# Patient Record
Sex: MALE | Race: WHITE | NOT HISPANIC OR LATINO | URBAN - METROPOLITAN AREA
[De-identification: names, ages, dates, MRNs, and addresses within clinical notes are randomized per-mention and may not be internally consistent; named-entity substitution may affect disease eponyms.]

---

## 2018-10-31 ENCOUNTER — OUTPATIENT (OUTPATIENT)
Dept: OUTPATIENT SERVICES | Facility: HOSPITAL | Age: 63
LOS: 1 days | Discharge: HOME | End: 2018-10-31

## 2018-10-31 DIAGNOSIS — I10 ESSENTIAL (PRIMARY) HYPERTENSION: ICD-10-CM

## 2019-05-02 ENCOUNTER — APPOINTMENT (OUTPATIENT)
Dept: CARDIOLOGY | Facility: CLINIC | Age: 64
End: 2019-05-02
Payer: MEDICARE

## 2019-05-02 PROCEDURE — 99214 OFFICE O/P EST MOD 30 MIN: CPT

## 2019-05-02 PROCEDURE — 93000 ELECTROCARDIOGRAM COMPLETE: CPT

## 2019-10-16 ENCOUNTER — APPOINTMENT (OUTPATIENT)
Dept: CARDIOLOGY | Facility: CLINIC | Age: 64
End: 2019-10-16
Payer: MEDICARE

## 2019-10-16 PROCEDURE — 76377 3D RENDER W/INTRP POSTPROCES: CPT

## 2019-10-16 PROCEDURE — 93306 TTE W/DOPPLER COMPLETE: CPT

## 2019-10-24 ENCOUNTER — APPOINTMENT (OUTPATIENT)
Dept: CARDIOLOGY | Facility: CLINIC | Age: 64
End: 2019-10-24
Payer: MEDICARE

## 2019-10-24 PROCEDURE — 93880 EXTRACRANIAL BILAT STUDY: CPT

## 2019-11-20 ENCOUNTER — APPOINTMENT (OUTPATIENT)
Dept: CARDIOLOGY | Facility: CLINIC | Age: 64
End: 2019-11-20
Payer: MEDICARE

## 2019-11-20 PROCEDURE — 93000 ELECTROCARDIOGRAM COMPLETE: CPT

## 2019-11-20 PROCEDURE — 99214 OFFICE O/P EST MOD 30 MIN: CPT

## 2020-05-19 ENCOUNTER — APPOINTMENT (OUTPATIENT)
Dept: CARDIOLOGY | Facility: CLINIC | Age: 65
End: 2020-05-19
Payer: MEDICARE

## 2020-05-19 PROCEDURE — 99214 OFFICE O/P EST MOD 30 MIN: CPT

## 2020-05-19 PROCEDURE — 93000 ELECTROCARDIOGRAM COMPLETE: CPT

## 2020-09-04 ENCOUNTER — RX RENEWAL (OUTPATIENT)
Age: 65
End: 2020-09-04

## 2020-10-05 ENCOUNTER — RECORD ABSTRACTING (OUTPATIENT)
Age: 65
End: 2020-10-05

## 2020-10-05 DIAGNOSIS — Z87.448 PERSONAL HISTORY OF OTHER DISEASES OF URINARY SYSTEM: ICD-10-CM

## 2020-10-05 DIAGNOSIS — I65.23 OCCLUSION AND STENOSIS OF BILATERAL CAROTID ARTERIES: ICD-10-CM

## 2020-10-05 DIAGNOSIS — Z87.898 PERSONAL HISTORY OF OTHER SPECIFIED CONDITIONS: ICD-10-CM

## 2020-10-05 DIAGNOSIS — Z87.891 PERSONAL HISTORY OF NICOTINE DEPENDENCE: ICD-10-CM

## 2020-10-05 DIAGNOSIS — Z86.79 PERSONAL HISTORY OF OTHER DISEASES OF THE CIRCULATORY SYSTEM: ICD-10-CM

## 2020-11-12 ENCOUNTER — APPOINTMENT (OUTPATIENT)
Dept: CARDIOLOGY | Facility: CLINIC | Age: 65
End: 2020-11-12
Payer: MEDICARE

## 2020-11-12 VITALS
HEART RATE: 50 BPM | TEMPERATURE: 98.5 F | SYSTOLIC BLOOD PRESSURE: 120 MMHG | WEIGHT: 238 LBS | DIASTOLIC BLOOD PRESSURE: 70 MMHG | BODY MASS INDEX: 33.32 KG/M2 | RESPIRATION RATE: 16 BRPM | OXYGEN SATURATION: 98 % | HEIGHT: 71 IN

## 2020-11-12 PROCEDURE — 99214 OFFICE O/P EST MOD 30 MIN: CPT

## 2020-11-12 PROCEDURE — 93000 ELECTROCARDIOGRAM COMPLETE: CPT

## 2020-11-12 RX ORDER — MULTIVIT-MIN/FOLIC/VIT K/LYCOP 400-300MCG
50 MCG TABLET ORAL
Refills: 0 | Status: ACTIVE | COMMUNITY

## 2020-11-12 RX ORDER — HYDROXYCHLOROQUINE SULFATE 200 MG/1
200 TABLET ORAL
Refills: 0 | Status: ACTIVE | COMMUNITY

## 2020-11-12 NOTE — HISTORY OF PRESENT ILLNESS
[FreeTextEntry1] : 65 year old male who came in for on going evaluation for HTN, MVP, hyperlipidemia and palpitations. Patient denies chest pains, cough, fever or dyspnea. The patient is currently being treated for Lupus.\par \par \par PMH is significant for:\par \par HTN - controlled\par Palpitations - stable\par MVP - stable\par Hyperlipidemia - controlled\par Lupus - stable\par \par Medications reviewed and reconciled with patient. Patient is compliant with taking appropriate medications at appropriate doses at appropriate intervals without missing doses.

## 2020-11-12 NOTE — ASSESSMENT
[FreeTextEntry1] : HTN - controlled\par Palpitations - stable\par MVP - stable\par Hyperlipidemia - controlled\par Lupus - stable

## 2020-11-12 NOTE — PHYSICAL EXAM
[General Appearance - Well Developed] : well developed [Normal Appearance] : normal appearance [Well Groomed] : well groomed [General Appearance - Well Nourished] : well nourished [No Deformities] : no deformities [General Appearance - In No Acute Distress] : no acute distress [Normal Conjunctiva] : the conjunctiva exhibited no abnormalities [Eyelids - No Xanthelasma] : the eyelids demonstrated no xanthelasmas [Normal Oral Mucosa] : normal oral mucosa [No Oral Pallor] : no oral pallor [No Oral Cyanosis] : no oral cyanosis [Normal Jugular Venous A Waves Present] : normal jugular venous A waves present [Normal Jugular Venous V Waves Present] : normal jugular venous V waves present [No Jugular Venous Sanchez A Waves] : no jugular venous sanchez A waves [Respiration, Rhythm And Depth] : normal respiratory rhythm and effort [Exaggerated Use Of Accessory Muscles For Inspiration] : no accessory muscle use [Auscultation Breath Sounds / Voice Sounds] : lungs were clear to auscultation bilaterally [Heart Rate And Rhythm] : heart rate and rhythm were normal [Heart Sounds] : normal S1 and S2 [Murmurs] : no murmurs present [Arterial Pulses Normal] : the arterial pulses were normal [Edema] : no peripheral edema present [Abdomen Soft] : soft [Abdomen Tenderness] : non-tender [Abdomen Mass (___ Cm)] : no abdominal mass palpated [Abnormal Walk] : normal gait [Gait - Sufficient For Exercise Testing] : the gait was sufficient for exercise testing [Nail Clubbing] : no clubbing of the fingernails [Cyanosis, Localized] : no localized cyanosis [Petechial Hemorrhages (___cm)] : no petechial hemorrhages [Skin Color & Pigmentation] : normal skin color and pigmentation [] : no rash [No Venous Stasis] : no venous stasis [Skin Lesions] : no skin lesions [No Skin Ulcers] : no skin ulcer [No Xanthoma] : no  xanthoma was observed [Oriented To Time, Place, And Person] : oriented to person, place, and time [Affect] : the affect was normal [Mood] : the mood was normal [No Anxiety] : not feeling anxious

## 2021-03-04 ENCOUNTER — RX RENEWAL (OUTPATIENT)
Age: 66
End: 2021-03-04

## 2021-05-12 ENCOUNTER — APPOINTMENT (OUTPATIENT)
Dept: CARDIOLOGY | Facility: CLINIC | Age: 66
End: 2021-05-12
Payer: MEDICARE

## 2021-05-12 VITALS
WEIGHT: 246 LBS | HEIGHT: 71 IN | DIASTOLIC BLOOD PRESSURE: 80 MMHG | RESPIRATION RATE: 16 BRPM | BODY MASS INDEX: 34.44 KG/M2 | HEART RATE: 59 BPM | OXYGEN SATURATION: 95 % | SYSTOLIC BLOOD PRESSURE: 126 MMHG | TEMPERATURE: 97.9 F

## 2021-05-12 PROCEDURE — 99213 OFFICE O/P EST LOW 20 MIN: CPT

## 2021-05-12 PROCEDURE — 93000 ELECTROCARDIOGRAM COMPLETE: CPT

## 2021-05-12 NOTE — DISCUSSION/SUMMARY
[With Me] : with me [___ Month(s)] : in [unfilled] month(s) [FreeTextEntry1] : Ekg results were reviewed.\par Recent lab results were reviewed.\par \par Fasting CBC, BMP, LFTs, Lipid Profile, HgbA1c, CRP, UA, Urine Microalbumin, Mg, Ca, TSH, T3, T4 prior to next visit\par f/u 6 months\par \par Number/complexity of problems -  Mod - 2 or more stable chronic illnesses\par Amount/complexity of data -history, exam, reviewed old note, labs reviewed, ekg reviewed\par Risk of complications - Low\par

## 2021-08-28 ENCOUNTER — RX RENEWAL (OUTPATIENT)
Age: 66
End: 2021-08-28

## 2021-09-18 ENCOUNTER — RX RENEWAL (OUTPATIENT)
Age: 66
End: 2021-09-18

## 2021-11-15 ENCOUNTER — APPOINTMENT (OUTPATIENT)
Dept: CARDIOLOGY | Facility: CLINIC | Age: 66
End: 2021-11-15
Payer: MEDICARE

## 2021-11-15 VITALS
WEIGHT: 227 LBS | SYSTOLIC BLOOD PRESSURE: 122 MMHG | OXYGEN SATURATION: 98 % | DIASTOLIC BLOOD PRESSURE: 70 MMHG | HEART RATE: 53 BPM | TEMPERATURE: 97.8 F | BODY MASS INDEX: 31.78 KG/M2 | HEIGHT: 71 IN

## 2021-11-15 DIAGNOSIS — Z00.00 ENCOUNTER FOR GENERAL ADULT MEDICAL EXAMINATION W/OUT ABNORMAL FINDINGS: ICD-10-CM

## 2021-11-15 PROCEDURE — 99213 OFFICE O/P EST LOW 20 MIN: CPT

## 2021-11-15 PROCEDURE — 93000 ELECTROCARDIOGRAM COMPLETE: CPT

## 2021-11-15 RX ORDER — MULTIVITAMIN
TABLET,CHEWABLE ORAL
Qty: 1 | Refills: 0 | Status: ACTIVE | COMMUNITY

## 2021-11-15 NOTE — HISTORY OF PRESENT ILLNESS
[FreeTextEntry1] : 66 year old male who came in for on going evaluation for HTN, MVP, hyperlipidemia and palpitations. Patient denies chest pains, cough, fever or dyspnea. The patient is currently being treated for Lupus.\par \par \par PMH is significant for:\par \par HTN - controlled\par Palpitations - stable\par MVP - stable\par Hyperlipidemia - controlled\par Lupus - stable\par \par Medications reviewed and reconciled with patient. Patient is compliant with taking appropriate medications at appropriate doses at appropriate intervals without missing doses.

## 2021-11-15 NOTE — DISCUSSION/SUMMARY
[With Me] : with me [___ Month(s)] : in [unfilled] month(s) [FreeTextEntry1] : Last visit note reviewed.\par Ekg results were reviewed.\par Recent lab results were reviewed.\par \par Fasting CBC, BMP, LFTs, Lipid Profile, HgbA1c, CRP, UA, Urine Microalbumin, Mg, Ca, TSH, T3, T4 prior to next visit\par f/u 6 months\par \par Number/complexity of problems -  Mod - 2 or more stable chronic illnesses\par Amount/complexity of data -history, exam, reviewed old note, labs reviewed, ekg reviewed\par Risk of complications - Low\par

## 2022-02-27 ENCOUNTER — RX RENEWAL (OUTPATIENT)
Age: 67
End: 2022-02-27

## 2022-03-24 NOTE — DISCUSSION/SUMMARY
Dear Estefania Barrios,    We are sorry you are not feeling well. Based on the responses you provided, it is recommended that you be seen in-person in urgent care so we can better evaluate your symptoms. Please click here to find the nearest urgent care location to you.   You will not be charged for this Visit. Thank you for trusting us with your care.    Niecy Price, CNP     [___ Month(s)] : [unfilled] month(s) [With Me] : with me [FreeTextEntry1] : Ekg results were reviewed.\par Recent lab results were reviewed.\par \par Fasting CBC, BMP, LFTs, Lipid Profile, HgbA1c, CRP, UA, Urine Microalbumin, Mg, Ca, TSH, T3, T4 prior to next visit\par f/u 6 months

## 2022-05-12 ENCOUNTER — APPOINTMENT (OUTPATIENT)
Dept: CARDIOLOGY | Facility: CLINIC | Age: 67
End: 2022-05-12
Payer: MEDICARE

## 2022-05-12 VITALS — SYSTOLIC BLOOD PRESSURE: 154 MMHG | HEART RATE: 80 BPM | DIASTOLIC BLOOD PRESSURE: 79 MMHG

## 2022-05-12 VITALS
DIASTOLIC BLOOD PRESSURE: 72 MMHG | TEMPERATURE: 96.8 F | BODY MASS INDEX: 32.34 KG/M2 | SYSTOLIC BLOOD PRESSURE: 122 MMHG | OXYGEN SATURATION: 95 % | HEIGHT: 71 IN | HEART RATE: 58 BPM | WEIGHT: 231 LBS

## 2022-05-12 VITALS — DIASTOLIC BLOOD PRESSURE: 71 MMHG | HEART RATE: 60 BPM | SYSTOLIC BLOOD PRESSURE: 146 MMHG

## 2022-05-12 VITALS — DIASTOLIC BLOOD PRESSURE: 80 MMHG | SYSTOLIC BLOOD PRESSURE: 146 MMHG

## 2022-05-12 DIAGNOSIS — E78.5 HYPERLIPIDEMIA, UNSPECIFIED: ICD-10-CM

## 2022-05-12 PROCEDURE — 99214 OFFICE O/P EST MOD 30 MIN: CPT

## 2022-05-12 PROCEDURE — 93000 ELECTROCARDIOGRAM COMPLETE: CPT

## 2022-05-12 NOTE — ASSESSMENT
[FreeTextEntry1] : HTN - uncontrolled\par Palpitations - stable\par MVP - stable\par Hyperlipidemia - controlled\par Lupus - stable\par Dizziness - new

## 2022-05-12 NOTE — HISTORY OF PRESENT ILLNESS
[FreeTextEntry1] : 67 year old male who came in for on going evaluation for HTN, MVP, hyperlipidemia and palpitations. Patient denies chest pains, cough, fever or dyspnea. The patient is currently being treated for Lupus.  The patient fell about 8 weeks ago and feels woozy when he stands up quickly.  He also gets diizzy when he looks up and to the right.  The patient did not tke his BP meds today and was due for them about 30 min. ago.\par \par \par PMH is significant for:\par \par HTN - uncontrolled\par Palpitations - stable\par MVP - stable\par Hyperlipidemia - controlled\par Lupus - stable\par Dizziness - new\par \par Medications reviewed and reconciled with patient. Patient is compliant with taking appropriate medications at appropriate doses at appropriate intervals without missing doses.

## 2022-06-14 ENCOUNTER — APPOINTMENT (OUTPATIENT)
Dept: CARDIOLOGY | Facility: CLINIC | Age: 67
End: 2022-06-14
Payer: MEDICARE

## 2022-06-14 PROCEDURE — 93880 EXTRACRANIAL BILAT STUDY: CPT

## 2022-06-14 PROCEDURE — 93306 TTE W/DOPPLER COMPLETE: CPT

## 2022-06-22 ENCOUNTER — APPOINTMENT (OUTPATIENT)
Dept: CARDIOLOGY | Facility: CLINIC | Age: 67
End: 2022-06-22
Payer: MEDICARE

## 2022-06-22 VITALS
SYSTOLIC BLOOD PRESSURE: 124 MMHG | OXYGEN SATURATION: 97 % | BODY MASS INDEX: 31.5 KG/M2 | TEMPERATURE: 98.7 F | HEIGHT: 71 IN | DIASTOLIC BLOOD PRESSURE: 68 MMHG | HEART RATE: 82 BPM | WEIGHT: 225 LBS | RESPIRATION RATE: 16 BRPM

## 2022-06-22 VITALS — DIASTOLIC BLOOD PRESSURE: 78 MMHG | HEART RATE: 72 BPM | SYSTOLIC BLOOD PRESSURE: 132 MMHG

## 2022-06-22 VITALS — DIASTOLIC BLOOD PRESSURE: 67 MMHG | SYSTOLIC BLOOD PRESSURE: 118 MMHG | HEART RATE: 62 BPM

## 2022-06-22 VITALS — DIASTOLIC BLOOD PRESSURE: 64 MMHG | SYSTOLIC BLOOD PRESSURE: 123 MMHG | HEART RATE: 56 BPM

## 2022-06-22 PROCEDURE — 93000 ELECTROCARDIOGRAM COMPLETE: CPT

## 2022-06-22 PROCEDURE — 99214 OFFICE O/P EST MOD 30 MIN: CPT

## 2022-06-22 RX ORDER — MECLIZINE HYDROCHLORIDE 25 MG/1
25 TABLET ORAL 3 TIMES DAILY
Qty: 30 | Refills: 1 | Status: ACTIVE | COMMUNITY
Start: 2022-06-22 | End: 1900-01-01

## 2022-06-22 NOTE — ASSESSMENT
[FreeTextEntry1] : HTN - controlled\par Palpitations - stable\par MVP - stable\par Hyperlipidemia - controlled\par Lupus - stable\par Dizziness - continuing

## 2022-06-22 NOTE — DISCUSSION/SUMMARY
[With Me] : with me [___ Month(s)] : in [unfilled] month(s) [FreeTextEntry1] : Last visit note reviewed.\par Echocardiogram results were reviewed.\par Carotid artery duplex results were reviewed.\par Ekg was performed and interpreted. (sinus bradycardia, Non-specific T abnormality)\par \par Refer to ENT and neurology\par Meclizine 25 mg po tid\par Fasting CBC, BMP, LFTs, Lipid Profile, HgbA1c, CRP, UA, Urine Microalbumin, Mg, Ca, TSH, T3, T4 prior to next visit\par f/u 6 months\par \par Number/complexity of problems - Mod - 2 or more chronic stable illnesses\par Amount/complexity of data -history, exam, reviewed old note, ekg reviewed, echo reviewed, carotid reviewed\par Medication modification performed\par Risk of complications - Mod\par \par

## 2022-06-22 NOTE — HISTORY OF PRESENT ILLNESS
[FreeTextEntry1] : 67 year old male who came in for on going evaluation for HTN, MVP, hyperlipidemia and palpitations. Patient denies chest pains, cough, fever or dyspnea. The patient is currently being treated for Lupus.  The patient fell about 8 weeks ago and feels woozy when he stands up quickly.  He also gets diizzy when he looks up and to the right.  The patient did not tke his BP meds today and was due for them about 30 min. ago.\par \par \par PMH is significant for:\par \par HTN - controlled\par Palpitations - stable\par MVP - stable\par Hyperlipidemia - controlled\par Lupus - stable\par Dizziness - continuing\par \par Medications reviewed and reconciled with patient. Patient is compliant with taking appropriate medications at appropriate doses at appropriate intervals without missing doses.

## 2022-08-03 ENCOUNTER — APPOINTMENT (OUTPATIENT)
Dept: OTOLARYNGOLOGY | Facility: CLINIC | Age: 67
End: 2022-08-03

## 2022-08-03 VITALS — BODY MASS INDEX: 31.36 KG/M2 | HEIGHT: 71 IN | WEIGHT: 224 LBS

## 2022-08-03 PROCEDURE — 99203 OFFICE O/P NEW LOW 30 MIN: CPT

## 2022-08-03 NOTE — HISTORY OF PRESENT ILLNESS
[de-identified] : Patient presents today c/o dizziness.  He has been experiencing  dizziness when  standing  up . Dizziness when looking up .  Has been going on for  a while . Denies room  spinning , he gets  off balance .   Started  after Covid booster shots most recent 5/16/22 ,  10/20/21. Was advise not to get get  recent covid booster   Denies any nausea . Hard time taking  showers in the morning , gets  very  dizzy .  Dr Gisselle Sims  noticed that when he  stands up  blood  pressure drops . He  sees Dr Gisselle Sanchez for lupus taking hydrochloroquine . Feels better when drinking beer ,  has been drinking beer daily at night. Drinking water  during day.

## 2022-08-03 NOTE — PHYSICAL EXAM
[Normal] : mucosa is normal [Midline] : trachea located in midline position [] : [place] test is negative

## 2022-09-06 ENCOUNTER — RX RENEWAL (OUTPATIENT)
Age: 67
End: 2022-09-06

## 2022-09-12 ENCOUNTER — APPOINTMENT (OUTPATIENT)
Dept: SPEECH THERAPY | Facility: CLINIC | Age: 67
End: 2022-09-12

## 2022-09-12 ENCOUNTER — OUTPATIENT (OUTPATIENT)
Dept: OUTPATIENT SERVICES | Facility: HOSPITAL | Age: 67
LOS: 1 days | Discharge: HOME | End: 2022-09-12

## 2022-09-13 DIAGNOSIS — H91.23 SUDDEN IDIOPATHIC HEARING LOSS, BILATERAL: ICD-10-CM

## 2022-09-13 DIAGNOSIS — R42 DIZZINESS AND GIDDINESS: ICD-10-CM

## 2022-09-21 ENCOUNTER — APPOINTMENT (OUTPATIENT)
Dept: OTOLARYNGOLOGY | Facility: CLINIC | Age: 67
End: 2022-09-21

## 2022-09-21 VITALS — BODY MASS INDEX: 31.36 KG/M2 | HEIGHT: 71 IN | WEIGHT: 224 LBS

## 2022-09-21 DIAGNOSIS — R42 DIZZINESS AND GIDDINESS: ICD-10-CM

## 2022-09-21 DIAGNOSIS — H90.5 UNSPECIFIED SENSORINEURAL HEARING LOSS: ICD-10-CM

## 2022-09-21 PROCEDURE — 99215 OFFICE O/P EST HI 40 MIN: CPT

## 2022-09-21 NOTE — REASON FOR VISIT
12 hour chart check   [Subsequent Evaluation] : a subsequent evaluation for [FreeTextEntry2] : dizziness

## 2022-09-21 NOTE — HISTORY OF PRESENT ILLNESS
[FreeTextEntry1] : Patient returns today following up on dizziness. Patient is here to discuss results of hearing and VNG testing.

## 2022-09-21 NOTE — ASSESSMENT
[FreeTextEntry1] : Patient still dizzy. \par \par I reviewed, interpreted, and discussed the Audiogram done on sep 12. Bilateral SNHL.\par \par discussed hearing aids. Patient does not want them now.\par \par I personally reviewed, interpreted and discussed patient's VNG. In favor of BPPV, however, central etiology could not be ruled out.\par \par Patient to go to neurology.\par \par

## 2022-10-19 ENCOUNTER — APPOINTMENT (OUTPATIENT)
Dept: OTOLARYNGOLOGY | Facility: CLINIC | Age: 67
End: 2022-10-19

## 2022-12-21 ENCOUNTER — APPOINTMENT (OUTPATIENT)
Dept: CARDIOLOGY | Facility: CLINIC | Age: 67
End: 2022-12-21

## 2022-12-21 VITALS
HEART RATE: 52 BPM | DIASTOLIC BLOOD PRESSURE: 70 MMHG | SYSTOLIC BLOOD PRESSURE: 130 MMHG | WEIGHT: 220 LBS | BODY MASS INDEX: 30.8 KG/M2 | OXYGEN SATURATION: 97 % | HEIGHT: 71 IN

## 2022-12-21 DIAGNOSIS — I10 ESSENTIAL (PRIMARY) HYPERTENSION: ICD-10-CM

## 2022-12-21 DIAGNOSIS — M32.9 SYSTEMIC LUPUS ERYTHEMATOSUS, UNSPECIFIED: ICD-10-CM

## 2022-12-21 DIAGNOSIS — I65.29 OCCLUSION AND STENOSIS OF UNSPECIFIED CAROTID ARTERY: ICD-10-CM

## 2022-12-21 DIAGNOSIS — Z78.9 OTHER SPECIFIED HEALTH STATUS: ICD-10-CM

## 2022-12-21 DIAGNOSIS — E78.5 HYPERLIPIDEMIA, UNSPECIFIED: ICD-10-CM

## 2022-12-21 PROCEDURE — 99214 OFFICE O/P EST MOD 30 MIN: CPT

## 2022-12-21 PROCEDURE — 93000 ELECTROCARDIOGRAM COMPLETE: CPT

## 2022-12-21 NOTE — HISTORY OF PRESENT ILLNESS
[FreeTextEntry1] : Mr. Sanchez is a 67-year-old man with history of MVP, HTN, HLD, and SLE presenting for follow up.\par \par Patient previously followed with Dr. Sims and was last seen in office 6/2022.\par Today, feels overall well other than chronic knee pain/arthritis.\par \par RA Duplex 2019\par - No ROLAN, normal sized kidneys\par \par TTE 6/2022\par - Normal biventricular function, mild LA dilatation\par \par Carotid Doppler 6/2022\par - Bilateral ICA stenosis <50%\par \par Labs:\par - , , , HDL 71\par - Cr 0.9, K 4.0\par - TSH 1.54, CRP 2.5 (<8)\par \par Notable Meds:\par - Valsartan 320mg\par - Chlorthalidone 12.5mg daily\par - Metoprolol XL 100mg daily\par - Plaquenil 400mg daily\par  GOAL: Patient will demonstrate a 1/3 increase in strength where deficient within 4 weeks to assist with performing functional mobility and ADLs. surgery

## 2022-12-21 NOTE — ASSESSMENT
[FreeTextEntry1] : Mr. Sanchez is a 67-year-old man with history of MVP, HTN, HLD, and SLE presenting for follow up.\par \par Impression:\par (1) Systolic murmur, aortic sclerosis vs stenosis\par (2) MVP based on prior TTE, no reported regurgitation\par (3) HTN, well controlled\par (4) HLD, well controlled, goal LDL <100\par (5) SLE, on Plaquenil\par (6) Carotid artery disease\par \par Plan:\par - TTE\par - Carotid artery duplex\par - Continue chlorthalidone, valsartan, metoprolol\par - ENT follow up for vertigo\par - Cardiometabolic labs\par \par RTC 6-12 months

## 2022-12-21 NOTE — PHYSICAL EXAM
[Well Developed] : well developed [Well Nourished] : well nourished [No Acute Distress] : no acute distress [Normal Conjunctiva] : normal conjunctiva [Normal Venous Pressure] : normal venous pressure [No Carotid Bruit] : no carotid bruit [Normal S1, S2] : normal S1, S2 [No Rub] : no rub [No Gallop] : no gallop [Clear Lung Fields] : clear lung fields [Good Air Entry] : good air entry [No Respiratory Distress] : no respiratory distress  [Soft] : abdomen soft [Non Tender] : non-tender [No Masses/organomegaly] : no masses/organomegaly [Normal Bowel Sounds] : normal bowel sounds [Normal Gait] : normal gait [No Edema] : no edema [No Cyanosis] : no cyanosis [No Clubbing] : no clubbing [No Varicosities] : no varicosities [No Rash] : no rash [No Skin Lesions] : no skin lesions [Moves all extremities] : moves all extremities [No Focal Deficits] : no focal deficits [Normal Speech] : normal speech [Alert and Oriented] : alert and oriented [Normal memory] : normal memory [de-identified] : 2/6 systolic murmur USBs

## 2023-01-13 ENCOUNTER — APPOINTMENT (OUTPATIENT)
Dept: NEUROLOGY | Facility: CLINIC | Age: 68
End: 2023-01-13

## 2023-05-15 RX ORDER — METOPROLOL SUCCINATE 100 MG/1
100 TABLET, EXTENDED RELEASE ORAL
Qty: 90 | Refills: 3 | Status: ACTIVE | COMMUNITY
Start: 2020-09-04 | End: 1900-01-01

## 2023-08-30 RX ORDER — VALSARTAN 320 MG/1
320 TABLET, COATED ORAL
Qty: 90 | Refills: 3 | Status: ACTIVE | COMMUNITY
Start: 2021-03-04 | End: 1900-01-01

## 2023-08-30 RX ORDER — CHLORTHALIDONE 25 MG/1
25 TABLET ORAL
Qty: 45 | Refills: 3 | Status: ACTIVE | COMMUNITY
Start: 2021-09-18 | End: 1900-01-01

## 2023-12-06 ENCOUNTER — APPOINTMENT (OUTPATIENT)
Dept: CARDIOLOGY | Facility: CLINIC | Age: 68
End: 2023-12-06

## 2023-12-06 ENCOUNTER — EMERGENCY (EMERGENCY)
Facility: HOSPITAL | Age: 68
LOS: 0 days | Discharge: LEFT BEFORE DISCHARGE | End: 2023-12-07
Attending: EMERGENCY MEDICINE
Payer: MEDICARE

## 2023-12-06 VITALS
TEMPERATURE: 98 F | OXYGEN SATURATION: 97 % | HEART RATE: 70 BPM | RESPIRATION RATE: 18 BRPM | WEIGHT: 222.01 LBS | DIASTOLIC BLOOD PRESSURE: 64 MMHG | SYSTOLIC BLOOD PRESSURE: 142 MMHG

## 2023-12-06 DIAGNOSIS — S22.42XA MULTIPLE FRACTURES OF RIBS, LEFT SIDE, INITIAL ENCOUNTER FOR CLOSED FRACTURE: ICD-10-CM
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
## 2023-12-06 DIAGNOSIS — Z53.29 PROCEDURE AND TREATMENT NOT CARRIED OUT BECAUSE OF PATIENT'S DECISION FOR OTHER REASONS: ICD-10-CM

## 2023-12-06 DIAGNOSIS — S09.90XA UNSPECIFIED INJURY OF HEAD, INITIAL ENCOUNTER: ICD-10-CM

## 2023-12-06 DIAGNOSIS — S00.01XA ABRASION OF SCALP, INITIAL ENCOUNTER: ICD-10-CM

## 2023-12-06 DIAGNOSIS — M32.9 SYSTEMIC LUPUS ERYTHEMATOSUS, UNSPECIFIED: ICD-10-CM
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
## 2023-12-06 DIAGNOSIS — R42 DIZZINESS AND GIDDINESS: ICD-10-CM

## 2023-12-06 DIAGNOSIS — I10 ESSENTIAL (PRIMARY) HYPERTENSION: ICD-10-CM

## 2023-12-06 DIAGNOSIS — Z87.891 PERSONAL HISTORY OF NICOTINE DEPENDENCE: ICD-10-CM

## 2023-12-06 DIAGNOSIS — Z23 ENCOUNTER FOR IMMUNIZATION: ICD-10-CM

## 2023-12-06 DIAGNOSIS — Z91.81 HISTORY OF FALLING: ICD-10-CM

## 2023-12-06 DIAGNOSIS — E78.5 HYPERLIPIDEMIA, UNSPECIFIED: ICD-10-CM

## 2023-12-06 DIAGNOSIS — R07.81 PLEURODYNIA: ICD-10-CM

## 2023-12-06 LAB
ALBUMIN SERPL ELPH-MCNC: 4.4 G/DL — SIGNIFICANT CHANGE UP (ref 3.5–5.2)
ALBUMIN SERPL ELPH-MCNC: 4.4 G/DL — SIGNIFICANT CHANGE UP (ref 3.5–5.2)
ALP SERPL-CCNC: 133 U/L — HIGH (ref 30–115)
ALP SERPL-CCNC: 133 U/L — HIGH (ref 30–115)
ALT FLD-CCNC: 11 U/L — SIGNIFICANT CHANGE UP (ref 0–41)
ALT FLD-CCNC: 11 U/L — SIGNIFICANT CHANGE UP (ref 0–41)
ANION GAP SERPL CALC-SCNC: 10 MMOL/L — SIGNIFICANT CHANGE UP (ref 7–14)
ANION GAP SERPL CALC-SCNC: 10 MMOL/L — SIGNIFICANT CHANGE UP (ref 7–14)
APTT BLD: 30.3 SEC — SIGNIFICANT CHANGE UP (ref 27–39.2)
APTT BLD: 30.3 SEC — SIGNIFICANT CHANGE UP (ref 27–39.2)
AST SERPL-CCNC: 28 U/L — SIGNIFICANT CHANGE UP (ref 0–41)
AST SERPL-CCNC: 28 U/L — SIGNIFICANT CHANGE UP (ref 0–41)
BASOPHILS # BLD AUTO: 0.05 K/UL — SIGNIFICANT CHANGE UP (ref 0–0.2)
BASOPHILS # BLD AUTO: 0.05 K/UL — SIGNIFICANT CHANGE UP (ref 0–0.2)
BASOPHILS NFR BLD AUTO: 0.4 % — SIGNIFICANT CHANGE UP (ref 0–1)
BASOPHILS NFR BLD AUTO: 0.4 % — SIGNIFICANT CHANGE UP (ref 0–1)
BILIRUB SERPL-MCNC: 0.5 MG/DL — SIGNIFICANT CHANGE UP (ref 0.2–1.2)
BILIRUB SERPL-MCNC: 0.5 MG/DL — SIGNIFICANT CHANGE UP (ref 0.2–1.2)
BUN SERPL-MCNC: 19 MG/DL — SIGNIFICANT CHANGE UP (ref 10–20)
BUN SERPL-MCNC: 19 MG/DL — SIGNIFICANT CHANGE UP (ref 10–20)
CALCIUM SERPL-MCNC: 9.4 MG/DL — SIGNIFICANT CHANGE UP (ref 8.4–10.4)
CALCIUM SERPL-MCNC: 9.4 MG/DL — SIGNIFICANT CHANGE UP (ref 8.4–10.4)
CHLORIDE SERPL-SCNC: 103 MMOL/L — SIGNIFICANT CHANGE UP (ref 98–110)
CHLORIDE SERPL-SCNC: 103 MMOL/L — SIGNIFICANT CHANGE UP (ref 98–110)
CO2 SERPL-SCNC: 28 MMOL/L — SIGNIFICANT CHANGE UP (ref 17–32)
CO2 SERPL-SCNC: 28 MMOL/L — SIGNIFICANT CHANGE UP (ref 17–32)
CREAT SERPL-MCNC: 0.8 MG/DL — SIGNIFICANT CHANGE UP (ref 0.7–1.5)
CREAT SERPL-MCNC: 0.8 MG/DL — SIGNIFICANT CHANGE UP (ref 0.7–1.5)
EGFR: 95 ML/MIN/1.73M2 — SIGNIFICANT CHANGE UP
EGFR: 95 ML/MIN/1.73M2 — SIGNIFICANT CHANGE UP
EOSINOPHIL # BLD AUTO: 0.09 K/UL — SIGNIFICANT CHANGE UP (ref 0–0.7)
EOSINOPHIL # BLD AUTO: 0.09 K/UL — SIGNIFICANT CHANGE UP (ref 0–0.7)
EOSINOPHIL NFR BLD AUTO: 0.8 % — SIGNIFICANT CHANGE UP (ref 0–8)
EOSINOPHIL NFR BLD AUTO: 0.8 % — SIGNIFICANT CHANGE UP (ref 0–8)
GLUCOSE SERPL-MCNC: 100 MG/DL — HIGH (ref 70–99)
GLUCOSE SERPL-MCNC: 100 MG/DL — HIGH (ref 70–99)
HCT VFR BLD CALC: 39.1 % — LOW (ref 42–52)
HCT VFR BLD CALC: 39.1 % — LOW (ref 42–52)
HGB BLD-MCNC: 13.1 G/DL — LOW (ref 14–18)
HGB BLD-MCNC: 13.1 G/DL — LOW (ref 14–18)
IMM GRANULOCYTES NFR BLD AUTO: 0.3 % — SIGNIFICANT CHANGE UP (ref 0.1–0.3)
IMM GRANULOCYTES NFR BLD AUTO: 0.3 % — SIGNIFICANT CHANGE UP (ref 0.1–0.3)
INR BLD: 1.05 RATIO — SIGNIFICANT CHANGE UP (ref 0.65–1.3)
INR BLD: 1.05 RATIO — SIGNIFICANT CHANGE UP (ref 0.65–1.3)
LYMPHOCYTES # BLD AUTO: 2.58 K/UL — SIGNIFICANT CHANGE UP (ref 1.2–3.4)
LYMPHOCYTES # BLD AUTO: 2.58 K/UL — SIGNIFICANT CHANGE UP (ref 1.2–3.4)
LYMPHOCYTES # BLD AUTO: 22.3 % — SIGNIFICANT CHANGE UP (ref 20.5–51.1)
LYMPHOCYTES # BLD AUTO: 22.3 % — SIGNIFICANT CHANGE UP (ref 20.5–51.1)
MCHC RBC-ENTMCNC: 31.1 PG — HIGH (ref 27–31)
MCHC RBC-ENTMCNC: 31.1 PG — HIGH (ref 27–31)
MCHC RBC-ENTMCNC: 33.5 G/DL — SIGNIFICANT CHANGE UP (ref 32–37)
MCHC RBC-ENTMCNC: 33.5 G/DL — SIGNIFICANT CHANGE UP (ref 32–37)
MCV RBC AUTO: 92.9 FL — SIGNIFICANT CHANGE UP (ref 80–94)
MCV RBC AUTO: 92.9 FL — SIGNIFICANT CHANGE UP (ref 80–94)
MONOCYTES # BLD AUTO: 0.75 K/UL — HIGH (ref 0.1–0.6)
MONOCYTES # BLD AUTO: 0.75 K/UL — HIGH (ref 0.1–0.6)
MONOCYTES NFR BLD AUTO: 6.5 % — SIGNIFICANT CHANGE UP (ref 1.7–9.3)
MONOCYTES NFR BLD AUTO: 6.5 % — SIGNIFICANT CHANGE UP (ref 1.7–9.3)
NEUTROPHILS # BLD AUTO: 8.05 K/UL — HIGH (ref 1.4–6.5)
NEUTROPHILS # BLD AUTO: 8.05 K/UL — HIGH (ref 1.4–6.5)
NEUTROPHILS NFR BLD AUTO: 69.7 % — SIGNIFICANT CHANGE UP (ref 42.2–75.2)
NEUTROPHILS NFR BLD AUTO: 69.7 % — SIGNIFICANT CHANGE UP (ref 42.2–75.2)
NRBC # BLD: 0 /100 WBCS — SIGNIFICANT CHANGE UP (ref 0–0)
NRBC # BLD: 0 /100 WBCS — SIGNIFICANT CHANGE UP (ref 0–0)
PLATELET # BLD AUTO: 287 K/UL — SIGNIFICANT CHANGE UP (ref 130–400)
PLATELET # BLD AUTO: 287 K/UL — SIGNIFICANT CHANGE UP (ref 130–400)
PMV BLD: 9.9 FL — SIGNIFICANT CHANGE UP (ref 7.4–10.4)
PMV BLD: 9.9 FL — SIGNIFICANT CHANGE UP (ref 7.4–10.4)
POTASSIUM SERPL-MCNC: 3.6 MMOL/L — SIGNIFICANT CHANGE UP (ref 3.5–5)
POTASSIUM SERPL-MCNC: 3.6 MMOL/L — SIGNIFICANT CHANGE UP (ref 3.5–5)
POTASSIUM SERPL-SCNC: 3.6 MMOL/L — SIGNIFICANT CHANGE UP (ref 3.5–5)
POTASSIUM SERPL-SCNC: 3.6 MMOL/L — SIGNIFICANT CHANGE UP (ref 3.5–5)
PROT SERPL-MCNC: 7 G/DL — SIGNIFICANT CHANGE UP (ref 6–8)
PROT SERPL-MCNC: 7 G/DL — SIGNIFICANT CHANGE UP (ref 6–8)
PROTHROM AB SERPL-ACNC: 12 SEC — SIGNIFICANT CHANGE UP (ref 9.95–12.87)
PROTHROM AB SERPL-ACNC: 12 SEC — SIGNIFICANT CHANGE UP (ref 9.95–12.87)
RBC # BLD: 4.21 M/UL — LOW (ref 4.7–6.1)
RBC # BLD: 4.21 M/UL — LOW (ref 4.7–6.1)
RBC # FLD: 13.2 % — SIGNIFICANT CHANGE UP (ref 11.5–14.5)
RBC # FLD: 13.2 % — SIGNIFICANT CHANGE UP (ref 11.5–14.5)
SODIUM SERPL-SCNC: 141 MMOL/L — SIGNIFICANT CHANGE UP (ref 135–146)
SODIUM SERPL-SCNC: 141 MMOL/L — SIGNIFICANT CHANGE UP (ref 135–146)
WBC # BLD: 11.55 K/UL — HIGH (ref 4.8–10.8)
WBC # BLD: 11.55 K/UL — HIGH (ref 4.8–10.8)
WBC # FLD AUTO: 11.55 K/UL — HIGH (ref 4.8–10.8)
WBC # FLD AUTO: 11.55 K/UL — HIGH (ref 4.8–10.8)

## 2023-12-06 PROCEDURE — 90715 TDAP VACCINE 7 YRS/> IM: CPT

## 2023-12-06 PROCEDURE — 70450 CT HEAD/BRAIN W/O DYE: CPT | Mod: MA

## 2023-12-06 PROCEDURE — 71260 CT THORAX DX C+: CPT | Mod: MA

## 2023-12-06 PROCEDURE — 71101 X-RAY EXAM UNILAT RIBS/CHEST: CPT | Mod: LT

## 2023-12-06 PROCEDURE — 36415 COLL VENOUS BLD VENIPUNCTURE: CPT

## 2023-12-06 PROCEDURE — 74177 CT ABD & PELVIS W/CONTRAST: CPT | Mod: MA

## 2023-12-06 PROCEDURE — 72125 CT NECK SPINE W/O DYE: CPT | Mod: MA

## 2023-12-06 PROCEDURE — 80053 COMPREHEN METABOLIC PANEL: CPT

## 2023-12-06 PROCEDURE — 85025 COMPLETE CBC W/AUTO DIFF WBC: CPT

## 2023-12-06 PROCEDURE — 99284 EMERGENCY DEPT VISIT MOD MDM: CPT

## 2023-12-06 PROCEDURE — 90471 IMMUNIZATION ADMIN: CPT

## 2023-12-06 PROCEDURE — 99285 EMERGENCY DEPT VISIT HI MDM: CPT | Mod: FS

## 2023-12-06 PROCEDURE — 74177 CT ABD & PELVIS W/CONTRAST: CPT | Mod: 26,MA

## 2023-12-06 PROCEDURE — 99284 EMERGENCY DEPT VISIT MOD MDM: CPT | Mod: 25

## 2023-12-06 PROCEDURE — 85610 PROTHROMBIN TIME: CPT

## 2023-12-06 PROCEDURE — 71101 X-RAY EXAM UNILAT RIBS/CHEST: CPT | Mod: 26,LT

## 2023-12-06 PROCEDURE — 85730 THROMBOPLASTIN TIME PARTIAL: CPT

## 2023-12-06 PROCEDURE — 72125 CT NECK SPINE W/O DYE: CPT | Mod: 26,MA

## 2023-12-06 PROCEDURE — 70450 CT HEAD/BRAIN W/O DYE: CPT | Mod: 26,MA

## 2023-12-06 PROCEDURE — 71260 CT THORAX DX C+: CPT | Mod: 26,MA

## 2023-12-06 RX ORDER — TETANUS TOXOID, REDUCED DIPHTHERIA TOXOID AND ACELLULAR PERTUSSIS VACCINE, ADSORBED 5; 2.5; 8; 8; 2.5 [IU]/.5ML; [IU]/.5ML; UG/.5ML; UG/.5ML; UG/.5ML
0.5 SUSPENSION INTRAMUSCULAR ONCE
Refills: 0 | Status: COMPLETED | OUTPATIENT
Start: 2023-12-06 | End: 2023-12-06

## 2023-12-06 RX ADMIN — TETANUS TOXOID, REDUCED DIPHTHERIA TOXOID AND ACELLULAR PERTUSSIS VACCINE, ADSORBED 0.5 MILLILITER(S): 5; 2.5; 8; 8; 2.5 SUSPENSION INTRAMUSCULAR at 17:06

## 2023-12-06 NOTE — ED PROVIDER NOTE - OBJECTIVE STATEMENT
67 yo male with a pmh of htn and hld presents after a mechanical fall. pt states to have hit his head with no LOC. pt mentions to have had a mechanical fall several days prior as well and notes R rib pain from that fall. pt denies any other symptoms including fevers, chill, headache, recent illness/travel, cough, abdominal pain, chest pain, or SOB. 69 yo male with a pmh of htn and hld presents after a mechanical fall. pt states to have hit his head with no LOC. pt mentions to have had a mechanical fall several days prior as well and notes R rib pain from that fall. pt denies any other symptoms including fevers, chill, headache, recent illness/travel, cough, abdominal pain, chest pain, or SOB.

## 2023-12-06 NOTE — ED ADULT TRIAGE NOTE - CHIEF COMPLAINT QUOTE
Patient walk into ED steady gait a+ox3 in NAD sp trip and fall outside with head injury- denies AC use/LOC

## 2023-12-06 NOTE — ED PROVIDER NOTE - PROGRESS NOTE DETAILS
Pt s/o to Dr. Haddad to f/u trauma surg eval. Signed out to me by Dr. Arce to follow-up trauma, reassess and dispo. discussed incidental c2 lucency with pt, pt will follow up outpatient.   I had extensive discussion of risks and benefits of pursuing further medical evaluation and/or care with patient and any available family/friends; patient still electing to leave against medical advice. Patient is awake, alert, oriented and demonstrates full capacity and insight into illness. Patient aware and encouraged to return immediately to ED or nearest ED if patient decides to change mind regarding care or if patient experiences any new, worsening, or concerning symptoms.

## 2023-12-06 NOTE — CONSULT NOTE ADULT - ATTENDING COMMENTS
Trauma/ACS Attending Note Attestation    Patient is examined and evaluated at the bedside at 11:15 PM. Treatment plan discussed with the team, nurses, and consulting physicians and consulting teams. Medications, radiological studies and all other relevant studies reviewed.     68y Male with PMH HTN, HLD, ?Lupus who presents s/p fall. Unclear if syncopal or mechanical. States he was getting out of chair when he fell and hit the backside of his head. Has been having frequent dizziness spells he believes is due to his blood pressure medications, but he could not say if he was dizzy during this fall. He saw his cardiologist who recommended he go to the ED for evaluation for his fall. Patient reports he also fell a week prior to presentation on his left side. No chest pain or SOB at present. Ambulated after fall without issue. No numbness, paresthesias.    I examined the patient independently and agree with the physical exam findings above. He pulled over 3L on IS during my examination.                          13.1   11.55 )-----------( 287      ( 06 Dec 2023 19:47 )             39.1     12-06    141  |  103  |  19  ----------------------------<  100<H>  3.6   |  28  |  0.8    Ca    9.4      06 Dec 2023 19:47    TPro  7.0  /  Alb  4.4  /  TBili  0.5  /  DBili  x   /  AST  28  /  ALT  11  /  AlkPhos  133<H>  12-06      CXR reviewed and interpreted by me - no hemothorax/pneumothorax  CTH/Csp reviewed and interpreted by me - no acute traumatic injuries  CT C/A/P reviewed and interpreted by me - scattered old rib fractures, acute to subacute L lateral fifth rib fracture    Assessment/Plan:  68y Male PMH HTN, HLD, ?Lupus s/p fall. Unclear if mechanical or syncopal. Found to have scalp abrasion and acute to subacute L lateral 5th rib fracture. Pulling over 3L on IS. No chest pain or tenderness. No indication for admission to trauma service. Recommend medical evaluation for syncope and blood pressure medication adjustment if needed.    Jesse Wise MD  Trauma/ACS/Surgical Critical Care Attending

## 2023-12-06 NOTE — ED PROVIDER NOTE - CONSIDERATION OF ADMISSION OBSERVATION
recommended admission for further evaluation and possible rehab vs home care services but pt refused, leaving AMA Consideration of Admission/Observation

## 2023-12-06 NOTE — ED PROVIDER NOTE - PHYSICAL EXAMINATION
Gen: NAD, AOx3  Head: NCAT  HEENT: PERRL, oral mucosa moist, normal conjunctiva, oropharynx clear without exudate or erythema  Lung: CTAB, no respiratory distress, no wheezing, rales, rhonchi  CV: normal s1/s2, rrr, Normal perfusion, pulses 2+ throughout  Abd: soft, NTND, no CVA tenderness  Genitourinary: no pelvic tenderness  MSK: No edema, no visible deformities, full range of motion in all 4 extremities, no spinal tenderness, TTP R lower ribs   Neuro: CN II-XII grossly intact, No focal neurologic deficits, sensation intact, strength 5/5 BUE/BLE  Skin: No rash   Psych: normal affect

## 2023-12-06 NOTE — ED PROVIDER NOTE - NS ED ATTENDING STATEMENT MOD
This was a shared visit with the JERE. I reviewed and verified the documentation and independently performed the documented:

## 2023-12-06 NOTE — ED PROVIDER NOTE - NSFOLLOWUPCLINICS_GEN_ALL_ED_FT
[facility]  Trauma Surgery  [address] [place] [address]  Phone: [phone]  Fax:   Follow Up Time: 1-3 Days     [facility]  Trauma Surgery  [address] [place] [address]  Phone: [phone]  Fax:   Follow Up Time: 1-3 Days

## 2023-12-06 NOTE — ED PROVIDER NOTE - CLINICAL SUMMARY MEDICAL DECISION MAKING FREE TEXT BOX
68-year-old male presented for evaluation status post fall, imaging without acute pathology noted, patient with subacute rib fracture, evaluated by trauma, cleared.  Recommended admission to medical service for further evaluation and possible home services given falls.  Patient adamantly refusing, discussed at length risks of discharge however patient ANO x 3, states he does not want to be admitted or receive any home care.  Advised close follow-up for reevaluation with PMD and patient agreed. The patient wished to leave against medical advice.  I have discussed the risks, benefits and alternatives (including the possibility of worsening of disease, pain, permanent disability, and/or death) with the patient.  The patient voiced understanding of these risks, benefits, and alternatives and still wishes to sign out against medical advice.  The patient was awake, alert, oriented  x 3 and has demonstrated capacity to refuse/direct care.  I advised the patient that they can and should return immediately should they develop any worse/different/additional symptoms, or if they change their mind and want to continue their care. Strict return precautions advised and pt verbalized understanding. 68-year-old male presented for evaluation status post fall, imaging without acute pathology noted, patient with acute and subacute rib fractures, evaluated by trauma, cleared.  Recommended admission to medical service for further evaluation and possible home services given falls.  Patient adamantly refusing, discussed at length risks of discharge however patient ANO x 3, states he does not want to be admitted or receive any home care.  Advised close follow-up for reevaluation with PMD Dr Johnson and patient agreed. The patient wished to leave against medical advice.  I have discussed the risks, benefits and alternatives (including the possibility of worsening of disease, pain, permanent disability, and/or death) with the patient.  The patient voiced understanding of these risks, benefits, and alternatives and still wishes to sign out against medical advice.  The patient was awake, alert, oriented  x 3 and has demonstrated capacity to refuse/direct care.  I advised the patient that they can and should return immediately should they develop any worse/different/additional symptoms, or if they change their mind and want to continue their care. Strict return precautions advised and pt verbalized understanding. Incentive spirometer and education provided.

## 2023-12-06 NOTE — ED PROVIDER NOTE - CARE PROVIDERS DIRECT ADDRESSES
Pharmacy faxed request for medication clarification.  Preferred pharmacy set up and verified.    Med Name: olanzapine    Message on fax:  90 day supply request       ,DirectAddress_Unknown,[email or web address] ,DirectAddress_Unknown,[email or web address]

## 2023-12-06 NOTE — ED PROVIDER NOTE - PROVIDER TOKENS
PROVIDER:[TOKEN:[[id number]],FOLLOWUP:[1-3 Days]],PROVIDER:[TOKEN:[[id number]],FOLLOWUP:[1-3 Days]] PROVIDER:[TOKEN:[[id number]],FOLLOWUP:[1-3 Days]],PROVIDER:[TOKEN:[[id number]],FOLLOWUP:[1-3 Days]]

## 2023-12-06 NOTE — ED PROVIDER NOTE - ATTENDING APP SHARED VISIT CONTRIBUTION OF CARE
68-year-old male with h/o HTN, HLD, and ER evaluation status post trip and fall earlier today.  Patient states he lost his balance getting out of car, fell backwards and hit the back of his head.  No LOC.  + Scalp abrasion.  Denies any neck pain.  On any AC meds.  No visual changes.  No N/V.  Patient also complaining of left lower rib pain which started after a fall 2 weeks ago -states his legs gave out and he fell onto his left ribs.  Did not seek medical evaluation at that time.  Patient states he uses a cane to ambulate, has history of legs giving out. denies any cp/sob.  no abd pain. extremity pain/injury.    PE - nad, nc, + posterior scalp abrasion, eomi, perrl, op- clear, mmm, no c-spine tenderness, + normal WOB, cta b/l, no w/r/r, rrr, + mild L lateral rib tenderness,  abd - soft, nt/nd, nabs, from x 4, no extremity tenderness/swelling, A&O x 3, cn 2-12 intact, motor 5/5  b/l UE and LE, no  sensory deficits.  -ct head/c-spine, L rib x-ray No

## 2023-12-06 NOTE — CONSULT NOTE ADULT - SUBJECTIVE AND OBJECTIVE BOX
TRAUMA ACTIVATION LEVEL:  CONSULT  ACTIVATED BY: ED  INTUBATED:  NO    MECHANISM OF INJURY:   [] Blunt     [] MVC	  [x] Fall	  [] Pedestrian Struck	  [] Motorcycle     [] Assault     [] Bicycle collision    [] Sports injury    [] Penetrating    [] Gun Shot Wound      [] Stab Wound    GCS: 15    E: 4	V: 5	M: 6    HPI:     68yM w/ PMHx of HTN, HLD, Lupus, former smoker seen as Trauma Consult s/p fall.  Trauma assessment in ED: ABCs intact , GCS 15 , AAOx3.    Patient states he was getting out of his car today when he had a mechanical fall backwards, hitting the back of his head. He denies any loss of consciousness or anticoagulation medications. He was able to ambulate immediately after the fall. His only complaint at this time is some intermittent left chest wall pain which he describes as aching. Patient also had a fall about a week ago on his left side. He states he has episodes of dizziness after taking his blood pressure medications, and has had more falls recently. He was on his way to see his cardiologist Dr. Rosenberg today when he experienced the fall. He denies any shortness of breath, cough, nausea, vomiting, changes in vision, weight loss, fevers, chills at this time.       5-item FRAIL scale (Fatigue, Resistance, Ambulation, Illnesses, & Loss of Weight)    - Fatigue: How much time during the previous 4 weeks did you feel tired?   All or most of the time [   ] Yes (1pt)    [ x ] No  (0pts)  - Resistance: Do you have any difficulty walking up 10 steps alone without resting and without aids? [  x ] Yes (1pt)    [  ] No  (0pts)  - Ambulation: Do you have any difficulty walking several hundred yards alone without aids? [ x  ] Yes (1pt)    [  ] No  (0pts)  - Illness: Do you have 5+ medical problems? [   ] 5 or more (1pt) [ x ] < 5 (0pts)  (The total illnesses (0–11) are recoded as 0–4 = 0 and 5–11 = 1. The illnesses include hypertension, diabetes, cancer (other than a minor skin cancer), chronic lung disease, heart attack, congestive heart failure, angina, asthma, arthritis, stroke, and kidney disease.)    - Loss of weight: Have you had weight loss of 5% or more? [   ] Yes (1pt)    [ x ] No  (0pts)    Total Score:     Score 1-2: Consult medical comangement  Score 3-4: Consult Geriatric service   Score 5: Consult Palliative service [id number]    Jackie Garcia G, Jabari MEYER, Jojo DESOUZA, Maynor MESSINA. Frality: toward a clinical definition. J [employer] Med Dir Assoc. 2008; 9 (2): 71-72  Lauro JE, Sree TK, Gutierrez DK. A simple frailty questionnaire (FRAIL) predicts outcomes in middle aged  Americans. J Nutr Health Aging. 2012; 16 (7): 601-608      PAST MEDICAL & SURGICAL HISTORY:  HTN  HLD  Former Smoker  ?Lupus    Allergies    No Known Allergies    Intolerances        Home Medications:  Valsartan  Metoprolol  Chlorthalidone    ROS: 10-system review is otherwise negative except HPI above.      Primary Survey:    A - airway intact  B - bilateral breath sounds and good chest rise  C - palpable pulses in all extremities  D - GCS 15 on arrival, GARCIA  Exposure obtained    Vital Signs Last 24 Hrs  T(C): 36.7 (06 Dec 2023 13:50), Max: 36.7 (06 Dec 2023 13:50)  T(F): 98 (06 Dec 2023 13:50), Max: 98 (06 Dec 2023 13:50)  HR: 70 (06 Dec 2023 13:50) (70 - 70)  BP: 142/64 (06 Dec 2023 13:50) (142/64 - 142/64)  BP(mean): --  RR: 18 (06 Dec 2023 13:50) (18 - 18)  SpO2: 97% (06 Dec 2023 13:50) (97% - 97%)    Parameters below as of 06 Dec 2023 13:50  Patient On (Oxygen Delivery Method): room air        Secondary Survey:   General: NAD  HEENT: EOMI, PEERLA. Abrasion to crown of skull approx 3x3cm, no active bleeding.   Neck: Soft, midline trachea. no c-spine tenderness  Chest: Minimal L chest wall tenderness to palpation, no subcutaneous emphysema   Cardiac: S1, S2, RRR  Respiratory: Bilateral breath sounds, clear and equal bilaterally  Abdomen: Soft, non-distended, non-tender, no rebound, no guarding.  Groin: Normal appearing, pelvis stable   Ext:  Moving b/l upper and lower extremities. Palpable Radial b/l UE, b/l DP palpable in LE.   Back: No T/L/S spine tenderness, No palpable runoff/stepoff/deformity    ACCESS / DEVICES:  [ x] Peripheral IV  [ ] Central Venous Line	[ ] R	[ ] L	[ ] IJ	[ ] Fem	[ ] SC	Placed:   [ ] Arterial Line		[ ] R	[ ] L	[ ] Fem	[ ] Rad	[ ] Ax	Placed:   [ ] PICC:					[ ] Mediport  [ ] Urinary Catheter,  Date Placed:   [ ] Chest tube: [ ] Right, [ ] Left  [ ] MIRTHA/Ed Drains    Labs:  CAPILLARY BLOOD GLUCOSE                              13.1   11.55 )-----------( 287      ( 06 Dec 2023 19:47 )             39.1       Auto Neutrophil %: 69.7 % (12-06-23 @ 19:47)  Auto Immature Granulocyte %: 0.3 % (12-06-23 @ 19:47)    12-06    141  |  103  |  19  ----------------------------<  100<H>  3.6   |  28  |  0.8      Calcium: 9.4 mg/dL (12-06-23 @ 19:47)      LFTs:             7.0  | 0.5  | 28       ------------------[133     ( 06 Dec 2023 19:47 )  4.4  | x    | 11          Lipase:x      Amylase:x             Coags:     12.00  ----< 1.05    ( 06 Dec 2023 19:47 )     30.3                Urinalysis Basic - ( 06 Dec 2023 19:47 )    Color: x / Appearance: x / SG: x / pH: x  Gluc: 100 mg/dL / Ketone: x  / Bili: x / Urobili: x   Blood: x / Protein: x / Nitrite: x   Leuk Esterase: x / RBC: x / WBC x   Sq Epi: x / Non Sq Epi: x / Bacteria: x                RADIOLOGY & ADDITIONAL STUDIES:  ---------------------------------------------------------------------------------------    < from: CT Head No Cont (12.06.23 @ 15:47) >  CT HEAD:  No acute intracranial findings.    Mild chronic microvascular ischemic changes.    CT CERVICAL SPINE:  No acute cervical fracture or facet subluxation.    Nonspecific 1.2 cm lucent osseous lesion in the C3 vertebral body.   Further characterization with nonemergent contrast-enhanced cervical MRI   can be considered.    < end of copied text >  < from: CT Chest w/ IV Cont (12.06.23 @ 21:57) >  Acute, minimally displaced fracture of the left lateral fifth rib. No   pneumothorax.    Subacute to chronic appearing fractures of the left fifth through eighth   ribs.    < end of copied text >   TRAUMA ACTIVATION LEVEL:  CONSULT  ACTIVATED BY: ED  INTUBATED:  NO    MECHANISM OF INJURY:   [] Blunt     [] MVC	  [x] Fall	    GCS: 15    E: 4	V: 5	M: 6    HPI:   68yM w/ PMHx of HTN, HLD, Lupus, former smoker seen as Trauma Consult s/p fall.  Trauma assessment in ED: ABCs intact , GCS 15 , AAOx3.    Patient states he was getting out of his car today when he had a mechanical fall backwards, hitting the back of his head. He denies any loss of consciousness or anticoagulation medications. He was able to ambulate immediately after the fall. His only complaint at this time is some intermittent left chest wall pain which he describes as aching. Patient also had a fall about a week ago on his left side. He states he has episodes of dizziness after taking his blood pressure medications, and has had more falls recently. He was on his way to see his cardiologist Dr. Rosenberg today when he experienced the fall. He denies any shortness of breath, cough, nausea, vomiting, changes in vision, weight loss, fevers, chills at this time.       5-item FRAIL scale (Fatigue, Resistance, Ambulation, Illnesses, & Loss of Weight)  - Fatigue: How much time during the previous 4 weeks did you feel tired?   All or most of the time [   ] Yes (1pt)    [ x ] No  (0pts)  - Resistance: Do you have any difficulty walking up 10 steps alone without resting and without aids? [  x ] Yes (1pt)    [  ] No  (0pts)  - Ambulation: Do you have any difficulty walking several hundred yards alone without aids? [ x  ] Yes (1pt)    [  ] No  (0pts)  - Illness: Do you have 5+ medical problems? [   ] 5 or more (1pt) [ x ] < 5 (0pts)  (The total illnesses (0–11) are recoded as 0–4 = 0 and 5–11 = 1. The illnesses include hypertension, diabetes, cancer (other than a minor skin cancer), chronic lung disease, heart attack, congestive heart failure, angina, asthma, arthritis, stroke, and kidney disease.)    - Loss of weight: Have you had weight loss of 5% or more? [   ] Yes (1pt)    [ x ] No  (0pts)    Total Score: 2    Score 1-2: Consult medical comangement  Score 3-4: Consult Geriatric service   Score 5: Consult Palliative service [id number]    Jackie Garcia G, Jabari YM, Jojo DESOUZA, Maynor B. Frality: toward a clinical definition. J AM Med Dir Assoc. 2008; 9 (2): 71-72  Lauro JE, Sree TK, Gutierrez DK. A simple frailty questionnaire (FRAIL) predicts outcomes in middle aged  Americans. J Nutr Health Aging. 2012; 16 (7): 601-608      PAST MEDICAL & SURGICAL HISTORY:  HTN  HLD  Former Smoker  ?Lupus    Allergies  No Known Allergies    Home Medications:  Valsartan  Metoprolol  Chlorthalidone    ROS: 10-system review is otherwise negative except HPI above.      Primary Survey:    A - airway intact  B - bilateral breath sounds and good chest rise  C - palpable pulses in all extremities  D - GCS 15 on arrival, GARCIA  Exposure obtained    Vital Signs Last 24 Hrs  T(C): 36.7 (06 Dec 2023 13:50), Max: 36.7 (06 Dec 2023 13:50)  T(F): 98 (06 Dec 2023 13:50), Max: 98 (06 Dec 2023 13:50)  HR: 70 (06 Dec 2023 13:50) (70 - 70)  BP: 142/64 (06 Dec 2023 13:50) (142/64 - 142/64)  BP(mean): --  RR: 18 (06 Dec 2023 13:50) (18 - 18)  SpO2: 97% (06 Dec 2023 13:50) (97% - 97%)    Parameters below as of 06 Dec 2023 13:50  Patient On (Oxygen Delivery Method): room air      Secondary Survey:   General: NAD  HEENT: EOMI, PEERLA. Abrasion to crown of skull approx 3x3cm, no active bleeding.   Neck: Soft, midline trachea. no c-spine tenderness  Chest: Minimal L chest wall tenderness to palpation, no subcutaneous emphysema   Cardiac: S1, S2, RRR  Respiratory: Bilateral breath sounds, clear and equal bilaterally  Abdomen: Soft, non-distended, non-tender, no rebound, no guarding.  Groin: Normal appearing, pelvis stable   Ext:  Moving b/l upper and lower extremities. Palpable Radial b/l UE, b/l DP palpable in LE.   Back: No T/L/S spine tenderness, No palpable runoff/stepoff/deformity    ACCESS / DEVICES:  [ x] Peripheral IV      Labs:                     13.1   11.55 )-----------( 287      ( 06 Dec 2023 19:47 )             39.1       Auto Neutrophil %: 69.7 % (12-06-23 @ 19:47)  Auto Immature Granulocyte %: 0.3 % (12-06-23 @ 19:47)    12-06  141  |  103  |  19  ----------------------------<  100<H>  3.6   |  28  |  0.8    Calcium: 9.4 mg/dL (12-06-23 @ 19:47)    LFTs:         7.0  | 0.5  | 28       ------------------[133     ( 06 Dec 2023 19:47 )  4.4  | x    | 11             Coags:  12.00  ----< 1.05    ( 06 Dec 2023 19:47 )     30.3        Urinalysis Basic - ( 06 Dec 2023 19:47 )    Color: x / Appearance: x / SG: x / pH: x  Gluc: 100 mg/dL / Ketone: x  / Bili: x / Urobili: x   Blood: x / Protein: x / Nitrite: x   Leuk Esterase: x / RBC: x / WBC x   Sq Epi: x / Non Sq Epi: x / Bacteria: x          RADIOLOGY & ADDITIONAL STUDIES:  ---------------------------------------------------------------------------------------  < from: CT Head No Cont (12.06.23 @ 15:47) >  CT HEAD:  No acute intracranial findings.  Mild chronic microvascular ischemic changes.    CT CERVICAL SPINE:  No acute cervical fracture or facet subluxation.    Nonspecific 1.2 cm lucent osseous lesion in the C3 vertebral body.   Further characterization with nonemergent contrast-enhanced cervical MRI   can be considered.  < end of copied text >    < from: CT Chest w/ IV Cont (12.06.23 @ 21:57) >  Acute, minimally displaced fracture of the left lateral fifth rib. No   pneumothorax.    Subacute to chronic appearing fractures of the left fifth through eighth   ribs.  < end of copied text >

## 2023-12-06 NOTE — ED PROVIDER NOTE - CARE PROVIDER_API CALL
Bridger Rosenberg  Cardiovascular Disease  [address] [place] [address], [address]  [place] [address]  Phone: [phone]  Fax: [phone]  Follow Up Time: 1-3 Days    Philipp Johnson  [address] [address]  Phone: [phone]  Fax: [phone]  Follow Up Time: 1-3 Days   Bridger Rosenberg  Cardiovascular Disease  [address] [place] [address], [address]  [place] [address]  Phone: [phone]  Fax: [phone]  Follow Up Time: 1-3 Days    Philipp Johnson  [address] [address]  Phone: [phone]  Fax: [phone]  Follow Up Time: 1-3 Days

## 2023-12-06 NOTE — ED PROVIDER NOTE - NSFOLLOWUPINSTRUCTIONS_ED_ALL_ED_FT
Rib Fracture    WHAT YOU NEED TO KNOW:    A rib fracture is a crack or break in a rib bone. Rib fractures usually heal within 6 weeks. You should be able to return to normal activities before that time. Do not wrap anything around your body to try to splint your ribs. This can prevent you from taking deep breaths and increases your risk for pneumonia.Rib Cage         DISCHARGE INSTRUCTIONS:    Call 911 for any of the following:     You have trouble breathing.      You have new or increased pain.    Return to the emergency department if:     Your pain does not get better, even after treatment.      You have a fever or a cough.     Contact your healthcare provider if:     You have questions or concerns about your condition or care.        Medicines:     NSAIDs help decrease swelling and pain. NSAIDs are available without a doctor's order. Ask your healthcare provider which medicine is right for you. Ask how much to take and when to take it. Take as directed. NSAIDs can cause stomach bleeding and kidney problems if not taken correctly.      Prescription pain medicine may be given. Ask your healthcare provider how to take this medicine safely. Some prescription pain medicines contain acetaminophen. Do not take other medicines that contain acetaminophen without talking to your healthcare provider. Too much acetaminophen may cause liver damage. Prescription pain medicine may cause constipation. Ask your healthcare provider how to prevent or treat constipation.       Take your medicine as directed. Contact your healthcare provider if you think your medicine is not helping or if you have side effects. Tell him or her if you are allergic to any medicine. Keep a list of the medicines, vitamins, and herbs you take. Include the amounts, and when and why you take them. Bring the list or the pill bottles to follow-up visits. Carry your medicine list with you in case of an emergency.    Follow up with your healthcare provider as directed: Write down your questions so you remember to ask them during your visits.    Deep breathing: Deep breathing will decrease your risk for pneumonia. Hug a pillow on the injured side while doing this exercise, to decrease pain. Take a deep breath and hold it for as long as possible. You should let the air out and then cough strongly. Deep breaths help open your airway. You may be given an incentive spirometer to help take deep breaths. Put the plastic piece in your mouth. Take a slow, deep breath. You should then let the air out and cough. Repeat these steps 10 times every hour.    Rest: Rest and limit activity to decrease swelling and pain, and allow your injury to heal. Avoid activities that may cause more pain or damage to your ribs such as, pulling, pushing, and lifting. As your pain decreases, begin movements slowly. Take short walks between rest periods.    Ice: Apply ice on the fractured area for 15 to 20 minutes every hour or as directed. Use an ice pack or put crushed ice in a plastic bag. Cover it with a towel. Ice helps prevent tissue damage and decreases swelling and pain.       © Copyright [employer] 2019 All illustrations and images included in CareNotes are the copyrighted property of [employer]. or [employer]       Fall Prevention    WHAT YOU NEED TO KNOW:    Fall prevention includes ways to make your home and other areas safer. It also includes ways you can move more carefully to prevent a fall. Health conditions that cause changes in your blood pressure, vision, or muscle strength and coordination may increase your risk for falls. Medicines may also increase your risk for falls if they make you dizzy, weak, or sleepy.     DISCHARGE INSTRUCTIONS:    Call 911 or have someone else call if:     You have fallen and are unconscious.      You have fallen and cannot move part of your body.    Contact your healthcare provider if:     You have fallen and have pain or a headache.      You have questions or concerns about your condition or care.    Fall prevention tips:     Stand or sit up slowly. This may help you keep your balance and prevent falls.      Use assistive devices as directed. Your healthcare provider may suggest that you use a cane or walker to help you keep your balance. You may need to have grab bars put in your bathroom near the toilet or in the shower.      Wear shoes that fit well and have soles that . Wear shoes both inside and outside. Use slippers with good . Do not wear shoes with high heels.      Wear a personal alarm. This is a device that allows you to call 911 if you fall and need help. Ask your healthcare provider for more information.      Stay active. Exercise can help strengthen your muscles and improve your balance. Your healthcare provider may recommend water aerobics or walking. He or she may also recommend physical therapy to improve your coordination. Never start an exercise program without talking to your healthcare provider first.       Manage your medical conditions. Keep all appointments with your healthcare providers. Visit your eye doctor as directed.    Home safety tips:     Add items to prevent falls in the bathroom. Put nonslip strips on your bath or shower floor to prevent you from slipping. Use a bath mat if you do not have carpet in the bathroom. This will prevent you from falling when you step out of the bath or shower. Use a shower seat so you do not need to stand while you shower. Sit on the toilet or a chair in your bathroom to dry yourself and put on clothing. This will prevent you from losing your balance from drying or dressing yourself while you are standing.       Keep paths clear. Remove books, shoes, and other objects from walkways and stairs. Place cords for telephones and lamps out of the way so that you do not need to walk over them. Tape them down if you cannot move them. Remove small rugs. If you cannot remove a rug, secure it with double-sided tape. This will prevent you from tripping.       Install bright lights in your home. Use night lights to help light paths to the bathroom or kitchen. Always turn on the light before you start walking.      Keep items you use often on shelves within reach. Do not use a step stool to help you reach an item.      Paint or place reflective tape on the edges of your stairs. This will help you see the stairs better.    Follow up with your healthcare provider as directed: Write down your questions so you remember to ask them during your visits.        © Copyright [employer] 2020 Rib Fracture    WHAT YOU NEED TO KNOW:    A rib fracture is a crack or break in a rib bone. Rib fractures usually heal within 6 weeks. You should be able to return to normal activities before that time. Do not wrap anything around your body to try to splint your ribs. This can prevent you from taking deep breaths and increases your risk for pneumonia.Rib Cage         DISCHARGE INSTRUCTIONS:    Call 911 for any of the following:     You have trouble breathing.      You have new or increased pain.    Return to the emergency department if:     Your pain does not get better, even after treatment.      You have a fever or a cough.     Contact your healthcare provider if:     You have questions or concerns about your condition or care.        Medicines:     NSAIDs help decrease swelling and pain. NSAIDs are available without a doctor's order. Ask your healthcare provider which medicine is right for you. Ask how much to take and when to take it. Take as directed. NSAIDs can cause stomach bleeding and kidney problems if not taken correctly.      Prescription pain medicine may be given. Ask your healthcare provider how to take this medicine safely. Some prescription pain medicines contain acetaminophen. Do not take other medicines that contain acetaminophen without talking to your healthcare provider. Too much acetaminophen may cause liver damage. Prescription pain medicine may cause constipation. Ask your healthcare provider how to prevent or treat constipation.       Take your medicine as directed. Contact your healthcare provider if you think your medicine is not helping or if you have side effects. Tell him or her if you are allergic to any medicine. Keep a list of the medicines, vitamins, and herbs you take. Include the amounts, and when and why you take them. Bring the list or the pill bottles to follow-up visits. Carry your medicine list with you in case of an emergency.    Follow up with your healthcare provider as directed: Write down your questions so you remember to ask them during your visits.    Deep breathing: Deep breathing will decrease your risk for pneumonia. Hug a pillow on the injured side while doing this exercise, to decrease pain. Take a deep breath and hold it for as long as possible. You should let the air out and then cough strongly. Deep breaths help open your airway. You may be given an incentive spirometer to help take deep breaths. Put the plastic piece in your mouth. Take a slow, deep breath. You should then let the air out and cough. Repeat these steps 10 times every hour.    Rest: Rest and limit activity to decrease swelling and pain, and allow your injury to heal. Avoid activities that may cause more pain or damage to your ribs such as, pulling, pushing, and lifting. As your pain decreases, begin movements slowly. Take short walks between rest periods.    Ice: Apply ice on the fractured area for 15 to 20 minutes every hour or as directed. Use an ice pack or put crushed ice in a plastic bag. Cover it with a towel. Ice helps prevent tissue damage and decreases swelling and pain.       © Copyright [employer] 2019 All illustrations and images included in CareNotes are the copyrighted property of [employer]. or [employer]       Fall Prevention    WHAT YOU NEED TO KNOW:    Fall prevention includes ways to make your home and other areas safer. It also includes ways you can move more carefully to prevent a fall. Health conditions that cause changes in your blood pressure, vision, or muscle strength and coordination may increase your risk for falls. Medicines may also increase your risk for falls if they make you dizzy, weak, or sleepy.     DISCHARGE INSTRUCTIONS:    Call 911 or have someone else call if:     You have fallen and are unconscious.      You have fallen and cannot move part of your body.    Contact your healthcare provider if:     You have fallen and have pain or a headache.      You have questions or concerns about your condition or care.    Fall prevention tips:     Stand or sit up slowly. This may help you keep your balance and prevent falls.      Use assistive devices as directed. Your healthcare provider may suggest that you use a cane or walker to help you keep your balance. You may need to have grab bars put in your bathroom near the toilet or in the shower.      Wear shoes that fit well and have soles that . Wear shoes both inside and outside. Use slippers with good . Do not wear shoes with high heels.      Wear a personal alarm. This is a device that allows you to call 911 if you fall and need help. Ask your healthcare provider for more information.      Stay active. Exercise can help strengthen your muscles and improve your balance. Your healthcare provider may recommend water aerobics or walking. He or she may also recommend physical therapy to improve your coordination. Never start an exercise program without talking to your healthcare provider first.       Manage your medical conditions. Keep all appointments with your healthcare providers. Visit your eye doctor as directed.    Home safety tips:     Add items to prevent falls in the bathroom. Put nonslip strips on your bath or shower floor to prevent you from slipping. Use a bath mat if you do not have carpet in the bathroom. This will prevent you from falling when you step out of the bath or shower. Use a shower seat so you do not need to stand while you shower. Sit on the toilet or a chair in your bathroom to dry yourself and put on clothing. This will prevent you from losing your balance from drying or dressing yourself while you are standing.       Keep paths clear. Remove books, shoes, and other objects from walkways and stairs. Place cords for telephones and lamps out of the way so that you do not need to walk over them. Tape them down if you cannot move them. Remove small rugs. If you cannot remove a rug, secure it with double-sided tape. This will prevent you from tripping.       Install bright lights in your home. Use night lights to help light paths to the bathroom or kitchen. Always turn on the light before you start walking.      Keep items you use often on shelves within reach. Do not use a step stool to help you reach an item.      Paint or place reflective tape on the edges of your stairs. This will help you see the stairs better.    Follow up with your healthcare provider as directed: Write down your questions so you remember to ask them during your visits.        © Copyright [employer] 2020

## 2023-12-06 NOTE — ED ADULT NURSE NOTE - OBJECTIVE STATEMENT
Pt. presents to the ED s/p fall. Pt. stated he tripped and fell outside going into his doctor's office. Pt. reports he hit his head. Pt. denies LOC. Pt. denies AC use. Pt. denies N/V/D. Pt. denies chest pain/SOB. Pt. ambulates with cane at baseline; as per triage pt. walked with steady gait.

## 2023-12-06 NOTE — ED PROVIDER NOTE - DATE/TIME 2
07-Dec-2023 00:04 Azithromycin Pregnancy And Lactation Text: This medication is considered safe during pregnancy and is also secreted in breast milk.

## 2023-12-06 NOTE — ED ADULT NURSE NOTE - NSFALLRISKINTERV_ED_ALL_ED
Communicate fall risk and risk factors to all staff, patient, and family/Provide visual cue: yellow wristband, yellow gown, etc/Reinforce activity limits and safety measures with patient and family/Call bell, personal items and telephone in reach/Instruct patient to call for assistance before getting out of bed/chair/stretcher/Non-slip footwear applied when patient is off stretcher/[place] to call system/Physically safe environment - no spills, clutter or unnecessary equipment/Purposeful Proactive Rounding/Room/bathroom lighting operational, light cord in reach Communicate fall risk and risk factors to all staff, patient, and family/Provide visual cue: yellow wristband, yellow gown, etc/Reinforce activity limits and safety measures with patient and family/Call bell, personal items and telephone in reach/Instruct patient to call for assistance before getting out of bed/chair/stretcher/Non-slip footwear applied when patient is off stretcher/[place] to call system/Physically safe environment - no spills, clutter or unnecessary equipment/Purposeful Proactive Rounding/Room/bathroom lighting operational, light cord in reach

## 2023-12-06 NOTE — CONSULT NOTE ADULT - ASSESSMENT
ASSESSMENT:  68y Male  w/ PMHx of HTN, HLD, former smoker seen as Trauma Consult s/p fall, external signs of trauma include abrasion to scalp approx 3x3cm with no active bleeding. Trauma assessment in ED: ABCs intact , GCS 15 , AAOx3,  GARCIA.     Injuries identified:   - Scall abrasion  - Acute, minimally displaced fracture of the left lateral fifth rib.   - Subacute to chronic appearing fractures of the left fifth through eighth ribs.      PLAN:   - Rib fractures likes from previous fall given minimal chest wall tenderness, ability to pull >3000 on incentive spirometry  - No acute trauma surgery intervention  - Recommend medical syncope workup for dizziness, more frequent falls  - Dispo per ED    Disposition pending results of above labs and imaging  Above plan discussed with Trauma attending, Dr. Wise, patient, patient family, and ED team  --------------------------------------------------------------------------------------  12-06-23 @ 23:30 ASSESSMENT:  68y Male  w/ PMHx of HTN, HLD, former smoker seen as Trauma Consult s/p fall, external signs of trauma include abrasion to scalp approx 3x3cm with no active bleeding. Trauma assessment in ED: ABCs intact , GCS 15 , AAOx3,  GARCIA.     Injuries identified:   - Scalp abrasion repaired by ED  - Acute, minimally displaced fracture of the left lateral fifth rib.   - Subacute to chronic appearing fractures of the left fifth through eighth ribs.      PLAN:   - Rib fractures likely from previous fall given minimal chest wall tenderness, and ability to pull >3000 on incentive spirometry  - No acute trauma surgery intervention  - Recommend medical syncope workup for dizziness, and more frequent falls  - Dispo per ED  - Will follow for Tertiary Trauma Survey in 24H      Above plan discussed with Trauma attending, Dr. Wise, patient, and ED team  --------------------------------------------------------------------------------------  12-06-23 @ 23:30

## 2023-12-06 NOTE — ED PROVIDER NOTE - PATIENT PORTAL LINK FT
You can access the FollowMyHealth Patient Portal offered by [facility] by registering at the following website: [email or web address]. By joining [facility]’s FollowMyHealth portal, you will also be able to view your health information using other applications (apps) compatible with our system. You can access the FollowMyHealth Patient Portal offered by [facility] by registering at the following website: [email or web address]. By joining [email or web address]’s FollowMyHealth portal, you will also be able to view your health information using other applications (apps) compatible with our system.

## 2023-12-07 VITALS
SYSTOLIC BLOOD PRESSURE: 144 MMHG | DIASTOLIC BLOOD PRESSURE: 62 MMHG | TEMPERATURE: 98 F | OXYGEN SATURATION: 98 % | RESPIRATION RATE: 18 BRPM | HEART RATE: 72 BPM

## 2023-12-07 NOTE — ED ADULT NURSE REASSESSMENT NOTE - NS ED NURSE REASSESS COMMENT FT1
patient requested to leave AMA. MD aware and at the bedside. Patient A&Ox4, ambulates with a steady gait with cane. Patient educated on the risk and dangers of leaving AMA including possible death. Patient verbalized understanding and said "I want to leave."  Patient IV access removed, no bleeding noted.  Patient signed all AMA papers.

## 2023-12-20 ENCOUNTER — APPOINTMENT (OUTPATIENT)
Dept: CARDIOLOGY | Facility: CLINIC | Age: 68
End: 2023-12-20

## 2024-01-01 ENCOUNTER — RESULT REVIEW (OUTPATIENT)
Age: 69
End: 2024-01-01

## 2024-01-01 ENCOUNTER — INPATIENT (INPATIENT)
Facility: HOSPITAL | Age: 69
LOS: 9 days | DRG: 179 | End: 2024-11-27
Attending: INTERNAL MEDICINE | Admitting: STUDENT IN AN ORGANIZED HEALTH CARE EDUCATION/TRAINING PROGRAM
Payer: MEDICARE

## 2024-01-01 VITALS
DIASTOLIC BLOOD PRESSURE: 74 MMHG | SYSTOLIC BLOOD PRESSURE: 171 MMHG | TEMPERATURE: 98 F | HEART RATE: 64 BPM | OXYGEN SATURATION: 95 % | RESPIRATION RATE: 20 BRPM | WEIGHT: 190.04 LBS

## 2024-01-01 DIAGNOSIS — J69.0 PNEUMONITIS DUE TO INHALATION OF FOOD AND VOMIT: ICD-10-CM

## 2024-01-01 DIAGNOSIS — Z51.5 ENCOUNTER FOR PALLIATIVE CARE: ICD-10-CM

## 2024-01-01 DIAGNOSIS — Z11.52 ENCOUNTER FOR SCREENING FOR COVID-19: ICD-10-CM

## 2024-01-01 DIAGNOSIS — M32.9 SYSTEMIC LUPUS ERYTHEMATOSUS, UNSPECIFIED: ICD-10-CM

## 2024-01-01 DIAGNOSIS — R40.2A NONTRAUMATIC COMA DUE TO UNDERLYING CONDITION: ICD-10-CM

## 2024-01-01 DIAGNOSIS — Z87.11 PERSONAL HISTORY OF PEPTIC ULCER DISEASE: ICD-10-CM

## 2024-01-01 DIAGNOSIS — R00.1 BRADYCARDIA, UNSPECIFIED: ICD-10-CM

## 2024-01-01 DIAGNOSIS — Z66 DO NOT RESUSCITATE: ICD-10-CM

## 2024-01-01 DIAGNOSIS — J96.01 ACUTE RESPIRATORY FAILURE WITH HYPOXIA: ICD-10-CM

## 2024-01-01 DIAGNOSIS — G93.1 ANOXIC BRAIN DAMAGE, NOT ELSEWHERE CLASSIFIED: ICD-10-CM

## 2024-01-01 DIAGNOSIS — R27.0 ATAXIA, UNSPECIFIED: ICD-10-CM

## 2024-01-01 DIAGNOSIS — E66.9 OBESITY, UNSPECIFIED: ICD-10-CM

## 2024-01-01 DIAGNOSIS — I46.8 CARDIAC ARREST DUE TO OTHER UNDERLYING CONDITION: ICD-10-CM

## 2024-01-01 DIAGNOSIS — I46.9 CARDIAC ARREST, CAUSE UNSPECIFIED: ICD-10-CM

## 2024-01-01 DIAGNOSIS — E78.5 HYPERLIPIDEMIA, UNSPECIFIED: ICD-10-CM

## 2024-01-01 DIAGNOSIS — I21.A1 MYOCARDIAL INFARCTION TYPE 2: ICD-10-CM

## 2024-01-01 DIAGNOSIS — Z79.69 LONG TERM (CURRENT) USE OF OTHER IMMUNOMODULATORS AND IMMUNOSUPPRESSANTS: ICD-10-CM

## 2024-01-01 DIAGNOSIS — D84.81 IMMUNODEFICIENCY DUE TO CONDITIONS CLASSIFIED ELSEWHERE: ICD-10-CM

## 2024-01-01 DIAGNOSIS — I49.01 VENTRICULAR FIBRILLATION: ICD-10-CM

## 2024-01-01 DIAGNOSIS — R57.9 SHOCK, UNSPECIFIED: ICD-10-CM

## 2024-01-01 DIAGNOSIS — Z91.81 HISTORY OF FALLING: ICD-10-CM

## 2024-01-01 DIAGNOSIS — J18.9 PNEUMONIA, UNSPECIFIED ORGANISM: ICD-10-CM

## 2024-01-01 DIAGNOSIS — G40.401 OTHER GENERALIZED EPILEPSY AND EPILEPTIC SYNDROMES, NOT INTRACTABLE, WITH STATUS EPILEPTICUS: ICD-10-CM

## 2024-01-01 DIAGNOSIS — Z71.89 OTHER SPECIFIED COUNSELING: ICD-10-CM

## 2024-01-01 DIAGNOSIS — I47.20 VENTRICULAR TACHYCARDIA, UNSPECIFIED: ICD-10-CM

## 2024-01-01 LAB
ALBUMIN SERPL ELPH-MCNC: 3 G/DL — LOW (ref 3.5–5.2)
ALBUMIN SERPL ELPH-MCNC: 3 G/DL — LOW (ref 3.5–5.2)
ALBUMIN SERPL ELPH-MCNC: 3.1 G/DL — LOW (ref 3.5–5.2)
ALBUMIN SERPL ELPH-MCNC: 3.3 G/DL — LOW (ref 3.5–5.2)
ALBUMIN SERPL ELPH-MCNC: 3.4 G/DL — LOW (ref 3.5–5.2)
ALBUMIN SERPL ELPH-MCNC: 3.6 G/DL — SIGNIFICANT CHANGE UP (ref 3.5–5.2)
ALBUMIN SERPL ELPH-MCNC: 3.8 G/DL — SIGNIFICANT CHANGE UP (ref 3.5–5.2)
ALP SERPL-CCNC: 50 U/L — SIGNIFICANT CHANGE UP (ref 30–115)
ALP SERPL-CCNC: 52 U/L — SIGNIFICANT CHANGE UP (ref 30–115)
ALP SERPL-CCNC: 53 U/L — SIGNIFICANT CHANGE UP (ref 30–115)
ALP SERPL-CCNC: 55 U/L — SIGNIFICANT CHANGE UP (ref 30–115)
ALP SERPL-CCNC: 59 U/L — SIGNIFICANT CHANGE UP (ref 30–115)
ALP SERPL-CCNC: 61 U/L — SIGNIFICANT CHANGE UP (ref 30–115)
ALP SERPL-CCNC: 62 U/L — SIGNIFICANT CHANGE UP (ref 30–115)
ALP SERPL-CCNC: 63 U/L — SIGNIFICANT CHANGE UP (ref 30–115)
ALP SERPL-CCNC: 65 U/L — SIGNIFICANT CHANGE UP (ref 30–115)
ALP SERPL-CCNC: 66 U/L — SIGNIFICANT CHANGE UP (ref 30–115)
ALT FLD-CCNC: 10 U/L — SIGNIFICANT CHANGE UP (ref 0–41)
ALT FLD-CCNC: 11 U/L — SIGNIFICANT CHANGE UP (ref 0–41)
ALT FLD-CCNC: 12 U/L — SIGNIFICANT CHANGE UP (ref 0–41)
ALT FLD-CCNC: 17 U/L — SIGNIFICANT CHANGE UP (ref 0–41)
ALT FLD-CCNC: 23 U/L — SIGNIFICANT CHANGE UP (ref 0–41)
ALT FLD-CCNC: 27 U/L — SIGNIFICANT CHANGE UP (ref 0–41)
ALT FLD-CCNC: 6 U/L — SIGNIFICANT CHANGE UP (ref 0–41)
ALT FLD-CCNC: 7 U/L — SIGNIFICANT CHANGE UP (ref 0–41)
ALT FLD-CCNC: 7 U/L — SIGNIFICANT CHANGE UP (ref 0–41)
ALT FLD-CCNC: 8 U/L — SIGNIFICANT CHANGE UP (ref 0–41)
ALT FLD-CCNC: 8 U/L — SIGNIFICANT CHANGE UP (ref 0–41)
ALT FLD-CCNC: 9 U/L — SIGNIFICANT CHANGE UP (ref 0–41)
ANION GAP SERPL CALC-SCNC: 11 MMOL/L — SIGNIFICANT CHANGE UP (ref 7–14)
ANION GAP SERPL CALC-SCNC: 12 MMOL/L — SIGNIFICANT CHANGE UP (ref 7–14)
ANION GAP SERPL CALC-SCNC: 12 MMOL/L — SIGNIFICANT CHANGE UP (ref 7–14)
ANION GAP SERPL CALC-SCNC: 13 MMOL/L — SIGNIFICANT CHANGE UP (ref 7–14)
ANION GAP SERPL CALC-SCNC: 16 MMOL/L — HIGH (ref 7–14)
ANION GAP SERPL CALC-SCNC: 8 MMOL/L — SIGNIFICANT CHANGE UP (ref 7–14)
ANION GAP SERPL CALC-SCNC: 9 MMOL/L — SIGNIFICANT CHANGE UP (ref 7–14)
APPEARANCE UR: ABNORMAL
APTT BLD: 23.1 SEC — CRITICAL LOW (ref 27–39.2)
APTT BLD: 28.4 SEC — SIGNIFICANT CHANGE UP (ref 27–39.2)
APTT BLD: 29.3 SEC — SIGNIFICANT CHANGE UP (ref 27–39.2)
APTT BLD: 29.9 SEC — SIGNIFICANT CHANGE UP (ref 27–39.2)
AST SERPL-CCNC: 22 U/L — SIGNIFICANT CHANGE UP (ref 0–41)
AST SERPL-CCNC: 22 U/L — SIGNIFICANT CHANGE UP (ref 0–41)
AST SERPL-CCNC: 24 U/L — SIGNIFICANT CHANGE UP (ref 0–41)
AST SERPL-CCNC: 25 U/L — SIGNIFICANT CHANGE UP (ref 0–41)
AST SERPL-CCNC: 26 U/L — SIGNIFICANT CHANGE UP (ref 0–41)
AST SERPL-CCNC: 26 U/L — SIGNIFICANT CHANGE UP (ref 0–41)
AST SERPL-CCNC: 28 U/L — SIGNIFICANT CHANGE UP (ref 0–41)
AST SERPL-CCNC: 30 U/L — SIGNIFICANT CHANGE UP (ref 0–41)
AST SERPL-CCNC: 35 U/L — SIGNIFICANT CHANGE UP (ref 0–41)
AST SERPL-CCNC: 37 U/L — SIGNIFICANT CHANGE UP (ref 0–41)
AST SERPL-CCNC: 48 U/L — HIGH (ref 0–41)
AST SERPL-CCNC: 57 U/L — HIGH (ref 0–41)
B BURGDOR DNA SPEC QL NAA+PROBE: NEGATIVE — SIGNIFICANT CHANGE UP
BASE EXCESS BLDA CALC-SCNC: 1.3 MMOL/L — SIGNIFICANT CHANGE UP (ref -2–3)
BASE EXCESS BLDA CALC-SCNC: 2.5 MMOL/L — SIGNIFICANT CHANGE UP (ref -2–3)
BASE EXCESS BLDA CALC-SCNC: 3.9 MMOL/L — HIGH (ref -2–3)
BASE EXCESS BLDA CALC-SCNC: 6.1 MMOL/L — HIGH (ref -2–3)
BASE EXCESS BLDA CALC-SCNC: 6.2 MMOL/L — HIGH (ref -2–3)
BASE EXCESS BLDV CALC-SCNC: 1.7 MMOL/L — SIGNIFICANT CHANGE UP (ref -2–3)
BASOPHILS # BLD AUTO: 0.02 K/UL — SIGNIFICANT CHANGE UP (ref 0–0.2)
BASOPHILS # BLD AUTO: 0.04 K/UL — SIGNIFICANT CHANGE UP (ref 0–0.2)
BASOPHILS # BLD AUTO: 0.04 K/UL — SIGNIFICANT CHANGE UP (ref 0–0.2)
BASOPHILS # BLD AUTO: 0.05 K/UL — SIGNIFICANT CHANGE UP (ref 0–0.2)
BASOPHILS # BLD AUTO: 0.05 K/UL — SIGNIFICANT CHANGE UP (ref 0–0.2)
BASOPHILS # BLD AUTO: 0.07 K/UL — SIGNIFICANT CHANGE UP (ref 0–0.2)
BASOPHILS # BLD AUTO: 0.08 K/UL — SIGNIFICANT CHANGE UP (ref 0–0.2)
BASOPHILS # BLD AUTO: 0.08 K/UL — SIGNIFICANT CHANGE UP (ref 0–0.2)
BASOPHILS # BLD AUTO: 0.09 K/UL — SIGNIFICANT CHANGE UP (ref 0–0.2)
BASOPHILS # BLD AUTO: 0.1 K/UL — SIGNIFICANT CHANGE UP (ref 0–0.2)
BASOPHILS # BLD AUTO: 0.12 K/UL — SIGNIFICANT CHANGE UP (ref 0–0.2)
BASOPHILS NFR BLD AUTO: 0.2 % — SIGNIFICANT CHANGE UP (ref 0–1)
BASOPHILS NFR BLD AUTO: 0.2 % — SIGNIFICANT CHANGE UP (ref 0–1)
BASOPHILS NFR BLD AUTO: 0.4 % — SIGNIFICANT CHANGE UP (ref 0–1)
BASOPHILS NFR BLD AUTO: 0.4 % — SIGNIFICANT CHANGE UP (ref 0–1)
BASOPHILS NFR BLD AUTO: 0.5 % — SIGNIFICANT CHANGE UP (ref 0–1)
BASOPHILS NFR BLD AUTO: 0.5 % — SIGNIFICANT CHANGE UP (ref 0–1)
BASOPHILS NFR BLD AUTO: 0.6 % — SIGNIFICANT CHANGE UP (ref 0–1)
BASOPHILS NFR BLD AUTO: 0.7 % — SIGNIFICANT CHANGE UP (ref 0–1)
BILIRUB SERPL-MCNC: 0.2 MG/DL — SIGNIFICANT CHANGE UP (ref 0.2–1.2)
BILIRUB SERPL-MCNC: 0.3 MG/DL — SIGNIFICANT CHANGE UP (ref 0.2–1.2)
BILIRUB SERPL-MCNC: 0.4 MG/DL — SIGNIFICANT CHANGE UP (ref 0.2–1.2)
BILIRUB SERPL-MCNC: 0.5 MG/DL — SIGNIFICANT CHANGE UP (ref 0.2–1.2)
BILIRUB UR-MCNC: NEGATIVE — SIGNIFICANT CHANGE UP
BUN SERPL-MCNC: 15 MG/DL — SIGNIFICANT CHANGE UP (ref 10–20)
BUN SERPL-MCNC: 17 MG/DL — SIGNIFICANT CHANGE UP (ref 10–20)
BUN SERPL-MCNC: 18 MG/DL — SIGNIFICANT CHANGE UP (ref 10–20)
BUN SERPL-MCNC: 18 MG/DL — SIGNIFICANT CHANGE UP (ref 10–20)
BUN SERPL-MCNC: 21 MG/DL — HIGH (ref 10–20)
BUN SERPL-MCNC: 22 MG/DL — HIGH (ref 10–20)
BUN SERPL-MCNC: 23 MG/DL — HIGH (ref 10–20)
BUN SERPL-MCNC: 24 MG/DL — HIGH (ref 10–20)
BUN SERPL-MCNC: 26 MG/DL — HIGH (ref 10–20)
BUN SERPL-MCNC: 28 MG/DL — HIGH (ref 10–20)
BUN SERPL-MCNC: 30 MG/DL — HIGH (ref 10–20)
CA-I SERPL-SCNC: 1.19 MMOL/L — SIGNIFICANT CHANGE UP (ref 1.15–1.33)
CALCIUM SERPL-MCNC: 7.8 MG/DL — LOW (ref 8.4–10.4)
CALCIUM SERPL-MCNC: 7.9 MG/DL — LOW (ref 8.4–10.5)
CALCIUM SERPL-MCNC: 8 MG/DL — LOW (ref 8.4–10.5)
CALCIUM SERPL-MCNC: 8 MG/DL — LOW (ref 8.4–10.5)
CALCIUM SERPL-MCNC: 8.2 MG/DL — LOW (ref 8.4–10.4)
CALCIUM SERPL-MCNC: 8.2 MG/DL — LOW (ref 8.4–10.4)
CALCIUM SERPL-MCNC: 8.2 MG/DL — LOW (ref 8.4–10.5)
CALCIUM SERPL-MCNC: 8.3 MG/DL — LOW (ref 8.4–10.5)
CALCIUM SERPL-MCNC: 8.3 MG/DL — LOW (ref 8.4–10.5)
CALCIUM SERPL-MCNC: 8.4 MG/DL — SIGNIFICANT CHANGE UP (ref 8.4–10.5)
CALCIUM SERPL-MCNC: 8.4 MG/DL — SIGNIFICANT CHANGE UP (ref 8.4–10.5)
CALCIUM SERPL-MCNC: 8.6 MG/DL — SIGNIFICANT CHANGE UP (ref 8.4–10.5)
CALCIUM SERPL-MCNC: 8.9 MG/DL — SIGNIFICANT CHANGE UP (ref 8.4–10.5)
CHLORIDE SERPL-SCNC: 100 MMOL/L — SIGNIFICANT CHANGE UP (ref 98–110)
CHLORIDE SERPL-SCNC: 101 MMOL/L — SIGNIFICANT CHANGE UP (ref 98–110)
CHLORIDE SERPL-SCNC: 102 MMOL/L — SIGNIFICANT CHANGE UP (ref 98–110)
CHLORIDE SERPL-SCNC: 103 MMOL/L — SIGNIFICANT CHANGE UP (ref 98–110)
CHLORIDE SERPL-SCNC: 104 MMOL/L — SIGNIFICANT CHANGE UP (ref 98–110)
CHLORIDE SERPL-SCNC: 105 MMOL/L — SIGNIFICANT CHANGE UP (ref 98–110)
CHLORIDE SERPL-SCNC: 106 MMOL/L — SIGNIFICANT CHANGE UP (ref 98–110)
CHLORIDE SERPL-SCNC: 107 MMOL/L — SIGNIFICANT CHANGE UP (ref 98–110)
CHLORIDE SERPL-SCNC: 108 MMOL/L — SIGNIFICANT CHANGE UP (ref 98–110)
CHOLEST SERPL-MCNC: 152 MG/DL — SIGNIFICANT CHANGE UP
CHOLEST SERPL-MCNC: 153 MG/DL — SIGNIFICANT CHANGE UP
CK SERPL-CCNC: 184 U/L — SIGNIFICANT CHANGE UP (ref 0–225)
CK SERPL-CCNC: 290 U/L — HIGH (ref 0–225)
CK SERPL-CCNC: 301 U/L — HIGH (ref 0–225)
CK SERPL-CCNC: 337 U/L — HIGH (ref 0–225)
CK SERPL-CCNC: 385 U/L — HIGH (ref 0–225)
CK SERPL-CCNC: 427 U/L — HIGH (ref 0–225)
CK SERPL-CCNC: 99 U/L — SIGNIFICANT CHANGE UP (ref 0–225)
CO2 SERPL-SCNC: 20 MMOL/L — SIGNIFICANT CHANGE UP (ref 17–32)
CO2 SERPL-SCNC: 23 MMOL/L — SIGNIFICANT CHANGE UP (ref 17–32)
CO2 SERPL-SCNC: 24 MMOL/L — SIGNIFICANT CHANGE UP (ref 17–32)
CO2 SERPL-SCNC: 25 MMOL/L — SIGNIFICANT CHANGE UP (ref 17–32)
CO2 SERPL-SCNC: 25 MMOL/L — SIGNIFICANT CHANGE UP (ref 17–32)
CO2 SERPL-SCNC: 26 MMOL/L — SIGNIFICANT CHANGE UP (ref 17–32)
CO2 SERPL-SCNC: 26 MMOL/L — SIGNIFICANT CHANGE UP (ref 17–32)
CO2 SERPL-SCNC: 27 MMOL/L — SIGNIFICANT CHANGE UP (ref 17–32)
CO2 SERPL-SCNC: 28 MMOL/L — SIGNIFICANT CHANGE UP (ref 17–32)
CO2 SERPL-SCNC: 29 MMOL/L — SIGNIFICANT CHANGE UP (ref 17–32)
CO2 SERPL-SCNC: 30 MMOL/L — SIGNIFICANT CHANGE UP (ref 17–32)
COLOR SPEC: SIGNIFICANT CHANGE UP
CORTIS AM PEAK SERPL-MCNC: 12.7 UG/DL — SIGNIFICANT CHANGE UP (ref 6–18.4)
CREAT SERPL-MCNC: 0.6 MG/DL — LOW (ref 0.7–1.5)
CREAT SERPL-MCNC: 0.7 MG/DL — SIGNIFICANT CHANGE UP (ref 0.7–1.5)
CREAT SERPL-MCNC: 0.8 MG/DL — SIGNIFICANT CHANGE UP (ref 0.7–1.5)
CREAT SERPL-MCNC: 0.9 MG/DL — SIGNIFICANT CHANGE UP (ref 0.7–1.5)
CREAT SERPL-MCNC: 1 MG/DL — SIGNIFICANT CHANGE UP (ref 0.7–1.5)
CREAT SERPL-MCNC: 1 MG/DL — SIGNIFICANT CHANGE UP (ref 0.7–1.5)
CREAT SERPL-MCNC: 1.1 MG/DL — SIGNIFICANT CHANGE UP (ref 0.7–1.5)
CULTURE RESULTS: ABNORMAL
CULTURE RESULTS: NO GROWTH — SIGNIFICANT CHANGE UP
CULTURE RESULTS: SIGNIFICANT CHANGE UP
CULTURE RESULTS: SIGNIFICANT CHANGE UP
DIFF PNL FLD: ABNORMAL
EGFR: 103 ML/MIN/1.73M2 — SIGNIFICANT CHANGE UP
EGFR: 72 ML/MIN/1.73M2 — SIGNIFICANT CHANGE UP
EGFR: 80 ML/MIN/1.73M2 — SIGNIFICANT CHANGE UP
EGFR: 80 ML/MIN/1.73M2 — SIGNIFICANT CHANGE UP
EGFR: 91 ML/MIN/1.73M2 — SIGNIFICANT CHANGE UP
EGFR: 95 ML/MIN/1.73M2 — SIGNIFICANT CHANGE UP
EGFR: 99 ML/MIN/1.73M2 — SIGNIFICANT CHANGE UP
EOSINOPHIL # BLD AUTO: 0.01 K/UL — SIGNIFICANT CHANGE UP (ref 0–0.7)
EOSINOPHIL # BLD AUTO: 0.03 K/UL — SIGNIFICANT CHANGE UP (ref 0–0.7)
EOSINOPHIL # BLD AUTO: 0.12 K/UL — SIGNIFICANT CHANGE UP (ref 0–0.7)
EOSINOPHIL # BLD AUTO: 0.13 K/UL — SIGNIFICANT CHANGE UP (ref 0–0.7)
EOSINOPHIL # BLD AUTO: 0.16 K/UL — SIGNIFICANT CHANGE UP (ref 0–0.7)
EOSINOPHIL # BLD AUTO: 0.19 K/UL — SIGNIFICANT CHANGE UP (ref 0–0.7)
EOSINOPHIL # BLD AUTO: 0.28 K/UL — SIGNIFICANT CHANGE UP (ref 0–0.7)
EOSINOPHIL # BLD AUTO: 0.38 K/UL — SIGNIFICANT CHANGE UP (ref 0–0.7)
EOSINOPHIL # BLD AUTO: 0.41 K/UL — SIGNIFICANT CHANGE UP (ref 0–0.7)
EOSINOPHIL # BLD AUTO: 0.41 K/UL — SIGNIFICANT CHANGE UP (ref 0–0.7)
EOSINOPHIL # BLD AUTO: 0.52 K/UL — SIGNIFICANT CHANGE UP (ref 0–0.7)
EOSINOPHIL NFR BLD AUTO: 0 % — SIGNIFICANT CHANGE UP (ref 0–8)
EOSINOPHIL NFR BLD AUTO: 0.3 % — SIGNIFICANT CHANGE UP (ref 0–8)
EOSINOPHIL NFR BLD AUTO: 0.9 % — SIGNIFICANT CHANGE UP (ref 0–8)
EOSINOPHIL NFR BLD AUTO: 1.1 % — SIGNIFICANT CHANGE UP (ref 0–8)
EOSINOPHIL NFR BLD AUTO: 1.2 % — SIGNIFICANT CHANGE UP (ref 0–8)
EOSINOPHIL NFR BLD AUTO: 1.7 % — SIGNIFICANT CHANGE UP (ref 0–8)
EOSINOPHIL NFR BLD AUTO: 1.9 % — SIGNIFICANT CHANGE UP (ref 0–8)
EOSINOPHIL NFR BLD AUTO: 2.2 % — SIGNIFICANT CHANGE UP (ref 0–8)
EOSINOPHIL NFR BLD AUTO: 2.6 % — SIGNIFICANT CHANGE UP (ref 0–8)
EOSINOPHIL NFR BLD AUTO: 2.6 % — SIGNIFICANT CHANGE UP (ref 0–8)
EOSINOPHIL NFR BLD AUTO: 3.7 % — SIGNIFICANT CHANGE UP (ref 0–8)
FLUAV AG NPH QL: SIGNIFICANT CHANGE UP
FLUAV AG NPH QL: SIGNIFICANT CHANGE UP
FLUBV AG NPH QL: SIGNIFICANT CHANGE UP
FLUBV AG NPH QL: SIGNIFICANT CHANGE UP
GAS PNL BLDA: SIGNIFICANT CHANGE UP
GAS PNL BLDV: 137 MMOL/L — SIGNIFICANT CHANGE UP (ref 136–145)
GAS PNL BLDV: SIGNIFICANT CHANGE UP
GLUCOSE BLDC GLUCOMTR-MCNC: 115 MG/DL — HIGH (ref 70–99)
GLUCOSE BLDC GLUCOMTR-MCNC: 165 MG/DL — HIGH (ref 70–99)
GLUCOSE BLDC GLUCOMTR-MCNC: 98 MG/DL — SIGNIFICANT CHANGE UP (ref 70–99)
GLUCOSE SERPL-MCNC: 104 MG/DL — HIGH (ref 70–99)
GLUCOSE SERPL-MCNC: 107 MG/DL — HIGH (ref 70–99)
GLUCOSE SERPL-MCNC: 110 MG/DL — HIGH (ref 70–99)
GLUCOSE SERPL-MCNC: 119 MG/DL — HIGH (ref 70–99)
GLUCOSE SERPL-MCNC: 122 MG/DL — HIGH (ref 70–99)
GLUCOSE SERPL-MCNC: 123 MG/DL — HIGH (ref 70–99)
GLUCOSE SERPL-MCNC: 211 MG/DL — HIGH (ref 70–99)
GLUCOSE SERPL-MCNC: 88 MG/DL — SIGNIFICANT CHANGE UP (ref 70–99)
GLUCOSE SERPL-MCNC: 92 MG/DL — SIGNIFICANT CHANGE UP (ref 70–99)
GLUCOSE SERPL-MCNC: 93 MG/DL — SIGNIFICANT CHANGE UP (ref 70–99)
GLUCOSE SERPL-MCNC: 95 MG/DL — SIGNIFICANT CHANGE UP (ref 70–99)
GLUCOSE SERPL-MCNC: 95 MG/DL — SIGNIFICANT CHANGE UP (ref 70–99)
GLUCOSE SERPL-MCNC: 97 MG/DL — SIGNIFICANT CHANGE UP (ref 70–99)
GLUCOSE UR QL: NEGATIVE MG/DL — SIGNIFICANT CHANGE UP
GRAM STN FLD: ABNORMAL
HCO3 BLDA-SCNC: 26 MMOL/L — SIGNIFICANT CHANGE UP (ref 21–28)
HCO3 BLDA-SCNC: 26 MMOL/L — SIGNIFICANT CHANGE UP (ref 21–28)
HCO3 BLDA-SCNC: 28 MMOL/L — SIGNIFICANT CHANGE UP (ref 21–28)
HCO3 BLDA-SCNC: 30 MMOL/L — HIGH (ref 21–28)
HCO3 BLDA-SCNC: 30 MMOL/L — HIGH (ref 21–28)
HCO3 BLDV-SCNC: 27 MMOL/L — SIGNIFICANT CHANGE UP (ref 22–29)
HCT VFR BLD CALC: 26.5 % — LOW (ref 42–52)
HCT VFR BLD CALC: 26.5 % — LOW (ref 42–52)
HCT VFR BLD CALC: 27.5 % — LOW (ref 42–52)
HCT VFR BLD CALC: 29.5 % — LOW (ref 42–52)
HCT VFR BLD CALC: 29.9 % — LOW (ref 42–52)
HCT VFR BLD CALC: 30.3 % — LOW (ref 42–52)
HCT VFR BLD CALC: 30.9 % — LOW (ref 42–52)
HCT VFR BLD CALC: 31.5 % — LOW (ref 42–52)
HCT VFR BLD CALC: 33 % — LOW (ref 42–52)
HCT VFR BLD CALC: 33.2 % — LOW (ref 42–52)
HCT VFR BLD CALC: 33.4 % — LOW (ref 42–52)
HCT VFR BLDA CALC: 30 % — LOW (ref 39–51)
HDLC SERPL-MCNC: 29 MG/DL — LOW
HDLC SERPL-MCNC: 47 MG/DL — SIGNIFICANT CHANGE UP
HGB BLD CALC-MCNC: 10 G/DL — LOW (ref 12.6–17.4)
HGB BLD-MCNC: 10.2 G/DL — LOW (ref 14–18)
HGB BLD-MCNC: 10.5 G/DL — LOW (ref 14–18)
HGB BLD-MCNC: 10.5 G/DL — LOW (ref 14–18)
HGB BLD-MCNC: 10.9 G/DL — LOW (ref 14–18)
HGB BLD-MCNC: 8.5 G/DL — LOW (ref 14–18)
HGB BLD-MCNC: 8.5 G/DL — LOW (ref 14–18)
HGB BLD-MCNC: 8.8 G/DL — LOW (ref 14–18)
HGB BLD-MCNC: 9.3 G/DL — LOW (ref 14–18)
HGB BLD-MCNC: 9.5 G/DL — LOW (ref 14–18)
HGB BLD-MCNC: 9.8 G/DL — LOW (ref 14–18)
HGB BLD-MCNC: 9.9 G/DL — LOW (ref 14–18)
HOROWITZ INDEX BLDA+IHG-RTO: 40 — SIGNIFICANT CHANGE UP
HOROWITZ INDEX BLDA+IHG-RTO: 50 — SIGNIFICANT CHANGE UP
IMM GRANULOCYTES NFR BLD AUTO: 0.3 % — SIGNIFICANT CHANGE UP (ref 0.1–0.3)
IMM GRANULOCYTES NFR BLD AUTO: 0.4 % — HIGH (ref 0.1–0.3)
IMM GRANULOCYTES NFR BLD AUTO: 0.4 % — HIGH (ref 0.1–0.3)
IMM GRANULOCYTES NFR BLD AUTO: 0.5 % — HIGH (ref 0.1–0.3)
IMM GRANULOCYTES NFR BLD AUTO: 0.6 % — HIGH (ref 0.1–0.3)
IMM GRANULOCYTES NFR BLD AUTO: 0.6 % — HIGH (ref 0.1–0.3)
IMM GRANULOCYTES NFR BLD AUTO: 1.1 % — HIGH (ref 0.1–0.3)
IMM GRANULOCYTES NFR BLD AUTO: 2 % — HIGH (ref 0.1–0.3)
IMM GRANULOCYTES NFR BLD AUTO: 2.2 % — HIGH (ref 0.1–0.3)
IMM GRANULOCYTES NFR BLD AUTO: 2.3 % — HIGH (ref 0.1–0.3)
IMM GRANULOCYTES NFR BLD AUTO: 3 % — HIGH (ref 0.1–0.3)
INR BLD: 0.98 RATIO — SIGNIFICANT CHANGE UP (ref 0.65–1.3)
INR BLD: 0.99 RATIO — SIGNIFICANT CHANGE UP (ref 0.65–1.3)
INR BLD: 1.12 RATIO — SIGNIFICANT CHANGE UP (ref 0.65–1.3)
KETONES UR-MCNC: ABNORMAL MG/DL
LACTATE BLDV-MCNC: 1.2 MMOL/L — SIGNIFICANT CHANGE UP (ref 0.5–2)
LACTATE SERPL-SCNC: 0.9 MMOL/L — SIGNIFICANT CHANGE UP (ref 0.7–2)
LACTATE SERPL-SCNC: 1.9 MMOL/L — SIGNIFICANT CHANGE UP (ref 0.7–2)
LEUKOCYTE ESTERASE UR-ACNC: ABNORMAL
LIPID PNL WITH DIRECT LDL SERPL: 88 MG/DL — SIGNIFICANT CHANGE UP
LIPID PNL WITH DIRECT LDL SERPL: 91 MG/DL — SIGNIFICANT CHANGE UP
LYMPHOCYTES # BLD AUTO: 1.81 K/UL — SIGNIFICANT CHANGE UP (ref 1.2–3.4)
LYMPHOCYTES # BLD AUTO: 1.82 K/UL — SIGNIFICANT CHANGE UP (ref 1.2–3.4)
LYMPHOCYTES # BLD AUTO: 1.88 K/UL — SIGNIFICANT CHANGE UP (ref 1.2–3.4)
LYMPHOCYTES # BLD AUTO: 11.1 % — LOW (ref 20.5–51.1)
LYMPHOCYTES # BLD AUTO: 11.4 % — LOW (ref 20.5–51.1)
LYMPHOCYTES # BLD AUTO: 13.3 % — LOW (ref 20.5–51.1)
LYMPHOCYTES # BLD AUTO: 15.1 % — LOW (ref 20.5–51.1)
LYMPHOCYTES # BLD AUTO: 16 % — LOW (ref 20.5–51.1)
LYMPHOCYTES # BLD AUTO: 16.2 % — LOW (ref 20.5–51.1)
LYMPHOCYTES # BLD AUTO: 16.9 % — LOW (ref 20.5–51.1)
LYMPHOCYTES # BLD AUTO: 18.8 % — LOW (ref 20.5–51.1)
LYMPHOCYTES # BLD AUTO: 2.1 K/UL — SIGNIFICANT CHANGE UP (ref 1.2–3.4)
LYMPHOCYTES # BLD AUTO: 2.12 K/UL — SIGNIFICANT CHANGE UP (ref 1.2–3.4)
LYMPHOCYTES # BLD AUTO: 2.34 K/UL — SIGNIFICANT CHANGE UP (ref 1.2–3.4)
LYMPHOCYTES # BLD AUTO: 2.47 K/UL — SIGNIFICANT CHANGE UP (ref 1.2–3.4)
LYMPHOCYTES # BLD AUTO: 2.6 K/UL — SIGNIFICANT CHANGE UP (ref 1.2–3.4)
LYMPHOCYTES # BLD AUTO: 2.77 K/UL — SIGNIFICANT CHANGE UP (ref 1.2–3.4)
LYMPHOCYTES # BLD AUTO: 2.77 K/UL — SIGNIFICANT CHANGE UP (ref 1.2–3.4)
LYMPHOCYTES # BLD AUTO: 2.81 K/UL — SIGNIFICANT CHANGE UP (ref 1.2–3.4)
LYMPHOCYTES # BLD AUTO: 20.2 % — LOW (ref 20.5–51.1)
LYMPHOCYTES # BLD AUTO: 21.9 % — SIGNIFICANT CHANGE UP (ref 20.5–51.1)
LYMPHOCYTES # BLD AUTO: 22.7 % — SIGNIFICANT CHANGE UP (ref 20.5–51.1)
MAGNESIUM SERPL-MCNC: 2.1 MG/DL — SIGNIFICANT CHANGE UP (ref 1.8–2.4)
MAGNESIUM SERPL-MCNC: 2.1 MG/DL — SIGNIFICANT CHANGE UP (ref 1.8–2.4)
MAGNESIUM SERPL-MCNC: 2.2 MG/DL — SIGNIFICANT CHANGE UP (ref 1.8–2.4)
MAGNESIUM SERPL-MCNC: 2.3 MG/DL — SIGNIFICANT CHANGE UP (ref 1.8–2.4)
MAGNESIUM SERPL-MCNC: 2.4 MG/DL — SIGNIFICANT CHANGE UP (ref 1.8–2.4)
MCHC RBC-ENTMCNC: 28.9 PG — SIGNIFICANT CHANGE UP (ref 27–31)
MCHC RBC-ENTMCNC: 29.2 PG — SIGNIFICANT CHANGE UP (ref 27–31)
MCHC RBC-ENTMCNC: 29.2 PG — SIGNIFICANT CHANGE UP (ref 27–31)
MCHC RBC-ENTMCNC: 29.3 PG — SIGNIFICANT CHANGE UP (ref 27–31)
MCHC RBC-ENTMCNC: 29.3 PG — SIGNIFICANT CHANGE UP (ref 27–31)
MCHC RBC-ENTMCNC: 29.4 PG — SIGNIFICANT CHANGE UP (ref 27–31)
MCHC RBC-ENTMCNC: 29.4 PG — SIGNIFICANT CHANGE UP (ref 27–31)
MCHC RBC-ENTMCNC: 29.5 PG — SIGNIFICANT CHANGE UP (ref 27–31)
MCHC RBC-ENTMCNC: 29.6 PG — SIGNIFICANT CHANGE UP (ref 27–31)
MCHC RBC-ENTMCNC: 29.7 PG — SIGNIFICANT CHANGE UP (ref 27–31)
MCHC RBC-ENTMCNC: 29.8 PG — SIGNIFICANT CHANGE UP (ref 27–31)
MCHC RBC-ENTMCNC: 31.4 G/DL — LOW (ref 32–37)
MCHC RBC-ENTMCNC: 31.4 G/DL — LOW (ref 32–37)
MCHC RBC-ENTMCNC: 31.5 G/DL — LOW (ref 32–37)
MCHC RBC-ENTMCNC: 31.6 G/DL — LOW (ref 32–37)
MCHC RBC-ENTMCNC: 31.8 G/DL — LOW (ref 32–37)
MCHC RBC-ENTMCNC: 32 G/DL — SIGNIFICANT CHANGE UP (ref 32–37)
MCHC RBC-ENTMCNC: 32.1 G/DL — SIGNIFICANT CHANGE UP (ref 32–37)
MCHC RBC-ENTMCNC: 32.1 G/DL — SIGNIFICANT CHANGE UP (ref 32–37)
MCHC RBC-ENTMCNC: 32.3 G/DL — SIGNIFICANT CHANGE UP (ref 32–37)
MCHC RBC-ENTMCNC: 33 G/DL — SIGNIFICANT CHANGE UP (ref 32–37)
MCHC RBC-ENTMCNC: 33 G/DL — SIGNIFICANT CHANGE UP (ref 32–37)
MCV RBC AUTO: 89.4 FL — SIGNIFICANT CHANGE UP (ref 80–94)
MCV RBC AUTO: 89.8 FL — SIGNIFICANT CHANGE UP (ref 80–94)
MCV RBC AUTO: 91.1 FL — SIGNIFICANT CHANGE UP (ref 80–94)
MCV RBC AUTO: 92 FL — SIGNIFICANT CHANGE UP (ref 80–94)
MCV RBC AUTO: 92 FL — SIGNIFICANT CHANGE UP (ref 80–94)
MCV RBC AUTO: 92.1 FL — SIGNIFICANT CHANGE UP (ref 80–94)
MCV RBC AUTO: 92.3 FL — SIGNIFICANT CHANGE UP (ref 80–94)
MCV RBC AUTO: 92.3 FL — SIGNIFICANT CHANGE UP (ref 80–94)
MCV RBC AUTO: 92.5 FL — SIGNIFICANT CHANGE UP (ref 80–94)
MCV RBC AUTO: 93.1 FL — SIGNIFICANT CHANGE UP (ref 80–94)
MCV RBC AUTO: 93.5 FL — SIGNIFICANT CHANGE UP (ref 80–94)
MONOCYTES # BLD AUTO: 0.7 K/UL — HIGH (ref 0.1–0.6)
MONOCYTES # BLD AUTO: 0.73 K/UL — HIGH (ref 0.1–0.6)
MONOCYTES # BLD AUTO: 0.76 K/UL — HIGH (ref 0.1–0.6)
MONOCYTES # BLD AUTO: 0.82 K/UL — HIGH (ref 0.1–0.6)
MONOCYTES # BLD AUTO: 0.86 K/UL — HIGH (ref 0.1–0.6)
MONOCYTES # BLD AUTO: 1.02 K/UL — HIGH (ref 0.1–0.6)
MONOCYTES # BLD AUTO: 1.03 K/UL — HIGH (ref 0.1–0.6)
MONOCYTES # BLD AUTO: 1.14 K/UL — HIGH (ref 0.1–0.6)
MONOCYTES # BLD AUTO: 1.23 K/UL — HIGH (ref 0.1–0.6)
MONOCYTES # BLD AUTO: 1.58 K/UL — HIGH (ref 0.1–0.6)
MONOCYTES # BLD AUTO: 1.66 K/UL — HIGH (ref 0.1–0.6)
MONOCYTES NFR BLD AUTO: 5.8 % — SIGNIFICANT CHANGE UP (ref 1.7–9.3)
MONOCYTES NFR BLD AUTO: 6.2 % — SIGNIFICANT CHANGE UP (ref 1.7–9.3)
MONOCYTES NFR BLD AUTO: 6.3 % — SIGNIFICANT CHANGE UP (ref 1.7–9.3)
MONOCYTES NFR BLD AUTO: 6.4 % — SIGNIFICANT CHANGE UP (ref 1.7–9.3)
MONOCYTES NFR BLD AUTO: 6.5 % — SIGNIFICANT CHANGE UP (ref 1.7–9.3)
MONOCYTES NFR BLD AUTO: 6.8 % — SIGNIFICANT CHANGE UP (ref 1.7–9.3)
MONOCYTES NFR BLD AUTO: 7.9 % — SIGNIFICANT CHANGE UP (ref 1.7–9.3)
MONOCYTES NFR BLD AUTO: 8.1 % — SIGNIFICANT CHANGE UP (ref 1.7–9.3)
MONOCYTES NFR BLD AUTO: 8.2 % — SIGNIFICANT CHANGE UP (ref 1.7–9.3)
MONOCYTES NFR BLD AUTO: 8.5 % — SIGNIFICANT CHANGE UP (ref 1.7–9.3)
MONOCYTES NFR BLD AUTO: 8.6 % — SIGNIFICANT CHANGE UP (ref 1.7–9.3)
MRSA PCR RESULT.: NEGATIVE — SIGNIFICANT CHANGE UP
MRSA PCR RESULT.: NEGATIVE — SIGNIFICANT CHANGE UP
NEUTROPHILS # BLD AUTO: 12.26 K/UL — HIGH (ref 1.4–6.5)
NEUTROPHILS # BLD AUTO: 12.93 K/UL — HIGH (ref 1.4–6.5)
NEUTROPHILS # BLD AUTO: 14.31 K/UL — HIGH (ref 1.4–6.5)
NEUTROPHILS # BLD AUTO: 17.34 K/UL — HIGH (ref 1.4–6.5)
NEUTROPHILS # BLD AUTO: 7.1 K/UL — HIGH (ref 1.4–6.5)
NEUTROPHILS # BLD AUTO: 8.18 K/UL — HIGH (ref 1.4–6.5)
NEUTROPHILS # BLD AUTO: 8.39 K/UL — HIGH (ref 1.4–6.5)
NEUTROPHILS # BLD AUTO: 8.59 K/UL — HIGH (ref 1.4–6.5)
NEUTROPHILS # BLD AUTO: 8.63 K/UL — HIGH (ref 1.4–6.5)
NEUTROPHILS # BLD AUTO: 9.03 K/UL — HIGH (ref 1.4–6.5)
NEUTROPHILS # BLD AUTO: 9.75 K/UL — HIGH (ref 1.4–6.5)
NEUTROPHILS NFR BLD AUTO: 65.1 % — SIGNIFICANT CHANGE UP (ref 42.2–75.2)
NEUTROPHILS NFR BLD AUTO: 65.2 % — SIGNIFICANT CHANGE UP (ref 42.2–75.2)
NEUTROPHILS NFR BLD AUTO: 68.1 % — SIGNIFICANT CHANGE UP (ref 42.2–75.2)
NEUTROPHILS NFR BLD AUTO: 70.4 % — SIGNIFICANT CHANGE UP (ref 42.2–75.2)
NEUTROPHILS NFR BLD AUTO: 72.3 % — SIGNIFICANT CHANGE UP (ref 42.2–75.2)
NEUTROPHILS NFR BLD AUTO: 73.5 % — SIGNIFICANT CHANGE UP (ref 42.2–75.2)
NEUTROPHILS NFR BLD AUTO: 75.2 % — SIGNIFICANT CHANGE UP (ref 42.2–75.2)
NEUTROPHILS NFR BLD AUTO: 75.3 % — HIGH (ref 42.2–75.2)
NEUTROPHILS NFR BLD AUTO: 75.8 % — HIGH (ref 42.2–75.2)
NEUTROPHILS NFR BLD AUTO: 77 % — HIGH (ref 42.2–75.2)
NEUTROPHILS NFR BLD AUTO: 82.3 % — HIGH (ref 42.2–75.2)
NITRITE UR-MCNC: NEGATIVE — SIGNIFICANT CHANGE UP
NON HDL CHOLESTEROL: 105 MG/DL — SIGNIFICANT CHANGE UP
NON HDL CHOLESTEROL: 124 MG/DL — SIGNIFICANT CHANGE UP
NRBC # BLD: 0 /100 WBCS — SIGNIFICANT CHANGE UP (ref 0–0)
NT-PROBNP SERPL-SCNC: 569 PG/ML — HIGH (ref 0–300)
PCO2 BLDA: 37 MMHG — SIGNIFICANT CHANGE UP (ref 35–48)
PCO2 BLDA: 37 MMHG — SIGNIFICANT CHANGE UP (ref 35–48)
PCO2 BLDA: 38 MMHG — SIGNIFICANT CHANGE UP (ref 35–48)
PCO2 BLDA: 38 MMHG — SIGNIFICANT CHANGE UP (ref 35–48)
PCO2 BLDA: 40 MMHG — SIGNIFICANT CHANGE UP (ref 35–48)
PCO2 BLDV: 42 MMHG — SIGNIFICANT CHANGE UP (ref 42–55)
PH BLDA: 7.42 — SIGNIFICANT CHANGE UP (ref 7.35–7.45)
PH BLDA: 7.46 — HIGH (ref 7.35–7.45)
PH BLDA: 7.47 — HIGH (ref 7.35–7.45)
PH BLDA: 7.5 — HIGH (ref 7.35–7.45)
PH BLDA: 7.51 — HIGH (ref 7.35–7.45)
PH BLDV: 7.41 — SIGNIFICANT CHANGE UP (ref 7.32–7.43)
PH UR: 5.5 — SIGNIFICANT CHANGE UP (ref 5–8)
PHOSPHATE SERPL-MCNC: 2.9 MG/DL — SIGNIFICANT CHANGE UP (ref 2.1–4.9)
PHOSPHATE SERPL-MCNC: 2.9 MG/DL — SIGNIFICANT CHANGE UP (ref 2.1–4.9)
PHOSPHATE SERPL-MCNC: 3.1 MG/DL — SIGNIFICANT CHANGE UP (ref 2.1–4.9)
PHOSPHATE SERPL-MCNC: 3.2 MG/DL — SIGNIFICANT CHANGE UP (ref 2.1–4.9)
PHOSPHATE SERPL-MCNC: 4.1 MG/DL — SIGNIFICANT CHANGE UP (ref 2.1–4.9)
PLATELET # BLD AUTO: 185 K/UL — SIGNIFICANT CHANGE UP (ref 130–400)
PLATELET # BLD AUTO: 209 K/UL — SIGNIFICANT CHANGE UP (ref 130–400)
PLATELET # BLD AUTO: 210 K/UL — SIGNIFICANT CHANGE UP (ref 130–400)
PLATELET # BLD AUTO: 215 K/UL — SIGNIFICANT CHANGE UP (ref 130–400)
PLATELET # BLD AUTO: 216 K/UL — SIGNIFICANT CHANGE UP (ref 130–400)
PLATELET # BLD AUTO: 254 K/UL — SIGNIFICANT CHANGE UP (ref 130–400)
PLATELET # BLD AUTO: 259 K/UL — SIGNIFICANT CHANGE UP (ref 130–400)
PLATELET # BLD AUTO: 263 K/UL — SIGNIFICANT CHANGE UP (ref 130–400)
PLATELET # BLD AUTO: 287 K/UL — SIGNIFICANT CHANGE UP (ref 130–400)
PMV BLD: 10.2 FL — SIGNIFICANT CHANGE UP (ref 7.4–10.4)
PMV BLD: 10.2 FL — SIGNIFICANT CHANGE UP (ref 7.4–10.4)
PMV BLD: 10.3 FL — SIGNIFICANT CHANGE UP (ref 7.4–10.4)
PMV BLD: 10.3 FL — SIGNIFICANT CHANGE UP (ref 7.4–10.4)
PMV BLD: 10.4 FL — SIGNIFICANT CHANGE UP (ref 7.4–10.4)
PMV BLD: 10.5 FL — HIGH (ref 7.4–10.4)
PMV BLD: 10.6 FL — HIGH (ref 7.4–10.4)
PMV BLD: 10.6 FL — HIGH (ref 7.4–10.4)
PMV BLD: 10.8 FL — HIGH (ref 7.4–10.4)
PO2 BLDA: 107 MMHG — SIGNIFICANT CHANGE UP (ref 83–108)
PO2 BLDA: 140 MMHG — HIGH (ref 83–108)
PO2 BLDA: 148 MMHG — HIGH (ref 83–108)
PO2 BLDA: 63 MMHG — LOW (ref 83–108)
PO2 BLDA: 77 MMHG — LOW (ref 83–108)
PO2 BLDV: 62 MMHG — HIGH (ref 25–45)
POTASSIUM BLDV-SCNC: 3.1 MMOL/L — LOW (ref 3.5–5.1)
POTASSIUM SERPL-MCNC: 3.1 MMOL/L — LOW (ref 3.5–5)
POTASSIUM SERPL-MCNC: 3.3 MMOL/L — LOW (ref 3.5–5)
POTASSIUM SERPL-MCNC: 3.3 MMOL/L — LOW (ref 3.5–5)
POTASSIUM SERPL-MCNC: 3.4 MMOL/L — LOW (ref 3.5–5)
POTASSIUM SERPL-MCNC: 3.5 MMOL/L — SIGNIFICANT CHANGE UP (ref 3.5–5)
POTASSIUM SERPL-MCNC: 3.5 MMOL/L — SIGNIFICANT CHANGE UP (ref 3.5–5)
POTASSIUM SERPL-MCNC: 3.6 MMOL/L — SIGNIFICANT CHANGE UP (ref 3.5–5)
POTASSIUM SERPL-MCNC: 3.6 MMOL/L — SIGNIFICANT CHANGE UP (ref 3.5–5)
POTASSIUM SERPL-MCNC: 3.7 MMOL/L — SIGNIFICANT CHANGE UP (ref 3.5–5)
POTASSIUM SERPL-MCNC: 3.8 MMOL/L — SIGNIFICANT CHANGE UP (ref 3.5–5)
POTASSIUM SERPL-MCNC: 3.8 MMOL/L — SIGNIFICANT CHANGE UP (ref 3.5–5)
POTASSIUM SERPL-MCNC: 4.1 MMOL/L — SIGNIFICANT CHANGE UP (ref 3.5–5)
POTASSIUM SERPL-MCNC: 4.1 MMOL/L — SIGNIFICANT CHANGE UP (ref 3.5–5)
POTASSIUM SERPL-SCNC: 3.1 MMOL/L — LOW (ref 3.5–5)
POTASSIUM SERPL-SCNC: 3.3 MMOL/L — LOW (ref 3.5–5)
POTASSIUM SERPL-SCNC: 3.3 MMOL/L — LOW (ref 3.5–5)
POTASSIUM SERPL-SCNC: 3.4 MMOL/L — LOW (ref 3.5–5)
POTASSIUM SERPL-SCNC: 3.5 MMOL/L — SIGNIFICANT CHANGE UP (ref 3.5–5)
POTASSIUM SERPL-SCNC: 3.5 MMOL/L — SIGNIFICANT CHANGE UP (ref 3.5–5)
POTASSIUM SERPL-SCNC: 3.6 MMOL/L — SIGNIFICANT CHANGE UP (ref 3.5–5)
POTASSIUM SERPL-SCNC: 3.6 MMOL/L — SIGNIFICANT CHANGE UP (ref 3.5–5)
POTASSIUM SERPL-SCNC: 3.7 MMOL/L — SIGNIFICANT CHANGE UP (ref 3.5–5)
POTASSIUM SERPL-SCNC: 3.8 MMOL/L — SIGNIFICANT CHANGE UP (ref 3.5–5)
POTASSIUM SERPL-SCNC: 3.8 MMOL/L — SIGNIFICANT CHANGE UP (ref 3.5–5)
POTASSIUM SERPL-SCNC: 4.1 MMOL/L — SIGNIFICANT CHANGE UP (ref 3.5–5)
POTASSIUM SERPL-SCNC: 4.1 MMOL/L — SIGNIFICANT CHANGE UP (ref 3.5–5)
PROCALCITONIN SERPL-MCNC: 0.1 NG/ML — SIGNIFICANT CHANGE UP (ref 0.02–0.1)
PROCALCITONIN SERPL-MCNC: 0.44 NG/ML — HIGH (ref 0.02–0.1)
PROCALCITONIN SERPL-MCNC: 0.75 NG/ML — HIGH (ref 0.02–0.1)
PROT SERPL-MCNC: 5.1 G/DL — LOW (ref 6–8)
PROT SERPL-MCNC: 5.3 G/DL — LOW (ref 6–8)
PROT SERPL-MCNC: 5.3 G/DL — LOW (ref 6–8)
PROT SERPL-MCNC: 5.4 G/DL — LOW (ref 6–8)
PROT SERPL-MCNC: 5.7 G/DL — LOW (ref 6–8)
PROT SERPL-MCNC: 5.8 G/DL — LOW (ref 6–8)
PROT SERPL-MCNC: 5.9 G/DL — LOW (ref 6–8)
PROT SERPL-MCNC: 5.9 G/DL — LOW (ref 6–8)
PROT SERPL-MCNC: 6 G/DL — SIGNIFICANT CHANGE UP (ref 6–8)
PROT SERPL-MCNC: 6.4 G/DL — SIGNIFICANT CHANGE UP (ref 6–8)
PROT UR-MCNC: 100 MG/DL
PROTHROM AB SERPL-ACNC: 11.6 SEC — SIGNIFICANT CHANGE UP (ref 9.95–12.87)
PROTHROM AB SERPL-ACNC: 11.7 SEC — SIGNIFICANT CHANGE UP (ref 9.95–12.87)
PROTHROM AB SERPL-ACNC: 13.2 SEC — HIGH (ref 9.95–12.87)
RAPID RVP RESULT: SIGNIFICANT CHANGE UP
RBC # BLD: 2.87 M/UL — LOW (ref 4.7–6.1)
RBC # BLD: 2.91 M/UL — LOW (ref 4.7–6.1)
RBC # BLD: 2.99 M/UL — LOW (ref 4.7–6.1)
RBC # BLD: 3.17 M/UL — LOW (ref 4.7–6.1)
RBC # BLD: 3.24 M/UL — LOW (ref 4.7–6.1)
RBC # BLD: 3.29 M/UL — LOW (ref 4.7–6.1)
RBC # BLD: 3.37 M/UL — LOW (ref 4.7–6.1)
RBC # BLD: 3.44 M/UL — LOW (ref 4.7–6.1)
RBC # BLD: 3.59 M/UL — LOW (ref 4.7–6.1)
RBC # BLD: 3.63 M/UL — LOW (ref 4.7–6.1)
RBC # BLD: 3.69 M/UL — LOW (ref 4.7–6.1)
RBC # FLD: 13.9 % — SIGNIFICANT CHANGE UP (ref 11.5–14.5)
RBC # FLD: 14.1 % — SIGNIFICANT CHANGE UP (ref 11.5–14.5)
RBC # FLD: 14.5 % — SIGNIFICANT CHANGE UP (ref 11.5–14.5)
RBC # FLD: 14.6 % — HIGH (ref 11.5–14.5)
RBC # FLD: 14.7 % — HIGH (ref 11.5–14.5)
RSV RNA NPH QL NAA+NON-PROBE: SIGNIFICANT CHANGE UP
RSV RNA NPH QL NAA+NON-PROBE: SIGNIFICANT CHANGE UP
SAO2 % BLDA: 93 % — LOW (ref 94–98)
SAO2 % BLDA: 96.6 % — SIGNIFICANT CHANGE UP (ref 94–98)
SAO2 % BLDA: 98.2 % — HIGH (ref 94–98)
SAO2 % BLDA: 98.3 % — HIGH (ref 94–98)
SAO2 % BLDA: 98.5 % — HIGH (ref 94–98)
SAO2 % BLDV: 92 % — HIGH (ref 67–88)
SARS-COV-2 RNA SPEC QL NAA+PROBE: SIGNIFICANT CHANGE UP
SODIUM SERPL-SCNC: 137 MMOL/L — SIGNIFICANT CHANGE UP (ref 135–146)
SODIUM SERPL-SCNC: 138 MMOL/L — SIGNIFICANT CHANGE UP (ref 135–146)
SODIUM SERPL-SCNC: 139 MMOL/L — SIGNIFICANT CHANGE UP (ref 135–146)
SODIUM SERPL-SCNC: 140 MMOL/L — SIGNIFICANT CHANGE UP (ref 135–146)
SODIUM SERPL-SCNC: 141 MMOL/L — SIGNIFICANT CHANGE UP (ref 135–146)
SODIUM SERPL-SCNC: 143 MMOL/L — SIGNIFICANT CHANGE UP (ref 135–146)
SODIUM SERPL-SCNC: 144 MMOL/L — SIGNIFICANT CHANGE UP (ref 135–146)
SP GR SPEC: >1.03 — HIGH (ref 1–1.03)
SPECIMEN SOURCE: SIGNIFICANT CHANGE UP
TRIGL SERPL-MCNC: 166 MG/DL — HIGH
TRIGL SERPL-MCNC: 87 MG/DL — SIGNIFICANT CHANGE UP
TROPONIN T, HIGH SENSITIVITY RESULT: 100 NG/L — CRITICAL HIGH (ref 6–21)
TROPONIN T, HIGH SENSITIVITY RESULT: 109 NG/L — CRITICAL HIGH (ref 6–21)
TROPONIN T, HIGH SENSITIVITY RESULT: 29 NG/L — HIGH (ref 6–21)
TROPONIN T, HIGH SENSITIVITY RESULT: 32 NG/L — HIGH (ref 6–21)
TSH SERPL-MCNC: 1.24 UIU/ML — SIGNIFICANT CHANGE UP (ref 0.27–4.2)
UROBILINOGEN FLD QL: 1 MG/DL — SIGNIFICANT CHANGE UP (ref 0.2–1)
VALPROATE SERPL-MCNC: 16 UG/ML — LOW (ref 50–100)
VALPROATE SERPL-MCNC: 17 UG/ML — LOW (ref 50–100)
VALPROATE SERPL-MCNC: 22 UG/ML — LOW (ref 50–100)
VALPROATE SERPL-MCNC: 24 UG/ML — LOW (ref 50–100)
VALPROATE SERPL-MCNC: 27 UG/ML — LOW (ref 50–100)
VALPROATE SERPL-MCNC: 29 UG/ML — LOW (ref 50–100)
VALPROATE SERPL-MCNC: 31 UG/ML — LOW (ref 50–100)
WBC # BLD: 10.88 K/UL — HIGH (ref 4.8–10.8)
WBC # BLD: 11.15 K/UL — HIGH (ref 4.8–10.8)
WBC # BLD: 11.29 K/UL — HIGH (ref 4.8–10.8)
WBC # BLD: 11.34 K/UL — HIGH (ref 4.8–10.8)
WBC # BLD: 12.67 K/UL — HIGH (ref 4.8–10.8)
WBC # BLD: 12.96 K/UL — HIGH (ref 4.8–10.8)
WBC # BLD: 13.89 K/UL — HIGH (ref 4.8–10.8)
WBC # BLD: 15.92 K/UL — HIGH (ref 4.8–10.8)
WBC # BLD: 18.36 K/UL — HIGH (ref 4.8–10.8)
WBC # BLD: 19.5 K/UL — HIGH (ref 4.8–10.8)
WBC # BLD: 21.08 K/UL — HIGH (ref 4.8–10.8)
WBC # FLD AUTO: 10.88 K/UL — HIGH (ref 4.8–10.8)
WBC # FLD AUTO: 11.15 K/UL — HIGH (ref 4.8–10.8)
WBC # FLD AUTO: 11.29 K/UL — HIGH (ref 4.8–10.8)
WBC # FLD AUTO: 11.34 K/UL — HIGH (ref 4.8–10.8)
WBC # FLD AUTO: 12.67 K/UL — HIGH (ref 4.8–10.8)
WBC # FLD AUTO: 12.96 K/UL — HIGH (ref 4.8–10.8)
WBC # FLD AUTO: 13.89 K/UL — HIGH (ref 4.8–10.8)
WBC # FLD AUTO: 15.92 K/UL — HIGH (ref 4.8–10.8)
WBC # FLD AUTO: 18.36 K/UL — HIGH (ref 4.8–10.8)
WBC # FLD AUTO: 19.5 K/UL — HIGH (ref 4.8–10.8)
WBC # FLD AUTO: 21.08 K/UL — HIGH (ref 4.8–10.8)

## 2024-01-01 PROCEDURE — 95720 EEG PHY/QHP EA INCR W/VEEG: CPT

## 2024-01-01 PROCEDURE — 93010 ELECTROCARDIOGRAM REPORT: CPT | Mod: 76,77

## 2024-01-01 PROCEDURE — 82803 BLOOD GASES ANY COMBINATION: CPT

## 2024-01-01 PROCEDURE — 85025 COMPLETE CBC W/AUTO DIFF WBC: CPT

## 2024-01-01 PROCEDURE — 99233 SBSQ HOSP IP/OBS HIGH 50: CPT

## 2024-01-01 PROCEDURE — 83605 ASSAY OF LACTIC ACID: CPT

## 2024-01-01 PROCEDURE — 93010 ELECTROCARDIOGRAM REPORT: CPT

## 2024-01-01 PROCEDURE — 86901 BLOOD TYPING SEROLOGIC RH(D): CPT

## 2024-01-01 PROCEDURE — 71045 X-RAY EXAM CHEST 1 VIEW: CPT | Mod: 26,XU

## 2024-01-01 PROCEDURE — 82962 GLUCOSE BLOOD TEST: CPT

## 2024-01-01 PROCEDURE — 93970 EXTREMITY STUDY: CPT

## 2024-01-01 PROCEDURE — 71045 X-RAY EXAM CHEST 1 VIEW: CPT | Mod: 26

## 2024-01-01 PROCEDURE — 82533 TOTAL CORTISOL: CPT

## 2024-01-01 PROCEDURE — 87040 BLOOD CULTURE FOR BACTERIA: CPT

## 2024-01-01 PROCEDURE — 83735 ASSAY OF MAGNESIUM: CPT

## 2024-01-01 PROCEDURE — 93306 TTE W/DOPPLER COMPLETE: CPT | Mod: 26

## 2024-01-01 PROCEDURE — 87640 STAPH A DNA AMP PROBE: CPT

## 2024-01-01 PROCEDURE — 71250 CT THORAX DX C-: CPT | Mod: 26

## 2024-01-01 PROCEDURE — 93970 EXTREMITY STUDY: CPT | Mod: 26

## 2024-01-01 PROCEDURE — 74018 RADEX ABDOMEN 1 VIEW: CPT | Mod: 26

## 2024-01-01 PROCEDURE — 71045 X-RAY EXAM CHEST 1 VIEW: CPT

## 2024-01-01 PROCEDURE — 93307 TTE W/O DOPPLER COMPLETE: CPT

## 2024-01-01 PROCEDURE — 70551 MRI BRAIN STEM W/O DYE: CPT | Mod: MC

## 2024-01-01 PROCEDURE — 84295 ASSAY OF SERUM SODIUM: CPT

## 2024-01-01 PROCEDURE — 99291 CRITICAL CARE FIRST HOUR: CPT

## 2024-01-01 PROCEDURE — 86900 BLOOD TYPING SEROLOGIC ABO: CPT

## 2024-01-01 PROCEDURE — 82550 ASSAY OF CK (CPK): CPT

## 2024-01-01 PROCEDURE — 95714 VEEG EA 12-26 HR UNMNTR: CPT

## 2024-01-01 PROCEDURE — 93971 EXTREMITY STUDY: CPT | Mod: 26,RT

## 2024-01-01 PROCEDURE — 87641 MR-STAPH DNA AMP PROBE: CPT

## 2024-01-01 PROCEDURE — 84100 ASSAY OF PHOSPHORUS: CPT

## 2024-01-01 PROCEDURE — 80061 LIPID PANEL: CPT

## 2024-01-01 PROCEDURE — 80053 COMPREHEN METABOLIC PANEL: CPT

## 2024-01-01 PROCEDURE — 87476 LYME DIS DNA AMP PROBE: CPT

## 2024-01-01 PROCEDURE — 70450 CT HEAD/BRAIN W/O DYE: CPT | Mod: 26

## 2024-01-01 PROCEDURE — 99233 SBSQ HOSP IP/OBS HIGH 50: CPT | Mod: FS

## 2024-01-01 PROCEDURE — 93005 ELECTROCARDIOGRAM TRACING: CPT

## 2024-01-01 PROCEDURE — 85014 HEMATOCRIT: CPT

## 2024-01-01 PROCEDURE — 84132 ASSAY OF SERUM POTASSIUM: CPT

## 2024-01-01 PROCEDURE — 81001 URINALYSIS AUTO W/SCOPE: CPT

## 2024-01-01 PROCEDURE — 80048 BASIC METABOLIC PNL TOTAL CA: CPT

## 2024-01-01 PROCEDURE — 83880 ASSAY OF NATRIURETIC PEPTIDE: CPT

## 2024-01-01 PROCEDURE — 87449 NOS EACH ORGANISM AG IA: CPT

## 2024-01-01 PROCEDURE — 99223 1ST HOSP IP/OBS HIGH 75: CPT

## 2024-01-01 PROCEDURE — 36415 COLL VENOUS BLD VENIPUNCTURE: CPT

## 2024-01-01 PROCEDURE — 71250 CT THORAX DX C-: CPT | Mod: MC

## 2024-01-01 PROCEDURE — 85610 PROTHROMBIN TIME: CPT

## 2024-01-01 PROCEDURE — 70551 MRI BRAIN STEM W/O DYE: CPT | Mod: 26

## 2024-01-01 PROCEDURE — 94003 VENT MGMT INPAT SUBQ DAY: CPT

## 2024-01-01 PROCEDURE — 93971 EXTREMITY STUDY: CPT | Mod: RT

## 2024-01-01 PROCEDURE — 87086 URINE CULTURE/COLONY COUNT: CPT

## 2024-01-01 PROCEDURE — 99222 1ST HOSP IP/OBS MODERATE 55: CPT

## 2024-01-01 PROCEDURE — 71046 X-RAY EXAM CHEST 2 VIEWS: CPT

## 2024-01-01 PROCEDURE — 0241U: CPT

## 2024-01-01 PROCEDURE — 85018 HEMOGLOBIN: CPT

## 2024-01-01 PROCEDURE — 85730 THROMBOPLASTIN TIME PARTIAL: CPT

## 2024-01-01 PROCEDURE — 87070 CULTURE OTHR SPECIMN AEROBIC: CPT

## 2024-01-01 PROCEDURE — 82330 ASSAY OF CALCIUM: CPT

## 2024-01-01 PROCEDURE — 74018 RADEX ABDOMEN 1 VIEW: CPT

## 2024-01-01 PROCEDURE — 71046 X-RAY EXAM CHEST 2 VIEWS: CPT | Mod: 26

## 2024-01-01 PROCEDURE — 70450 CT HEAD/BRAIN W/O DYE: CPT | Mod: MC

## 2024-01-01 PROCEDURE — 71045 X-RAY EXAM CHEST 1 VIEW: CPT | Mod: 26,77

## 2024-01-01 PROCEDURE — 84145 PROCALCITONIN (PCT): CPT

## 2024-01-01 PROCEDURE — 83520 IMMUNOASSAY QUANT NOS NONAB: CPT

## 2024-01-01 PROCEDURE — 94760 N-INVAS EAR/PLS OXIMETRY 1: CPT

## 2024-01-01 PROCEDURE — 86850 RBC ANTIBODY SCREEN: CPT

## 2024-01-01 PROCEDURE — 99233 SBSQ HOSP IP/OBS HIGH 50: CPT | Mod: 25

## 2024-01-01 PROCEDURE — 87205 SMEAR GRAM STAIN: CPT

## 2024-01-01 PROCEDURE — 99497 ADVNCD CARE PLAN 30 MIN: CPT

## 2024-01-01 PROCEDURE — 99497 ADVNCD CARE PLAN 30 MIN: CPT | Mod: 25

## 2024-01-01 PROCEDURE — 84443 ASSAY THYROID STIM HORMONE: CPT

## 2024-01-01 PROCEDURE — 80164 ASSAY DIPROPYLACETIC ACD TOT: CPT

## 2024-01-01 PROCEDURE — 84484 ASSAY OF TROPONIN QUANT: CPT

## 2024-01-01 PROCEDURE — 95700 EEG CONT REC W/VID EEG TECH: CPT

## 2024-01-01 RX ORDER — METOPROLOL TARTRATE 100 MG/1
5 TABLET, FILM COATED ORAL ONCE
Refills: 0 | Status: COMPLETED | OUTPATIENT
Start: 2024-01-01 | End: 2024-01-01

## 2024-01-01 RX ORDER — FENTANYL 12 UG/H
0.5 PATCH, EXTENDED RELEASE TRANSDERMAL
Qty: 2500 | Refills: 0 | Status: DISCONTINUED | OUTPATIENT
Start: 2024-01-01 | End: 2024-01-01

## 2024-01-01 RX ORDER — GLUCOSAMINE SULFATE DIPOT CHLR 500 MG
1 CAPSULE ORAL DAILY
Refills: 0 | Status: DISCONTINUED | OUTPATIENT
Start: 2024-01-01 | End: 2024-01-01

## 2024-01-01 RX ORDER — ACETAMINOPHEN 500MG 500 MG/1
650 TABLET, COATED ORAL EVERY 6 HOURS
Refills: 0 | Status: DISCONTINUED | OUTPATIENT
Start: 2024-01-01 | End: 2024-01-01

## 2024-01-01 RX ORDER — NOREPINEPHRINE BITARTRATE 1 MG/ML
0.05 INJECTION, SOLUTION, CONCENTRATE INTRAVENOUS
Qty: 8 | Refills: 0 | Status: DISCONTINUED | OUTPATIENT
Start: 2024-01-01 | End: 2024-01-01

## 2024-01-01 RX ORDER — LEVETIRACETAM 1000 MG/1
1000 TABLET ORAL ONCE
Refills: 0 | Status: DISCONTINUED | OUTPATIENT
Start: 2024-01-01 | End: 2024-01-01

## 2024-01-01 RX ORDER — DOXYCYCLINE HYCLATE 150 MG/1
100 TABLET, COATED ORAL EVERY 12 HOURS
Refills: 0 | Status: DISCONTINUED | OUTPATIENT
Start: 2024-01-01 | End: 2024-01-01

## 2024-01-01 RX ORDER — ACETAMINOPHEN 500MG 500 MG/1
1000 TABLET, COATED ORAL ONCE
Refills: 0 | Status: COMPLETED | OUTPATIENT
Start: 2024-01-01 | End: 2024-01-01

## 2024-01-01 RX ORDER — LEVETIRACETAM 1000 MG/1
2000 TABLET ORAL ONCE
Refills: 0 | Status: DISCONTINUED | OUTPATIENT
Start: 2024-01-01 | End: 2024-01-01

## 2024-01-01 RX ORDER — LEVETIRACETAM 1000 MG/1
1500 TABLET ORAL
Refills: 0 | Status: DISCONTINUED | OUTPATIENT
Start: 2024-01-01 | End: 2024-01-01

## 2024-01-01 RX ORDER — PROPOFOL 10 MG/ML
10 INJECTION, EMULSION INTRAVENOUS
Qty: 500 | Refills: 0 | Status: DISCONTINUED | OUTPATIENT
Start: 2024-01-01 | End: 2024-01-01

## 2024-01-01 RX ORDER — CEFEPIME 2 G/1
1000 INJECTION, POWDER, FOR SOLUTION INTRAVENOUS ONCE
Refills: 0 | Status: COMPLETED | OUTPATIENT
Start: 2024-01-01 | End: 2024-01-01

## 2024-01-01 RX ORDER — PROPOFOL 10 MG/ML
10 INJECTION, EMULSION INTRAVENOUS
Qty: 1000 | Refills: 0 | Status: DISCONTINUED | OUTPATIENT
Start: 2024-01-01 | End: 2024-01-01

## 2024-01-01 RX ORDER — HYDROMORPHONE HYDROCHLORIDE 2 MG/1
1 TABLET ORAL
Refills: 0 | Status: DISCONTINUED | OUTPATIENT
Start: 2024-01-01 | End: 2024-01-01

## 2024-01-01 RX ORDER — CEFEPIME 2 G/1
2000 INJECTION, POWDER, FOR SOLUTION INTRAVENOUS ONCE
Refills: 0 | Status: COMPLETED | OUTPATIENT
Start: 2024-01-01 | End: 2024-01-01

## 2024-01-01 RX ORDER — PIPERACILLIN SODIUM AND TAZOBACTAM SODIUM 4; .5 G/20ML; G/20ML
3.38 INJECTION, POWDER, LYOPHILIZED, FOR SOLUTION INTRAVENOUS EVERY 8 HOURS
Refills: 0 | Status: DISCONTINUED | OUTPATIENT
Start: 2024-01-01 | End: 2024-01-01

## 2024-01-01 RX ORDER — PROPOFOL 10 MG/ML
50 INJECTION, EMULSION INTRAVENOUS ONCE
Refills: 0 | Status: DISCONTINUED | OUTPATIENT
Start: 2024-01-01 | End: 2024-01-01

## 2024-01-01 RX ORDER — DOXYCYCLINE HYCLATE 150 MG/1
100 TABLET, COATED ORAL EVERY 12 HOURS
Refills: 0 | Status: COMPLETED | OUTPATIENT
Start: 2024-01-01 | End: 2024-01-01

## 2024-01-01 RX ORDER — CEFEPIME 2 G/1
2000 INJECTION, POWDER, FOR SOLUTION INTRAVENOUS EVERY 8 HOURS
Refills: 0 | Status: DISCONTINUED | OUTPATIENT
Start: 2024-01-01 | End: 2024-01-01

## 2024-01-01 RX ORDER — 0.9 % SODIUM CHLORIDE 0.9 %
1000 INTRAVENOUS SOLUTION INTRAVENOUS
Refills: 0 | Status: DISCONTINUED | OUTPATIENT
Start: 2024-01-01 | End: 2024-01-01

## 2024-01-01 RX ORDER — EPINEPHRINE 1 MG/ML(1)
2 AMPUL (ML) INJECTION
Qty: 4 | Refills: 0 | Status: DISCONTINUED | OUTPATIENT
Start: 2024-01-01 | End: 2024-01-01

## 2024-01-01 RX ORDER — LORAZEPAM 2 MG/1
2 TABLET ORAL ONCE
Refills: 0 | Status: DISCONTINUED | OUTPATIENT
Start: 2024-01-01 | End: 2024-01-01

## 2024-01-01 RX ORDER — 0.9 % SODIUM CHLORIDE 0.9 %
1000 INTRAVENOUS SOLUTION INTRAVENOUS ONCE
Refills: 0 | Status: COMPLETED | OUTPATIENT
Start: 2024-01-01 | End: 2024-01-01

## 2024-01-01 RX ORDER — LEVETIRACETAM 1000 MG/1
500 TABLET ORAL
Refills: 0 | Status: DISCONTINUED | OUTPATIENT
Start: 2024-01-01 | End: 2024-01-01

## 2024-01-01 RX ORDER — PIPERACILLIN SODIUM AND TAZOBACTAM SODIUM 4; .5 G/20ML; G/20ML
3.38 INJECTION, POWDER, LYOPHILIZED, FOR SOLUTION INTRAVENOUS ONCE
Refills: 0 | Status: COMPLETED | OUTPATIENT
Start: 2024-01-01 | End: 2024-01-01

## 2024-01-01 RX ORDER — PROPOFOL 10 MG/ML
40 INJECTION, EMULSION INTRAVENOUS
Qty: 1000 | Refills: 0 | Status: DISCONTINUED | OUTPATIENT
Start: 2024-01-01 | End: 2024-01-01

## 2024-01-01 RX ORDER — CHLORHEXIDINE GLUCONATE 1.2 MG/ML
15 RINSE ORAL EVERY 12 HOURS
Refills: 0 | Status: DISCONTINUED | OUTPATIENT
Start: 2024-01-01 | End: 2024-01-01

## 2024-01-01 RX ORDER — METOPROLOL TARTRATE 100 MG/1
25 TABLET, FILM COATED ORAL
Refills: 0 | Status: DISCONTINUED | OUTPATIENT
Start: 2024-01-01 | End: 2024-01-01

## 2024-01-01 RX ORDER — PIPERACILLIN SODIUM AND TAZOBACTAM SODIUM 4; .5 G/20ML; G/20ML
3.38 INJECTION, POWDER, LYOPHILIZED, FOR SOLUTION INTRAVENOUS ONCE
Refills: 0 | Status: DISCONTINUED | OUTPATIENT
Start: 2024-01-01 | End: 2024-01-01

## 2024-01-01 RX ORDER — FLUTICASONE PROPIONATE 50 MCG
1 SPRAY, SUSPENSION NASAL
Refills: 0 | DISCHARGE

## 2024-01-01 RX ORDER — HYDROXYCHLOROQUINE SULFATE 200 MG/1
400 TABLET, FILM COATED ORAL DAILY
Refills: 0 | Status: DISCONTINUED | OUTPATIENT
Start: 2024-01-01 | End: 2024-01-01

## 2024-01-01 RX ORDER — METHYLPREDNISOLONE SOD SUCC 125 MG
40 VIAL (EA) INJECTION EVERY 12 HOURS
Refills: 0 | Status: DISCONTINUED | OUTPATIENT
Start: 2024-01-01 | End: 2024-01-01

## 2024-01-01 RX ORDER — POTASSIUM CHLORIDE 600 MG/1
40 TABLET, EXTENDED RELEASE ORAL ONCE
Refills: 0 | Status: COMPLETED | OUTPATIENT
Start: 2024-01-01 | End: 2024-01-01

## 2024-01-01 RX ORDER — PROPOFOL 10 MG/ML
20 INJECTION, EMULSION INTRAVENOUS
Qty: 1000 | Refills: 0 | Status: DISCONTINUED | OUTPATIENT
Start: 2024-01-01 | End: 2024-01-01

## 2024-01-01 RX ORDER — IPRATROPIUM BROMIDE AND ALBUTEROL SULFATE 2.5; .5 MG/3ML; MG/3ML
3 SOLUTION RESPIRATORY (INHALATION) ONCE
Refills: 0 | Status: COMPLETED | OUTPATIENT
Start: 2024-01-01 | End: 2024-01-01

## 2024-01-01 RX ORDER — VALPROIC ACID 250 MG/5ML
2000 SOLUTION ORAL ONCE
Refills: 0 | Status: COMPLETED | OUTPATIENT
Start: 2024-01-01 | End: 2024-01-01

## 2024-01-01 RX ORDER — FUROSEMIDE 40 MG/1
40 TABLET ORAL DAILY
Refills: 0 | Status: DISCONTINUED | OUTPATIENT
Start: 2024-01-01 | End: 2024-01-01

## 2024-01-01 RX ORDER — LORAZEPAM 2 MG/1
2 TABLET ORAL EVERY 4 HOURS
Refills: 0 | Status: DISCONTINUED | OUTPATIENT
Start: 2024-01-01 | End: 2024-01-01

## 2024-01-01 RX ORDER — HYDROMORPHONE HYDROCHLORIDE 2 MG/1
1 TABLET ORAL ONCE
Refills: 0 | Status: DISCONTINUED | OUTPATIENT
Start: 2024-01-01 | End: 2024-01-01

## 2024-01-01 RX ORDER — VANCOMYCIN HCL 900 MCG/MG
1750 POWDER (GRAM) MISCELLANEOUS EVERY 12 HOURS
Refills: 0 | Status: DISCONTINUED | OUTPATIENT
Start: 2024-01-01 | End: 2024-01-01

## 2024-01-01 RX ORDER — POTASSIUM CHLORIDE 600 MG/1
40 TABLET, EXTENDED RELEASE ORAL ONCE
Refills: 0 | Status: DISCONTINUED | OUTPATIENT
Start: 2024-01-01 | End: 2024-01-01

## 2024-01-01 RX ORDER — FENTANYL 12 UG/H
25 PATCH, EXTENDED RELEASE TRANSDERMAL EVERY 4 HOURS
Refills: 0 | Status: DISCONTINUED | OUTPATIENT
Start: 2024-01-01 | End: 2024-01-01

## 2024-01-01 RX ORDER — ENOXAPARIN SODIUM 30 MG/.3ML
40 INJECTION SUBCUTANEOUS EVERY 24 HOURS
Refills: 0 | Status: DISCONTINUED | OUTPATIENT
Start: 2024-01-01 | End: 2024-01-01

## 2024-01-01 RX ORDER — PANTOPRAZOLE SODIUM 40 MG/1
40 TABLET, DELAYED RELEASE ORAL EVERY 12 HOURS
Refills: 0 | Status: DISCONTINUED | OUTPATIENT
Start: 2024-01-01 | End: 2024-01-01

## 2024-01-01 RX ORDER — LORAZEPAM 2 MG/1
4 TABLET ORAL ONCE
Refills: 0 | Status: DISCONTINUED | OUTPATIENT
Start: 2024-01-01 | End: 2024-01-01

## 2024-01-01 RX ORDER — DOXYCYCLINE HYCLATE 150 MG/1
TABLET, COATED ORAL
Refills: 0 | Status: DISCONTINUED | OUTPATIENT
Start: 2024-01-01 | End: 2024-01-01

## 2024-01-01 RX ORDER — LEVETIRACETAM 1000 MG/1
2000 TABLET ORAL ONCE
Refills: 0 | Status: COMPLETED | OUTPATIENT
Start: 2024-01-01 | End: 2024-01-01

## 2024-01-01 RX ORDER — METHYLPREDNISOLONE SOD SUCC 125 MG
40 VIAL (EA) INJECTION
Refills: 0 | Status: DISCONTINUED | OUTPATIENT
Start: 2024-01-01 | End: 2024-01-01

## 2024-01-01 RX ORDER — POLYETHYLENE GLYCOL 3350 17 G/17G
17 POWDER, FOR SOLUTION ORAL
Refills: 0 | Status: DISCONTINUED | OUTPATIENT
Start: 2024-01-01 | End: 2024-01-01

## 2024-01-01 RX ORDER — SENNOSIDES 8.6 MG
2 TABLET ORAL AT BEDTIME
Refills: 0 | Status: DISCONTINUED | OUTPATIENT
Start: 2024-01-01 | End: 2024-01-01

## 2024-01-01 RX ORDER — LORAZEPAM 2 MG/1
2 TABLET ORAL
Refills: 0 | Status: DISCONTINUED | OUTPATIENT
Start: 2024-01-01 | End: 2024-01-01

## 2024-01-01 RX ORDER — VALPROIC ACID 250 MG/5ML
1000 SOLUTION ORAL EVERY 8 HOURS
Refills: 0 | Status: DISCONTINUED | OUTPATIENT
Start: 2024-01-01 | End: 2024-01-01

## 2024-01-01 RX ORDER — AMIODARONE HYDROCHLORIDE 200 MG/1
1 TABLET ORAL
Qty: 450 | Refills: 0 | Status: DISCONTINUED | OUTPATIENT
Start: 2024-01-01 | End: 2024-01-01

## 2024-01-01 RX ORDER — AMIODARONE HYDROCHLORIDE 200 MG/1
300 TABLET ORAL ONCE
Refills: 0 | Status: DISCONTINUED | OUTPATIENT
Start: 2024-01-01 | End: 2024-01-01

## 2024-01-01 RX ORDER — GABAPENTIN 300 MG/1
100 CAPSULE ORAL THREE TIMES A DAY
Refills: 0 | Status: DISCONTINUED | OUTPATIENT
Start: 2024-01-01 | End: 2024-01-01

## 2024-01-01 RX ORDER — DOXYCYCLINE HYCLATE 150 MG/1
100 TABLET, COATED ORAL ONCE
Refills: 0 | Status: COMPLETED | OUTPATIENT
Start: 2024-01-01 | End: 2024-01-01

## 2024-01-01 RX ORDER — LOSARTAN POTASSIUM 100 MG/1
1 TABLET, FILM COATED ORAL
Refills: 0 | DISCHARGE

## 2024-01-01 RX ORDER — POTASSIUM CHLORIDE 600 MG/1
20 TABLET, EXTENDED RELEASE ORAL ONCE
Refills: 0 | Status: COMPLETED | OUTPATIENT
Start: 2024-01-01 | End: 2024-01-01

## 2024-01-01 RX ORDER — 0.9 % SODIUM CHLORIDE 0.9 %
1200 INTRAVENOUS SOLUTION INTRAVENOUS ONCE
Refills: 0 | Status: COMPLETED | OUTPATIENT
Start: 2024-01-01 | End: 2024-01-01

## 2024-01-01 RX ORDER — PIPERACILLIN SODIUM AND TAZOBACTAM SODIUM 4; .5 G/20ML; G/20ML
3.38 INJECTION, POWDER, LYOPHILIZED, FOR SOLUTION INTRAVENOUS EVERY 8 HOURS
Refills: 0 | Status: COMPLETED | OUTPATIENT
Start: 2024-01-01 | End: 2024-01-01

## 2024-01-01 RX ORDER — NOREPINEPHRINE BITARTRATE 1 MG/ML
0.05 INJECTION, SOLUTION, CONCENTRATE INTRAVENOUS
Qty: 16 | Refills: 0 | Status: DISCONTINUED | OUTPATIENT
Start: 2024-01-01 | End: 2024-01-01

## 2024-01-01 RX ORDER — LORAZEPAM 2 MG/1
2 TABLET ORAL DAILY
Refills: 0 | Status: DISCONTINUED | OUTPATIENT
Start: 2024-01-01 | End: 2024-01-01

## 2024-01-01 RX ORDER — CHLORHEXIDINE GLUCONATE 1.2 MG/ML
1 RINSE ORAL
Refills: 0 | Status: DISCONTINUED | OUTPATIENT
Start: 2024-01-01 | End: 2024-01-01

## 2024-01-01 RX ORDER — VALPROIC ACID 250 MG/5ML
750 SOLUTION ORAL EVERY 8 HOURS
Refills: 0 | Status: DISCONTINUED | OUTPATIENT
Start: 2024-01-01 | End: 2024-01-01

## 2024-01-01 RX ORDER — CYANOCOBALAMIN/FOLIC AC/VIT B6 1-2.2-25MG
1 TABLET ORAL DAILY
Refills: 0 | Status: DISCONTINUED | OUTPATIENT
Start: 2024-01-01 | End: 2024-01-01

## 2024-01-01 RX ORDER — CEFEPIME 2 G/1
INJECTION, POWDER, FOR SOLUTION INTRAVENOUS
Refills: 0 | Status: DISCONTINUED | OUTPATIENT
Start: 2024-01-01 | End: 2024-01-01

## 2024-01-01 RX ORDER — METHYLPREDNISOLONE SOD SUCC 125 MG
40 VIAL (EA) INJECTION DAILY
Refills: 0 | Status: DISCONTINUED | OUTPATIENT
Start: 2024-01-01 | End: 2024-01-01

## 2024-01-01 RX ORDER — PROPOFOL 10 MG/ML
75 INJECTION, EMULSION INTRAVENOUS
Qty: 1000 | Refills: 0 | Status: DISCONTINUED | OUTPATIENT
Start: 2024-01-01 | End: 2024-01-01

## 2024-01-01 RX ORDER — DOPAMINE HYDROCHLORIDE 0.8 MG/ML
2 INJECTION, SOLUTION INTRAVENOUS
Qty: 400 | Refills: 0 | Status: DISCONTINUED | OUTPATIENT
Start: 2024-01-01 | End: 2024-01-01

## 2024-01-01 RX ORDER — POTASSIUM CHLORIDE 600 MG/1
20 TABLET, EXTENDED RELEASE ORAL
Refills: 0 | Status: COMPLETED | OUTPATIENT
Start: 2024-01-01 | End: 2024-01-01

## 2024-01-01 RX ORDER — GLYCOPYRROLATE 1 MG/1
0.2 TABLET ORAL
Refills: 0 | Status: DISCONTINUED | OUTPATIENT
Start: 2024-01-01 | End: 2024-01-01

## 2024-01-01 RX ORDER — METOPROLOL TARTRATE 100 MG/1
5 TABLET, FILM COATED ORAL ONCE
Refills: 0 | Status: DISCONTINUED | OUTPATIENT
Start: 2024-01-01 | End: 2024-01-01

## 2024-01-01 RX ORDER — EPINEPHRINE 1 MG/ML(1)
4 AMPUL (ML) INJECTION
Qty: 4 | Refills: 0 | Status: DISCONTINUED | OUTPATIENT
Start: 2024-01-01 | End: 2024-01-01

## 2024-01-01 RX ORDER — LOSARTAN POTASSIUM 100 MG/1
50 TABLET, FILM COATED ORAL DAILY
Refills: 0 | Status: DISCONTINUED | OUTPATIENT
Start: 2024-01-01 | End: 2024-01-01

## 2024-01-01 RX ORDER — PANTOPRAZOLE SODIUM 40 MG/1
40 TABLET, DELAYED RELEASE ORAL
Refills: 0 | Status: DISCONTINUED | OUTPATIENT
Start: 2024-01-01 | End: 2024-01-01

## 2024-01-01 RX ORDER — VALPROIC ACID 250 MG/5ML
750 SOLUTION ORAL EVERY 12 HOURS
Refills: 0 | Status: DISCONTINUED | OUTPATIENT
Start: 2024-01-01 | End: 2024-01-01

## 2024-01-01 RX ORDER — FENTANYL 12 UG/H
50 PATCH, EXTENDED RELEASE TRANSDERMAL EVERY 4 HOURS
Refills: 0 | Status: DISCONTINUED | OUTPATIENT
Start: 2024-01-01 | End: 2024-01-01

## 2024-01-01 RX ORDER — LANOLIN ALCOHOL/MO/W.PET/CERES
100 CREAM (GRAM) TOPICAL DAILY
Refills: 0 | Status: DISCONTINUED | OUTPATIENT
Start: 2024-01-01 | End: 2024-01-01

## 2024-01-01 RX ORDER — ATROPINE 2 MG/.7ML
1 INJECTION INTRAMUSCULAR ONCE
Refills: 0 | Status: DISCONTINUED | OUTPATIENT
Start: 2024-01-01 | End: 2024-01-01

## 2024-01-01 RX ORDER — FENTANYL 12 UG/H
50 PATCH, EXTENDED RELEASE TRANSDERMAL
Refills: 0 | Status: DISCONTINUED | OUTPATIENT
Start: 2024-01-01 | End: 2024-01-01

## 2024-01-01 RX ADMIN — Medication 40 MILLIGRAM(S): at 17:02

## 2024-01-01 RX ADMIN — DOPAMINE HYDROCHLORIDE 8.18 MICROGRAM(S)/KG/MIN: 0.8 INJECTION, SOLUTION INTRAVENOUS at 17:24

## 2024-01-01 RX ADMIN — VALPROIC ACID 50 MILLIGRAM(S): 250 SOLUTION ORAL at 13:07

## 2024-01-01 RX ADMIN — Medication 1 TABLET(S): at 11:38

## 2024-01-01 RX ADMIN — PANTOPRAZOLE SODIUM 40 MILLIGRAM(S): 40 TABLET, DELAYED RELEASE ORAL at 05:15

## 2024-01-01 RX ADMIN — FUROSEMIDE 40 MILLIGRAM(S): 40 TABLET ORAL at 05:28

## 2024-01-01 RX ADMIN — CEFEPIME 100 MILLIGRAM(S): 2 INJECTION, POWDER, FOR SOLUTION INTRAVENOUS at 05:43

## 2024-01-01 RX ADMIN — GABAPENTIN 100 MILLIGRAM(S): 300 CAPSULE ORAL at 05:29

## 2024-01-01 RX ADMIN — POLYETHYLENE GLYCOL 3350 17 GRAM(S): 17 POWDER, FOR SOLUTION ORAL at 18:04

## 2024-01-01 RX ADMIN — POLYETHYLENE GLYCOL 3350 17 GRAM(S): 17 POWDER, FOR SOLUTION ORAL at 19:02

## 2024-01-01 RX ADMIN — ACETAMINOPHEN 500MG 650 MILLIGRAM(S): 500 TABLET, COATED ORAL at 19:09

## 2024-01-01 RX ADMIN — Medication 1 TABLET(S): at 11:09

## 2024-01-01 RX ADMIN — PROPOFOL 20.7 MICROGRAM(S)/KG/MIN: 10 INJECTION, EMULSION INTRAVENOUS at 03:42

## 2024-01-01 RX ADMIN — Medication 2 TABLET(S): at 21:09

## 2024-01-01 RX ADMIN — Medication 100 MILLIGRAM(S): at 11:38

## 2024-01-01 RX ADMIN — GABAPENTIN 100 MILLIGRAM(S): 300 CAPSULE ORAL at 05:45

## 2024-01-01 RX ADMIN — POLYETHYLENE GLYCOL 3350 17 GRAM(S): 17 POWDER, FOR SOLUTION ORAL at 05:19

## 2024-01-01 RX ADMIN — PIPERACILLIN SODIUM AND TAZOBACTAM SODIUM 25 GRAM(S): 4; .5 INJECTION, POWDER, LYOPHILIZED, FOR SOLUTION INTRAVENOUS at 21:58

## 2024-01-01 RX ADMIN — Medication 5.46 MG/KG/HR: at 09:25

## 2024-01-01 RX ADMIN — LORAZEPAM 2 MILLIGRAM(S): 2 TABLET ORAL at 18:00

## 2024-01-01 RX ADMIN — VALPROIC ACID 50 MILLIGRAM(S): 250 SOLUTION ORAL at 21:23

## 2024-01-01 RX ADMIN — GABAPENTIN 100 MILLIGRAM(S): 300 CAPSULE ORAL at 21:09

## 2024-01-01 RX ADMIN — LEVETIRACETAM 1500 MILLIGRAM(S): 1000 TABLET ORAL at 17:31

## 2024-01-01 RX ADMIN — POTASSIUM CHLORIDE 50 MILLIEQUIVALENT(S): 600 TABLET, EXTENDED RELEASE ORAL at 11:39

## 2024-01-01 RX ADMIN — HYDROMORPHONE HYDROCHLORIDE 1 MILLIGRAM(S): 2 TABLET ORAL at 13:09

## 2024-01-01 RX ADMIN — LOSARTAN POTASSIUM 50 MILLIGRAM(S): 100 TABLET, FILM COATED ORAL at 05:18

## 2024-01-01 RX ADMIN — Medication 1 APPLICATION(S): at 05:20

## 2024-01-01 RX ADMIN — PANTOPRAZOLE SODIUM 40 MILLIGRAM(S): 40 TABLET, DELAYED RELEASE ORAL at 05:17

## 2024-01-01 RX ADMIN — Medication 1 MILLIGRAM(S): at 12:14

## 2024-01-01 RX ADMIN — GABAPENTIN 100 MILLIGRAM(S): 300 CAPSULE ORAL at 13:46

## 2024-01-01 RX ADMIN — PANTOPRAZOLE SODIUM 40 MILLIGRAM(S): 40 TABLET, DELAYED RELEASE ORAL at 18:53

## 2024-01-01 RX ADMIN — FUROSEMIDE 40 MILLIGRAM(S): 40 TABLET ORAL at 05:44

## 2024-01-01 RX ADMIN — HYDROXYCHLOROQUINE SULFATE 400 MILLIGRAM(S): 200 TABLET, FILM COATED ORAL at 11:19

## 2024-01-01 RX ADMIN — LORAZEPAM 2 MILLIGRAM(S): 2 TABLET ORAL at 21:07

## 2024-01-01 RX ADMIN — POLYETHYLENE GLYCOL 3350 17 GRAM(S): 17 POWDER, FOR SOLUTION ORAL at 05:17

## 2024-01-01 RX ADMIN — POTASSIUM CHLORIDE 50 MILLIEQUIVALENT(S): 600 TABLET, EXTENDED RELEASE ORAL at 02:24

## 2024-01-01 RX ADMIN — LORAZEPAM 2 MILLIGRAM(S): 2 TABLET ORAL at 12:59

## 2024-01-01 RX ADMIN — GABAPENTIN 100 MILLIGRAM(S): 300 CAPSULE ORAL at 13:07

## 2024-01-01 RX ADMIN — Medication 100 MILLIGRAM(S): at 11:55

## 2024-01-01 RX ADMIN — FUROSEMIDE 40 MILLIGRAM(S): 40 TABLET ORAL at 10:11

## 2024-01-01 RX ADMIN — Medication 1 MILLIGRAM(S): at 11:26

## 2024-01-01 RX ADMIN — PANTOPRAZOLE SODIUM 40 MILLIGRAM(S): 40 TABLET, DELAYED RELEASE ORAL at 05:34

## 2024-01-01 RX ADMIN — LEVETIRACETAM 500 MILLIGRAM(S): 1000 TABLET ORAL at 19:36

## 2024-01-01 RX ADMIN — LEVETIRACETAM 1000 MILLIGRAM(S): 1000 TABLET ORAL at 10:04

## 2024-01-01 RX ADMIN — Medication 100 MILLIGRAM(S): at 11:18

## 2024-01-01 RX ADMIN — PROPOFOL 26.2 MICROGRAM(S)/KG/MIN: 10 INJECTION, EMULSION INTRAVENOUS at 09:24

## 2024-01-01 RX ADMIN — POLYETHYLENE GLYCOL 3350 17 GRAM(S): 17 POWDER, FOR SOLUTION ORAL at 17:20

## 2024-01-01 RX ADMIN — METOPROLOL TARTRATE 5 MILLIGRAM(S): 100 TABLET, FILM COATED ORAL at 21:01

## 2024-01-01 RX ADMIN — CHLORHEXIDINE GLUCONATE 1 APPLICATION(S): 1.2 RINSE ORAL at 05:19

## 2024-01-01 RX ADMIN — Medication 1 TABLET(S): at 12:48

## 2024-01-01 RX ADMIN — DOXYCYCLINE HYCLATE 100 MILLIGRAM(S): 150 TABLET, COATED ORAL at 02:01

## 2024-01-01 RX ADMIN — PIPERACILLIN SODIUM AND TAZOBACTAM SODIUM 200 GRAM(S): 4; .5 INJECTION, POWDER, LYOPHILIZED, FOR SOLUTION INTRAVENOUS at 10:13

## 2024-01-01 RX ADMIN — Medication 100 MILLIGRAM(S): at 13:03

## 2024-01-01 RX ADMIN — ENOXAPARIN SODIUM 40 MILLIGRAM(S): 30 INJECTION SUBCUTANEOUS at 07:01

## 2024-01-01 RX ADMIN — CHLORHEXIDINE GLUCONATE 15 MILLILITER(S): 1.2 RINSE ORAL at 05:17

## 2024-01-01 RX ADMIN — VALPROIC ACID 50 MILLIGRAM(S): 250 SOLUTION ORAL at 05:30

## 2024-01-01 RX ADMIN — Medication 5.46 MG/KG/HR: at 23:18

## 2024-01-01 RX ADMIN — METOPROLOL TARTRATE 5 MILLIGRAM(S): 100 TABLET, FILM COATED ORAL at 05:43

## 2024-01-01 RX ADMIN — ACETAMINOPHEN 500MG 650 MILLIGRAM(S): 500 TABLET, COATED ORAL at 12:20

## 2024-01-01 RX ADMIN — LORAZEPAM 2 MILLIGRAM(S): 2 TABLET ORAL at 08:09

## 2024-01-01 RX ADMIN — POLYETHYLENE GLYCOL 3350 17 GRAM(S): 17 POWDER, FOR SOLUTION ORAL at 05:14

## 2024-01-01 RX ADMIN — PROPOFOL 26.2 MICROGRAM(S)/KG/MIN: 10 INJECTION, EMULSION INTRAVENOUS at 05:17

## 2024-01-01 RX ADMIN — POLYETHYLENE GLYCOL 3350 17 GRAM(S): 17 POWDER, FOR SOLUTION ORAL at 05:26

## 2024-01-01 RX ADMIN — POLYETHYLENE GLYCOL 3350 17 GRAM(S): 17 POWDER, FOR SOLUTION ORAL at 17:07

## 2024-01-01 RX ADMIN — Medication 2 MILLIGRAM(S): at 04:02

## 2024-01-01 RX ADMIN — LEVETIRACETAM 1500 MILLIGRAM(S): 1000 TABLET ORAL at 17:02

## 2024-01-01 RX ADMIN — LORAZEPAM 2 MILLIGRAM(S): 2 TABLET ORAL at 22:58

## 2024-01-01 RX ADMIN — ACETAMINOPHEN 500MG 650 MILLIGRAM(S): 500 TABLET, COATED ORAL at 05:45

## 2024-01-01 RX ADMIN — PIPERACILLIN SODIUM AND TAZOBACTAM SODIUM 25 GRAM(S): 4; .5 INJECTION, POWDER, LYOPHILIZED, FOR SOLUTION INTRAVENOUS at 05:27

## 2024-01-01 RX ADMIN — HYDROXYCHLOROQUINE SULFATE 400 MILLIGRAM(S): 200 TABLET, FILM COATED ORAL at 12:04

## 2024-01-01 RX ADMIN — HYDROXYCHLOROQUINE SULFATE 400 MILLIGRAM(S): 200 TABLET, FILM COATED ORAL at 12:14

## 2024-01-01 RX ADMIN — GABAPENTIN 100 MILLIGRAM(S): 300 CAPSULE ORAL at 11:16

## 2024-01-01 RX ADMIN — PANTOPRAZOLE SODIUM 40 MILLIGRAM(S): 40 TABLET, DELAYED RELEASE ORAL at 17:02

## 2024-01-01 RX ADMIN — CHLORHEXIDINE GLUCONATE 15 MILLILITER(S): 1.2 RINSE ORAL at 18:53

## 2024-01-01 RX ADMIN — Medication 1 TABLET(S): at 11:55

## 2024-01-01 RX ADMIN — GABAPENTIN 100 MILLIGRAM(S): 300 CAPSULE ORAL at 05:16

## 2024-01-01 RX ADMIN — PANTOPRAZOLE SODIUM 40 MILLIGRAM(S): 40 TABLET, DELAYED RELEASE ORAL at 17:20

## 2024-01-01 RX ADMIN — VALPROIC ACID 50 MILLIGRAM(S): 250 SOLUTION ORAL at 17:19

## 2024-01-01 RX ADMIN — Medication 1 MILLIGRAM(S): at 12:48

## 2024-01-01 RX ADMIN — LORAZEPAM 2 MILLIGRAM(S): 2 TABLET ORAL at 14:40

## 2024-01-01 RX ADMIN — POLYETHYLENE GLYCOL 3350 17 GRAM(S): 17 POWDER, FOR SOLUTION ORAL at 05:35

## 2024-01-01 RX ADMIN — GABAPENTIN 100 MILLIGRAM(S): 300 CAPSULE ORAL at 21:23

## 2024-01-01 RX ADMIN — Medication 1 APPLICATION(S): at 21:34

## 2024-01-01 RX ADMIN — VALPROIC ACID 50 MILLIGRAM(S): 250 SOLUTION ORAL at 21:04

## 2024-01-01 RX ADMIN — PIPERACILLIN SODIUM AND TAZOBACTAM SODIUM 25 GRAM(S): 4; .5 INJECTION, POWDER, LYOPHILIZED, FOR SOLUTION INTRAVENOUS at 05:14

## 2024-01-01 RX ADMIN — VALPROIC ACID 50 MILLIGRAM(S): 250 SOLUTION ORAL at 21:16

## 2024-01-01 RX ADMIN — VALPROIC ACID 50 MILLIGRAM(S): 250 SOLUTION ORAL at 12:48

## 2024-01-01 RX ADMIN — PIPERACILLIN SODIUM AND TAZOBACTAM SODIUM 25 GRAM(S): 4; .5 INJECTION, POWDER, LYOPHILIZED, FOR SOLUTION INTRAVENOUS at 14:38

## 2024-01-01 RX ADMIN — PROPOFOL 6.55 MICROGRAM(S)/KG/MIN: 10 INJECTION, EMULSION INTRAVENOUS at 06:57

## 2024-01-01 RX ADMIN — DOXYCYCLINE HYCLATE 100 MILLIGRAM(S): 150 TABLET, COATED ORAL at 06:08

## 2024-01-01 RX ADMIN — DOXYCYCLINE HYCLATE 100 MILLIGRAM(S): 150 TABLET, COATED ORAL at 17:31

## 2024-01-01 RX ADMIN — Medication 2 TABLET(S): at 22:36

## 2024-01-01 RX ADMIN — PROPOFOL 26.2 MICROGRAM(S)/KG/MIN: 10 INJECTION, EMULSION INTRAVENOUS at 21:20

## 2024-01-01 RX ADMIN — PANTOPRAZOLE SODIUM 40 MILLIGRAM(S): 40 TABLET, DELAYED RELEASE ORAL at 17:31

## 2024-01-01 RX ADMIN — LEVETIRACETAM 800 MILLIGRAM(S): 1000 TABLET ORAL at 08:53

## 2024-01-01 RX ADMIN — LORAZEPAM 2 MILLIGRAM(S): 2 TABLET ORAL at 03:17

## 2024-01-01 RX ADMIN — PIPERACILLIN SODIUM AND TAZOBACTAM SODIUM 25 GRAM(S): 4; .5 INJECTION, POWDER, LYOPHILIZED, FOR SOLUTION INTRAVENOUS at 21:06

## 2024-01-01 RX ADMIN — ACETAMINOPHEN 500MG 650 MILLIGRAM(S): 500 TABLET, COATED ORAL at 11:56

## 2024-01-01 RX ADMIN — CHLORHEXIDINE GLUCONATE 15 MILLILITER(S): 1.2 RINSE ORAL at 18:04

## 2024-01-01 RX ADMIN — ACETAMINOPHEN 500MG 1000 MILLIGRAM(S): 500 TABLET, COATED ORAL at 17:45

## 2024-01-01 RX ADMIN — Medication 1 TABLET(S): at 11:26

## 2024-01-01 RX ADMIN — Medication 2 MILLIGRAM(S): at 04:17

## 2024-01-01 RX ADMIN — PIPERACILLIN SODIUM AND TAZOBACTAM SODIUM 25 GRAM(S): 4; .5 INJECTION, POWDER, LYOPHILIZED, FOR SOLUTION INTRAVENOUS at 05:17

## 2024-01-01 RX ADMIN — GABAPENTIN 100 MILLIGRAM(S): 300 CAPSULE ORAL at 13:43

## 2024-01-01 RX ADMIN — LEVETIRACETAM 1500 MILLIGRAM(S): 1000 TABLET ORAL at 05:44

## 2024-01-01 RX ADMIN — PROPOFOL 26.2 MICROGRAM(S)/KG/MIN: 10 INJECTION, EMULSION INTRAVENOUS at 19:34

## 2024-01-01 RX ADMIN — PIPERACILLIN SODIUM AND TAZOBACTAM SODIUM 25 GRAM(S): 4; .5 INJECTION, POWDER, LYOPHILIZED, FOR SOLUTION INTRAVENOUS at 21:21

## 2024-01-01 RX ADMIN — Medication 1 APPLICATION(S): at 18:06

## 2024-01-01 RX ADMIN — PROPOFOL 26.2 MICROGRAM(S)/KG/MIN: 10 INJECTION, EMULSION INTRAVENOUS at 23:17

## 2024-01-01 RX ADMIN — LORAZEPAM 2 MILLIGRAM(S): 2 TABLET ORAL at 13:00

## 2024-01-01 RX ADMIN — VALPROIC ACID 50 MILLIGRAM(S): 250 SOLUTION ORAL at 14:39

## 2024-01-01 RX ADMIN — HYDROXYCHLOROQUINE SULFATE 400 MILLIGRAM(S): 200 TABLET, FILM COATED ORAL at 13:02

## 2024-01-01 RX ADMIN — CEFEPIME 1000 MILLIGRAM(S): 2 INJECTION, POWDER, FOR SOLUTION INTRAVENOUS at 01:03

## 2024-01-01 RX ADMIN — GABAPENTIN 100 MILLIGRAM(S): 300 CAPSULE ORAL at 05:42

## 2024-01-01 RX ADMIN — FENTANYL 50 MICROGRAM(S): 12 PATCH, EXTENDED RELEASE TRANSDERMAL at 10:13

## 2024-01-01 RX ADMIN — GABAPENTIN 100 MILLIGRAM(S): 300 CAPSULE ORAL at 05:17

## 2024-01-01 RX ADMIN — CEFEPIME 100 MILLIGRAM(S): 2 INJECTION, POWDER, FOR SOLUTION INTRAVENOUS at 21:24

## 2024-01-01 RX ADMIN — Medication 100 MILLIGRAM(S): at 11:20

## 2024-01-01 RX ADMIN — PIPERACILLIN SODIUM AND TAZOBACTAM SODIUM 25 GRAM(S): 4; .5 INJECTION, POWDER, LYOPHILIZED, FOR SOLUTION INTRAVENOUS at 07:26

## 2024-01-01 RX ADMIN — Medication 250 MILLIGRAM(S): at 19:01

## 2024-01-01 RX ADMIN — ENOXAPARIN SODIUM 40 MILLIGRAM(S): 30 INJECTION SUBCUTANEOUS at 08:37

## 2024-01-01 RX ADMIN — Medication 2 TABLET(S): at 21:20

## 2024-01-01 RX ADMIN — PIPERACILLIN SODIUM AND TAZOBACTAM SODIUM 25 GRAM(S): 4; .5 INJECTION, POWDER, LYOPHILIZED, FOR SOLUTION INTRAVENOUS at 21:09

## 2024-01-01 RX ADMIN — PANTOPRAZOLE SODIUM 40 MILLIGRAM(S): 40 TABLET, DELAYED RELEASE ORAL at 17:07

## 2024-01-01 RX ADMIN — Medication 1 APPLICATION(S): at 05:27

## 2024-01-01 RX ADMIN — ACETAMINOPHEN 500MG 650 MILLIGRAM(S): 500 TABLET, COATED ORAL at 11:35

## 2024-01-01 RX ADMIN — POLYETHYLENE GLYCOL 3350 17 GRAM(S): 17 POWDER, FOR SOLUTION ORAL at 17:02

## 2024-01-01 RX ADMIN — Medication 1000 MILLILITER(S): at 02:30

## 2024-01-01 RX ADMIN — PIPERACILLIN SODIUM AND TAZOBACTAM SODIUM 25 GRAM(S): 4; .5 INJECTION, POWDER, LYOPHILIZED, FOR SOLUTION INTRAVENOUS at 13:45

## 2024-01-01 RX ADMIN — PROPOFOL 6.55 MICROGRAM(S)/KG/MIN: 10 INJECTION, EMULSION INTRAVENOUS at 19:01

## 2024-01-01 RX ADMIN — CHLORHEXIDINE GLUCONATE 15 MILLILITER(S): 1.2 RINSE ORAL at 19:02

## 2024-01-01 RX ADMIN — FUROSEMIDE 40 MILLIGRAM(S): 40 TABLET ORAL at 05:18

## 2024-01-01 RX ADMIN — PROPOFOL 13.1 MICROGRAM(S)/KG/MIN: 10 INJECTION, EMULSION INTRAVENOUS at 05:48

## 2024-01-01 RX ADMIN — LORAZEPAM 4 MILLIGRAM(S): 2 TABLET ORAL at 21:35

## 2024-01-01 RX ADMIN — PIPERACILLIN SODIUM AND TAZOBACTAM SODIUM 25 GRAM(S): 4; .5 INJECTION, POWDER, LYOPHILIZED, FOR SOLUTION INTRAVENOUS at 15:57

## 2024-01-01 RX ADMIN — PROPOFOL 26.2 MICROGRAM(S)/KG/MIN: 10 INJECTION, EMULSION INTRAVENOUS at 21:09

## 2024-01-01 RX ADMIN — ACETAMINOPHEN 500MG 650 MILLIGRAM(S): 500 TABLET, COATED ORAL at 03:42

## 2024-01-01 RX ADMIN — POTASSIUM CHLORIDE 50 MILLIEQUIVALENT(S): 600 TABLET, EXTENDED RELEASE ORAL at 08:19

## 2024-01-01 RX ADMIN — LEVETIRACETAM 1500 MILLIGRAM(S): 1000 TABLET ORAL at 05:43

## 2024-01-01 RX ADMIN — LEVETIRACETAM 1500 MILLIGRAM(S): 1000 TABLET ORAL at 05:15

## 2024-01-01 RX ADMIN — POTASSIUM CHLORIDE 50 MILLIEQUIVALENT(S): 600 TABLET, EXTENDED RELEASE ORAL at 10:35

## 2024-01-01 RX ADMIN — CHLORHEXIDINE GLUCONATE 15 MILLILITER(S): 1.2 RINSE ORAL at 05:19

## 2024-01-01 RX ADMIN — POLYETHYLENE GLYCOL 3350 17 GRAM(S): 17 POWDER, FOR SOLUTION ORAL at 17:21

## 2024-01-01 RX ADMIN — PANTOPRAZOLE SODIUM 40 MILLIGRAM(S): 40 TABLET, DELAYED RELEASE ORAL at 05:43

## 2024-01-01 RX ADMIN — ENOXAPARIN SODIUM 40 MILLIGRAM(S): 30 INJECTION SUBCUTANEOUS at 06:39

## 2024-01-01 RX ADMIN — Medication 1 MILLIGRAM(S): at 11:55

## 2024-01-01 RX ADMIN — DOXYCYCLINE HYCLATE 100 MILLIGRAM(S): 150 TABLET, COATED ORAL at 05:14

## 2024-01-01 RX ADMIN — CHLORHEXIDINE GLUCONATE 15 MILLILITER(S): 1.2 RINSE ORAL at 17:20

## 2024-01-01 RX ADMIN — LORAZEPAM 2 MILLIGRAM(S): 2 TABLET ORAL at 12:00

## 2024-01-01 RX ADMIN — Medication 1200 MILLILITER(S): at 04:21

## 2024-01-01 RX ADMIN — ACETAMINOPHEN 500MG 650 MILLIGRAM(S): 500 TABLET, COATED ORAL at 03:57

## 2024-01-01 RX ADMIN — Medication 50 MILLILITER(S): at 21:09

## 2024-01-01 RX ADMIN — ENOXAPARIN SODIUM 40 MILLIGRAM(S): 30 INJECTION SUBCUTANEOUS at 09:54

## 2024-01-01 RX ADMIN — LORAZEPAM 2 MILLIGRAM(S): 2 TABLET ORAL at 19:08

## 2024-01-01 RX ADMIN — Medication 1 APPLICATION(S): at 05:29

## 2024-01-01 RX ADMIN — CHLORHEXIDINE GLUCONATE 1 APPLICATION(S): 1.2 RINSE ORAL at 05:45

## 2024-01-01 RX ADMIN — ENOXAPARIN SODIUM 40 MILLIGRAM(S): 30 INJECTION SUBCUTANEOUS at 06:44

## 2024-01-01 RX ADMIN — POLYETHYLENE GLYCOL 3350 17 GRAM(S): 17 POWDER, FOR SOLUTION ORAL at 17:31

## 2024-01-01 RX ADMIN — DOPAMINE HYDROCHLORIDE 8.18 MICROGRAM(S)/KG/MIN: 0.8 INJECTION, SOLUTION INTRAVENOUS at 01:40

## 2024-01-01 RX ADMIN — GABAPENTIN 100 MILLIGRAM(S): 300 CAPSULE ORAL at 21:04

## 2024-01-01 RX ADMIN — LEVETIRACETAM 1500 MILLIGRAM(S): 1000 TABLET ORAL at 05:26

## 2024-01-01 RX ADMIN — HYDROXYCHLOROQUINE SULFATE 400 MILLIGRAM(S): 200 TABLET, FILM COATED ORAL at 11:26

## 2024-01-01 RX ADMIN — Medication 1 TABLET(S): at 11:29

## 2024-01-01 RX ADMIN — METOPROLOL TARTRATE 5 MILLIGRAM(S): 100 TABLET, FILM COATED ORAL at 10:13

## 2024-01-01 RX ADMIN — Medication 1 APPLICATION(S): at 05:18

## 2024-01-01 RX ADMIN — PIPERACILLIN SODIUM AND TAZOBACTAM SODIUM 25 GRAM(S): 4; .5 INJECTION, POWDER, LYOPHILIZED, FOR SOLUTION INTRAVENOUS at 05:34

## 2024-01-01 RX ADMIN — PANTOPRAZOLE SODIUM 40 MILLIGRAM(S): 40 TABLET, DELAYED RELEASE ORAL at 05:19

## 2024-01-01 RX ADMIN — CHLORHEXIDINE GLUCONATE 15 MILLILITER(S): 1.2 RINSE ORAL at 05:28

## 2024-01-01 RX ADMIN — Medication 100 MILLIGRAM(S): at 12:48

## 2024-01-01 RX ADMIN — Medication 2 TABLET(S): at 21:17

## 2024-01-01 RX ADMIN — HYDROMORPHONE HYDROCHLORIDE 1 MILLIGRAM(S): 2 TABLET ORAL at 12:59

## 2024-01-01 RX ADMIN — ACETAMINOPHEN 500MG 650 MILLIGRAM(S): 500 TABLET, COATED ORAL at 20:36

## 2024-01-01 RX ADMIN — CHLORHEXIDINE GLUCONATE 1 APPLICATION(S): 1.2 RINSE ORAL at 05:18

## 2024-01-01 RX ADMIN — PROPOFOL 18.1 MICROGRAM(S)/KG/MIN: 10 INJECTION, EMULSION INTRAVENOUS at 11:22

## 2024-01-01 RX ADMIN — PIPERACILLIN SODIUM AND TAZOBACTAM SODIUM 25 GRAM(S): 4; .5 INJECTION, POWDER, LYOPHILIZED, FOR SOLUTION INTRAVENOUS at 21:16

## 2024-01-01 RX ADMIN — GABAPENTIN 100 MILLIGRAM(S): 300 CAPSULE ORAL at 22:26

## 2024-01-01 RX ADMIN — VALPROIC ACID 50 MILLIGRAM(S): 250 SOLUTION ORAL at 13:16

## 2024-01-01 RX ADMIN — HYDROMORPHONE HYDROCHLORIDE 1 MILLIGRAM(S): 2 TABLET ORAL at 13:00

## 2024-01-01 RX ADMIN — GABAPENTIN 100 MILLIGRAM(S): 300 CAPSULE ORAL at 05:35

## 2024-01-01 RX ADMIN — LORAZEPAM 2 MILLIGRAM(S): 2 TABLET ORAL at 11:31

## 2024-01-01 RX ADMIN — Medication 2 TABLET(S): at 21:23

## 2024-01-01 RX ADMIN — Medication 250 MILLIGRAM(S): at 05:19

## 2024-01-01 RX ADMIN — Medication 1 TABLET(S): at 12:13

## 2024-01-01 RX ADMIN — PANTOPRAZOLE SODIUM 40 MILLIGRAM(S): 40 TABLET, DELAYED RELEASE ORAL at 18:04

## 2024-01-01 RX ADMIN — VALPROIC ACID 50 MILLIGRAM(S): 250 SOLUTION ORAL at 05:14

## 2024-01-01 RX ADMIN — Medication 250 MILLIGRAM(S): at 19:02

## 2024-01-01 RX ADMIN — HYDROXYCHLOROQUINE SULFATE 400 MILLIGRAM(S): 200 TABLET, FILM COATED ORAL at 11:30

## 2024-01-01 RX ADMIN — VALPROIC ACID 50 MILLIGRAM(S): 250 SOLUTION ORAL at 05:20

## 2024-01-01 RX ADMIN — ENOXAPARIN SODIUM 40 MILLIGRAM(S): 30 INJECTION SUBCUTANEOUS at 07:27

## 2024-01-01 RX ADMIN — FENTANYL 4.31 MICROGRAM(S)/KG/HR: 12 PATCH, EXTENDED RELEASE TRANSDERMAL at 04:21

## 2024-01-01 RX ADMIN — Medication 4.36 MG/KG/HR: at 17:25

## 2024-01-01 RX ADMIN — VALPROIC ACID 50 MILLIGRAM(S): 250 SOLUTION ORAL at 05:45

## 2024-01-01 RX ADMIN — Medication 1 MILLIGRAM(S): at 13:03

## 2024-01-01 RX ADMIN — GABAPENTIN 100 MILLIGRAM(S): 300 CAPSULE ORAL at 21:20

## 2024-01-01 RX ADMIN — CEFEPIME 100 MILLIGRAM(S): 2 INJECTION, POWDER, FOR SOLUTION INTRAVENOUS at 12:50

## 2024-01-01 RX ADMIN — CHLORHEXIDINE GLUCONATE 15 MILLILITER(S): 1.2 RINSE ORAL at 17:01

## 2024-01-01 RX ADMIN — HYDROXYCHLOROQUINE SULFATE 400 MILLIGRAM(S): 200 TABLET, FILM COATED ORAL at 11:16

## 2024-01-01 RX ADMIN — METOPROLOL TARTRATE 5 MILLIGRAM(S): 100 TABLET, FILM COATED ORAL at 03:17

## 2024-01-01 RX ADMIN — Medication 1 MILLIGRAM(S): at 11:30

## 2024-01-01 RX ADMIN — PROPOFOL 26.2 MICROGRAM(S)/KG/MIN: 10 INJECTION, EMULSION INTRAVENOUS at 17:14

## 2024-01-01 RX ADMIN — Medication 2 TABLET(S): at 21:06

## 2024-01-01 RX ADMIN — ACETAMINOPHEN 500MG 650 MILLIGRAM(S): 500 TABLET, COATED ORAL at 12:00

## 2024-01-01 RX ADMIN — HYDROXYCHLOROQUINE SULFATE 400 MILLIGRAM(S): 200 TABLET, FILM COATED ORAL at 11:37

## 2024-01-01 RX ADMIN — PIPERACILLIN SODIUM AND TAZOBACTAM SODIUM 25 GRAM(S): 4; .5 INJECTION, POWDER, LYOPHILIZED, FOR SOLUTION INTRAVENOUS at 22:25

## 2024-01-01 RX ADMIN — CHLORHEXIDINE GLUCONATE 15 MILLILITER(S): 1.2 RINSE ORAL at 17:07

## 2024-01-01 RX ADMIN — ACETAMINOPHEN 500MG 650 MILLIGRAM(S): 500 TABLET, COATED ORAL at 21:03

## 2024-01-01 RX ADMIN — LORAZEPAM 2 MILLIGRAM(S): 2 TABLET ORAL at 21:01

## 2024-01-01 RX ADMIN — Medication 1 MILLIGRAM(S): at 11:15

## 2024-01-01 RX ADMIN — PIPERACILLIN SODIUM AND TAZOBACTAM SODIUM 25 GRAM(S): 4; .5 INJECTION, POWDER, LYOPHILIZED, FOR SOLUTION INTRAVENOUS at 13:20

## 2024-01-01 RX ADMIN — Medication 1.72 MG/KG/HR: at 04:21

## 2024-01-01 RX ADMIN — DOXYCYCLINE HYCLATE 100 MILLIGRAM(S): 150 TABLET, COATED ORAL at 17:08

## 2024-01-01 RX ADMIN — CHLORHEXIDINE GLUCONATE 15 MILLILITER(S): 1.2 RINSE ORAL at 17:31

## 2024-01-01 RX ADMIN — ENOXAPARIN SODIUM 40 MILLIGRAM(S): 30 INJECTION SUBCUTANEOUS at 06:48

## 2024-01-01 RX ADMIN — PANTOPRAZOLE SODIUM 40 MILLIGRAM(S): 40 TABLET, DELAYED RELEASE ORAL at 17:22

## 2024-01-01 RX ADMIN — Medication 1 APPLICATION(S): at 05:15

## 2024-01-01 RX ADMIN — LORAZEPAM 2 MILLIGRAM(S): 2 TABLET ORAL at 10:46

## 2024-01-01 RX ADMIN — Medication 1 APPLICATION(S): at 17:21

## 2024-01-01 RX ADMIN — VALPROIC ACID 50 MILLIGRAM(S): 250 SOLUTION ORAL at 13:03

## 2024-01-01 RX ADMIN — PANTOPRAZOLE SODIUM 40 MILLIGRAM(S): 40 TABLET, DELAYED RELEASE ORAL at 05:31

## 2024-01-01 RX ADMIN — LEVETIRACETAM 1500 MILLIGRAM(S): 1000 TABLET ORAL at 05:17

## 2024-01-01 RX ADMIN — GABAPENTIN 100 MILLIGRAM(S): 300 CAPSULE ORAL at 05:18

## 2024-01-01 RX ADMIN — Medication 1 APPLICATION(S): at 17:32

## 2024-01-01 RX ADMIN — FENTANYL 50 MICROGRAM(S): 12 PATCH, EXTENDED RELEASE TRANSDERMAL at 10:28

## 2024-01-01 RX ADMIN — ACETAMINOPHEN 500MG 400 MILLIGRAM(S): 500 TABLET, COATED ORAL at 21:28

## 2024-01-01 RX ADMIN — Medication 1 APPLICATION(S): at 17:07

## 2024-01-01 RX ADMIN — PIPERACILLIN SODIUM AND TAZOBACTAM SODIUM 25 GRAM(S): 4; .5 INJECTION, POWDER, LYOPHILIZED, FOR SOLUTION INTRAVENOUS at 22:36

## 2024-01-01 RX ADMIN — CHLORHEXIDINE GLUCONATE 1 APPLICATION(S): 1.2 RINSE ORAL at 05:15

## 2024-01-01 RX ADMIN — Medication 5.46 MG/KG/HR: at 09:39

## 2024-01-01 RX ADMIN — Medication 250 MILLIGRAM(S): at 05:44

## 2024-01-01 RX ADMIN — VALPROIC ACID 50 MILLIGRAM(S): 250 SOLUTION ORAL at 17:01

## 2024-01-01 RX ADMIN — METOPROLOL TARTRATE 5 MILLIGRAM(S): 100 TABLET, FILM COATED ORAL at 22:30

## 2024-01-01 RX ADMIN — PANTOPRAZOLE SODIUM 40 MILLIGRAM(S): 40 TABLET, DELAYED RELEASE ORAL at 19:03

## 2024-01-01 RX ADMIN — ACETAMINOPHEN 500MG 650 MILLIGRAM(S): 500 TABLET, COATED ORAL at 21:06

## 2024-01-01 RX ADMIN — PANTOPRAZOLE SODIUM 40 MILLIGRAM(S): 40 TABLET, DELAYED RELEASE ORAL at 05:42

## 2024-01-01 RX ADMIN — CHLORHEXIDINE GLUCONATE 15 MILLILITER(S): 1.2 RINSE ORAL at 05:34

## 2024-01-01 RX ADMIN — GABAPENTIN 100 MILLIGRAM(S): 300 CAPSULE ORAL at 15:51

## 2024-01-01 RX ADMIN — VALPROIC ACID 50 MILLIGRAM(S): 250 SOLUTION ORAL at 13:45

## 2024-01-01 RX ADMIN — PIPERACILLIN SODIUM AND TAZOBACTAM SODIUM 25 GRAM(S): 4; .5 INJECTION, POWDER, LYOPHILIZED, FOR SOLUTION INTRAVENOUS at 06:19

## 2024-01-01 RX ADMIN — Medication 1 APPLICATION(S): at 18:53

## 2024-01-01 RX ADMIN — ACETAMINOPHEN 500MG 650 MILLIGRAM(S): 500 TABLET, COATED ORAL at 05:17

## 2024-01-01 RX ADMIN — CHLORHEXIDINE GLUCONATE 1 APPLICATION(S): 1.2 RINSE ORAL at 05:36

## 2024-01-01 RX ADMIN — LEVETIRACETAM 1500 MILLIGRAM(S): 1000 TABLET ORAL at 17:20

## 2024-01-01 RX ADMIN — GABAPENTIN 100 MILLIGRAM(S): 300 CAPSULE ORAL at 21:06

## 2024-01-01 RX ADMIN — ACETAMINOPHEN 500MG 650 MILLIGRAM(S): 500 TABLET, COATED ORAL at 05:53

## 2024-01-01 RX ADMIN — GABAPENTIN 100 MILLIGRAM(S): 300 CAPSULE ORAL at 05:26

## 2024-01-01 RX ADMIN — POLYETHYLENE GLYCOL 3350 17 GRAM(S): 17 POWDER, FOR SOLUTION ORAL at 19:36

## 2024-01-01 RX ADMIN — FUROSEMIDE 40 MILLIGRAM(S): 40 TABLET ORAL at 05:14

## 2024-01-01 RX ADMIN — Medication 100 MILLIGRAM(S): at 11:26

## 2024-01-01 RX ADMIN — HYDROXYCHLOROQUINE SULFATE 400 MILLIGRAM(S): 200 TABLET, FILM COATED ORAL at 12:48

## 2024-01-01 RX ADMIN — Medication 1 MILLIGRAM(S): at 11:38

## 2024-01-01 RX ADMIN — GABAPENTIN 100 MILLIGRAM(S): 300 CAPSULE ORAL at 13:03

## 2024-01-01 RX ADMIN — GLYCOPYRROLATE 0.2 MILLIGRAM(S): 1 TABLET ORAL at 12:59

## 2024-01-01 RX ADMIN — LEVETIRACETAM 1500 MILLIGRAM(S): 1000 TABLET ORAL at 17:07

## 2024-01-01 RX ADMIN — LEVETIRACETAM 1500 MILLIGRAM(S): 1000 TABLET ORAL at 18:53

## 2024-01-01 RX ADMIN — IPRATROPIUM BROMIDE AND ALBUTEROL SULFATE 3 MILLILITER(S): 2.5; .5 SOLUTION RESPIRATORY (INHALATION) at 00:45

## 2024-01-01 RX ADMIN — ACETAMINOPHEN 500MG 400 MILLIGRAM(S): 500 TABLET, COATED ORAL at 17:31

## 2024-01-01 RX ADMIN — Medication 1 APPLICATION(S): at 05:44

## 2024-01-01 RX ADMIN — DOXYCYCLINE HYCLATE 100 MILLIGRAM(S): 150 TABLET, COATED ORAL at 05:25

## 2024-01-01 RX ADMIN — Medication 1 APPLICATION(S): at 06:08

## 2024-01-01 RX ADMIN — Medication 2 TABLET(S): at 22:26

## 2024-01-01 RX ADMIN — Medication 5.46 MG/KG/HR: at 14:21

## 2024-01-01 RX ADMIN — Medication 1 TABLET(S): at 11:19

## 2024-01-01 RX ADMIN — Medication 2 TABLET(S): at 21:58

## 2024-01-01 RX ADMIN — GABAPENTIN 100 MILLIGRAM(S): 300 CAPSULE ORAL at 13:16

## 2024-01-01 RX ADMIN — DOXYCYCLINE HYCLATE 100 MILLIGRAM(S): 150 TABLET, COATED ORAL at 05:45

## 2024-01-01 RX ADMIN — Medication 1 MILLIGRAM(S): at 11:08

## 2024-01-01 RX ADMIN — VALPROIC ACID 50 MILLIGRAM(S): 250 SOLUTION ORAL at 22:26

## 2024-01-01 RX ADMIN — PROPOFOL 26.2 MICROGRAM(S)/KG/MIN: 10 INJECTION, EMULSION INTRAVENOUS at 14:59

## 2024-01-01 RX ADMIN — CEFEPIME 100 MILLIGRAM(S): 2 INJECTION, POWDER, FOR SOLUTION INTRAVENOUS at 21:04

## 2024-01-01 RX ADMIN — Medication 100 MILLIGRAM(S): at 11:09

## 2024-01-01 RX ADMIN — ENOXAPARIN SODIUM 40 MILLIGRAM(S): 30 INJECTION SUBCUTANEOUS at 06:33

## 2024-01-01 RX ADMIN — LORAZEPAM 2 MILLIGRAM(S): 2 TABLET ORAL at 13:17

## 2024-01-01 RX ADMIN — Medication 1 APPLICATION(S): at 19:37

## 2024-01-01 RX ADMIN — Medication 40 MILLIGRAM(S): at 05:34

## 2024-01-01 RX ADMIN — ENOXAPARIN SODIUM 40 MILLIGRAM(S): 30 INJECTION SUBCUTANEOUS at 08:41

## 2024-01-01 RX ADMIN — CHLORHEXIDINE GLUCONATE 1 APPLICATION(S): 1.2 RINSE ORAL at 05:27

## 2024-01-01 RX ADMIN — Medication 50 MILLILITER(S): at 09:38

## 2024-01-01 RX ADMIN — PIPERACILLIN SODIUM AND TAZOBACTAM SODIUM 200 GRAM(S): 4; .5 INJECTION, POWDER, LYOPHILIZED, FOR SOLUTION INTRAVENOUS at 04:04

## 2024-01-01 RX ADMIN — LEVETIRACETAM 1500 MILLIGRAM(S): 1000 TABLET ORAL at 05:18

## 2024-01-01 RX ADMIN — CHLORHEXIDINE GLUCONATE 15 MILLILITER(S): 1.2 RINSE ORAL at 05:42

## 2024-01-01 RX ADMIN — VALPROIC ACID 50 MILLIGRAM(S): 250 SOLUTION ORAL at 21:58

## 2024-01-01 RX ADMIN — CEFEPIME 100 MILLIGRAM(S): 2 INJECTION, POWDER, FOR SOLUTION INTRAVENOUS at 00:33

## 2024-01-01 RX ADMIN — ACETAMINOPHEN 500MG 650 MILLIGRAM(S): 500 TABLET, COATED ORAL at 22:06

## 2024-01-01 RX ADMIN — PANTOPRAZOLE SODIUM 40 MILLIGRAM(S): 40 TABLET, DELAYED RELEASE ORAL at 05:28

## 2024-01-01 RX ADMIN — CHLORHEXIDINE GLUCONATE 1 APPLICATION(S): 1.2 RINSE ORAL at 05:44

## 2024-01-01 RX ADMIN — PROPOFOL 6.55 MICROGRAM(S)/KG/MIN: 10 INJECTION, EMULSION INTRAVENOUS at 13:15

## 2024-01-01 RX ADMIN — LORAZEPAM 2 MILLIGRAM(S): 2 TABLET ORAL at 04:02

## 2024-01-01 RX ADMIN — VALPROIC ACID 50 MILLIGRAM(S): 250 SOLUTION ORAL at 05:17

## 2024-01-01 RX ADMIN — GABAPENTIN 100 MILLIGRAM(S): 300 CAPSULE ORAL at 21:58

## 2024-01-01 RX ADMIN — CHLORHEXIDINE GLUCONATE 15 MILLILITER(S): 1.2 RINSE ORAL at 19:36

## 2024-01-01 RX ADMIN — HYDROXYCHLOROQUINE SULFATE 400 MILLIGRAM(S): 200 TABLET, FILM COATED ORAL at 11:55

## 2024-01-01 RX ADMIN — Medication 40 MILLIGRAM(S): at 05:26

## 2024-01-01 RX ADMIN — LEVETIRACETAM 1500 MILLIGRAM(S): 1000 TABLET ORAL at 18:04

## 2024-01-01 RX ADMIN — DOXYCYCLINE HYCLATE 100 MILLIGRAM(S): 150 TABLET, COATED ORAL at 17:19

## 2024-01-01 RX ADMIN — LEVETIRACETAM 500 MILLIGRAM(S): 1000 TABLET ORAL at 05:34

## 2024-01-01 RX ADMIN — ACETAMINOPHEN 500MG 650 MILLIGRAM(S): 500 TABLET, COATED ORAL at 23:00

## 2024-01-01 RX ADMIN — Medication 1 MILLIGRAM(S): at 11:19

## 2024-01-01 RX ADMIN — Medication 1 TABLET(S): at 13:02

## 2024-01-01 RX ADMIN — FUROSEMIDE 40 MILLIGRAM(S): 40 TABLET ORAL at 05:43

## 2024-01-01 RX ADMIN — DOXYCYCLINE HYCLATE 100 MILLIGRAM(S): 150 TABLET, COATED ORAL at 05:27

## 2024-01-01 RX ADMIN — ACETAMINOPHEN 500MG 650 MILLIGRAM(S): 500 TABLET, COATED ORAL at 19:15

## 2024-01-01 RX ADMIN — Medication 100 MILLIGRAM(S): at 12:13

## 2024-01-01 RX ADMIN — CHLORHEXIDINE GLUCONATE 15 MILLILITER(S): 1.2 RINSE ORAL at 05:26

## 2024-01-01 RX ADMIN — DOPAMINE HYDROCHLORIDE 10.2 MICROGRAM(S)/KG/MIN: 0.8 INJECTION, SOLUTION INTRAVENOUS at 10:55

## 2024-01-01 RX ADMIN — LEVETIRACETAM 1500 MILLIGRAM(S): 1000 TABLET ORAL at 19:02

## 2024-01-01 RX ADMIN — Medication 1 APPLICATION(S): at 17:08

## 2024-01-01 RX ADMIN — NOREPINEPHRINE BITARTRATE 8.08 MICROGRAM(S)/KG/MIN: 1 INJECTION, SOLUTION, CONCENTRATE INTRAVENOUS at 08:16

## 2024-01-01 RX ADMIN — LOSARTAN POTASSIUM 50 MILLIGRAM(S): 100 TABLET, FILM COATED ORAL at 05:43

## 2024-01-01 RX ADMIN — PIPERACILLIN SODIUM AND TAZOBACTAM SODIUM 25 GRAM(S): 4; .5 INJECTION, POWDER, LYOPHILIZED, FOR SOLUTION INTRAVENOUS at 13:03

## 2024-01-01 RX ADMIN — ENOXAPARIN SODIUM 40 MILLIGRAM(S): 30 INJECTION SUBCUTANEOUS at 06:50

## 2024-01-01 RX ADMIN — LORAZEPAM 2 MILLIGRAM(S): 2 TABLET ORAL at 20:44

## 2024-01-01 RX ADMIN — GABAPENTIN 100 MILLIGRAM(S): 300 CAPSULE ORAL at 21:17

## 2024-01-01 RX ADMIN — DOXYCYCLINE HYCLATE 100 MILLIGRAM(S): 150 TABLET, COATED ORAL at 17:01

## 2024-01-01 RX ADMIN — Medication 100 MILLIGRAM(S): at 11:29

## 2024-01-01 RX ADMIN — CEFEPIME 100 MILLIGRAM(S): 2 INJECTION, POWDER, FOR SOLUTION INTRAVENOUS at 05:19

## 2024-01-01 RX ADMIN — Medication 1 TABLET(S): at 11:16

## 2024-01-01 RX ADMIN — POLYETHYLENE GLYCOL 3350 17 GRAM(S): 17 POWDER, FOR SOLUTION ORAL at 05:43

## 2024-01-01 RX ADMIN — LOSARTAN POTASSIUM 50 MILLIGRAM(S): 100 TABLET, FILM COATED ORAL at 11:29

## 2024-01-01 RX ADMIN — POTASSIUM CHLORIDE 40 MILLIEQUIVALENT(S): 600 TABLET, EXTENDED RELEASE ORAL at 11:29

## 2024-01-01 RX ADMIN — POTASSIUM CHLORIDE 40 MILLIEQUIVALENT(S): 600 TABLET, EXTENDED RELEASE ORAL at 06:28

## 2024-01-01 RX ADMIN — CHLORHEXIDINE GLUCONATE 1 APPLICATION(S): 1.2 RINSE ORAL at 05:29

## 2024-01-01 RX ADMIN — ACETAMINOPHEN 500MG 650 MILLIGRAM(S): 500 TABLET, COATED ORAL at 04:00

## 2024-01-01 RX ADMIN — PANTOPRAZOLE SODIUM 40 MILLIGRAM(S): 40 TABLET, DELAYED RELEASE ORAL at 05:26

## 2024-01-01 RX ADMIN — CHLORHEXIDINE GLUCONATE 15 MILLILITER(S): 1.2 RINSE ORAL at 05:15

## 2024-01-01 RX ADMIN — GABAPENTIN 100 MILLIGRAM(S): 300 CAPSULE ORAL at 22:36

## 2024-01-01 RX ADMIN — GABAPENTIN 100 MILLIGRAM(S): 300 CAPSULE ORAL at 12:51

## 2024-01-01 RX ADMIN — VALPROIC ACID 50 MILLIGRAM(S): 250 SOLUTION ORAL at 05:44

## 2024-01-01 RX ADMIN — DOXYCYCLINE HYCLATE 100 MILLIGRAM(S): 150 TABLET, COATED ORAL at 17:21

## 2024-01-01 RX ADMIN — DOXYCYCLINE HYCLATE 100 MILLIGRAM(S): 150 TABLET, COATED ORAL at 05:17

## 2024-01-01 RX ADMIN — PIPERACILLIN SODIUM AND TAZOBACTAM SODIUM 25 GRAM(S): 4; .5 INJECTION, POWDER, LYOPHILIZED, FOR SOLUTION INTRAVENOUS at 16:03

## 2024-01-01 RX ADMIN — ACETAMINOPHEN 500MG 650 MILLIGRAM(S): 500 TABLET, COATED ORAL at 02:44

## 2024-01-01 RX ADMIN — LORAZEPAM 2 MILLIGRAM(S): 2 TABLET ORAL at 21:04

## 2024-01-01 RX ADMIN — Medication 1 APPLICATION(S): at 17:20

## 2024-01-01 RX ADMIN — CHLORHEXIDINE GLUCONATE 1 APPLICATION(S): 1.2 RINSE ORAL at 10:05

## 2024-01-01 RX ADMIN — CEFEPIME 100 MILLIGRAM(S): 2 INJECTION, POWDER, FOR SOLUTION INTRAVENOUS at 11:26

## 2024-01-01 RX ADMIN — CHLORHEXIDINE GLUCONATE 15 MILLILITER(S): 1.2 RINSE ORAL at 05:43

## 2024-01-01 RX ADMIN — PIPERACILLIN SODIUM AND TAZOBACTAM SODIUM 25 GRAM(S): 4; .5 INJECTION, POWDER, LYOPHILIZED, FOR SOLUTION INTRAVENOUS at 13:43

## 2024-01-01 RX ADMIN — PIPERACILLIN SODIUM AND TAZOBACTAM SODIUM 25 GRAM(S): 4; .5 INJECTION, POWDER, LYOPHILIZED, FOR SOLUTION INTRAVENOUS at 05:44

## 2024-01-01 RX ADMIN — POLYETHYLENE GLYCOL 3350 17 GRAM(S): 17 POWDER, FOR SOLUTION ORAL at 05:28

## 2024-01-01 RX ADMIN — LEVETIRACETAM 1500 MILLIGRAM(S): 1000 TABLET ORAL at 05:28

## 2024-01-01 RX ADMIN — PROPOFOL 5.17 MICROGRAM(S)/KG/MIN: 10 INJECTION, EMULSION INTRAVENOUS at 08:17

## 2024-01-01 RX ADMIN — DOXYCYCLINE HYCLATE 100 MILLIGRAM(S): 150 TABLET, COATED ORAL at 18:06

## 2024-01-15 ENCOUNTER — INPATIENT (INPATIENT)
Facility: HOSPITAL | Age: 69
LOS: 2 days | Discharge: NURSING, REHAB OR LONG-TERM CARE | DRG: 183 | End: 2024-01-18
Attending: SURGERY | Admitting: SURGERY
Payer: MEDICARE

## 2024-01-15 VITALS
SYSTOLIC BLOOD PRESSURE: 141 MMHG | TEMPERATURE: 98 F | OXYGEN SATURATION: 100 % | DIASTOLIC BLOOD PRESSURE: 70 MMHG | RESPIRATION RATE: 18 BRPM | WEIGHT: 220.02 LBS | HEART RATE: 71 BPM

## 2024-01-15 DIAGNOSIS — N17.9 ACUTE KIDNEY FAILURE, UNSPECIFIED: ICD-10-CM

## 2024-01-15 LAB
ALBUMIN SERPL ELPH-MCNC: 3.4 G/DL — LOW (ref 3.5–5.2)
ALP SERPL-CCNC: 79 U/L — SIGNIFICANT CHANGE UP (ref 30–115)
ALT FLD-CCNC: 110 U/L — HIGH (ref 0–41)
ANION GAP SERPL CALC-SCNC: 27 MMOL/L — HIGH (ref 7–14)
APTT BLD: 29.4 SEC — SIGNIFICANT CHANGE UP (ref 27–39.2)
AST SERPL-CCNC: 309 U/L — HIGH (ref 0–41)
BASE EXCESS BLDV CALC-SCNC: -2.8 MMOL/L — LOW (ref -2–3)
BASOPHILS # BLD AUTO: 0.06 K/UL — SIGNIFICANT CHANGE UP (ref 0–0.2)
BASOPHILS NFR BLD AUTO: 0.4 % — SIGNIFICANT CHANGE UP (ref 0–1)
BILIRUB SERPL-MCNC: 0.7 MG/DL — SIGNIFICANT CHANGE UP (ref 0.2–1.2)
BUN SERPL-MCNC: 68 MG/DL — CRITICAL HIGH (ref 10–20)
CA-I SERPL-SCNC: 1.09 MMOL/L — LOW (ref 1.15–1.33)
CALCIUM SERPL-MCNC: 8.4 MG/DL — SIGNIFICANT CHANGE UP (ref 8.4–10.5)
CHLORIDE SERPL-SCNC: 93 MMOL/L — LOW (ref 98–110)
CK SERPL-CCNC: 5374 U/L — HIGH (ref 0–225)
CO2 SERPL-SCNC: 20 MMOL/L — SIGNIFICANT CHANGE UP (ref 17–32)
CREAT SERPL-MCNC: 1.6 MG/DL — HIGH (ref 0.7–1.5)
EGFR: 46 ML/MIN/1.73M2 — LOW
EOSINOPHIL # BLD AUTO: 0 K/UL — SIGNIFICANT CHANGE UP (ref 0–0.7)
EOSINOPHIL NFR BLD AUTO: 0 % — SIGNIFICANT CHANGE UP (ref 0–8)
GAS PNL BLDV: 133 MMOL/L — LOW (ref 136–145)
GAS PNL BLDV: SIGNIFICANT CHANGE UP
GLUCOSE SERPL-MCNC: 86 MG/DL — SIGNIFICANT CHANGE UP (ref 70–99)
HCO3 BLDV-SCNC: 21 MMOL/L — LOW (ref 22–29)
HCT VFR BLD CALC: 43 % — SIGNIFICANT CHANGE UP (ref 42–52)
HCT VFR BLDA CALC: 47 % — SIGNIFICANT CHANGE UP (ref 39–51)
HGB BLD CALC-MCNC: 15.6 G/DL — SIGNIFICANT CHANGE UP (ref 12.6–17.4)
HGB BLD-MCNC: 14.7 G/DL — SIGNIFICANT CHANGE UP (ref 14–18)
IMM GRANULOCYTES NFR BLD AUTO: 1.8 % — HIGH (ref 0.1–0.3)
INR BLD: 1.07 RATIO — SIGNIFICANT CHANGE UP (ref 0.65–1.3)
LACTATE BLDV-MCNC: 1.8 MMOL/L — SIGNIFICANT CHANGE UP (ref 0.5–2)
LACTATE SERPL-SCNC: 3.3 MMOL/L — HIGH (ref 0.7–2)
LIDOCAIN IGE QN: 56 U/L — SIGNIFICANT CHANGE UP (ref 7–60)
LYMPHOCYTES # BLD AUTO: 15.3 % — LOW (ref 20.5–51.1)
LYMPHOCYTES # BLD AUTO: 2.62 K/UL — SIGNIFICANT CHANGE UP (ref 1.2–3.4)
MCHC RBC-ENTMCNC: 31 PG — SIGNIFICANT CHANGE UP (ref 27–31)
MCHC RBC-ENTMCNC: 34.2 G/DL — SIGNIFICANT CHANGE UP (ref 32–37)
MCV RBC AUTO: 90.7 FL — SIGNIFICANT CHANGE UP (ref 80–94)
MONOCYTES # BLD AUTO: 0.89 K/UL — HIGH (ref 0.1–0.6)
MONOCYTES NFR BLD AUTO: 5.2 % — SIGNIFICANT CHANGE UP (ref 1.7–9.3)
NEUTROPHILS # BLD AUTO: 13.25 K/UL — HIGH (ref 1.4–6.5)
NEUTROPHILS NFR BLD AUTO: 77.3 % — HIGH (ref 42.2–75.2)
NRBC # BLD: 0 /100 WBCS — SIGNIFICANT CHANGE UP (ref 0–0)
PCO2 BLDV: 33 MMHG — LOW (ref 42–55)
PH BLDV: 7.41 — SIGNIFICANT CHANGE UP (ref 7.32–7.43)
PLATELET # BLD AUTO: 385 K/UL — SIGNIFICANT CHANGE UP (ref 130–400)
PMV BLD: 10.4 FL — SIGNIFICANT CHANGE UP (ref 7.4–10.4)
PO2 BLDV: 46 MMHG — HIGH (ref 25–45)
POTASSIUM BLDV-SCNC: 3.5 MMOL/L — SIGNIFICANT CHANGE UP (ref 3.5–5.1)
POTASSIUM SERPL-MCNC: 4 MMOL/L — SIGNIFICANT CHANGE UP (ref 3.5–5)
POTASSIUM SERPL-SCNC: 4 MMOL/L — SIGNIFICANT CHANGE UP (ref 3.5–5)
PROT SERPL-MCNC: 6.2 G/DL — SIGNIFICANT CHANGE UP (ref 6–8)
PROTHROM AB SERPL-ACNC: 12.2 SEC — SIGNIFICANT CHANGE UP (ref 9.95–12.87)
RBC # BLD: 4.74 M/UL — SIGNIFICANT CHANGE UP (ref 4.7–6.1)
RBC # FLD: 13.2 % — SIGNIFICANT CHANGE UP (ref 11.5–14.5)
SAO2 % BLDV: 74.4 % — SIGNIFICANT CHANGE UP (ref 67–88)
SODIUM SERPL-SCNC: 140 MMOL/L — SIGNIFICANT CHANGE UP (ref 135–146)
WBC # BLD: 17.12 K/UL — HIGH (ref 4.8–10.8)
WBC # FLD AUTO: 17.12 K/UL — HIGH (ref 4.8–10.8)

## 2024-01-15 PROCEDURE — 36415 COLL VENOUS BLD VENIPUNCTURE: CPT

## 2024-01-15 PROCEDURE — 86901 BLOOD TYPING SEROLOGIC RH(D): CPT

## 2024-01-15 PROCEDURE — 80048 BASIC METABOLIC PNL TOTAL CA: CPT

## 2024-01-15 PROCEDURE — 97166 OT EVAL MOD COMPLEX 45 MIN: CPT | Mod: GO

## 2024-01-15 PROCEDURE — 86803 HEPATITIS C AB TEST: CPT

## 2024-01-15 PROCEDURE — 83605 ASSAY OF LACTIC ACID: CPT

## 2024-01-15 PROCEDURE — 99285 EMERGENCY DEPT VISIT HI MDM: CPT | Mod: GC

## 2024-01-15 PROCEDURE — 93010 ELECTROCARDIOGRAM REPORT: CPT

## 2024-01-15 PROCEDURE — 97162 PT EVAL MOD COMPLEX 30 MIN: CPT | Mod: GP

## 2024-01-15 PROCEDURE — 97110 THERAPEUTIC EXERCISES: CPT | Mod: GP

## 2024-01-15 PROCEDURE — 73564 X-RAY EXAM KNEE 4 OR MORE: CPT | Mod: 26,RT

## 2024-01-15 PROCEDURE — 74177 CT ABD & PELVIS W/CONTRAST: CPT | Mod: 26,MA

## 2024-01-15 PROCEDURE — 82550 ASSAY OF CK (CPK): CPT

## 2024-01-15 PROCEDURE — 71260 CT THORAX DX C+: CPT | Mod: 26,MA

## 2024-01-15 PROCEDURE — 86900 BLOOD TYPING SEROLOGIC ABO: CPT

## 2024-01-15 PROCEDURE — 73070 X-RAY EXAM OF ELBOW: CPT | Mod: 26,50

## 2024-01-15 PROCEDURE — 85025 COMPLETE CBC W/AUTO DIFF WBC: CPT

## 2024-01-15 PROCEDURE — 80076 HEPATIC FUNCTION PANEL: CPT

## 2024-01-15 PROCEDURE — 99291 CRITICAL CARE FIRST HOUR: CPT

## 2024-01-15 PROCEDURE — 83735 ASSAY OF MAGNESIUM: CPT

## 2024-01-15 PROCEDURE — 70450 CT HEAD/BRAIN W/O DYE: CPT | Mod: 26,MA

## 2024-01-15 PROCEDURE — 82140 ASSAY OF AMMONIA: CPT

## 2024-01-15 PROCEDURE — 84100 ASSAY OF PHOSPHORUS: CPT

## 2024-01-15 PROCEDURE — 86850 RBC ANTIBODY SCREEN: CPT

## 2024-01-15 PROCEDURE — 72125 CT NECK SPINE W/O DYE: CPT | Mod: 26,MA

## 2024-01-15 PROCEDURE — C9113: CPT

## 2024-01-15 PROCEDURE — 71045 X-RAY EXAM CHEST 1 VIEW: CPT

## 2024-01-15 RX ORDER — SODIUM CHLORIDE 9 MG/ML
1000 INJECTION, SOLUTION INTRAVENOUS ONCE
Refills: 0 | Status: COMPLETED | OUTPATIENT
Start: 2024-01-15 | End: 2024-01-15

## 2024-01-15 RX ORDER — METHOCARBAMOL 500 MG/1
500 TABLET, FILM COATED ORAL THREE TIMES A DAY
Refills: 0 | Status: DISCONTINUED | OUTPATIENT
Start: 2024-01-15 | End: 2024-01-17

## 2024-01-15 RX ORDER — SODIUM CHLORIDE 9 MG/ML
1000 INJECTION, SOLUTION INTRAVENOUS
Refills: 0 | Status: DISCONTINUED | OUTPATIENT
Start: 2024-01-15 | End: 2024-01-18

## 2024-01-15 RX ORDER — HEPARIN SODIUM 5000 [USP'U]/ML
5000 INJECTION INTRAVENOUS; SUBCUTANEOUS EVERY 8 HOURS
Refills: 0 | Status: DISCONTINUED | OUTPATIENT
Start: 2024-01-15 | End: 2024-01-18

## 2024-01-15 RX ORDER — TAMSULOSIN HYDROCHLORIDE 0.4 MG/1
0.4 CAPSULE ORAL AT BEDTIME
Refills: 0 | Status: DISCONTINUED | OUTPATIENT
Start: 2024-01-15 | End: 2024-01-18

## 2024-01-15 RX ORDER — METOPROLOL TARTRATE 50 MG
50 TABLET ORAL DAILY
Refills: 0 | Status: DISCONTINUED | OUTPATIENT
Start: 2024-01-16 | End: 2024-01-16

## 2024-01-15 RX ORDER — METOPROLOL TARTRATE 50 MG
100 TABLET ORAL DAILY
Refills: 0 | Status: DISCONTINUED | OUTPATIENT
Start: 2024-01-15 | End: 2024-01-15

## 2024-01-15 RX ORDER — GABAPENTIN 400 MG/1
100 CAPSULE ORAL THREE TIMES A DAY
Refills: 0 | Status: DISCONTINUED | OUTPATIENT
Start: 2024-01-15 | End: 2024-01-18

## 2024-01-15 RX ORDER — HYDROXYCHLOROQUINE SULFATE 200 MG
0 TABLET ORAL
Qty: 0 | Refills: 0 | DISCHARGE

## 2024-01-15 RX ORDER — ACETAMINOPHEN 500 MG
650 TABLET ORAL EVERY 6 HOURS
Refills: 0 | Status: DISCONTINUED | OUTPATIENT
Start: 2024-01-15 | End: 2024-01-18

## 2024-01-15 RX ORDER — LIDOCAINE 4 G/100G
1 CREAM TOPICAL EVERY 24 HOURS
Refills: 0 | Status: DISCONTINUED | OUTPATIENT
Start: 2024-01-15 | End: 2024-01-18

## 2024-01-15 RX ADMIN — SODIUM CHLORIDE 1000 MILLILITER(S): 9 INJECTION, SOLUTION INTRAVENOUS at 15:35

## 2024-01-15 RX ADMIN — SODIUM CHLORIDE 1000 MILLILITER(S): 9 INJECTION, SOLUTION INTRAVENOUS at 17:30

## 2024-01-15 RX ADMIN — SODIUM CHLORIDE 1000 MILLILITER(S): 9 INJECTION, SOLUTION INTRAVENOUS at 17:00

## 2024-01-15 RX ADMIN — SODIUM CHLORIDE 1000 MILLILITER(S): 9 INJECTION, SOLUTION INTRAVENOUS at 19:00

## 2024-01-15 NOTE — H&P ADULT - ASSESSMENT
ASSESSMENT:  68M w/ PMH as above who presents s/p found down, unknown duration, underwent the above trauma workup and was found to have the following acute traumatic injuries:    Injuries identified:   - Acute displaced left anterior 6th, 7th ribs, lateral 4th rib  - Acute right anterior 5th and 6th ribs  - Nondisplaced right L1 transverse process fractures  - JHON    PLAN:   - Trauma admission, SICU level of care  - Pain control, encourage IS  - IVF, repeat CK  - Repeat AM CXR  - Resume home medications  - PT/Rehab/Dispo    Disposition pending results of above labs and imaging  Above plan discussed with Trauma attending, Dr. Wise  , patient, patient family, and ED team  --------------------------------------------------------------------------------------  01-15-24 @ 21:55    TRAUMA SENIOR SPECTRA: [id number]  TRAUMA TEAM SPECTRA: [id number]   ASSESSMENT:  68M w/ PMH as above who presents s/p found down, unknown duration, underwent the above trauma workup and was found to have the following acute traumatic injuries:    Injuries identified:   - Acute displaced left anterior 6th, 7th ribs, lateral 4th rib  - Acute right anterior 5th and 6th ribs  - Nondisplaced right L1 transverse process fractures  - JHON    PLAN:   - Trauma admission, SICU level of care  - Pain control, encourage IS  - IVF, repeat CK  - Repeat AM CXR  - Resume home medications  - PT/Rehab/Dispo    Disposition pending results of above labs and imaging  Above plan discussed with Trauma attending, Dr. Wise  , patient, patient family, and ED team  --------------------------------------------------------------------------------------  01-15-24 @ 21:55    TRAUMA SENIOR SPECTRA: [id number]  TRAUMA TEAM SPECTRA: [id number]

## 2024-01-15 NOTE — ED ADULT TRIAGE NOTE - CHIEF COMPLAINT QUOTE
Fall 3 days ago, was unable to get himself off the floor. Abrasions to front of body. No LOC. No blood thinner use.

## 2024-01-15 NOTE — ED PROVIDER NOTE - ATTENDING CONTRIBUTION TO CARE
68-year-old male presents to the ED ED status post fall.  Patient was found down at home.  Last known was 3 days ago.  Patient was unable to get himself off the floor.  Vitals were reviewed by me and noted to be within acceptable parameters.  Physical exam noted to have diffuse abrasions and skin burns from laying on the floor noted on the upper torso.  Additional abrasions noted on the right knee and bilateral elbows.  Patient is currently ANO x 3.  Reports he has been feeling dizzy and having recurrent falls.  We obtained labs along with CT imaging.  Case was signed out pending imaging and final disposition.
on unit

## 2024-01-15 NOTE — CONSULT NOTE ADULT - SUBJECTIVE AND OBJECTIVE BOX
SUSAN AWAD   [id number]   [birth year]  68yM    Admit Date: 01-15-24  Indication for SICU:        ============================  HPI    Patient is a 67 y/o male with PMH of HTN, HLD, vertigo, possible Lupus who presents to the ED s/p found down for 3 days. The patient reports that he was walking when the banister on his staircase broke and he fell down a flight of stairs on 1/13 and was unable to get up from the ground until a friend was able to find him three days after the fall. Patient is A&O x4, HD stable, ?HT, +LOC, -AC. The patient reports that he did lose consciousness at one point but was awake and unable to get himself up from the ground. The patient was reportedly found soiled in urine, feces, with water bottles scattered around him. The patient reports that he had tried to get himself up from the ground but was unable to and sustained several rashes 2/2 rug burn while on the ground on his arms and abdomen. The patient was recently seen by the Trauma Service in 12/2023 for a fall and was since diagnosed with Vertigo that the patient reports was prescribed a medication that he cannot recall but does not wish to begin taking it. The patient currently reports bilateral rib pain, however denies CP, SOB, nausea, vomiting, dizziness, fever or chills.     SICU Consult for monitoring in the setting of advanced age with multiple rib fractures. The patient is afebrile, HR 80, 159/60, satting 98 on RA. The patient can pull 2.5L on incentive spirometer.      24 Hour Events  -Admission under SICU service    [X] A ten-point review of systems was otherwise negative except as noted above.  [  ] Due to altered mental status/intubation, subjective information was not attained from the patient. History was obtained, to the extent possible, from review of the chart and collateral sources of information.    =========================================================================================================================================  =========================================================================================================================================    PMH  PAST MEDICAL & SURGICAL HISTORY:    Home Meds:  Home Medications: Valsartan, Metoprolol 50 QD     Allergies  Allergies    No Known Allergies       Current Medications:      Vital Sings, Intake/Output (Last 24Hours)  ICU Vital Signs Last 24 Hrs  T(C): 36.5 (15 Wenceslao 2024 19:00), Max: 36.7 (15 Wenceslao 2024 15:30)  T(F): 97.7 (15 Wenceslao 2024 19:00), Max: 98 (15 Wenceslao 2024 15:30)  HR: 80 (15 Wenceslao 2024 19:00) (71 - 97)  BP: 159/60 (15 Wenceslao 2024 19:00) (119/55 - 159/60)  BP(mean): --  ABP: --  ABP(mean): --  RR: 19 (15 Wenceslao 2024 19:00) (15 - 19)  SpO2: 98% (15 Wenceslao 2024 19:00) (97% - 100%)    O2 Parameters below as of 15 Wenceslao 2024 19:00  Patient On (Oxygen Delivery Method): room air      I&O's Summary    15 Wenceslao 2024 07:01  -  15 Wenceslao 2024 21:46  --------------------------------------------------------  IN: 2000 mL / OUT: 0 mL / NET: 2000 mL        Physical Exam:  ----------------------------------------------------------------------------------------------------------  GCS:      Exam: A&Ox4, no focal deficits    RESPIRATORY:  Normal expansion/effort, bilateral rib tenderness to palpation. IS 2.5L    CARDIOVASCULAR:   S1/S2.  RRR  No peripheral edema    GASTROINTESTINAL:  Abdomen soft, non-distended, extensive rash on the abdomen and arms, reportedly from rug burn, no active bleeding, no areas of purulence noted.     MUSCULOSKELETAL:  Extremities warm, pink, well-perfused. Moves all extremities spontaneously    DERM:  No skin breakdown. Extensive rash on abdomen, and old bruising on bilateral legs and old scab on knees.     ----------------------------------------------------------------------------------------------------------  [x] Peripheral IV  [ ] Urinary Catheter Andrea                                               [ ] Central Venous Line                   [ ] Arterial Line		          < from: CT Abdomen and Pelvis w/ IV Cont (01.15.24 @ 16:49) >  1.  Acute displaced left anterior 6th, 7th ribs, lateral 4th rib, right   anterior 5th and 6th ribs and nondisplaced right L1 transverse process   fractures. No pneumothorax.  2.  Patchy bilateral groundglass opacities, nonspecific and possibly   infectious/inflammatory in etiology.  3.  Few nonspecific subcentimeter periduodenal lymph nodes, possibly   reactive. Correlate for history of duodenitis.      < from: CT Cervical Spine No Cont (01.15.24 @ 16:35) >  CT HEAD:  No acute intracranial pathology.    Scattered paranasal sinus mucosal thickening within left maxillary sinus   air-fluid level, correlate for sinusitis.    CT CERVICAL SPINE:  No acute fracture or dislocation.    Similar nonspecific scattered lucent osseous lesions are noted.   Nonemergent outpatient contrast-enhanced cervical MRI may be considered   for further evaluation.      Fatigue: How much time during the previous 4 weeks did you feel tired?   All or most of the time [   ] Yes (1pt)    [  x] No  (0pts)  Resistance: Do you have any difficulty walking up 10 steps alone without resting and without aids? [   ] Yes (1pt)    [ x ] No  (0pts)  Ambulation: Do you have any difficulty walking several hundred yards alone without aids? [x   ] Yes (1pt)    [  ] No  (0pts)  Illness: how many illnesses do you have out of list of 11 total? [   ] 5 or more (1pt) [x  ] < 5 (0pts)  Loss of weight: Have you had weight loss of 5% or more? [   ] Yes (1pt)    [  x] No  (0pts)    Total Score: 1    Score 1-2: Consult medical comangement  Score 3-4: Consult Geriatric service   Score 5: Consult Palliative service [phone]    Jackie Garcia G, Jabari MEYER, Jojo DESOUZA, Maynor MESSINA. Frality: toward a clinical definition. J AM Med Dir Assoc. 2008; 9 (2): 71-72  Lauor JE, Sree TK, Gutierrez DK. A simple frailty questionnaire (FRAIL) predicts outcomes in middle aged  Americans. J Nutr Health Aging. 2012; 16 (7): 601-608 SUSAN AWAD   [id number]   [birth year]  68yM    Admit Date: 01-15-24  Indication for SICU:        ============================  HPI    Patient is a 67 y/o male with PMH of HTN, HLD, vertigo, possible Lupus who presents to the ED s/p found down for 3 days. The patient reports that he was walking when the banister on his staircase broke and he fell down a flight of stairs on 1/13 and was unable to get up from the ground until a friend was able to find him three days after the fall. Patient is A&O x4, HD stable, ?HT, +LOC, -AC. The patient reports that he did lose consciousness at one point but was awake and unable to get himself up from the ground. The patient was reportedly found soiled in urine, feces, with water bottles scattered around him. The patient reports that he had tried to get himself up from the ground but was unable to and sustained several rashes 2/2 rug burn while on the ground on his arms and abdomen. The patient was recently seen by the Trauma Service in 12/2023 for a fall and was since diagnosed with Vertigo that the patient reports was prescribed a medication that he cannot recall but does not wish to begin taking it. The patient currently reports bilateral rib pain, however denies CP, SOB, nausea, vomiting, dizziness, fever or chills.     SICU Consult for monitoring in the setting of advanced age with multiple rib fractures. The patient is afebrile, HR 80, 159/60, satting 98 on RA. The patient can pull 2.5L on incentive spirometer.      24 Hour Events  -Admission under SICU service    [X] A ten-point review of systems was otherwise negative except as noted above.  [  ] Due to altered mental status/intubation, subjective information was not attained from the patient. History was obtained, to the extent possible, from review of the chart and collateral sources of information.    =========================================================================================================================================  =========================================================================================================================================    PMH  PAST MEDICAL & SURGICAL HISTORY:    Home Meds:  Home Medications: Valsartan, Metoprolol 50 QD     Allergies  Allergies    No Known Allergies       Current Medications:      Vital Sings, Intake/Output (Last 24Hours)  ICU Vital Signs Last 24 Hrs  T(C): 36.5 (15 Wenceslao 2024 19:00), Max: 36.7 (15 Wenceslao 2024 15:30)  T(F): 97.7 (15 Wenceslao 2024 19:00), Max: 98 (15 Wenceslao 2024 15:30)  HR: 80 (15 Wenceslao 2024 19:00) (71 - 97)  BP: 159/60 (15 Wenceslao 2024 19:00) (119/55 - 159/60)  BP(mean): --  ABP: --  ABP(mean): --  RR: 19 (15 Wenceslao 2024 19:00) (15 - 19)  SpO2: 98% (15 Wenceslao 2024 19:00) (97% - 100%)    O2 Parameters below as of 15 Wenceslao 2024 19:00  Patient On (Oxygen Delivery Method): room air      I&O's Summary    15 Wenceslao 2024 07:01  -  15 Wenceslao 2024 21:46  --------------------------------------------------------  IN: 2000 mL / OUT: 0 mL / NET: 2000 mL        Physical Exam:  ----------------------------------------------------------------------------------------------------------  GCS:      Exam: A&Ox4, no focal deficits    RESPIRATORY:  Normal expansion/effort, bilateral rib tenderness to palpation. IS 2.5L    CARDIOVASCULAR:   S1/S2.  RRR  No peripheral edema    GASTROINTESTINAL:  Abdomen soft, non-distended, extensive rash on the abdomen and arms, reportedly from rug burn, no active bleeding, no areas of purulence noted.     MUSCULOSKELETAL:  Extremities warm, pink, well-perfused. Moves all extremities spontaneously    DERM:  No skin breakdown. Extensive rash on abdomen, and old bruising on bilateral legs and old scab on knees.     ----------------------------------------------------------------------------------------------------------  [x] Peripheral IV  [ ] Urinary Catheter Andrea                                               [ ] Central Venous Line                   [ ] Arterial Line		          < from: CT Abdomen and Pelvis w/ IV Cont (01.15.24 @ 16:49) >  1.  Acute displaced left anterior 6th, 7th ribs, lateral 4th rib, right   anterior 5th and 6th ribs and nondisplaced right L1 transverse process   fractures. No pneumothorax.  2.  Patchy bilateral groundglass opacities, nonspecific and possibly   infectious/inflammatory in etiology.  3.  Few nonspecific subcentimeter periduodenal lymph nodes, possibly   reactive. Correlate for history of duodenitis.      < from: CT Cervical Spine No Cont (01.15.24 @ 16:35) >  CT HEAD:  No acute intracranial pathology.    Scattered paranasal sinus mucosal thickening within left maxillary sinus   air-fluid level, correlate for sinusitis.    CT CERVICAL SPINE:  No acute fracture or dislocation.    Similar nonspecific scattered lucent osseous lesions are noted.   Nonemergent outpatient contrast-enhanced cervical MRI may be considered   for further evaluation.      Fatigue: How much time during the previous 4 weeks did you feel tired?   All or most of the time [   ] Yes (1pt)    [  x] No  (0pts)  Resistance: Do you have any difficulty walking up 10 steps alone without resting and without aids? [   ] Yes (1pt)    [ x ] No  (0pts)  Ambulation: Do you have any difficulty walking several hundred yards alone without aids? [x   ] Yes (1pt)    [  ] No  (0pts)  Illness: how many illnesses do you have out of list of 11 total? [   ] 5 or more (1pt) [x  ] < 5 (0pts)  Loss of weight: Have you had weight loss of 5% or more? [   ] Yes (1pt)    [  x] No  (0pts)    Total Score: 1    Score 1-2: Consult medical comangement  Score 3-4: Consult Geriatric service   Score 5: Consult Palliative service [phone]    Jackie Garcia G, Jabari MEYER, Jojo DESOUZA, Maynor MESSINA. Frality: toward a clinical definition. J AM Med Dir Assoc. 2008; 9 (2): 71-72  Lauro JE, Sree TK, Gutierrez DK. A simple frailty questionnaire (FRAIL) predicts outcomes in middle aged  Americans. J Nutr Health Aging. 2012; 16 (7): 601-608

## 2024-01-15 NOTE — ED PROVIDER NOTE - DIFFERENTIAL DIAGNOSIS
Differential Diagnosis Fractures, dislocation, subluxation, renal failure, rhabdomyolysis Fractures, dislocation, subluxation, renal failure, rhabdomyolysis, acidosis

## 2024-01-15 NOTE — CONSULT NOTE ADULT - ASSESSMENT
Assessment & Plan    68y Male  s/p fall down flight of stairs, found down three days later, Rib fractures R 5-6th L 4th, 6th, 7th rib fracture, and L1 TP fracture    NEURO:  #Acute pain    -APAP, Gabapentin 100mg q8h, Lidocaine patches  #recently diagnosed Vertigo  -Was prescribed Meclizine but has not taken      RESP:   #Oxygenation    -RA  #Activity    -increase as tolerated  #Acute displaced left anterior 6th, 7th ribs, lateral 4th rib, right anterior 5th and 6th ribs fractures.  -no evidence of PTX on CT  -pulling 2.5L on incentive spirometry  -F/U AM CXR 1/16  -continue IS, cough, deep breathing exercises    CARDS:   #HTN  -takes Valsartan and Metoprolol 50mgQD  Imaging: Echo 06/2022-Normal global left ventricular systolic function. Normal left ventricular internal cavity size.  The left ventricular ejection fraction is 57 %. Mildly dilated left atrium. Trace mitral valve regurgitation. Trace tricuspid regurgitation.Trace pulmonic valve regurgitation.  #HLD  -takes no medication    GI/NUTR:  #Diet  -CLD  #History of right inguinal hernia repair with mesh in 1980  #GI Prophylaxis    -not indicated  #Bowel regimen    -senna/miralax if indicated    -last bowel movement      /RENAL:   #JHON  -Cr 1.6 from 0.8 one month ago  -IVF resuscitation  #Elevated CK, Lactate  -elevated to 5374, continue to trend QD  -Lactate 3.3, continue to trend until clear  #urine output in critically ill  -Pending U/A and monitor for next void  #mild transaminitis  -continue to monitor ,   Labs:          BUN/Cr- 68/1.6  -->          Electrolytes-Na 140 // K 4.0 // Mg -- //  Phos -- (01-15 @ 16:30)          HEME/ONC:   DVT propylaxis-  Hb/Hct:  14.7/43.0  -->  Plts:  385  -->    PTT/INR: 29.4/1.07 (01-15)    T&S:  Blood Consent-obtain if acute anemia, q6 CBC    ID:  #leukocytosis   WBC- 17.12  --->>  Temp trend- 24hrs T(F): 97.7 (01-15 @ 19:00), Max: 98 (01-15 @ 15:30)  Antibiotics-none    ENDO:    -FSG q6 if NPO or Tube feeds    -Glucose goal 140-180. if above 180 start ISS    MSK:  #Acute displaced left anterior 6th, 7th ribs, lateral 4th rib, right anterior 5th and 6th ribs fractures.nondisplaced right L1 transverse process       LINES/DRAINS:  PIV    ADVANCED DIRECTIVES:  DNR/DNI    HCP/Emergency Contact-Cousin Jose L Sanchez-will be coming to hospital 1/16    INDICATION FOR SICU: Acute renal failure, multiple rib fractures with advanced age          DISPO:   Case discussed with attending Dr. Wise Assessment & Plan    68y Male  s/p fall down flight of stairs, found down three days later, Rib fractures R 5-6th L 4th, 6th, 7th rib fracture, and L1 TP fracture    NEURO:  #Acute pain    -APAP, Gabapentin 100mg q8h, Lidocaine patches  #recently diagnosed Vertigo  -Was prescribed Meclizine but has not taken      RESP:   #Oxygenation    -RA  #Activity    -increase as tolerated  #Acute displaced left anterior 6th, 7th ribs, lateral 4th rib, right anterior 5th and 6th ribs fractures.  -no evidence of PTX on CT  -pulling 2.5L on incentive spirometry  -F/U AM CXR 1/16  -continue IS, cough, deep breathing exercises    CARDS:   #HTN  -takes Valsartan and Metoprolol 50mgQD  Imaging: Echo 06/2022-Normal global left ventricular systolic function. Normal left ventricular internal cavity size.  The left ventricular ejection fraction is 57 %. Mildly dilated left atrium. Trace mitral valve regurgitation. Trace tricuspid regurgitation.Trace pulmonic valve regurgitation.  #HLD  -takes no medication    GI/NUTR:  #Diet  -Regular Diet  #History of right inguinal hernia repair with mesh in 1980  #GI Prophylaxis    -not indicated  #Bowel regimen    -senna/miralax if indicated    -last bowel movement      /RENAL:   #JHON  -Cr 1.6 from 0.8 one month ago  -IVF resuscitation  #Elevated CK, Lactate  -elevated to 5374, continue to trend QD  -Lactate 3.3, continue to trend until clear  #urine output in critically ill  -Pending U/A and monitor for next void  #mild transaminitis  -continue to monitor ,   Labs:          BUN/Cr- 68/1.6  -->          Electrolytes-Na 140 // K 4.0 // Mg -- //  Phos -- (01-15 @ 16:30)          HEME/ONC:   DVT propylaxis-  Hb/Hct:  14.7/43.0  -->  Plts:  385  -->    PTT/INR: 29.4/1.07 (01-15)    T&S:  Blood Consent-obtain if acute anemia, q6 CBC    ID:  #leukocytosis   WBC- 17.12  --->>  Temp trend- 24hrs T(F): 97.7 (01-15 @ 19:00), Max: 98 (01-15 @ 15:30)  Antibiotics-none    ENDO:    -FSG q6 if NPO or Tube feeds    -Glucose goal 140-180. if above 180 start ISS    MSK:  #Acute displaced left anterior 6th, 7th ribs, lateral 4th rib, right anterior 5th and 6th ribs fractures.nondisplaced right L1 transverse process       LINES/DRAINS:  PIV    ADVANCED DIRECTIVES:  DNR/DNI    HCP/Emergency Contact-Cousin Jose L Sanchez-will be coming to hospital 1/16    INDICATION FOR SICU: Acute renal failure, multiple rib fractures with advanced age          DISPO:   Case discussed with attending Dr. Wise

## 2024-01-15 NOTE — H&P ADULT - NSHPLABSRESULTS_GEN_ALL_CORE
Labs:  CAPILLARY BLOOD GLUCOSE      POCT Blood Glucose.: 98 mg/dL (15 Wenceslao 2024 15:29)                          14.7   17.12 )-----------( 385      ( 15 Wenceslao 2024 16:30 )             43.0       Auto Neutrophil %: 77.3 % (01-15-24 @ 16:30)  Auto Immature Granulocyte %: 1.8 % (01-15-24 @ 16:30)    01-15    140  |  93<L>  |  68<HH>  ----------------------------<  86  4.0   |  20  |  1.6<H>      Calcium: 8.4 mg/dL (01-15-24 @ 16:30)      LFTs:             6.2  | 0.7  | 309      ------------------[79      ( 15 Wenceslao 2024 16:30 )  3.4  | x    | 110         Lipase:56     Amylase:x         Blood Gas Venous - Lactate: 1.8 mmol/L (01-15-24 @ 19:45)  Lactate, Blood: 3.3 mmol/L (01-15-24 @ 16:30)      Coags:     12.20  ----< 1.07    ( 15 Wenceslao 2024 16:30 )     29.4        CARDIAC MARKERS ( 15 Wenceslao 2024 16:30 )  x     / x     / 5374 U/L / x     / x              Urinalysis Basic - ( 15 Wenceslao 2024 16:30 )    Color: x / Appearance: x / SG: x / pH: x  Gluc: 86 mg/dL / Ketone: x  / Bili: x / Urobili: x   Blood: x / Protein: x / Nitrite: x   Leuk Esterase: x / RBC: x / WBC x   Sq Epi: x / Non Sq Epi: x / Bacteria: x                RADIOLOGY & ADDITIONAL STUDIES:  ---------------------------------------------------------------------------------------  < from: CT Head No Cont (01.15.24 @ 16:33) >    IMPRESSION:    CT HEAD:  No acute intracranial pathology.    Scattered paranasal sinus mucosal thickening within left maxillary sinus   air-fluid level, correlate for sinusitis.    CT CERVICAL SPINE:  No acute fracture or dislocation.    Similar nonspecific scattered lucent osseous lesions are noted.   Nonemergent outpatient contrast-enhanced cervical MRI may be considered   for further evaluation.        IMPRESSION:  1.  Acute displaced left anterior 6th, 7th ribs, lateral 4th rib, right   anterior 5th and 6th ribs and nondisplaced right L1 transverse process   fractures. No pneumothorax.  2.  Patchy bilateral groundglass opacities, nonspecific and possibly   infectious/inflammatory in etiology.  3.  Few nonspecific subcentimeter periduodenal lymph nodes, possibly   reactive. Correlate for history of duodenitis.

## 2024-01-15 NOTE — ED ADULT NURSE NOTE - NSFALLRISKFACTORS_ED_ALL_ED
right rib pain, unsteady gait, weakness/Bone Condition: Including osteoporosis, prolonged steroid use or metastatic bone disease/cancer/Other

## 2024-01-15 NOTE — ED ADULT NURSE NOTE - NSFALLHARMRISKINTERV_ED_ALL_ED
Assistance OOB with selected safe patient handling equipment if applicable/Assistance with ambulation/Communicate risk of Fall with Harm to all staff, patient, and family/Encourage patient to sit up slowly, dangle for a short time, stand at bedside before walking/Monitor gait and stability/Monitor for mental status changes and reorient to person, place, and time, as needed/Move patient closer to nursing station/within visual sight of ED staff/Orthostatic vital signs/Provide patient with walking aids/Provide visual cue: red socks, yellow wristband, yellow gown, etc/Reinforce activity limits and safety measures with patient and family/Toileting schedule using arm’s reach rule for commode and bathroom/Use of alarms - bed, stretcher, chair and/or video monitoring/Bed in lowest position, wheels locked, appropriate side rails in place/Call bell, personal items and telephone in reach/Instruct patient to call for assistance before getting out of bed/chair/stretcher/Non-slip footwear applied when patient is off stretcher/[place] to call system/Physically safe environment - no spills, clutter or unnecessary equipment/Purposeful Proactive Rounding/Room/bathroom lighting operational, light cord in reach Assistance OOB with selected safe patient handling equipment if applicable/Assistance with ambulation/Communicate risk of Fall with Harm to all staff, patient, and family/Encourage patient to sit up slowly, dangle for a short time, stand at bedside before walking/Monitor gait and stability/Monitor for mental status changes and reorient to person, place, and time, as needed/Move patient closer to nursing station/within visual sight of ED staff/Orthostatic vital signs/Provide patient with walking aids/Provide visual cue: red socks, yellow wristband, yellow gown, etc/Reinforce activity limits and safety measures with patient and family/Toileting schedule using arm’s reach rule for commode and bathroom/Use of alarms - bed, stretcher, chair and/or video monitoring/Bed in lowest position, wheels locked, appropriate side rails in place/Call bell, personal items and telephone in reach/Instruct patient to call for assistance before getting out of bed/chair/stretcher/Non-slip footwear applied when patient is off stretcher/[place] to call system/Physically safe environment - no spills, clutter or unnecessary equipment/Purposeful Proactive Rounding/Room/bathroom lighting operational, light cord in reach

## 2024-01-15 NOTE — ED PROVIDER NOTE - CARE PLAN
1 Principal Discharge DX:	Unwitnessed fall  Secondary Diagnosis:	Multiple rib fractures  Secondary Diagnosis:	Rhabdomyolysis  Secondary Diagnosis:	JHON (acute kidney injury)   Principal Discharge DX:	Unwitnessed fall  Secondary Diagnosis:	Multiple rib fractures  Secondary Diagnosis:	Rhabdomyolysis  Secondary Diagnosis:	JHON (acute kidney injury)  Secondary Diagnosis:	Transaminitis

## 2024-01-15 NOTE — H&P ADULT - ATTENDING COMMENTS
Trauma/ACS Attending Note Attestation    Patient is examined and evaluated at the bedside with the residents/PAs on 1/15 at 7:00 PM. Treatment plan discussed with the team, nurses, and consulting physicians and consulting teams. Medications, radiological studies and all other relevant studies reviewed.     68y Male with PMH HTN, HLD, vertigo s/p unwitnessed fall. Was found down by friend. Patient believes he fell due to his vertigo. Complaining of bilateral chest wall discomfort. No nausea, vomiting, SOB, abdominal pain, back pain, numbness, paresthesias, headache    Vital Signs Last 24 Hrs  T(C): 36.5 (15 Wenceslao 2024 19:00), Max: 36.7 (15 Wenceslao 2024 15:30)  T(F): 97.7 (15 Wenceslao 2024 19:00), Max: 98 (15 Wenceslao 2024 15:30)  HR: 80 (15 Wenceslao 2024 19:00) (71 - 97)  BP: 159/60 (15 Wenceslao 2024 19:00) (119/55 - 159/60)  BP(mean): --  RR: 19 (15 Wenceslao 2024 19:00) (15 - 19)  SpO2: 98% (15 Wenceslao 2024 19:00) (97% - 100%)    Parameters below as of 15 Wenceslao 2024 19:00  Patient On (Oxygen Delivery Method): room air    I personally examined the patient independently. I have made revisions to the physical exam in the note above and agree with the exam as written.                          13.1   14.95 )-----------( 323      ( 16 Jan 2024 00:41 )             37.6     01-15    140  |  93<L>  |  68<HH>  ----------------------------<  86  4.0   |  20  |  1.6<H>    Ca    8.4      15 Wenceslao 2024 16:30    TPro  6.2  /  Alb  3.4<L>  /  TBili  0.7  /  DBili  x   /  AST  309<H>  /  ALT  110<H>  /  AlkPhos  79  01-15    CK 5374, Lactate 3.3    CXR reviewed and interpreted by me - no hemothorax/pneumothorax  CTH/Csp reviewed and interpreted by me - no acute traumatic injuries  CT C/A/P reviewed and interpreted by me - L 4th, 6th, 7th rib fx; R 5th and 6th rib fx, R L1 TP fx, patchy bilateral groundglass opacities    Echocardiogram from 06/22 - EF 57% with mildly dilated LA, trace MR, trace TR, trace pulmonic valve regurg    Assessment/Plan:  68y Male s/p fall with L 4th, 6th, 7th rib fx along with R 5th and 6th rib fx and R L1 TP fx. Labs notable for leukocytosis, lactic acidosis, acute kidney injury, and elevated creatine kinase likely secondary to dehydration and prolonged immobilization on ground  - Admit to surgical ICU  - Multimodal pain control for rib fractures and R L1 TP fx  - Pulling 1500 on IS, mobilize as able, OOB as able, deep breathing exercises  - Continue metoprolol and valsartan for hypertension  - Can take regular diet, start bowel regimen if no BM in next day  - IVF resuscitation for acute kidney injury, trend lactate until cleared, trend creatinine and BUN, replete electrolytes per protocol  - Monitor hemoglobin and trend platelets  - Trend leukocytosis, monitor for fevers  - Monitor blood sugar  - SQH for DVT PPX    Jesse Wise MD  Trauma/ACS/Surgical Critical Care Attending

## 2024-01-15 NOTE — ED PROVIDER NOTE - NS ED MD DISPO DIVISION
[id number] Detailed [facility]

## 2024-01-15 NOTE — ED PROVIDER NOTE - OBJECTIVE STATEMENT
68-year-old male history of hypertension hyperlipidemia presenting for evaluation after fall.  Per friend at bedside, he was unable to reach patient for the past 5 days, called 911 to a wellness check however got tired of waiting so went to the house.  Was unable to reach friend so broke in and found friend in the basement crawling.  Patient states he was on the ground for at least 3 days.  Per EMS, patient was found on the floor with 30+ bottles of water, covered in urine and feces.  Patient states he rolled out of bed because he could not get up.

## 2024-01-15 NOTE — ED ADULT NURSE NOTE - OBJECTIVE STATEMENT
Pt BIBA from home s/p fall 3 days ago. Pt has been on the floor x 3 days. Pt states he has been falling a lot at home due to generalized weakness & unsteady gait. Pt uses walker but states his mobility as declined. Pt c/o pain to the right ribs. (+) multiple ecchymosis to the arms, legs and trunk.

## 2024-01-15 NOTE — ED PROVIDER NOTE - PHYSICAL EXAMINATION
Vital Signs: I have reviewed the initial vital signs.  Constitutional: chronically ill appearing no acute distress.  HEENT: Airway patent, moist MM, EOMI,   CV: regular rate, regular rhythm, well-perfused extremities,   Lungs: Clear to ascultation bilaterally, no increased work of breathing.  ABD: Non-tender, Non-distended,   MSK: Neck supple, nontender,   INTEG: Skin warm, abrasion to entire front chest and abdomen, bruising to BL elbows  NEURO: A&Ox3, moving all extremities, normal speech  PSYCH: Calm, cooperative, normal affect and interaction.

## 2024-01-15 NOTE — H&P ADULT - HISTORY OF PRESENT ILLNESS
TRAUMA ACTIVATION LEVEL:  CODE / ALERT  / CONSULT  ACTIVATED BY: EMS**  /  ED**  INTUBATED: YES** / NO**    MECHANISM OF INJURY:   [] Blunt     [] MVC	  [x] Fall	  [] Pedestrian Struck	  [] Motorcycle     [] Assault     [] Bicycle collision    [] Sports injury    [] Penetrating    [] Gun Shot Wound      [] Stab Wound    GCS: 15 	E: 4	V: 5	M: 6    HPI:"68-year-old male history of hypertension hyperlipidemia presenting for evaluation after fall.  Per friend at bedside, he was unable to reach patient for the past 5 days, called 911 to a wellness check however got tired of waiting so went to the house.  Was unable to reach friend so broke in and found friend in the basement crawling.  Patient states he was on the ground for at least 3 days.  Per EMS, patient was found on the floor with 30+ bottles of water, covered in urine and feces.  Patient states he rolled out of bed because he could not get up"      Obvious external signs of injury: chest abrasions    PAST MEDICAL & SURGICAL HISTORY:    Allergies    No Known Allergies    Intolerances      Home Medications:      ROS: 10-system review is otherwise negative except HPI above.      Primary Survey:    A - airway intact  B - bilateral breath sounds and good chest rise  C - palpable pulses in all extremities  D - GCS 15 on arrival, GARCIA  Exposure obtained    Vital Signs Last 24 Hrs  T(C): 36.5 (15 Wenceslao 2024 19:00), Max: 36.7 (15 Wenceslao 2024 15:30)  T(F): 97.7 (15 Wenceslao 2024 19:00), Max: 98 (15 Wenceslao 2024 15:30)  HR: 80 (15 Wenceslao 2024 19:00) (71 - 97)  BP: 159/60 (15 Wenceslao 2024 19:00) (119/55 - 159/60)  BP(mean): --  RR: 19 (15 Wenceslao 2024 19:00) (15 - 19)  SpO2: 98% (15 Wenceslao 2024 19:00) (97% - 100%)    Parameters below as of 15 Wenceslao 2024 19:00  Patient On (Oxygen Delivery Method): room air        Secondary Survey:   General: NAD, appears chronically ill  HEENT: Normocephalic, atraumatic, EOMI, PEERLA. no scalp lacerations   Neck: Soft, midline trachea. no c-spine tenderness  Chest: No chest wall tenderness, no subcutaneous emphysema, +chest abrasion  Cardiac: RR  Respiratory: Bilateral breath sounds, clear and equal bilaterally  Abdomen: Soft, non-distended, non-tender, no rebound, no guarding.  Groin: Normal appearing, pelvis stable   Ext:  Moving b/l upper and lower extremities. Palpable Radial b/l UE, b/l DP palpable in LE.   Back: No T/L/S spine tenderness, No palpable runoff/stepoff/deformity      FAST:        9 TRAUMA ACTIVATION LEVEL:  CODE / ALERT  / CONSULT  ACTIVATED BY: EMS**  /  ED**  INTUBATED: YES** / NO**    MECHANISM OF INJURY:   [] Blunt     [] MVC	  [x] Fall	  [] Pedestrian Struck	  [] Motorcycle     [] Assault     [] Bicycle collision    [] Sports injury    [] Penetrating    [] Gun Shot Wound      [] Stab Wound    GCS: 15 	E: 4	V: 5	M: 6    HPI:"68-year-old male history of hypertension hyperlipidemia presenting for evaluation after fall.  Per friend at bedside, he was unable to reach patient for the past 5 days, called 911 to a wellness check however got tired of waiting so went to the house.  Was unable to reach friend so broke in and found friend in the basement crawling.  Patient states he was on the ground for at least 3 days.  Per EMS, patient was found on the floor with 30+ bottles of water, covered in urine and feces.  Patient states he rolled out of bed because he could not get up"      Obvious external signs of injury: chest abrasions    PAST MEDICAL & SURGICAL HISTORY: HTN, HLD, vertigo, R inguinal hernia repair    Allergies    No Known Allergies    Intolerances      Home Medications: metoprolol, valsartan, flomax      ROS: 10-system review is otherwise negative except HPI above.      Primary Survey:    A - airway intact  B - bilateral breath sounds and good chest rise  C - palpable pulses in all extremities  D - GCS 15 on arrival, GARCIA  Exposure obtained    Vital Signs Last 24 Hrs  T(C): 36.5 (15 Wenceslao 2024 19:00), Max: 36.7 (15 Wenceslao 2024 15:30)  T(F): 97.7 (15 Wenceslao 2024 19:00), Max: 98 (15 Wenceslao 2024 15:30)  HR: 80 (15 Wenceslao 2024 19:00) (71 - 97)  BP: 159/60 (15 Wenceslao 2024 19:00) (119/55 - 159/60)  BP(mean): --  RR: 19 (15 Wenceslao 2024 19:00) (15 - 19)  SpO2: 98% (15 Wenceslao 2024 19:00) (97% - 100%)    Parameters below as of 15 Wenceslao 2024 19:00  Patient On (Oxygen Delivery Method): room air      Secondary Survey:   General: NAD, appears chronically ill  HEENT: Normocephalic, atraumatic, EOMI, PEERLA. no scalp lacerations   Neck: Soft, midline trachea. no c-spine tenderness  Chest: mild chest wall tenderness bilaterally, no subcutaneous emphysema, +chest abrasion  Cardiac: regular rate and rhythm  Respiratory: Bilateral breath sounds, clear and equal bilaterally  Abdomen: Soft, non-distended, non-tender, no rebound, no guarding.  Groin: Normal appearing, pelvis stable   Ext:  Moving b/l upper and lower extremities. Palpable Radial b/l UE, b/l DP palpable in LE.   Back: No T/L/S spine tenderness, No palpable runoff/stepoff/deformity

## 2024-01-15 NOTE — ED PROVIDER NOTE - CLINICAL SUMMARY MEDICAL DECISION MAKING FREE TEXT BOX
68yF HTN DLD frequent falls p/w fall and subsequent days on the floor crawling before friend found him and called 911.  Pt hemodynamically stable and w/o resp distress though labs w/ JHON, borderline acidosis, rhabdomyolysis and transaminitis likely from prolonged time on the floor.  Imaging w/ b/l rib fx, likely acute on chronic.  Trauma consulted and will adm for further care.

## 2024-01-16 ENCOUNTER — TRANSCRIPTION ENCOUNTER (OUTPATIENT)
Age: 69
End: 2024-01-16

## 2024-01-16 LAB
ALBUMIN SERPL ELPH-MCNC: 2.5 G/DL — LOW (ref 3.5–5.2)
ALP SERPL-CCNC: 54 U/L — SIGNIFICANT CHANGE UP (ref 30–115)
ALT FLD-CCNC: 69 U/L — HIGH (ref 0–41)
AMMONIA BLD-MCNC: 116 UMOL/L — HIGH (ref 11–55)
ANION GAP SERPL CALC-SCNC: 14 MMOL/L — SIGNIFICANT CHANGE UP (ref 7–14)
ANION GAP SERPL CALC-SCNC: 20 MMOL/L — HIGH (ref 7–14)
AST SERPL-CCNC: 193 U/L — HIGH (ref 0–41)
BASOPHILS # BLD AUTO: 0.02 K/UL — SIGNIFICANT CHANGE UP (ref 0–0.2)
BASOPHILS # BLD AUTO: 0.03 K/UL — SIGNIFICANT CHANGE UP (ref 0–0.2)
BASOPHILS # BLD AUTO: 0.04 K/UL — SIGNIFICANT CHANGE UP (ref 0–0.2)
BASOPHILS # BLD AUTO: 0.04 K/UL — SIGNIFICANT CHANGE UP (ref 0–0.2)
BASOPHILS NFR BLD AUTO: 0.2 % — SIGNIFICANT CHANGE UP (ref 0–1)
BASOPHILS NFR BLD AUTO: 0.2 % — SIGNIFICANT CHANGE UP (ref 0–1)
BASOPHILS NFR BLD AUTO: 0.3 % — SIGNIFICANT CHANGE UP (ref 0–1)
BASOPHILS NFR BLD AUTO: 0.3 % — SIGNIFICANT CHANGE UP (ref 0–1)
BILIRUB DIRECT SERPL-MCNC: 0.2 MG/DL — SIGNIFICANT CHANGE UP (ref 0–0.3)
BILIRUB INDIRECT FLD-MCNC: 0.3 MG/DL — SIGNIFICANT CHANGE UP (ref 0.2–1.2)
BILIRUB SERPL-MCNC: 0.5 MG/DL — SIGNIFICANT CHANGE UP (ref 0.2–1.2)
BUN SERPL-MCNC: 71 MG/DL — CRITICAL HIGH (ref 10–20)
BUN SERPL-MCNC: 95 MG/DL — CRITICAL HIGH (ref 10–20)
CALCIUM SERPL-MCNC: 7.5 MG/DL — LOW (ref 8.4–10.5)
CALCIUM SERPL-MCNC: 8.1 MG/DL — LOW (ref 8.4–10.5)
CHLORIDE SERPL-SCNC: 100 MMOL/L — SIGNIFICANT CHANGE UP (ref 98–110)
CHLORIDE SERPL-SCNC: 99 MMOL/L — SIGNIFICANT CHANGE UP (ref 98–110)
CK SERPL-CCNC: 3911 U/L — HIGH (ref 0–225)
CO2 SERPL-SCNC: 20 MMOL/L — SIGNIFICANT CHANGE UP (ref 17–32)
CO2 SERPL-SCNC: 22 MMOL/L — SIGNIFICANT CHANGE UP (ref 17–32)
CREAT SERPL-MCNC: 1.4 MG/DL — SIGNIFICANT CHANGE UP (ref 0.7–1.5)
CREAT SERPL-MCNC: 1.7 MG/DL — HIGH (ref 0.7–1.5)
EGFR: 43 ML/MIN/1.73M2 — LOW
EGFR: 54 ML/MIN/1.73M2 — LOW
EOSINOPHIL # BLD AUTO: 0 K/UL — SIGNIFICANT CHANGE UP (ref 0–0.7)
EOSINOPHIL # BLD AUTO: 0 K/UL — SIGNIFICANT CHANGE UP (ref 0–0.7)
EOSINOPHIL # BLD AUTO: 0.01 K/UL — SIGNIFICANT CHANGE UP (ref 0–0.7)
EOSINOPHIL # BLD AUTO: 0.03 K/UL — SIGNIFICANT CHANGE UP (ref 0–0.7)
EOSINOPHIL NFR BLD AUTO: 0 % — SIGNIFICANT CHANGE UP (ref 0–8)
EOSINOPHIL NFR BLD AUTO: 0 % — SIGNIFICANT CHANGE UP (ref 0–8)
EOSINOPHIL NFR BLD AUTO: 0.1 % — SIGNIFICANT CHANGE UP (ref 0–8)
EOSINOPHIL NFR BLD AUTO: 0.2 % — SIGNIFICANT CHANGE UP (ref 0–8)
GLUCOSE SERPL-MCNC: 130 MG/DL — HIGH (ref 70–99)
GLUCOSE SERPL-MCNC: 98 MG/DL — SIGNIFICANT CHANGE UP (ref 70–99)
HCT VFR BLD CALC: 27.4 % — LOW (ref 42–52)
HCT VFR BLD CALC: 27.6 % — LOW (ref 42–52)
HCT VFR BLD CALC: 29.9 % — LOW (ref 42–52)
HCT VFR BLD CALC: 37.6 % — LOW (ref 42–52)
HCV AB S/CO SERPL IA: 0.04 COI — SIGNIFICANT CHANGE UP
HCV AB SERPL-IMP: SIGNIFICANT CHANGE UP
HGB BLD-MCNC: 10.3 G/DL — LOW (ref 14–18)
HGB BLD-MCNC: 13.1 G/DL — LOW (ref 14–18)
HGB BLD-MCNC: 9.4 G/DL — LOW (ref 14–18)
HGB BLD-MCNC: 9.5 G/DL — LOW (ref 14–18)
IMM GRANULOCYTES NFR BLD AUTO: 1.3 % — HIGH (ref 0.1–0.3)
IMM GRANULOCYTES NFR BLD AUTO: 1.6 % — HIGH (ref 0.1–0.3)
IMM GRANULOCYTES NFR BLD AUTO: 1.7 % — HIGH (ref 0.1–0.3)
IMM GRANULOCYTES NFR BLD AUTO: 1.8 % — HIGH (ref 0.1–0.3)
LACTATE SERPL-SCNC: 1.2 MMOL/L — SIGNIFICANT CHANGE UP (ref 0.7–2)
LYMPHOCYTES # BLD AUTO: 1.57 K/UL — SIGNIFICANT CHANGE UP (ref 1.2–3.4)
LYMPHOCYTES # BLD AUTO: 11.9 % — LOW (ref 20.5–51.1)
LYMPHOCYTES # BLD AUTO: 18.6 % — LOW (ref 20.5–51.1)
LYMPHOCYTES # BLD AUTO: 19.2 % — LOW (ref 20.5–51.1)
LYMPHOCYTES # BLD AUTO: 2.78 K/UL — SIGNIFICANT CHANGE UP (ref 1.2–3.4)
LYMPHOCYTES # BLD AUTO: 2.82 K/UL — SIGNIFICANT CHANGE UP (ref 1.2–3.4)
LYMPHOCYTES # BLD AUTO: 2.88 K/UL — SIGNIFICANT CHANGE UP (ref 1.2–3.4)
LYMPHOCYTES # BLD AUTO: 20.9 % — SIGNIFICANT CHANGE UP (ref 20.5–51.1)
MAGNESIUM SERPL-MCNC: 2.7 MG/DL — HIGH (ref 1.8–2.4)
MAGNESIUM SERPL-MCNC: 2.8 MG/DL — HIGH (ref 1.8–2.4)
MCHC RBC-ENTMCNC: 31 PG — SIGNIFICANT CHANGE UP (ref 27–31)
MCHC RBC-ENTMCNC: 31.3 PG — HIGH (ref 27–31)
MCHC RBC-ENTMCNC: 31.5 PG — HIGH (ref 27–31)
MCHC RBC-ENTMCNC: 31.7 PG — HIGH (ref 27–31)
MCHC RBC-ENTMCNC: 34.1 G/DL — SIGNIFICANT CHANGE UP (ref 32–37)
MCHC RBC-ENTMCNC: 34.4 G/DL — SIGNIFICANT CHANGE UP (ref 32–37)
MCHC RBC-ENTMCNC: 34.7 G/DL — SIGNIFICANT CHANGE UP (ref 32–37)
MCHC RBC-ENTMCNC: 34.8 G/DL — SIGNIFICANT CHANGE UP (ref 32–37)
MCV RBC AUTO: 90.4 FL — SIGNIFICANT CHANGE UP (ref 80–94)
MCV RBC AUTO: 90.9 FL — SIGNIFICANT CHANGE UP (ref 80–94)
MCV RBC AUTO: 91.1 FL — SIGNIFICANT CHANGE UP (ref 80–94)
MCV RBC AUTO: 91.3 FL — SIGNIFICANT CHANGE UP (ref 80–94)
MONOCYTES # BLD AUTO: 0.35 K/UL — SIGNIFICANT CHANGE UP (ref 0.1–0.6)
MONOCYTES # BLD AUTO: 0.88 K/UL — HIGH (ref 0.1–0.6)
MONOCYTES # BLD AUTO: 1.01 K/UL — HIGH (ref 0.1–0.6)
MONOCYTES # BLD AUTO: 1.1 K/UL — HIGH (ref 0.1–0.6)
MONOCYTES NFR BLD AUTO: 2.7 % — SIGNIFICANT CHANGE UP (ref 1.7–9.3)
MONOCYTES NFR BLD AUTO: 5.9 % — SIGNIFICANT CHANGE UP (ref 1.7–9.3)
MONOCYTES NFR BLD AUTO: 6.9 % — SIGNIFICANT CHANGE UP (ref 1.7–9.3)
MONOCYTES NFR BLD AUTO: 8 % — SIGNIFICANT CHANGE UP (ref 1.7–9.3)
NEUTROPHILS # BLD AUTO: 10.59 K/UL — HIGH (ref 1.4–6.5)
NEUTROPHILS # BLD AUTO: 10.99 K/UL — HIGH (ref 1.4–6.5)
NEUTROPHILS # BLD AUTO: 11.06 K/UL — HIGH (ref 1.4–6.5)
NEUTROPHILS # BLD AUTO: 9.48 K/UL — HIGH (ref 1.4–6.5)
NEUTROPHILS NFR BLD AUTO: 68.9 % — SIGNIFICANT CHANGE UP (ref 42.2–75.2)
NEUTROPHILS NFR BLD AUTO: 71.9 % — SIGNIFICANT CHANGE UP (ref 42.2–75.2)
NEUTROPHILS NFR BLD AUTO: 73.9 % — SIGNIFICANT CHANGE UP (ref 42.2–75.2)
NEUTROPHILS NFR BLD AUTO: 83.5 % — HIGH (ref 42.2–75.2)
NRBC # BLD: 0 /100 WBCS — SIGNIFICANT CHANGE UP (ref 0–0)
PHOSPHATE SERPL-MCNC: 3.4 MG/DL — SIGNIFICANT CHANGE UP (ref 2.1–4.9)
PHOSPHATE SERPL-MCNC: 4 MG/DL — SIGNIFICANT CHANGE UP (ref 2.1–4.9)
PLATELET # BLD AUTO: 266 K/UL — SIGNIFICANT CHANGE UP (ref 130–400)
PLATELET # BLD AUTO: 268 K/UL — SIGNIFICANT CHANGE UP (ref 130–400)
PLATELET # BLD AUTO: 286 K/UL — SIGNIFICANT CHANGE UP (ref 130–400)
PLATELET # BLD AUTO: 323 K/UL — SIGNIFICANT CHANGE UP (ref 130–400)
PMV BLD: 10 FL — SIGNIFICANT CHANGE UP (ref 7.4–10.4)
PMV BLD: 10.2 FL — SIGNIFICANT CHANGE UP (ref 7.4–10.4)
PMV BLD: 10.4 FL — SIGNIFICANT CHANGE UP (ref 7.4–10.4)
PMV BLD: 10.6 FL — HIGH (ref 7.4–10.4)
POTASSIUM SERPL-MCNC: 3.8 MMOL/L — SIGNIFICANT CHANGE UP (ref 3.5–5)
POTASSIUM SERPL-MCNC: 3.8 MMOL/L — SIGNIFICANT CHANGE UP (ref 3.5–5)
POTASSIUM SERPL-SCNC: 3.8 MMOL/L — SIGNIFICANT CHANGE UP (ref 3.5–5)
POTASSIUM SERPL-SCNC: 3.8 MMOL/L — SIGNIFICANT CHANGE UP (ref 3.5–5)
PROT SERPL-MCNC: 4.5 G/DL — LOW (ref 6–8)
RBC # BLD: 3 M/UL — LOW (ref 4.7–6.1)
RBC # BLD: 3.03 M/UL — LOW (ref 4.7–6.1)
RBC # BLD: 3.29 M/UL — LOW (ref 4.7–6.1)
RBC # BLD: 4.16 M/UL — LOW (ref 4.7–6.1)
RBC # FLD: 13.2 % — SIGNIFICANT CHANGE UP (ref 11.5–14.5)
RBC # FLD: 13.3 % — SIGNIFICANT CHANGE UP (ref 11.5–14.5)
SODIUM SERPL-SCNC: 136 MMOL/L — SIGNIFICANT CHANGE UP (ref 135–146)
SODIUM SERPL-SCNC: 139 MMOL/L — SIGNIFICANT CHANGE UP (ref 135–146)
WBC # BLD: 13.16 K/UL — HIGH (ref 4.8–10.8)
WBC # BLD: 13.77 K/UL — HIGH (ref 4.8–10.8)
WBC # BLD: 14.71 K/UL — HIGH (ref 4.8–10.8)
WBC # BLD: 14.95 K/UL — HIGH (ref 4.8–10.8)
WBC # FLD AUTO: 13.16 K/UL — HIGH (ref 4.8–10.8)
WBC # FLD AUTO: 13.77 K/UL — HIGH (ref 4.8–10.8)
WBC # FLD AUTO: 14.71 K/UL — HIGH (ref 4.8–10.8)
WBC # FLD AUTO: 14.95 K/UL — HIGH (ref 4.8–10.8)

## 2024-01-16 PROCEDURE — 99233 SBSQ HOSP IP/OBS HIGH 50: CPT

## 2024-01-16 PROCEDURE — 71045 X-RAY EXAM CHEST 1 VIEW: CPT | Mod: 26

## 2024-01-16 PROCEDURE — 99291 CRITICAL CARE FIRST HOUR: CPT

## 2024-01-16 PROCEDURE — 71045 X-RAY EXAM CHEST 1 VIEW: CPT | Mod: 26,77

## 2024-01-16 PROCEDURE — 43255 EGD CONTROL BLEEDING ANY: CPT

## 2024-01-16 RX ORDER — SODIUM CHLORIDE 9 MG/ML
500 INJECTION, SOLUTION INTRAVENOUS ONCE
Refills: 0 | Status: COMPLETED | OUTPATIENT
Start: 2024-01-16 | End: 2024-01-16

## 2024-01-16 RX ORDER — VALSARTAN 80 MG/1
320 TABLET ORAL DAILY
Refills: 0 | Status: DISCONTINUED | OUTPATIENT
Start: 2024-01-15 | End: 2024-01-16

## 2024-01-16 RX ORDER — CHLORHEXIDINE GLUCONATE 213 G/1000ML
1 SOLUTION TOPICAL
Refills: 0 | Status: DISCONTINUED | OUTPATIENT
Start: 2024-01-16 | End: 2024-01-18

## 2024-01-16 RX ORDER — SODIUM CHLORIDE 9 MG/ML
250 INJECTION INTRAMUSCULAR; INTRAVENOUS; SUBCUTANEOUS ONCE
Refills: 0 | Status: COMPLETED | OUTPATIENT
Start: 2024-01-16 | End: 2024-01-16

## 2024-01-16 RX ORDER — VALSARTAN 80 MG/1
320 TABLET ORAL DAILY
Refills: 0 | Status: DISCONTINUED | OUTPATIENT
Start: 2024-01-16 | End: 2024-01-16

## 2024-01-16 RX ORDER — PANTOPRAZOLE SODIUM 20 MG/1
40 TABLET, DELAYED RELEASE ORAL EVERY 12 HOURS
Refills: 0 | Status: DISCONTINUED | OUTPATIENT
Start: 2024-01-16 | End: 2024-01-16

## 2024-01-16 RX ORDER — DIAZEPAM 5 MG
5 TABLET ORAL ONCE
Refills: 0 | Status: DISCONTINUED | OUTPATIENT
Start: 2024-01-16 | End: 2024-01-16

## 2024-01-16 RX ORDER — FOLIC ACID 0.8 MG
1 TABLET ORAL DAILY
Refills: 0 | Status: DISCONTINUED | OUTPATIENT
Start: 2024-01-16 | End: 2024-01-18

## 2024-01-16 RX ORDER — HYDROXYCHLOROQUINE SULFATE 200 MG
2 TABLET ORAL
Refills: 0 | DISCHARGE

## 2024-01-16 RX ORDER — HYDROXYCHLOROQUINE SULFATE 200 MG
400 TABLET ORAL DAILY
Refills: 0 | Status: DISCONTINUED | OUTPATIENT
Start: 2024-01-16 | End: 2024-01-18

## 2024-01-16 RX ORDER — PANTOPRAZOLE SODIUM 20 MG/1
8 TABLET, DELAYED RELEASE ORAL
Qty: 80 | Refills: 0 | Status: DISCONTINUED | OUTPATIENT
Start: 2024-01-16 | End: 2024-01-18

## 2024-01-16 RX ORDER — METOPROLOL TARTRATE 50 MG
25 TABLET ORAL
Refills: 0 | Status: DISCONTINUED | OUTPATIENT
Start: 2024-01-16 | End: 2024-01-18

## 2024-01-16 RX ORDER — SODIUM CHLORIDE 9 MG/ML
250 INJECTION, SOLUTION INTRAVENOUS ONCE
Refills: 0 | Status: DISCONTINUED | OUTPATIENT
Start: 2024-01-16 | End: 2024-01-16

## 2024-01-16 RX ORDER — THIAMINE MONONITRATE (VIT B1) 100 MG
100 TABLET ORAL DAILY
Refills: 0 | Status: COMPLETED | OUTPATIENT
Start: 2024-01-16 | End: 2024-01-18

## 2024-01-16 RX ORDER — METOPROLOL TARTRATE 50 MG
25 TABLET ORAL
Refills: 0 | Status: DISCONTINUED | OUTPATIENT
Start: 2024-01-16 | End: 2024-01-16

## 2024-01-16 RX ADMIN — SODIUM CHLORIDE 500 MILLILITER(S): 9 INJECTION, SOLUTION INTRAVENOUS at 08:10

## 2024-01-16 RX ADMIN — Medication 1 TABLET(S): at 11:44

## 2024-01-16 RX ADMIN — HEPARIN SODIUM 5000 UNIT(S): 5000 INJECTION INTRAVENOUS; SUBCUTANEOUS at 21:47

## 2024-01-16 RX ADMIN — SODIUM CHLORIDE 1000 MILLILITER(S): 9 INJECTION, SOLUTION INTRAVENOUS at 10:30

## 2024-01-16 RX ADMIN — SODIUM CHLORIDE 125 MILLILITER(S): 9 INJECTION, SOLUTION INTRAVENOUS at 05:05

## 2024-01-16 RX ADMIN — GABAPENTIN 100 MILLIGRAM(S): 400 CAPSULE ORAL at 13:49

## 2024-01-16 RX ADMIN — LIDOCAINE 1 PATCH: 4 CREAM TOPICAL at 21:09

## 2024-01-16 RX ADMIN — PANTOPRAZOLE SODIUM 40 MILLIGRAM(S): 20 TABLET, DELAYED RELEASE ORAL at 10:30

## 2024-01-16 RX ADMIN — SODIUM CHLORIDE 500 MILLILITER(S): 9 INJECTION INTRAMUSCULAR; INTRAVENOUS; SUBCUTANEOUS at 04:00

## 2024-01-16 RX ADMIN — Medication 650 MILLIGRAM(S): at 18:19

## 2024-01-16 RX ADMIN — LIDOCAINE 1 PATCH: 4 CREAM TOPICAL at 07:56

## 2024-01-16 RX ADMIN — GABAPENTIN 100 MILLIGRAM(S): 400 CAPSULE ORAL at 05:06

## 2024-01-16 RX ADMIN — METHOCARBAMOL 500 MILLIGRAM(S): 500 TABLET, FILM COATED ORAL at 05:06

## 2024-01-16 RX ADMIN — Medication 25 MILLIGRAM(S): at 18:19

## 2024-01-16 RX ADMIN — TAMSULOSIN HYDROCHLORIDE 0.4 MILLIGRAM(S): 0.4 CAPSULE ORAL at 21:47

## 2024-01-16 RX ADMIN — Medication 100 MILLIGRAM(S): at 11:44

## 2024-01-16 RX ADMIN — HEPARIN SODIUM 5000 UNIT(S): 5000 INJECTION INTRAVENOUS; SUBCUTANEOUS at 13:50

## 2024-01-16 RX ADMIN — CHLORHEXIDINE GLUCONATE 1 APPLICATION(S): 213 SOLUTION TOPICAL at 08:10

## 2024-01-16 RX ADMIN — HEPARIN SODIUM 5000 UNIT(S): 5000 INJECTION INTRAVENOUS; SUBCUTANEOUS at 05:05

## 2024-01-16 RX ADMIN — Medication 650 MILLIGRAM(S): at 23:49

## 2024-01-16 RX ADMIN — SODIUM CHLORIDE 1000 MILLILITER(S): 9 INJECTION, SOLUTION INTRAVENOUS at 13:30

## 2024-01-16 RX ADMIN — SODIUM CHLORIDE 500 MILLILITER(S): 9 INJECTION, SOLUTION INTRAVENOUS at 06:15

## 2024-01-16 RX ADMIN — METHOCARBAMOL 500 MILLIGRAM(S): 500 TABLET, FILM COATED ORAL at 13:49

## 2024-01-16 RX ADMIN — SODIUM CHLORIDE 125 MILLILITER(S): 9 INJECTION, SOLUTION INTRAVENOUS at 23:58

## 2024-01-16 RX ADMIN — SODIUM CHLORIDE 125 MILLILITER(S): 9 INJECTION, SOLUTION INTRAVENOUS at 08:10

## 2024-01-16 RX ADMIN — Medication 400 MILLIGRAM(S): at 11:45

## 2024-01-16 RX ADMIN — Medication 25 MILLIGRAM(S): at 05:06

## 2024-01-16 RX ADMIN — GABAPENTIN 100 MILLIGRAM(S): 400 CAPSULE ORAL at 21:47

## 2024-01-16 RX ADMIN — Medication 650 MILLIGRAM(S): at 05:06

## 2024-01-16 RX ADMIN — PANTOPRAZOLE SODIUM 10 MG/HR: 20 TABLET, DELAYED RELEASE ORAL at 18:46

## 2024-01-16 RX ADMIN — Medication 650 MILLIGRAM(S): at 12:12

## 2024-01-16 RX ADMIN — Medication 650 MILLIGRAM(S): at 11:42

## 2024-01-16 RX ADMIN — Medication 1 MILLIGRAM(S): at 11:43

## 2024-01-16 RX ADMIN — VALSARTAN 320 MILLIGRAM(S): 80 TABLET ORAL at 05:06

## 2024-01-16 NOTE — CHART NOTE - NSCHARTNOTEFT_GEN_A_CORE
PACU ANESTHESIA ADMISSION NOTE      Procedure:   Post op diagnosis:      ____  Intubated  TV:______       Rate: ______      FiO2: ______    __x__  Patent Airway    ____  Full return of protective reflexes    ____  Full recovery from anesthesia / back to baseline     Vitals:   T:     98       R:    12              BP:     140/63              Sat:  98%                  P:  75      Mental Status:  __x__ Awake   _____ Alert   _____ Drowsy   _____ Sedated    Nausea/Vomiting:  __x__ NO  ______Yes,   See Post - Op Orders          Pain Scale (0-10):  _____    Treatment: ____ None    ___x_ See Post - Op/PCA Orders    Post - Operative Fluids:   ____ Oral   ___x_ See Post - Op Orders    Plan: Discharge:   ____Home       ___x__Floor     _____Critical Care    _____  Other:_________________    Comments: Uneventful intraoperative course. No anesthesia issues or complications noted.  Patient stable upon arrival to PACU. Report given to RN. Discharge when criteria met.      c

## 2024-01-16 NOTE — PROGRESS NOTE ADULT - ASSESSMENT
68M w/ PMH of HTN, HLD, vertigo, and lupus presented to the ED s/p found down found to have acute left 4/6/7 and R 5/6 rib fractures as well as L transverse process fx.     PLAN:   -PAIN: APAP, gabapentin, robaxin, lido patch  -RESP: encourage IS, AM CXR  -CARD: resumed home valsartan and metoprolol  -GI: regular diet  -: ; JHON (baseline Cr 0.8) - 1.6 > 1.4  -HEME: HSQ  -ID: resumed home hydroxychloroquine    DISPO: PT, physiatry consults pending

## 2024-01-16 NOTE — CONSULT NOTE ADULT - SUBJECTIVE AND OBJECTIVE BOX
Gastroenterology Initial Consult Note      Location: [facility] 2B 002 A ([facility] 2B)  Patient Name: SUSAN AWAD  Age: 68y  Gender: Male      Chief Complaint  Patient is a 68y old Male who presents with a chief complaint of b/l rib fx (16 Jan 2024 00:06)  Primary diagnosis of Acute renal failure      Reason for Consult  Reported Melena  Intermittent Hematochezia      History of Present Illness  68 year old male patient known to have a history of HTN, DL, and BPH who was brought to the ED on 01/15 after being found on the floor unable to stand, found to be hemodynamically stable with evidence of acute rib fractures and pneumonia on imaging, admitted for further management. Stay was complicated on 01/16 by 2 episodes of melena per nurse. We are consulted for concern of melena.      Summary:  * Baseline hemoglobin (prior to admission): Hb 13.1 on 12/06/2023  * ED Vitals:  /70 mmHg, HR 71 bpm   * Hemoglobin on admission: Hb 14.7 -> 01/16 Hb 13.1   * Coags: INR 1.07 on 01/15    * Not on AP or AC  * SWEETIE dark brown  * CT Abdomen and Pelvis w/ IV Cont (01.15.24 @ 16:49) Acute displaced left anterior 6th, 7th ribs, lateral 4th rib, right anterior 5th and 6th ribs and nondisplaced right L1 transverse process fractures. No pneumothorax. Patchy bilateral groundglass opacities, nonspecific and possibly infectious/inflammatory in etiology.Few nonspecific subcentimeter periduodenal lymph nodes, possibly reactive. Correlate for history of duodenitis.  * No prior EGD  * Prior Colonoscopy around 3 years ago: at [facility]  * Family History: no GI malignancies  * Had hypotension overnight on 01/16; reported melena x2 by nurse; SWEETIE dark brown on 01/16 at 09:00 AM      Home Medications:  Home Medications:  CHLORTHALID 25MG TAB:  (15 Wenceslao 2024 22:12)  hydroxychloroquine 200 mg oral tablet: 2 tab(s) orally once a day (16 Jan 2024 02:53)  METOPROL SUC 100MG ER TAB:  (15 Wenceslao 2024 22:12)  TAMSULOSIN HCL 0.4 MG CAPSULE: TAKE 1 CAPSULE BY MOUTH EVERY DAY AT BEDTIME FOR 90 DAYS (15 Wenceslao 2024 22:12)  VALSARTAN 320 MG TABLET: TAKE 1 TABLET BY MOUTH EVERY DAY (15 Wenceslao 2024 22:12)      Social History:  Tobacco: denies  Alcohol: denies  Drugs: denies      Allergies:  No Known Allergies      Family History:  FAMILY HISTORY:  No GI malignancies      Vital Signs in the last 24 hours   Vitals Summary T(C): 37.3 (01-16-24 @ 08:00), Max: 37.3 (01-16-24 @ 03:00)  HR: 71 (01-16-24 @ 08:00) (71 - 109)  BP: 110/56 (01-16-24 @ 08:00) (79/58 - 159/60)  RR: 19 (01-16-24 @ 08:00) (15 - 20)  SpO2: 98% (01-16-24 @ 08:00) (95% - 100%)  Vent Data   Intake/ Output   01-15-24 @ 07:01  -  01-16-24 @ 07:00  --------------------------------------------------------  IN: 3475 mL / OUT: 0 mL / NET: 3475 mL    01-16-24 @ 07:01  -  01-16-24 @ 09:43  --------------------------------------------------------  IN: 625 mL / OUT: 0 mL / NET: 625 mL      Physical Exam  * General Appearance: Alert, oriented to time, place, and person  * Lungs: Good bilateral air entry, crackles on Nasal Canula  * Heart: Regular Rate and Rhythm, normal S1 and S2, no audible murmur, rub, or gallop  * Abdomen: Symmetric, non-distended, soft, non-tender, bowel sounds active all four quadrants, no masses, no organomegaly (no hepatosplenomegaly)  * Rectal: Dark Brown stool, Normal tone, no palpable masses or tenderness      Investigations   Laboratory Workup      - CBC:                        13.1   14.95 )-----------( 323      ( 16 Jan 2024 00:41 )             37.6       - Hgb Trend:  13.1  01-16-24 @ 00:41  14.7  01-15-24 @ 16:30          - Chemistry:  01-16    139  |  99  |  71<HH>  ----------------------------<  98  3.8   |  20  |  1.4    Ca    8.1<L>      16 Jan 2024 00:41  Phos  4.0     01-16  Mg     2.8     01-16    TPro  6.2  /  Alb  3.4<L>  /  TBili  0.7  /  DBili  x   /  AST  309<H>  /  ALT  110<H>  /  AlkPhos  79  01-15    Liver panel trend:  TBili 0.7   /      /      /   AlkP 79   /   Tptn 6.2   /   Alb 3.4    /   DBili --      01-15      - Coagulation Studies:  PT/INR - ( 15 Wenceslao 2024 16:30 )   PT: 12.20 sec;   INR: 1.07 ratio         PTT - ( 15 Wenceslao 2024 16:30 )  PTT:29.4 sec    - ABG:      - Cardiac Markers:  CARDIAC MARKERS ( 16 Jan 2024 00:41 )  x     / x     / 3911 U/L / x     / x      CARDIAC MARKERS ( 15 Wenceslao 2024 16:30 )  x     / x     / 5374 U/L / x     / x            Microbiological Workup  Urinalysis Basic - ( 16 Jan 2024 00:41 )    Color: x / Appearance: x / SG: x / pH: x  Gluc: 98 mg/dL / Ketone: x  / Bili: x / Urobili: x   Blood: x / Protein: x / Nitrite: x   Leuk Esterase: x / RBC: x / WBC x   Sq Epi: x / Non Sq Epi: x / Bacteria: x          Radiological Workup  CT Abdomen and Pelvis w/ IV Cont:   ACC: [id number] EXAM:  CT ABDOMEN AND PELVIS IC   ORDERED BY: ANTONY MOSES     ACC: [id number] EXAM:  CT CHEST IC   ORDERED BY: ANTONY MOSES     PROCEDURE DATE:  01/15/2024          INTERPRETATION:  CLINICAL STATEMENT: Fall    TECHNIQUE: Contiguous axial CT images were obtained from the thoracic   inlet to the pubic symphysis following administration of intravenous   contrast.  95 cc administered of Omnipaque 350 (accession [id number]), IV   contrast documented in unlinked concurrent exam (accession [id number]) (5   cc discarded).  Oral contrast was not administered.  Reformatted images   in the coronal and sagittal planes, as well as MIP reconstructed images,   were acquired.      COMPARISON CT: December 6, 2023    OTHER STUDIES USED FOR CORRELATION: None.      FINDINGS:    CHEST:    LUNGS/PLEURA/AIRWAYS: New patchy bilateral groundglass opacities, left   greater than right. No lobar consolidations, pleural effusions or   pneumothorax. Bibasilar subsegmental dependent atelectasis. Patent   central airways.    MEDIASTINUM/THORACIC NODES: No lymphadenopathy. Subcentimeter hypodense   right thyroid lobe nodule.    HEART/GREAT VESSELS: Normal heart size. Aortic and few coronary artery   calcifications. No pericardial effusion.      ABDOMEN/PELVIS:    HEPATOBILIARY: Hepatic dome and pericholecystic hepatic cysts.    SPLEEN: Unremarkable.    PANCREAS: Unremarkable.    ADRENAL GLANDS: Unremarkable.    KIDNEYS: Symmetric renal enhancement bilaterally. No hydronephrosis.   Bilateral renal cystsand additional subcentimeter hypodensities, too   small to characterize.    ABDOMINOPELVIC NODES: No lymphadenopathy. Few subcentimeter right upper   quadrant periduodenal lymph nodes, nonspecific.    PELVIC ORGANS: Prostatomegaly.    PERITONEUM/MESENTERY/BOWEL: No bowel obstruction, ascites or   pneumoperitoneum. Normal appendix.    BONES/SOFT TISSUES: Acute displaced left anterior 6th, 7th ribs and   lateral 4th  rib fractures. Acute right anterior 5th, 6th rib fractures   with minimal displacement. Acute nondisplaced right L1 transverse process   fracture. Nonspecific sclerotic appearance of the sternum, possibly   posttraumatic. Multilevel degenerative changes of the spine noted   Additional multiple healing and healed bilateral rib fractures. Bilateral   fat-containing inguinal hernias.    OTHER: Scattered atherosclerotic vascular calcifications.      IMPRESSION:  1.  Acute displaced left anterior 6th, 7th ribs, lateral 4th rib, right   anterior 5th and 6th ribs and nondisplaced right L1 transverse process   fractures. No pneumothorax.  2.  Patchy bilateral groundglass opacities, nonspecific and possibly   infectious/inflammatory in etiology.  3.  Few nonspecific subcentimeter periduodenal lymph nodes, possibly   reactive. Correlate for history of duodenitis.    --- End of Report ---        Current Medications  Standing Medications  acetaminophen     Tablet .. 650 milliGRAM(s) Oral every 6 hours  chlorhexidine 2% Cloths 1 Application(s) Topical <User Schedule>  gabapentin 100 milliGRAM(s) Oral three times a day  heparin   Injectable 5000 Unit(s) SubCutaneous every 8 hours  hydroxychloroquine 400 milliGRAM(s) Oral daily  lactated ringers. 1000 milliLiter(s) (125 mL/Hr) IV Continuous <Continuous>  lidocaine   4% Patch 1 Patch Transdermal every 24 hours  methocarbamol 500 milliGRAM(s) Oral three times a day  metoprolol tartrate 25 milliGRAM(s) Oral two times a day  pantoprazole  Injectable 40 milliGRAM(s) IV Push every 12 hours  tamsulosin 0.4 milliGRAM(s) Oral at bedtime    PRN Medications    Singles Doses Administered  lactated ringers Bolus 500 milliLiter(s) IV Bolus once  lactated ringers Bolus 1000 milliLiter(s) IV Bolus once  lactated ringers Bolus 1000 milliLiter(s) IV Bolus once  lactated ringers Bolus 500 milliLiter(s) IV Bolus once  sodium chloride 0.9% Bolus 250 milliLiter(s) IV Bolus once       Gastroenterology Initial Consult Note      Location: [facility] 2B 002 A ([facility] 2B)  Patient Name: SUSAN AWAD  Age: 68y  Gender: Male      Chief Complaint  Patient is a 68y old Male who presents with a chief complaint of b/l rib fx (16 Jan 2024 00:06)  Primary diagnosis of Acute renal failure      Reason for Consult  Reported Melena  Intermittent Hematochezia      History of Present Illness  68 year old male patient known to have a history of HTN, DL, and BPH who was brought to the ED on 01/15 after being found on the floor unable to stand, found to be hemodynamically stable with evidence of acute rib fractures and pneumonia on imaging, admitted for further management. Stay was complicated on 01/16 by 2 episodes of melena per nurse. We are consulted for concern of melena.      Summary:  * Baseline hemoglobin (prior to admission): Hb 13.1 on 12/06/2023  * ED Vitals:  /70 mmHg, HR 71 bpm   * Hemoglobin on admission: Hb 14.7 -> 01/16 Hb 13.1   * Coags: INR 1.07 on 01/15    * Not on AP or AC  * SWEETIE dark brown  * CT Abdomen and Pelvis w/ IV Cont (01.15.24 @ 16:49) Acute displaced left anterior 6th, 7th ribs, lateral 4th rib, right anterior 5th and 6th ribs and nondisplaced right L1 transverse process fractures. No pneumothorax. Patchy bilateral groundglass opacities, nonspecific and possibly infectious/inflammatory in etiology.Few nonspecific subcentimeter periduodenal lymph nodes, possibly reactive. Correlate for history of duodenitis.  * No prior EGD  * Prior Colonoscopy around 3 years ago: at [facility]  * Family History: no GI malignancies  * Had hypotension overnight on 01/16; reported melena x2 by nurse; SWEETIE dark brown on 01/16 at 09:00 AM      Home Medications:  Home Medications:  CHLORTHALID 25MG TAB:  (15 Wenceslao 2024 22:12)  hydroxychloroquine 200 mg oral tablet: 2 tab(s) orally once a day (16 Jan 2024 02:53)  METOPROL SUC 100MG ER TAB:  (15 Wenceslao 2024 22:12)  TAMSULOSIN HCL 0.4 MG CAPSULE: TAKE 1 CAPSULE BY MOUTH EVERY DAY AT BEDTIME FOR 90 DAYS (15 Wenceslao 2024 22:12)  VALSARTAN 320 MG TABLET: TAKE 1 TABLET BY MOUTH EVERY DAY (15 Wenceslao 2024 22:12)      Social History:  Tobacco: denies  Alcohol: denies  Drugs: denies      Allergies:  No Known Allergies      Family History:  FAMILY HISTORY:  No GI malignancies      Vital Signs in the last 24 hours   Vitals Summary T(C): 37.3 (01-16-24 @ 08:00), Max: 37.3 (01-16-24 @ 03:00)  HR: 71 (01-16-24 @ 08:00) (71 - 109)  BP: 110/56 (01-16-24 @ 08:00) (79/58 - 159/60)  RR: 19 (01-16-24 @ 08:00) (15 - 20)  SpO2: 98% (01-16-24 @ 08:00) (95% - 100%)  Vent Data   Intake/ Output   01-15-24 @ 07:01  -  01-16-24 @ 07:00  --------------------------------------------------------  IN: 3475 mL / OUT: 0 mL / NET: 3475 mL    01-16-24 @ 07:01  -  01-16-24 @ 09:43  --------------------------------------------------------  IN: 625 mL / OUT: 0 mL / NET: 625 mL      Physical Exam  * General Appearance: Alert, oriented to time, place, and person  * Lungs: Good bilateral air entry, crackles on Nasal Canula  * Heart: Regular Rate and Rhythm, normal S1 and S2, no audible murmur, rub, or gallop  * Abdomen: Symmetric, non-distended, soft, non-tender, bowel sounds active all four quadrants, no masses, no organomegaly (no hepatosplenomegaly)  * Rectal: Dark Brown stool, Normal tone, no palpable masses or tenderness      Investigations   Laboratory Workup      - CBC:                        13.1   14.95 )-----------( 323      ( 16 Jan 2024 00:41 )             37.6       - Hgb Trend:  13.1  01-16-24 @ 00:41  14.7  01-15-24 @ 16:30          - Chemistry:  01-16    139  |  99  |  71<HH>  ----------------------------<  98  3.8   |  20  |  1.4    Ca    8.1<L>      16 Jan 2024 00:41  Phos  4.0     01-16  Mg     2.8     01-16    TPro  6.2  /  Alb  3.4<L>  /  TBili  0.7  /  DBili  x   /  AST  309<H>  /  ALT  110<H>  /  AlkPhos  79  01-15    Liver panel trend:  TBili 0.7   /      /      /   AlkP 79   /   Tptn 6.2   /   Alb 3.4    /   DBili --      01-15      - Coagulation Studies:  PT/INR - ( 15 Wenceslao 2024 16:30 )   PT: 12.20 sec;   INR: 1.07 ratio         PTT - ( 15 Wenceslao 2024 16:30 )  PTT:29.4 sec    - ABG:      - Cardiac Markers:  CARDIAC MARKERS ( 16 Jan 2024 00:41 )  x     / x     / 3911 U/L / x     / x      CARDIAC MARKERS ( 15 Wenceslao 2024 16:30 )  x     / x     / 5374 U/L / x     / x            Microbiological Workup  Urinalysis Basic - ( 16 Jan 2024 00:41 )    Color: x / Appearance: x / SG: x / pH: x  Gluc: 98 mg/dL / Ketone: x  / Bili: x / Urobili: x   Blood: x / Protein: x / Nitrite: x   Leuk Esterase: x / RBC: x / WBC x   Sq Epi: x / Non Sq Epi: x / Bacteria: x          Radiological Workup  CT Abdomen and Pelvis w/ IV Cont:   ACC: [id number] EXAM:  CT ABDOMEN AND PELVIS IC   ORDERED BY: ANTONY MOSES     ACC: [id number] EXAM:  CT CHEST IC   ORDERED BY: ANTONY MOSES     PROCEDURE DATE:  01/15/2024          INTERPRETATION:  CLINICAL STATEMENT: Fall    TECHNIQUE: Contiguous axial CT images were obtained from the thoracic   inlet to the pubic symphysis following administration of intravenous   contrast.  95 cc administered of Omnipaque 350 (accession [id number]), IV   contrast documented in unlinked concurrent exam (accession [id number]) (5   cc discarded).  Oral contrast was not administered.  Reformatted images   in the coronal and sagittal planes, as well as MIP reconstructed images,   were acquired.      COMPARISON CT: December 6, 2023    OTHER STUDIES USED FOR CORRELATION: None.      FINDINGS:    CHEST:    LUNGS/PLEURA/AIRWAYS: New patchy bilateral groundglass opacities, left   greater than right. No lobar consolidations, pleural effusions or   pneumothorax. Bibasilar subsegmental dependent atelectasis. Patent   central airways.    MEDIASTINUM/THORACIC NODES: No lymphadenopathy. Subcentimeter hypodense   right thyroid lobe nodule.    HEART/GREAT VESSELS: Normal heart size. Aortic and few coronary artery   calcifications. No pericardial effusion.      ABDOMEN/PELVIS:    HEPATOBILIARY: Hepatic dome and pericholecystic hepatic cysts.    SPLEEN: Unremarkable.    PANCREAS: Unremarkable.    ADRENAL GLANDS: Unremarkable.    KIDNEYS: Symmetric renal enhancement bilaterally. No hydronephrosis.   Bilateral renal cystsand additional subcentimeter hypodensities, too   small to characterize.    ABDOMINOPELVIC NODES: No lymphadenopathy. Few subcentimeter right upper   quadrant periduodenal lymph nodes, nonspecific.    PELVIC ORGANS: Prostatomegaly.    PERITONEUM/MESENTERY/BOWEL: No bowel obstruction, ascites or   pneumoperitoneum. Normal appendix.    BONES/SOFT TISSUES: Acute displaced left anterior 6th, 7th ribs and   lateral 4th  rib fractures. Acute right anterior 5th, 6th rib fractures   with minimal displacement. Acute nondisplaced right L1 transverse process   fracture. Nonspecific sclerotic appearance of the sternum, possibly   posttraumatic. Multilevel degenerative changes of the spine noted   Additional multiple healing and healed bilateral rib fractures. Bilateral   fat-containing inguinal hernias.    OTHER: Scattered atherosclerotic vascular calcifications.      IMPRESSION:  1.  Acute displaced left anterior 6th, 7th ribs, lateral 4th rib, right   anterior 5th and 6th ribs and nondisplaced right L1 transverse process   fractures. No pneumothorax.  2.  Patchy bilateral groundglass opacities, nonspecific and possibly   infectious/inflammatory in etiology.  3.  Few nonspecific subcentimeter periduodenal lymph nodes, possibly   reactive. Correlate for history of duodenitis.    --- End of Report ---        Current Medications  Standing Medications  acetaminophen     Tablet .. 650 milliGRAM(s) Oral every 6 hours  chlorhexidine 2% Cloths 1 Application(s) Topical <User Schedule>  gabapentin 100 milliGRAM(s) Oral three times a day  heparin   Injectable 5000 Unit(s) SubCutaneous every 8 hours  hydroxychloroquine 400 milliGRAM(s) Oral daily  lactated ringers. 1000 milliLiter(s) (125 mL/Hr) IV Continuous <Continuous>  lidocaine   4% Patch 1 Patch Transdermal every 24 hours  methocarbamol 500 milliGRAM(s) Oral three times a day  metoprolol tartrate 25 milliGRAM(s) Oral two times a day  pantoprazole  Injectable 40 milliGRAM(s) IV Push every 12 hours  tamsulosin 0.4 milliGRAM(s) Oral at bedtime    PRN Medications    Singles Doses Administered  lactated ringers Bolus 500 milliLiter(s) IV Bolus once  lactated ringers Bolus 1000 milliLiter(s) IV Bolus once  lactated ringers Bolus 1000 milliLiter(s) IV Bolus once  lactated ringers Bolus 500 milliLiter(s) IV Bolus once  sodium chloride 0.9% Bolus 250 milliLiter(s) IV Bolus once       Gastroenterology Initial Consult Note      Location: [facility] 2B 002 A ([facility] 2B)  Patient Name: SUSAN AWAD  Age: 68y  Gender: Male      Chief Complaint  Patient is a 68y old Male who presents with a chief complaint of b/l rib fx (16 Jan 2024 00:06)  Primary diagnosis of Acute renal failure      Reason for Consult  Reported Melena  Intermittent Hematochezia      History of Present Illness  68 year old male patient known to have a history of HTN, DL, and BPH who was brought to the ED on 01/15 after being found on the floor unable to stand, found to be hemodynamically stable with evidence of acute rib fractures and pneumonia on imaging, admitted for further management. Stay was complicated on 01/16 by 2 episodes of melena per nurse. We are consulted for concern of melena.      Summary:  * Baseline hemoglobin (prior to admission): Hb 13.1 on 12/06/2023  * ED Vitals:  /70 mmHg, HR 71 bpm   * Hemoglobin on admission: Hb 14.7 -> 01/16 Hb 13.1   * Coags: INR 1.07 on 01/15    * Not on AP or AC  * SWEETIE dark brown  * CT Abdomen and Pelvis w/ IV Cont (01.15.24 @ 16:49) Acute displaced left anterior 6th, 7th ribs, lateral 4th rib, right anterior 5th and 6th ribs and nondisplaced right L1 transverse process fractures. No pneumothorax. Patchy bilateral groundglass opacities, nonspecific and possibly infectious/inflammatory in etiology.Few nonspecific subcentimeter periduodenal lymph nodes, possibly reactive. Correlate for history of duodenitis.  * No prior EGD  * Prior Colonoscopy around 3 years ago: at [facility]  * Family History: no GI malignancies  * Had hypotension overnight on 01/16; reported melena x2 by nurse in AM then again at 12 PM; SWEETIE dark brown with maroon tinge on 01/16 at 09:00 AM; Hb dropped from 13.1 to 10.3       Home Medications:  Home Medications:  CHLORTHALID 25MG TAB:  (15 Wenceslao 2024 22:12)  hydroxychloroquine 200 mg oral tablet: 2 tab(s) orally once a day (16 Jan 2024 02:53)  METOPROL SUC 100MG ER TAB:  (15 Wenceslao 2024 22:12)  TAMSULOSIN HCL 0.4 MG CAPSULE: TAKE 1 CAPSULE BY MOUTH EVERY DAY AT BEDTIME FOR 90 DAYS (15 Wenceslao 2024 22:12)  VALSARTAN 320 MG TABLET: TAKE 1 TABLET BY MOUTH EVERY DAY (15 Wenceslao 2024 22:12)      Social History:  Tobacco: denies  Alcohol: denies  Drugs: denies      Allergies:  No Known Allergies      Family History:  FAMILY HISTORY:  No GI malignancies      Vital Signs in the last 24 hours   Vitals Summary T(C): 37.3 (01-16-24 @ 08:00), Max: 37.3 (01-16-24 @ 03:00)  HR: 71 (01-16-24 @ 08:00) (71 - 109)  BP: 110/56 (01-16-24 @ 08:00) (79/58 - 159/60)  RR: 19 (01-16-24 @ 08:00) (15 - 20)  SpO2: 98% (01-16-24 @ 08:00) (95% - 100%)  Vent Data   Intake/ Output   01-15-24 @ 07:01  -  01-16-24 @ 07:00  --------------------------------------------------------  IN: 3475 mL / OUT: 0 mL / NET: 3475 mL    01-16-24 @ 07:01  -  01-16-24 @ 09:43  --------------------------------------------------------  IN: 625 mL / OUT: 0 mL / NET: 625 mL      Physical Exam  * General Appearance: Alert, oriented to time, place, and person  * Lungs: Good bilateral air entry, crackles on Nasal Canula  * Heart: Regular Rate and Rhythm, normal S1 and S2, no audible murmur, rub, or gallop  * Abdomen: Symmetric, non-distended, soft, non-tender, bowel sounds active all four quadrants, no masses, no organomegaly (no hepatosplenomegaly)  * Rectal: Dark Brown stool, Normal tone, no palpable masses or tenderness      Investigations   Laboratory Workup      - CBC:                        13.1   14.95 )-----------( 323      ( 16 Jan 2024 00:41 )             37.6       - Hgb Trend:  13.1  01-16-24 @ 00:41  14.7  01-15-24 @ 16:30          - Chemistry:  01-16    139  |  99  |  71<HH>  ----------------------------<  98  3.8   |  20  |  1.4    Ca    8.1<L>      16 Jan 2024 00:41  Phos  4.0     01-16  Mg     2.8     01-16    TPro  6.2  /  Alb  3.4<L>  /  TBili  0.7  /  DBili  x   /  AST  309<H>  /  ALT  110<H>  /  AlkPhos  79  01-15    Liver panel trend:  TBili 0.7   /      /      /   AlkP 79   /   Tptn 6.2   /   Alb 3.4    /   DBili --      01-15      - Coagulation Studies:  PT/INR - ( 15 Wenceslao 2024 16:30 )   PT: 12.20 sec;   INR: 1.07 ratio         PTT - ( 15 Wenceslao 2024 16:30 )  PTT:29.4 sec    - ABG:      - Cardiac Markers:  CARDIAC MARKERS ( 16 Jan 2024 00:41 )  x     / x     / 3911 U/L / x     / x      CARDIAC MARKERS ( 15 Wenceslao 2024 16:30 )  x     / x     / 5374 U/L / x     / x            Microbiological Workup  Urinalysis Basic - ( 16 Jan 2024 00:41 )    Color: x / Appearance: x / SG: x / pH: x  Gluc: 98 mg/dL / Ketone: x  / Bili: x / Urobili: x   Blood: x / Protein: x / Nitrite: x   Leuk Esterase: x / RBC: x / WBC x   Sq Epi: x / Non Sq Epi: x / Bacteria: x          Radiological Workup  CT Abdomen and Pelvis w/ IV Cont:   ACC: [id number] EXAM:  CT ABDOMEN AND PELVIS IC   ORDERED BY: ANTONY MOSES     ACC: [id number] EXAM:  CT CHEST IC   ORDERED BY: ANTONY MOSES     PROCEDURE DATE:  01/15/2024          INTERPRETATION:  CLINICAL STATEMENT: Fall    TECHNIQUE: Contiguous axial CT images were obtained from the thoracic   inlet to the pubic symphysis following administration of intravenous   contrast.  95 cc administered of Omnipaque 350 (accession [id number]), IV   contrast documented in unlinked concurrent exam (accession [id number]) (5   cc discarded).  Oral contrast was not administered.  Reformatted images   in the coronal and sagittal planes, as well as MIP reconstructed images,   were acquired.      COMPARISON CT: December 6, 2023    OTHER STUDIES USED FOR CORRELATION: None.      FINDINGS:    CHEST:    LUNGS/PLEURA/AIRWAYS: New patchy bilateral groundglass opacities, left   greater than right. No lobar consolidations, pleural effusions or   pneumothorax. Bibasilar subsegmental dependent atelectasis. Patent   central airways.    MEDIASTINUM/THORACIC NODES: No lymphadenopathy. Subcentimeter hypodense   right thyroid lobe nodule.    HEART/GREAT VESSELS: Normal heart size. Aortic and few coronary artery   calcifications. No pericardial effusion.      ABDOMEN/PELVIS:    HEPATOBILIARY: Hepatic dome and pericholecystic hepatic cysts.    SPLEEN: Unremarkable.    PANCREAS: Unremarkable.    ADRENAL GLANDS: Unremarkable.    KIDNEYS: Symmetric renal enhancement bilaterally. No hydronephrosis.   Bilateral renal cystsand additional subcentimeter hypodensities, too   small to characterize.    ABDOMINOPELVIC NODES: No lymphadenopathy. Few subcentimeter right upper   quadrant periduodenal lymph nodes, nonspecific.    PELVIC ORGANS: Prostatomegaly.    PERITONEUM/MESENTERY/BOWEL: No bowel obstruction, ascites or   pneumoperitoneum. Normal appendix.    BONES/SOFT TISSUES: Acute displaced left anterior 6th, 7th ribs and   lateral 4th  rib fractures. Acute right anterior 5th, 6th rib fractures   with minimal displacement. Acute nondisplaced right L1 transverse process   fracture. Nonspecific sclerotic appearance of the sternum, possibly   posttraumatic. Multilevel degenerative changes of the spine noted   Additional multiple healing and healed bilateral rib fractures. Bilateral   fat-containing inguinal hernias.    OTHER: Scattered atherosclerotic vascular calcifications.      IMPRESSION:  1.  Acute displaced left anterior 6th, 7th ribs, lateral 4th rib, right   anterior 5th and 6th ribs and nondisplaced right L1 transverse process   fractures. No pneumothorax.  2.  Patchy bilateral groundglass opacities, nonspecific and possibly   infectious/inflammatory in etiology.  3.  Few nonspecific subcentimeter periduodenal lymph nodes, possibly   reactive. Correlate for history of duodenitis.    --- End of Report ---        Current Medications  Standing Medications  acetaminophen     Tablet .. 650 milliGRAM(s) Oral every 6 hours  chlorhexidine 2% Cloths 1 Application(s) Topical <User Schedule>  gabapentin 100 milliGRAM(s) Oral three times a day  heparin   Injectable 5000 Unit(s) SubCutaneous every 8 hours  hydroxychloroquine 400 milliGRAM(s) Oral daily  lactated ringers. 1000 milliLiter(s) (125 mL/Hr) IV Continuous <Continuous>  lidocaine   4% Patch 1 Patch Transdermal every 24 hours  methocarbamol 500 milliGRAM(s) Oral three times a day  metoprolol tartrate 25 milliGRAM(s) Oral two times a day  pantoprazole  Injectable 40 milliGRAM(s) IV Push every 12 hours  tamsulosin 0.4 milliGRAM(s) Oral at bedtime    PRN Medications    Singles Doses Administered  lactated ringers Bolus 500 milliLiter(s) IV Bolus once  lactated ringers Bolus 1000 milliLiter(s) IV Bolus once  lactated ringers Bolus 1000 milliLiter(s) IV Bolus once  lactated ringers Bolus 500 milliLiter(s) IV Bolus once  sodium chloride 0.9% Bolus 250 milliLiter(s) IV Bolus once       Gastroenterology Initial Consult Note      Location: [facility] 2B 002 A ([facility] 2B)  Patient Name: SUSAN AWAD  Age: 68y  Gender: Male      Chief Complaint  Patient is a 68y old Male who presents with a chief complaint of b/l rib fx (16 Jan 2024 00:06)  Primary diagnosis of Acute renal failure      Reason for Consult  Reported Melena  Intermittent Hematochezia      History of Present Illness  68 year old male patient known to have a history of HTN, DL, and BPH who was brought to the ED on 01/15 after being found on the floor unable to stand, found to be hemodynamically stable with evidence of acute rib fractures and pneumonia on imaging, admitted for further management. Stay was complicated on 01/16 by 2 episodes of melena per nurse. We are consulted for concern of melena.      Summary:  * Baseline hemoglobin (prior to admission): Hb 13.1 on 12/06/2023  * ED Vitals:  /70 mmHg, HR 71 bpm   * Hemoglobin on admission: Hb 14.7 -> 01/16 Hb 13.1   * Coags: INR 1.07 on 01/15    * Not on AP or AC  * SWEETIE dark brown  * CT Abdomen and Pelvis w/ IV Cont (01.15.24 @ 16:49) Acute displaced left anterior 6th, 7th ribs, lateral 4th rib, right anterior 5th and 6th ribs and nondisplaced right L1 transverse process fractures. No pneumothorax. Patchy bilateral groundglass opacities, nonspecific and possibly infectious/inflammatory in etiology.Few nonspecific subcentimeter periduodenal lymph nodes, possibly reactive. Correlate for history of duodenitis.  * No prior EGD  * Prior Colonoscopy around 3 years ago: at [facility]  * Family History: no GI malignancies  * Had hypotension overnight on 01/16; reported melena x2 by nurse in AM then again at 12 PM; SWEETIE dark brown with maroon tinge on 01/16 at 09:00 AM; Hb dropped from 13.1 to 10.3       Home Medications:  Home Medications:  CHLORTHALID 25MG TAB:  (15 Wenceslao 2024 22:12)  hydroxychloroquine 200 mg oral tablet: 2 tab(s) orally once a day (16 Jan 2024 02:53)  METOPROL SUC 100MG ER TAB:  (15 Wenceslao 2024 22:12)  TAMSULOSIN HCL 0.4 MG CAPSULE: TAKE 1 CAPSULE BY MOUTH EVERY DAY AT BEDTIME FOR 90 DAYS (15 Wenceslao 2024 22:12)  VALSARTAN 320 MG TABLET: TAKE 1 TABLET BY MOUTH EVERY DAY (15 Wenceslao 2024 22:12)      Social History:  Tobacco: denies  Alcohol: denies  Drugs: denies      Allergies:  No Known Allergies      Family History:  FAMILY HISTORY:  No GI malignancies      Vital Signs in the last 24 hours   Vitals Summary T(C): 37.3 (01-16-24 @ 08:00), Max: 37.3 (01-16-24 @ 03:00)  HR: 71 (01-16-24 @ 08:00) (71 - 109)  BP: 110/56 (01-16-24 @ 08:00) (79/58 - 159/60)  RR: 19 (01-16-24 @ 08:00) (15 - 20)  SpO2: 98% (01-16-24 @ 08:00) (95% - 100%)  Vent Data   Intake/ Output   01-15-24 @ 07:01  -  01-16-24 @ 07:00  --------------------------------------------------------  IN: 3475 mL / OUT: 0 mL / NET: 3475 mL    01-16-24 @ 07:01  -  01-16-24 @ 09:43  --------------------------------------------------------  IN: 625 mL / OUT: 0 mL / NET: 625 mL      Physical Exam  * General Appearance: Alert, oriented to time, place, and person  * Lungs: Good bilateral air entry, crackles on Nasal Canula  * Heart: Regular Rate and Rhythm, normal S1 and S2, no audible murmur, rub, or gallop  * Abdomen: Symmetric, non-distended, soft, non-tender, bowel sounds active all four quadrants, no masses, no organomegaly (no hepatosplenomegaly)  * Rectal: Dark Brown stool, Normal tone, no palpable masses or tenderness      Investigations   Laboratory Workup      - CBC:                        13.1   14.95 )-----------( 323      ( 16 Jan 2024 00:41 )             37.6       - Hgb Trend:  13.1  01-16-24 @ 00:41  14.7  01-15-24 @ 16:30          - Chemistry:  01-16    139  |  99  |  71<HH>  ----------------------------<  98  3.8   |  20  |  1.4    Ca    8.1<L>      16 Jan 2024 00:41  Phos  4.0     01-16  Mg     2.8     01-16    TPro  6.2  /  Alb  3.4<L>  /  TBili  0.7  /  DBili  x   /  AST  309<H>  /  ALT  110<H>  /  AlkPhos  79  01-15    Liver panel trend:  TBili 0.7   /      /      /   AlkP 79   /   Tptn 6.2   /   Alb 3.4    /   DBili --      01-15      - Coagulation Studies:  PT/INR - ( 15 Wenceslao 2024 16:30 )   PT: 12.20 sec;   INR: 1.07 ratio         PTT - ( 15 Wenceslao 2024 16:30 )  PTT:29.4 sec    - ABG:      - Cardiac Markers:  CARDIAC MARKERS ( 16 Jan 2024 00:41 )  x     / x     / 3911 U/L / x     / x      CARDIAC MARKERS ( 15 Wenceslao 2024 16:30 )  x     / x     / 5374 U/L / x     / x            Microbiological Workup  Urinalysis Basic - ( 16 Jan 2024 00:41 )    Color: x / Appearance: x / SG: x / pH: x  Gluc: 98 mg/dL / Ketone: x  / Bili: x / Urobili: x   Blood: x / Protein: x / Nitrite: x   Leuk Esterase: x / RBC: x / WBC x   Sq Epi: x / Non Sq Epi: x / Bacteria: x          Radiological Workup  CT Abdomen and Pelvis w/ IV Cont:   ACC: [id number] EXAM:  CT ABDOMEN AND PELVIS IC   ORDERED BY: ANTONY MOSES     ACC: [id number] EXAM:  CT CHEST IC   ORDERED BY: ANTONY MOSES     PROCEDURE DATE:  01/15/2024          INTERPRETATION:  CLINICAL STATEMENT: Fall    TECHNIQUE: Contiguous axial CT images were obtained from the thoracic   inlet to the pubic symphysis following administration of intravenous   contrast.  95 cc administered of Omnipaque 350 (accession [id number]), IV   contrast documented in unlinked concurrent exam (accession [id number]) (5   cc discarded).  Oral contrast was not administered.  Reformatted images   in the coronal and sagittal planes, as well as MIP reconstructed images,   were acquired.      COMPARISON CT: December 6, 2023    OTHER STUDIES USED FOR CORRELATION: None.      FINDINGS:    CHEST:    LUNGS/PLEURA/AIRWAYS: New patchy bilateral groundglass opacities, left   greater than right. No lobar consolidations, pleural effusions or   pneumothorax. Bibasilar subsegmental dependent atelectasis. Patent   central airways.    MEDIASTINUM/THORACIC NODES: No lymphadenopathy. Subcentimeter hypodense   right thyroid lobe nodule.    HEART/GREAT VESSELS: Normal heart size. Aortic and few coronary artery   calcifications. No pericardial effusion.      ABDOMEN/PELVIS:    HEPATOBILIARY: Hepatic dome and pericholecystic hepatic cysts.    SPLEEN: Unremarkable.    PANCREAS: Unremarkable.    ADRENAL GLANDS: Unremarkable.    KIDNEYS: Symmetric renal enhancement bilaterally. No hydronephrosis.   Bilateral renal cystsand additional subcentimeter hypodensities, too   small to characterize.    ABDOMINOPELVIC NODES: No lymphadenopathy. Few subcentimeter right upper   quadrant periduodenal lymph nodes, nonspecific.    PELVIC ORGANS: Prostatomegaly.    PERITONEUM/MESENTERY/BOWEL: No bowel obstruction, ascites or   pneumoperitoneum. Normal appendix.    BONES/SOFT TISSUES: Acute displaced left anterior 6th, 7th ribs and   lateral 4th  rib fractures. Acute right anterior 5th, 6th rib fractures   with minimal displacement. Acute nondisplaced right L1 transverse process   fracture. Nonspecific sclerotic appearance of the sternum, possibly   posttraumatic. Multilevel degenerative changes of the spine noted   Additional multiple healing and healed bilateral rib fractures. Bilateral   fat-containing inguinal hernias.    OTHER: Scattered atherosclerotic vascular calcifications.      IMPRESSION:  1.  Acute displaced left anterior 6th, 7th ribs, lateral 4th rib, right   anterior 5th and 6th ribs and nondisplaced right L1 transverse process   fractures. No pneumothorax.  2.  Patchy bilateral groundglass opacities, nonspecific and possibly   infectious/inflammatory in etiology.  3.  Few nonspecific subcentimeter periduodenal lymph nodes, possibly   reactive. Correlate for history of duodenitis.    --- End of Report ---        Current Medications  Standing Medications  acetaminophen     Tablet .. 650 milliGRAM(s) Oral every 6 hours  chlorhexidine 2% Cloths 1 Application(s) Topical <User Schedule>  gabapentin 100 milliGRAM(s) Oral three times a day  heparin   Injectable 5000 Unit(s) SubCutaneous every 8 hours  hydroxychloroquine 400 milliGRAM(s) Oral daily  lactated ringers. 1000 milliLiter(s) (125 mL/Hr) IV Continuous <Continuous>  lidocaine   4% Patch 1 Patch Transdermal every 24 hours  methocarbamol 500 milliGRAM(s) Oral three times a day  metoprolol tartrate 25 milliGRAM(s) Oral two times a day  pantoprazole  Injectable 40 milliGRAM(s) IV Push every 12 hours  tamsulosin 0.4 milliGRAM(s) Oral at bedtime    PRN Medications    Singles Doses Administered  lactated ringers Bolus 500 milliLiter(s) IV Bolus once  lactated ringers Bolus 1000 milliLiter(s) IV Bolus once  lactated ringers Bolus 1000 milliLiter(s) IV Bolus once  lactated ringers Bolus 500 milliLiter(s) IV Bolus once  sodium chloride 0.9% Bolus 250 milliLiter(s) IV Bolus once       Gastroenterology Initial Consult Note      Location: [facility] 2B 002 A ([facility] 2B)  Patient Name: SUSAN AWAD  Age: 68y  Gender: Male      Chief Complaint  Patient is a 68y old Male who presents with a chief complaint of b/l rib fx (16 Jan 2024 00:06)  Primary diagnosis of Acute renal failure      Reason for Consult  Reported Melena  Intermittent Hematochezia      History of Present Illness  68 year old male patient known to have a history of HTN, DL, and BPH who was brought to the ED on 01/15 after being found on the floor unable to stand, found to be hemodynamically stable with evidence of acute rib fractures and pneumonia on imaging, admitted for further management. Stay was complicated on 01/16 by 2 episodes of melena per nurse. We are consulted for concern of melena.      Summary:  * Baseline hemoglobin (prior to admission): Hb 13.1 on 12/06/2023  * ED Vitals:  /70 mmHg, HR 71 bpm   * Hemoglobin on admission: Hb 14.7 -> 01/16 Hb 13.1   * Coags: INR 1.07 on 01/15    * Not on AP or AC  * SWEETIE dark brown  * CT Abdomen and Pelvis w/ IV Cont (01.15.24 @ 16:49) Acute displaced left anterior 6th, 7th ribs, lateral 4th rib, right anterior 5th and 6th ribs and nondisplaced right L1 transverse process fractures. No pneumothorax. Patchy bilateral groundglass opacities, nonspecific and possibly infectious/inflammatory in etiology.Few nonspecific subcentimeter periduodenal lymph nodes, possibly reactive. Correlate for history of duodenitis.  * No prior EGD  * Prior Colonoscopy around 3 years ago: [facility]  * Family History: colon ca in father   * Had hypotension overnight on 01/16; reported melena x2 by nurse in AM then again at 12 PM; SWEETIE dark brown with maroon tinge on 01/16 at 09:00 AM; Hb dropped from 13.1 to 10.3       Home Medications:  Home Medications:  CHLORTHALID 25MG TAB:  (15 Wenceslao 2024 22:12)  hydroxychloroquine 200 mg oral tablet: 2 tab(s) orally once a day (16 Jan 2024 02:53)  METOPROL SUC 100MG ER TAB:  (15 Wenceslao 2024 22:12)  TAMSULOSIN HCL 0.4 MG CAPSULE: TAKE 1 CAPSULE BY MOUTH EVERY DAY AT BEDTIME FOR 90 DAYS (15 Wenceslao 2024 22:12)  VALSARTAN 320 MG TABLET: TAKE 1 TABLET BY MOUTH EVERY DAY (15 Wenceslao 2024 22:12)      Social History:  Tobacco: denies  Alcohol: denies  Drugs: denies      Allergies:  No Known Allergies      Family History:  FAMILY HISTORY:  No GI malignancies      Vital Signs in the last 24 hours   Vitals Summary T(C): 37.3 (01-16-24 @ 08:00), Max: 37.3 (01-16-24 @ 03:00)  HR: 71 (01-16-24 @ 08:00) (71 - 109)  BP: 110/56 (01-16-24 @ 08:00) (79/58 - 159/60)  RR: 19 (01-16-24 @ 08:00) (15 - 20)  SpO2: 98% (01-16-24 @ 08:00) (95% - 100%)  Vent Data   Intake/ Output   01-15-24 @ 07:01  -  01-16-24 @ 07:00  --------------------------------------------------------  IN: 3475 mL / OUT: 0 mL / NET: 3475 mL    01-16-24 @ 07:01  -  01-16-24 @ 09:43  --------------------------------------------------------  IN: 625 mL / OUT: 0 mL / NET: 625 mL      Physical Exam  * General Appearance: Alert, oriented to time, place, and person  * Lungs: Good bilateral air entry, crackles on Nasal Canula  * Heart: Regular Rate and Rhythm, normal S1 and S2, no audible murmur, rub, or gallop  * Abdomen: Symmetric, non-distended, soft, non-tender, bowel sounds active all four quadrants, no masses, no organomegaly (no hepatosplenomegaly)  * Rectal: Dark Brown stool, Normal tone, no palpable masses or tenderness      Investigations   Laboratory Workup      - CBC:                        13.1   14.95 )-----------( 323      ( 16 Jan 2024 00:41 )             37.6       - Hgb Trend:  13.1  01-16-24 @ 00:41  14.7  01-15-24 @ 16:30          - Chemistry:  01-16    139  |  99  |  71<HH>  ----------------------------<  98  3.8   |  20  |  1.4    Ca    8.1<L>      16 Jan 2024 00:41  Phos  4.0     01-16  Mg     2.8     01-16    TPro  6.2  /  Alb  3.4<L>  /  TBili  0.7  /  DBili  x   /  AST  309<H>  /  ALT  110<H>  /  AlkPhos  79  01-15    Liver panel trend:  TBili 0.7   /      /      /   AlkP 79   /   Tptn 6.2   /   Alb 3.4    /   DBili --      01-15      - Coagulation Studies:  PT/INR - ( 15 Wenceslao 2024 16:30 )   PT: 12.20 sec;   INR: 1.07 ratio         PTT - ( 15 Wenceslao 2024 16:30 )  PTT:29.4 sec    - ABG:      - Cardiac Markers:  CARDIAC MARKERS ( 16 Jan 2024 00:41 )  x     / x     / 3911 U/L / x     / x      CARDIAC MARKERS ( 15 Wenceslao 2024 16:30 )  x     / x     / 5374 U/L / x     / x            Microbiological Workup  Urinalysis Basic - ( 16 Jan 2024 00:41 )    Color: x / Appearance: x / SG: x / pH: x  Gluc: 98 mg/dL / Ketone: x  / Bili: x / Urobili: x   Blood: x / Protein: x / Nitrite: x   Leuk Esterase: x / RBC: x / WBC x   Sq Epi: x / Non Sq Epi: x / Bacteria: x          Radiological Workup  CT Abdomen and Pelvis w/ IV Cont:   ACC: [id number] EXAM:  CT ABDOMEN AND PELVIS IC   ORDERED BY: ANTONY MOSES     ACC: [id number] EXAM:  CT CHEST IC   ORDERED BY: ANTONY MOSES     PROCEDURE DATE:  01/15/2024          INTERPRETATION:  CLINICAL STATEMENT: Fall    TECHNIQUE: Contiguous axial CT images were obtained from the thoracic   inlet to the pubic symphysis following administration of intravenous   contrast.  95 cc administered of Omnipaque 350 (accession [id number]), IV   contrast documented in unlinked concurrent exam (accession [id number]) (5   cc discarded).  Oral contrast was not administered.  Reformatted images   in the coronal and sagittal planes, as well as MIP reconstructed images,   were acquired.      COMPARISON CT: December 6, 2023    OTHER STUDIES USED FOR CORRELATION: None.      FINDINGS:    CHEST:    LUNGS/PLEURA/AIRWAYS: New patchy bilateral groundglass opacities, left   greater than right. No lobar consolidations, pleural effusions or   pneumothorax. Bibasilar subsegmental dependent atelectasis. Patent   central airways.    MEDIASTINUM/THORACIC NODES: No lymphadenopathy. Subcentimeter hypodense   right thyroid lobe nodule.    HEART/GREAT VESSELS: Normal heart size. Aortic and few coronary artery   calcifications. No pericardial effusion.      ABDOMEN/PELVIS:    HEPATOBILIARY: Hepatic dome and pericholecystic hepatic cysts.    SPLEEN: Unremarkable.    PANCREAS: Unremarkable.    ADRENAL GLANDS: Unremarkable.    KIDNEYS: Symmetric renal enhancement bilaterally. No hydronephrosis.   Bilateral renal cystsand additional subcentimeter hypodensities, too   small to characterize.    ABDOMINOPELVIC NODES: No lymphadenopathy. Few subcentimeter right upper   quadrant periduodenal lymph nodes, nonspecific.    PELVIC ORGANS: Prostatomegaly.    PERITONEUM/MESENTERY/BOWEL: No bowel obstruction, ascites or   pneumoperitoneum. Normal appendix.    BONES/SOFT TISSUES: Acute displaced left anterior 6th, 7th ribs and   lateral 4th  rib fractures. Acute right anterior 5th, 6th rib fractures   with minimal displacement. Acute nondisplaced right L1 transverse process   fracture. Nonspecific sclerotic appearance of the sternum, possibly   posttraumatic. Multilevel degenerative changes of the spine noted   Additional multiple healing and healed bilateral rib fractures. Bilateral   fat-containing inguinal hernias.    OTHER: Scattered atherosclerotic vascular calcifications.      IMPRESSION:  1.  Acute displaced left anterior 6th, 7th ribs, lateral 4th rib, right   anterior 5th and 6th ribs and nondisplaced right L1 transverse process   fractures. No pneumothorax.  2.  Patchy bilateral groundglass opacities, nonspecific and possibly   infectious/inflammatory in etiology.  3.  Few nonspecific subcentimeter periduodenal lymph nodes, possibly   reactive. Correlate for history of duodenitis.    --- End of Report ---        Current Medications  Standing Medications  acetaminophen     Tablet .. 650 milliGRAM(s) Oral every 6 hours  chlorhexidine 2% Cloths 1 Application(s) Topical <User Schedule>  gabapentin 100 milliGRAM(s) Oral three times a day  heparin   Injectable 5000 Unit(s) SubCutaneous every 8 hours  hydroxychloroquine 400 milliGRAM(s) Oral daily  lactated ringers. 1000 milliLiter(s) (125 mL/Hr) IV Continuous <Continuous>  lidocaine   4% Patch 1 Patch Transdermal every 24 hours  methocarbamol 500 milliGRAM(s) Oral three times a day  metoprolol tartrate 25 milliGRAM(s) Oral two times a day  pantoprazole  Injectable 40 milliGRAM(s) IV Push every 12 hours  tamsulosin 0.4 milliGRAM(s) Oral at bedtime    PRN Medications    Singles Doses Administered  lactated ringers Bolus 500 milliLiter(s) IV Bolus once  lactated ringers Bolus 1000 milliLiter(s) IV Bolus once  lactated ringers Bolus 1000 milliLiter(s) IV Bolus once  lactated ringers Bolus 500 milliLiter(s) IV Bolus once  sodium chloride 0.9% Bolus 250 milliLiter(s) IV Bolus once       Gastroenterology Initial Consult Note      Location: [facility] 2B 002 A ([facility] 2B)  Patient Name: SUSAN AWAD  Age: 68y  Gender: Male      Chief Complaint  Patient is a 68y old Male who presents with a chief complaint of b/l rib fx (16 Jan 2024 00:06)  Primary diagnosis of Acute renal failure      Reason for Consult  Reported Melena  Intermittent Hematochezia      History of Present Illness  68 year old male patient known to have a history of HTN, DL, and BPH who was brought to the ED on 01/15 after being found on the floor unable to stand, found to be hemodynamically stable with evidence of acute rib fractures and pneumonia on imaging, admitted for further management. Stay was complicated on 01/16 by 2 episodes of melena per nurse. We are consulted for concern of melena.      Summary:  * Baseline hemoglobin (prior to admission): Hb 13.1 on 12/06/2023  * ED Vitals:  /70 mmHg, HR 71 bpm   * Hemoglobin on admission: Hb 14.7 -> 01/16 Hb 13.1   * Coags: INR 1.07 on 01/15    * Not on AP or AC  * SWEETIE dark brown  * CT Abdomen and Pelvis w/ IV Cont (01.15.24 @ 16:49) Acute displaced left anterior 6th, 7th ribs, lateral 4th rib, right anterior 5th and 6th ribs and nondisplaced right L1 transverse process fractures. No pneumothorax. Patchy bilateral groundglass opacities, nonspecific and possibly infectious/inflammatory in etiology.Few nonspecific subcentimeter periduodenal lymph nodes, possibly reactive. Correlate for history of duodenitis.  * No prior EGD  * Prior Colonoscopy around 3 years ago: at [facility]  * Family History: colon ca in father   * Had hypotension overnight on 01/16; reported melena x2 by nurse in AM then again at 12 PM; SWEETIE dark brown with maroon tinge on 01/16 at 09:00 AM; Hb dropped from 13.1 to 10.3       Home Medications:  Home Medications:  CHLORTHALID 25MG TAB:  (15 Wenceslao 2024 22:12)  hydroxychloroquine 200 mg oral tablet: 2 tab(s) orally once a day (16 Jan 2024 02:53)  METOPROL SUC 100MG ER TAB:  (15 Wenceslao 2024 22:12)  TAMSULOSIN HCL 0.4 MG CAPSULE: TAKE 1 CAPSULE BY MOUTH EVERY DAY AT BEDTIME FOR 90 DAYS (15 Wenceslao 2024 22:12)  VALSARTAN 320 MG TABLET: TAKE 1 TABLET BY MOUTH EVERY DAY (15 Wenceslao 2024 22:12)      Social History:  Tobacco: denies  Alcohol: denies  Drugs: denies      Allergies:  No Known Allergies      Family History:  FAMILY HISTORY:  No GI malignancies      Vital Signs in the last 24 hours   Vitals Summary T(C): 37.3 (01-16-24 @ 08:00), Max: 37.3 (01-16-24 @ 03:00)  HR: 71 (01-16-24 @ 08:00) (71 - 109)  BP: 110/56 (01-16-24 @ 08:00) (79/58 - 159/60)  RR: 19 (01-16-24 @ 08:00) (15 - 20)  SpO2: 98% (01-16-24 @ 08:00) (95% - 100%)  Vent Data   Intake/ Output   01-15-24 @ 07:01  -  01-16-24 @ 07:00  --------------------------------------------------------  IN: 3475 mL / OUT: 0 mL / NET: 3475 mL    01-16-24 @ 07:01  -  01-16-24 @ 09:43  --------------------------------------------------------  IN: 625 mL / OUT: 0 mL / NET: 625 mL      Physical Exam  * General Appearance: Alert, oriented to time, place, and person  * Lungs: Good bilateral air entry, crackles on Nasal Canula  * Heart: Regular Rate and Rhythm, normal S1 and S2, no audible murmur, rub, or gallop  * Abdomen: Symmetric, non-distended, soft, non-tender, bowel sounds active all four quadrants, no masses, no organomegaly (no hepatosplenomegaly)  * Rectal: Dark Brown stool, Normal tone, no palpable masses or tenderness      Investigations   Laboratory Workup      - CBC:                        13.1   14.95 )-----------( 323      ( 16 Jan 2024 00:41 )             37.6       - Hgb Trend:  13.1  01-16-24 @ 00:41  14.7  01-15-24 @ 16:30          - Chemistry:  01-16    139  |  99  |  71<HH>  ----------------------------<  98  3.8   |  20  |  1.4    Ca    8.1<L>      16 Jan 2024 00:41  Phos  4.0     01-16  Mg     2.8     01-16    TPro  6.2  /  Alb  3.4<L>  /  TBili  0.7  /  DBili  x   /  AST  309<H>  /  ALT  110<H>  /  AlkPhos  79  01-15    Liver panel trend:  TBili 0.7   /      /      /   AlkP 79   /   Tptn 6.2   /   Alb 3.4    /   DBili --      01-15      - Coagulation Studies:  PT/INR - ( 15 Wenceslao 2024 16:30 )   PT: 12.20 sec;   INR: 1.07 ratio         PTT - ( 15 Wenceslao 2024 16:30 )  PTT:29.4 sec    - ABG:      - Cardiac Markers:  CARDIAC MARKERS ( 16 Jan 2024 00:41 )  x     / x     / 3911 U/L / x     / x      CARDIAC MARKERS ( 15 Wenceslao 2024 16:30 )  x     / x     / 5374 U/L / x     / x            Microbiological Workup  Urinalysis Basic - ( 16 Jan 2024 00:41 )    Color: x / Appearance: x / SG: x / pH: x  Gluc: 98 mg/dL / Ketone: x  / Bili: x / Urobili: x   Blood: x / Protein: x / Nitrite: x   Leuk Esterase: x / RBC: x / WBC x   Sq Epi: x / Non Sq Epi: x / Bacteria: x          Radiological Workup  CT Abdomen and Pelvis w/ IV Cont:   ACC: [id number] EXAM:  CT ABDOMEN AND PELVIS IC   ORDERED BY: ANTONY MOSES     ACC: [id number] EXAM:  CT CHEST IC   ORDERED BY: ANTONY MOSES     PROCEDURE DATE:  01/15/2024          INTERPRETATION:  CLINICAL STATEMENT: Fall    TECHNIQUE: Contiguous axial CT images were obtained from the thoracic   inlet to the pubic symphysis following administration of intravenous   contrast.  95 cc administered of Omnipaque 350 (accession [id number]), IV   contrast documented in unlinked concurrent exam (accession [id number]) (5   cc discarded).  Oral contrast was not administered.  Reformatted images   in the coronal and sagittal planes, as well as MIP reconstructed images,   were acquired.      COMPARISON CT: December 6, 2023    OTHER STUDIES USED FOR CORRELATION: None.      FINDINGS:    CHEST:    LUNGS/PLEURA/AIRWAYS: New patchy bilateral groundglass opacities, left   greater than right. No lobar consolidations, pleural effusions or   pneumothorax. Bibasilar subsegmental dependent atelectasis. Patent   central airways.    MEDIASTINUM/THORACIC NODES: No lymphadenopathy. Subcentimeter hypodense   right thyroid lobe nodule.    HEART/GREAT VESSELS: Normal heart size. Aortic and few coronary artery   calcifications. No pericardial effusion.      ABDOMEN/PELVIS:    HEPATOBILIARY: Hepatic dome and pericholecystic hepatic cysts.    SPLEEN: Unremarkable.    PANCREAS: Unremarkable.    ADRENAL GLANDS: Unremarkable.    KIDNEYS: Symmetric renal enhancement bilaterally. No hydronephrosis.   Bilateral renal cystsand additional subcentimeter hypodensities, too   small to characterize.    ABDOMINOPELVIC NODES: No lymphadenopathy. Few subcentimeter right upper   quadrant periduodenal lymph nodes, nonspecific.    PELVIC ORGANS: Prostatomegaly.    PERITONEUM/MESENTERY/BOWEL: No bowel obstruction, ascites or   pneumoperitoneum. Normal appendix.    BONES/SOFT TISSUES: Acute displaced left anterior 6th, 7th ribs and   lateral 4th  rib fractures. Acute right anterior 5th, 6th rib fractures   with minimal displacement. Acute nondisplaced right L1 transverse process   fracture. Nonspecific sclerotic appearance of the sternum, possibly   posttraumatic. Multilevel degenerative changes of the spine noted   Additional multiple healing and healed bilateral rib fractures. Bilateral   fat-containing inguinal hernias.    OTHER: Scattered atherosclerotic vascular calcifications.      IMPRESSION:  1.  Acute displaced left anterior 6th, 7th ribs, lateral 4th rib, right   anterior 5th and 6th ribs and nondisplaced right L1 transverse process   fractures. No pneumothorax.  2.  Patchy bilateral groundglass opacities, nonspecific and possibly   infectious/inflammatory in etiology.  3.  Few nonspecific subcentimeter periduodenal lymph nodes, possibly   reactive. Correlate for history of duodenitis.    --- End of Report ---        Current Medications  Standing Medications  acetaminophen     Tablet .. 650 milliGRAM(s) Oral every 6 hours  chlorhexidine 2% Cloths 1 Application(s) Topical <User Schedule>  gabapentin 100 milliGRAM(s) Oral three times a day  heparin   Injectable 5000 Unit(s) SubCutaneous every 8 hours  hydroxychloroquine 400 milliGRAM(s) Oral daily  lactated ringers. 1000 milliLiter(s) (125 mL/Hr) IV Continuous <Continuous>  lidocaine   4% Patch 1 Patch Transdermal every 24 hours  methocarbamol 500 milliGRAM(s) Oral three times a day  metoprolol tartrate 25 milliGRAM(s) Oral two times a day  pantoprazole  Injectable 40 milliGRAM(s) IV Push every 12 hours  tamsulosin 0.4 milliGRAM(s) Oral at bedtime    PRN Medications    Singles Doses Administered  lactated ringers Bolus 500 milliLiter(s) IV Bolus once  lactated ringers Bolus 1000 milliLiter(s) IV Bolus once  lactated ringers Bolus 1000 milliLiter(s) IV Bolus once  lactated ringers Bolus 500 milliLiter(s) IV Bolus once  sodium chloride 0.9% Bolus 250 milliLiter(s) IV Bolus once

## 2024-01-16 NOTE — CONSULT NOTE ADULT - ASSESSMENT
Patient is a 67 y/o male with PMH of HTN, HLD, vertigo, possible Lupus who presents to the ED s/p found down for 3 days. Patient reports falling out of bed, denies any LOC, but does have intermittent dizziness at baseline. He lives alone and was found to his friend 3 days later, admitted to trauma service due to rib fx and possible GIB. Hospitalist following for comanagement    Reported Melena  - patient denies any black/red stools although questionable historian  - CBC stable, elevated BUN  - GI on board, rec CLD and possible scope, PPI IV   - keep active T&S, transfuse if Hb <8    Unwitnessed fall resulting in rib fx  Rhabdomyolysis  - patient denies any preceding events - reports he rolled out of bed, but history seems inconsistent   - encourage incentive spirometry, PT, pain control and bowel regimen  - would be cautious with Robaxin in the elderly  - can add Oxycodone 5mg Q6H PRN for mod pain, add senna and Miralax  - unclear why on Ativan 2mg Q4H, recommend discontinuing - patient denies ETOH use. IF suspect ETOH Use, make ativan PRN and monitor CIWA   - taper down supplemental 02 as able, no evidence of active PNA  - c/w gentle hydration, repeat CK    - check orthostatics and echo   - PT following, possible 4A candidate     History OF MVP  HTN  CAD  - follows with Dr Rosenberg, please confirm home meds  - check echo    Transaminitis - could be due to transient hypotension  Hepatic Cysts   - prior records reviewed, LFTs previously normal  - c/w monitoring, avoid hepatotoxic meds  -   SLE - follows with Dr Sanchez, per OP records, patient on Hydroxychloroquine 400 Q12H    Vertigo - patient was prescribed meclizine but not currently taking     FOr dc planning, recommend social work eval as patient lives alone and has had multiple falls in the past   Discussed with patient, please call if any questions

## 2024-01-16 NOTE — CONSULT NOTE ADULT - SUBJECTIVE AND OBJECTIVE BOX
HPI: "68-year-old male history of hypertension hyperlipidemia presenting for evaluation after fall.  Per friend at bedside, he was unable to reach patient for the past 5 days, called 911 to a wellness check however got tired of waiting so went to the house.  Was unable to reach friend so broke in and found friend in the basement crawling.  Patient states he was on the ground for at least 3 days.  Per EMS, patient was found on the floor with 30+ bottles of water, covered in urine and feces.  Patient states he rolled out of bed because he could not get up"      Obvious external signs of injury: chest abrasions    PAST MEDICAL & SURGICAL HISTORY: HTN, HLD, vertigo, R inguinal hernia repair    Allergies    No Known Allergies    Intolerances      Home Medications: metoprolol, valsartan, flomax      ROS: 10-system review is otherwise negative except HPI above.      Primary Survey:    A - airway intact  B - bilateral breath sounds and good chest rise  C - palpable pulses in all extremities  D - GCS 15 on arrival, GARCIA  Exposure obtained    Vital Signs Last 24 Hrs  T(C): 36.5 (15 Wenceslao 2024 19:00), Max: 36.7 (15 Wenceslao 2024 15:30)  T(F): 97.7 (15 Wenceslao 2024 19:00), Max: 98 (15 Wenceslao 2024 15:30)  HR: 80 (15 Wenceslao 2024 19:00) (71 - 97)  BP: 159/60 (15 Wenceslao 2024 19:00) (119/55 - 159/60)  BP(mean): --  RR: 19 (15 Wenceslao 2024 19:00) (15 - 19)  SpO2: 98% (15 Wenceslao 2024 19:00) (97% - 100%)    Parameters below as of 15 Wenceslao 2024 19:00  Patient On (Oxygen Delivery Method): room air      Secondary Survey:   General: NAD, appears chronically ill  HEENT: Normocephalic, atraumatic, EOMI, PEERLA. no scalp lacerations   Neck: Soft, midline trachea. no c-spine tenderness  Chest: mild chest wall tenderness bilaterally, no subcutaneous emphysema, +chest abrasion  Cardiac: regular rate and rhythm  Respiratory: Bilateral breath sounds, clear and equal bilaterally  Abdomen: Soft, non-distended, non-tender, no rebound, no guarding.  Groin: Normal appearing, pelvis stable   Ext:  Moving b/l upper and lower extremities. Palpable Radial b/l UE, b/l DP palpable in LE.   Back: No T/L/S spine tenderness, No palpable runoff/stepoff/deformity           < from: CT Abdomen and Pelvis w/ IV Cont (01.15.24 @ 16:49) >  1.  Acute displaced left anterior 6th, 7th ribs, lateral 4th rib, right   anterior 5th and 6th ribs and nondisplaced right L1 transverse process   fractures. No pneumothorax.  2.  Patchy bilateral groundglass opacities, nonspecific and possibly   infectious/inflammatory in etiology.  3.  Few nonspecific subcentimeter periduodenal lymph nodes, possibly   reactive. Correlate for history of duodenitis.      < from: CT Cervical Spine No Cont (01.15.24 @ 16:35) >  CT HEAD:  No acute intracranial pathology.    Scattered paranasal sinus mucosal thickening within left maxillary sinus   air-fluid level, correlate for sinusitis.    CT CERVICAL SPINE:  No acute fracture or dislocation.      Injuries identified"  R 5-6th L 4th, 6th, 7th rib fracture, and L1 TP fracture    on 01/16 by 2 episodes of melena  - Would Ideally benefit of EGD and Colonoscopy for reported melena and hematochezia pending pulmonary optimization in setting of pneumonia and oxygen requirements (can start treatment for pneumonia). Patient would like to think about it first. GI on case       PAST MEDICAL & SURGICAL HISTORY:      Hospital Course:    TODAY'S SUBJECTIVE & REVIEW OF SYMPTOMS:     Constitutional WNL   Cardio WNL   Resp WNL   GI WNL  Heme WNL  Endo WNL  Skin WNL  MSK pain   Neuro WNL  Cognitive WNL  Psych WNL      MEDICATIONS  (STANDING):  acetaminophen     Tablet .. 650 milliGRAM(s) Oral every 6 hours  chlorhexidine 2% Cloths 1 Application(s) Topical <User Schedule>  diazepam  Injectable 5 milliGRAM(s) IV Push once  folic acid 1 milliGRAM(s) Oral daily  gabapentin 100 milliGRAM(s) Oral three times a day  heparin   Injectable 5000 Unit(s) SubCutaneous every 8 hours  hydroxychloroquine 400 milliGRAM(s) Oral daily  lactated ringers Bolus 500 milliLiter(s) IV Bolus once  lactated ringers. 1000 milliLiter(s) (125 mL/Hr) IV Continuous <Continuous>  lidocaine   4% Patch 1 Patch Transdermal every 24 hours  LORazepam     Tablet   Oral   LORazepam     Tablet 2 milliGRAM(s) Oral every 4 hours  methocarbamol 500 milliGRAM(s) Oral three times a day  metoprolol tartrate 25 milliGRAM(s) Oral two times a day  multivitamin 1 Tablet(s) Oral daily  pantoprazole  Injectable 40 milliGRAM(s) IV Push every 12 hours  tamsulosin 0.4 milliGRAM(s) Oral at bedtime  thiamine 100 milliGRAM(s) Oral daily    MEDICATIONS  (PRN):      FAMILY HISTORY:      Allergies    No Known Allergies    Intolerances        SOCIAL HISTORY:    [  ] Etoh  [  ] Smoking  [  ] Substance abuse     Home Environment:  [   ] Home Alone  [ x  ] Lives with Family  [   ] Home Health Aid    Dwelling:  [   ] Apartment  [ x  ] Private House  [   ] Adult Home  [   ] Skilled Nursing Facility      [   ] Short Term  [   ] Long Term  [x   ] Stairs       Elevator [   ]    FUNCTIONAL STATUS PTA: (Check all that apply)  Ambulation: [x    ]Independent    [   ] Dependent     [   ] Non-Ambulatory  Assistive Device: [   ] SA Cane  [   ]  Q Cane  [ x  ] Walker  [   ]  Wheelchair  ADL : [x ] Independent  [    ]  Dependent       Vital Signs Last 24 Hrs  T(C): 37.3 (16 Jan 2024 08:00), Max: 37.3 (16 Jan 2024 03:00)  T(F): 99.1 (16 Jan 2024 08:00), Max: 99.1 (16 Jan 2024 03:00)  HR: 71 (16 Jan 2024 08:00) (71 - 109)  BP: 110/56 (16 Jan 2024 08:00) (79/58 - 159/60)  BP(mean): 80 (16 Jan 2024 08:00) (63 - 87)  RR: 19 (16 Jan 2024 08:00) (15 - 20)  SpO2: 98% (16 Jan 2024 08:00) (95% - 100%)    Parameters below as of 16 Jan 2024 08:00  Patient On (Oxygen Delivery Method): nasal cannula  O2 Flow (L/min): 2        PHYSICAL EXAM: Awake & Alert  GENERAL: NAD  HEAD:  Normocephalic  CHEST/LUNG: Clear   HEART: S1S2+  ABDOMEN: Soft, Nontender  EXTREMITIES:  no calf tenderness    NERVOUS SYSTEM:  Cranial Nerves 2-12 intact [   ] Abnormal  [   ]  ROM: WFL all extremities [ x  ]  Abnormal [   ]  Motor Strength: WFL all extremities  [ x  ]  Abnormal [   ]  Sensation: intact to light touch [ x  ] Abnormal [   ]    FUNCTIONAL STATUS:  Bed Mobility: Independent [   ]  Supervision [   ]  Needs Assistance [ x  ]  N/A [   ]  Transfers: Independent [   ]  Supervision [   ]  Needs Assistance [   ]  N/A [   ]   Ambulation: Independent [   ]  Supervision [   ]  Needs Assistance [   ]  N/A [   ]  ADL: Independent [   ] Requires Assistance [   ] N/A [   ]      LABS:                        13.1   14.95 )-----------( 323      ( 16 Jan 2024 00:41 )             37.6     01-16    139  |  99  |  71<HH>  ----------------------------<  98  3.8   |  20  |  1.4    Ca    8.1<L>      16 Jan 2024 00:41  Phos  4.0     01-16  Mg     2.8     01-16    TPro  6.2  /  Alb  3.4<L>  /  TBili  0.7  /  DBili  x   /  AST  309<H>  /  ALT  110<H>  /  AlkPhos  79  01-15    PT/INR - ( 15 Wenceslao 2024 16:30 )   PT: 12.20 sec;   INR: 1.07 ratio         PTT - ( 15 Wenceslao 2024 16:30 )  PTT:29.4 sec  Urinalysis Basic - ( 16 Jan 2024 00:41 )    Color: x / Appearance: x / SG: x / pH: x  Gluc: 98 mg/dL / Ketone: x  / Bili: x / Urobili: x   Blood: x / Protein: x / Nitrite: x   Leuk Esterase: x / RBC: x / WBC x   Sq Epi: x / Non Sq Epi: x / Bacteria: x        RADIOLOGY & ADDITIONAL STUDIES:

## 2024-01-16 NOTE — PROGRESS NOTE ADULT - ATTENDING COMMENTS
Patient is awake, confused to place.  Has intermittently hallucinations this am.  On incentive spirometer able to do 1.5L  CXR with some low lung volumes bilateral.  Creat 1.4 this am. CK 3911, better.  Patient had an episode of hypotension last night, responded well to iv bolus.    ASSESSMENT:  69 y/o male, S/P Found down.  Closed Right 5,6th Rib Fractures.  Closed Left 4,6,7th Rib Fractures.  Acute pain due to trauma.  L1 Right Transverse Process Fracture.  Traumatic Rhabdomyolysis.  Dehydration.  JHON (Creat 1.6 from baseline 0.8).  Chronic Alcohol Use.  At risk for hemodynamic instability.    PLAN:  - pain control - avoid opioids  - put on CIWA protocol  - encourage incentive spirometer  - use O2 with NC as needed  - aggressive chest PT  - keep MAP>65; hypovolemic - continue ivf; use Nicom as needed  - regular diet  - follow serum electrolytes and UOP; follow CK   - ID: universal precautions  - DVT prophylaxis  Continue SICU care. Patient is awake, confused to place.  Has intermittently hallucinations this am.  On incentive spirometer able to do 1.5L  CXR with some low lung volumes bilateral.  Creat 1.4 this am. CK 3911, better.  Patient had an episode of hypotension last night, responded well to iv bolus.    ASSESSMENT:  67 y/o male, S/P Found down.  Closed Right 5,6th Rib Fractures.  Closed Left 4,6,7th Rib Fractures.  Acute pain due to trauma.  L1 Right Transverse Process Fracture.  Traumatic Rhabdomyolysis.  Dehydration.  JHON (Creat 1.6 from baseline 0.8).  Chronic Alcohol Use.  At risk for hemodynamic instability.    PLAN:  - pain control - avoid opioids  - put on CIWA protocol  - encourage incentive spirometer  - use O2 with NC as needed  - aggressive chest PT  - keep MAP>65; hypovolemic - continue ivf; use Nicom as needed  - regular diet  - follow serum electrolytes and UOP; follow CK   - ID: universal precautions  - DVT prophylaxis  Continue SICU care.

## 2024-01-16 NOTE — PROGRESS NOTE ADULT - ASSESSMENT
Assessment & Plan    68y Male  s/p fall down flight of stairs, found down three days later, Rib fractures R 5-6th L 4th, 6th, 7th rib fracture, and L1 TP fracture    NEURO:  #Acute pain    -APAP, Gabapentin 100mg q8h, Lidocaine patches  #recently diagnosed Vertigo  -Was prescribed Meclizine but has not taken      RESP:   #Oxygenation    -RA  #Activity    -increase as tolerated  #Acute displaced left anterior 6th, 7th ribs, lateral 4th rib, right anterior 5th and 6th ribs fractures.  -no evidence of PTX on CT  -pulling 2.5L on incentive spirometry  -F/U AM CXR 1/16  -continue IS, cough, deep breathing exercises    CARDS:   #HTN  -takes Valsartan 325mngQD and Metoprolol 50mgQD-->restarted  Imaging: Echo 06/2022-Normal global left ventricular systolic function. Normal left ventricular internal cavity size.  The left ventricular ejection fraction is 57 %. Mildly dilated left atrium. Trace mitral valve regurgitation. Trace tricuspid regurgitation.Trace pulmonic valve regurgitation.  #HLD  -takes no medication    GI/NUTR:  #Diet  -Regular Diet  #History of right inguinal hernia repair with mesh in 1980  #GI Prophylaxis    -not indicated  #Bowel regimen    -senna/miralax if indicated    -last bowel movement      /RENAL:   #JHON  -Cr 1.6 from 0.8 one month ago  -IVF resuscitation  #Elevated CK, Lactate  -elevated to 5374, continue to trend QD  -Lactate 3.3, continue to trend until clear  #urine output in critically ill  -Pending U/A and monitor for next void  #mild transaminitis  -continue to monitor ,   #urinary retention  -Tamsulodin 0.4mg QHS  Labs:          BUN/Cr- 68/1.6  -->          Electrolytes-Na 140 // K 4.0 // Mg -- //  Phos -- (01-15 @ 16:30)          HEME/ONC:   DVT propylaxis-  Hb/Hct:  14.7/43.0  -->  Plts:  385  -->    PTT/INR: 29.4/1.07 (01-15)    T&S:  Blood Consent-obtain if acute anemia, q6 CBC    ID:  #leukocytosis   WBC- 17.12  --->>  Temp trend- 24hrs T(F): 97.7 (01-15 @ 19:00), Max: 98 (01-15 @ 15:30)  Antibiotics-none    ENDO:    -FSG q6 if NPO or Tube feeds    -Glucose goal 140-180. if above 180 start ISS    MSK:  #Acute displaced left anterior 6th, 7th ribs, lateral 4th rib, right anterior 5th and 6th ribs fractures.nondisplaced right L1 transverse process       LINES/DRAINS:  PIV    ADVANCED DIRECTIVES:  DNR/DNI    HCP/Emergency Contact-Cousin Jose L Chungjuan-will be coming to hospital 1/16    INDICATION FOR SICU: Acute renal failure, multiple rib fractures with advanced age      DISPO:   Case discussed with attending Dr. Wise       Assessment & Plan    68y Male  s/p fall down flight of stairs, found down three days later, Rib fractures R 5-6th L 4th, 6th, 7th rib fracture, and L1 TP fracture    NEURO:  #Acute pain    -APAP, Gabapentin 100mg q8h, Lidocaine patches  #recently diagnosed Vertigo  -Was prescribed Meclizine but has not taken      RESP:   #Oxygenation    -RA  #Activity    -increase as tolerated  #Acute displaced left anterior 6th, 7th ribs, lateral 4th rib, right anterior 5th and 6th ribs fractures.  -no evidence of PTX on CT  -pulling 2.5L on incentive spirometry  -F/U AM CXR 1/16  -continue IS, cough, deep breathing exercises    CARDS:   #HTN  -takes Valsartan 325mngQD and Metoprolol 50mgQD-->restarted  Imaging: Echo 06/2022-Normal global left ventricular systolic function. Normal left ventricular internal cavity size.  The left ventricular ejection fraction is 57 %. Mildly dilated left atrium. Trace mitral valve regurgitation. Trace tricuspid regurgitation.Trace pulmonic valve regurgitation.  #HLD  -takes no medication    GI/NUTR:  #Diet  -Regular Diet  #History of right inguinal hernia repair with mesh in 1980  #GI Prophylaxis    -not indicated  #Bowel regimen    -senna/miralax if indicated    -last bowel movement      /RENAL:   #JHON  -Cr 1.6 from 0.8 one month ago  -IVF resuscitation  #Elevated CK, Lactate  -elevated to 5374, continue to trend QD  -Lactate 3.3, continue to trend until clear  #urine output in critically ill  -Pending U/A and monitor for next void  #mild transaminitis  -continue to monitor ,   #urinary retention  -Tamsulodin 0.4mg QHS  Labs:          BUN/Cr- 68/1.6  -->          Electrolytes-Na 140 // K 4.0 // Mg -- //  Phos -- (01-15 @ 16:30)          HEME/ONC:   DVT propylaxis-  Hb/Hct:  14.7/43.0  -->  Plts:  385  -->    PTT/INR: 29.4/1.07 (01-15)    T&S:  Blood Consent-obtain if acute anemia, q6 CBC    ID:  #leukocytosis   WBC- 17.12  --->>  Temp trend- 24hrs T(F): 97.7 (01-15 @ 19:00), Max: 98 (01-15 @ 15:30)  Antibiotics-none    ENDO:    -FSG q6 if NPO or Tube feeds    -Glucose goal 140-180. if above 180 start ISS    MSK:  #Acute displaced left anterior 6th, 7th ribs, lateral 4th rib, right anterior 5th and 6th ribs fractures.nondisplaced right L1 transverse process       LINES/DRAINS:  PIV    ADVANCED DIRECTIVES:  DNR/DNI    HCP/Emergency Contact-Cousin Jose L Thomaskala-will be coming to hospital 1/16    INDICATION FOR SICU: JHON, multiple rib fractures with advanced age      DISPO:   Case discussed with attending Dr. Wise

## 2024-01-16 NOTE — PRE-OP CHECKLIST - TEMPERATURE IN FAHRENHEIT (DEGREES F)
98

## 2024-01-16 NOTE — CONSULT NOTE ADULT - ASSESSMENT
IMPRESSION: Rehab of multitrauma, s/p fall with R 5-6 and L 4/6/7 rib fx and L1 transverse process fx / HTN, HLD, vertigo, and lupus     PRECAUTIONS: [   ] Cardiac  [   ] Respiratory  [   ] Seizures [   ] Contact Isolation  [   ] Droplet Isolation  [   ] Other    Weight Bearing Status:     RECOMMENDATION:    Out of Bed to Chair     DVT/Decubiti Prophylaxis    REHAB PLAN:     [ x   ] Bedside P/T 3-5 times a week   [  x  ]   Bedside O/T  2-3 times a week             [    ] Speech Therapy               [    ]  No Rehab Therapy Indicated   Conditioning/ROM                                    ADL  Bed Mobility                                               Conditioning/ROM  Transfers                                                     Bed Mobility  Sitting /Standing Balance                         Transfers                                        Gait Training                                               Sitting/Standing Balance  Stair Training [   ]Applicable                    Home equipment Eval                                                                        Splinting  [   ] Only      GOALS:   ADL   [ x   ]   Independent                    Transfers  [  x  ] Independent                          Ambulation  [  x  ] Independent     [  x   ] With device                            [    ]  CG                                                         [    ]  CG                                                                  [    ] CG                            [    ] Min A                                                   [    ] Min A                                                              [    ] Min  A          DISCHARGE PLAN:   [    ]  Good candidate for Intensive Rehabilitation/Hospital based                                             Will tolerate 3hrs Intensive Rehab Daily                                       [     ]  Short Term Rehab in Skilled Nursing Facility                                       [     ]  Home with Outpatient or VN services                                         [  x   ]  Possible Candidate for Intensive Hospital based Rehab

## 2024-01-16 NOTE — PROGRESS NOTE ADULT - SUBJECTIVE AND OBJECTIVE BOX
SUSAN AWAD   [id number]   [birth year]  68yM    Admit Date: 01-15-24  Indication for SICU:        ============================  HPI    Patient is a 67 y/o male with PMH of HTN, HLD, vertigo, possible Lupus who presents to the ED s/p found down for 3 days. The patient reports that he was walking when the banister on his staircase broke and he fell down a flight of stairs on 1/13 and was unable to get up from the ground until a friend was able to find him three days after the fall. Patient is A&O x4, HD stable, ?HT, +LOC, -AC. The patient reports that he did lose consciousness at one point but was awake and unable to get himself up from the ground. The patient was reportedly found soiled in urine, feces, with water bottles scattered around him. The patient reports that he had tried to get himself up from the ground but was unable to and sustained several rashes 2/2 rug burn while on the ground on his arms and abdomen. The patient was recently seen by the Trauma Service in 12/2023 for a fall and was since diagnosed with Vertigo that the patient reports was prescribed a medication that he cannot recall but does not wish to begin taking it. The patient currently reports bilateral rib pain, however denies CP, SOB, nausea, vomiting, dizziness, fever or chills.     SICU Consult for monitoring in the setting of advanced age with multiple rib fractures. The patient is afebrile, HR 80, 159/60, satting 98 on RA. The patient can pull 2.5L on incentive spirometer.      24 Hour Events:    1/15 NIGHT  -repeat 11:30pm labs including CK, lactate, BMP  -AM CXR f/u  -will need MOLST filled out      [X] A ten-point review of systems was otherwise negative except as noted above.  [  ] Due to altered mental status/intubation, subjective information was not attained from the patient. History was obtained, to the extent possible, from review of the chart and collateral sources of information.    =========================================================================================================================================  =========================================================================================================================================    PMH  PAST MEDICAL & SURGICAL HISTORY:    Home Meds:  Home Medications: Valsartan, Metoprolol 50 QD     Allergies  Allergies    No Known Allergies       Current Medications:    ICU Vital Signs Last 24 Hrs  T(C): 36.5 (15 Wenceslao 2024 19:00), Max: 36.7 (15 Wenceslao 2024 15:30)  T(F): 97.7 (15 Wenceslao 2024 19:00), Max: 98 (15 Wenceslao 2024 15:30)  HR: 80 (15 Wenceslao 2024 19:00) (71 - 97)  BP: 159/60 (15 Wenceslao 2024 19:00) (119/55 - 159/60)  BP(mean): --  ABP: --  ABP(mean): --  RR: 19 (15 Wenceslao 2024 19:00) (15 - 19)  SpO2: 98% (15 Wneceslao 2024 19:00) (97% - 100%)    O2 Parameters below as of 15 Wenceslao 2024 19:00  Patient On (Oxygen Delivery Method): room air    I&O's Summary    15 Wenceslao 2024 07:01  -  15 Wenceslao 2024 21:46  --------------------------------------------------------  IN: 2000 mL / OUT: 0 mL / NET: 2000 mL            < from: CT Abdomen and Pelvis w/ IV Cont (01.15.24 @ 16:49) >  1.  Acute displaced left anterior 6th, 7th ribs, lateral 4th rib, right   anterior 5th and 6th ribs and nondisplaced right L1 transverse process   fractures. No pneumothorax.  2.  Patchy bilateral groundglass opacities, nonspecific and possibly   infectious/inflammatory in etiology.  3.  Few nonspecific subcentimeter periduodenal lymph nodes, possibly   reactive. Correlate for history of duodenitis.      < from: CT Cervical Spine No Cont (01.15.24 @ 16:35) >  CT HEAD:  No acute intracranial pathology.    Scattered paranasal sinus mucosal thickening within left maxillary sinus   air-fluid level, correlate for sinusitis.    CT CERVICAL SPINE:  No acute fracture or dislocation.    Similar nonspecific scattered lucent osseous lesions are noted.   Nonemergent outpatient contrast-enhanced cervical MRI may be considered   for further evaluation.      Fatigue: How much time during the previous 4 weeks did you feel tired?   All or most of the time [   ] Yes (1pt)    [  x] No  (0pts)  Resistance: Do you have any difficulty walking up 10 steps alone without resting and without aids? [   ] Yes (1pt)    [ x ] No  (0pts)  Ambulation: Do you have any difficulty walking several hundred yards alone without aids? [x   ] Yes (1pt)    [  ] No  (0pts)  Illness: how many illnesses do you have out of list of 11 total? [   ] 5 or more (1pt) [x  ] < 5 (0pts)  Loss of weight: Have you had weight loss of 5% or more? [   ] Yes (1pt)    [  x] No  (0pts)    Total Score: 1    Score 1-2: Consult medical comangement  Score 3-4: Consult Geriatric service   Score 5: Consult Palliative service [phone]    Jackie Garcia G, Jabari MEYER, Jojo JE, Maynor B. Frality: toward a clinical definition. J AM Med Dir Assoc. 2008; 9 (2): 71-72  Lauro JE, Sree TK, Gutierrez DK. A simple frailty questionnaire (FRAIL) predicts outcomes in middle aged  Americans. J Nutr Health Aging. 2012; 16 (7): 601-608       SUSAN AWAD   [id number]   [birth year]  68yM    Admit Date: 01-15-24  Indication for SICU:        ============================  HPI    Patient is a 69 y/o male with PMH of HTN, HLD, vertigo, possible Lupus who presents to the ED s/p found down for 3 days. The patient reports that he was walking when the banister on his staircase broke and he fell down a flight of stairs on 1/13 and was unable to get up from the ground until a friend was able to find him three days after the fall. Patient is A&O x4, HD stable, ?HT, +LOC, -AC. The patient reports that he did lose consciousness at one point but was awake and unable to get himself up from the ground. The patient was reportedly found soiled in urine, feces, with water bottles scattered around him. The patient reports that he had tried to get himself up from the ground but was unable to and sustained several rashes 2/2 rug burn while on the ground on his arms and abdomen. The patient was recently seen by the Trauma Service in 12/2023 for a fall and was since diagnosed with Vertigo that the patient reports was prescribed a medication that he cannot recall but does not wish to begin taking it. The patient currently reports bilateral rib pain, however denies CP, SOB, nausea, vomiting, dizziness, fever or chills.     SICU Consult for monitoring in the setting of advanced age with multiple rib fractures. The patient is afebrile, HR 80, 159/60, satting 98 on RA. The patient can pull 2.5L on incentive spirometer.      24 Hour Events:    1/15 NIGHT  -repeat 11:30pm labs including CK, lactate, BMP  -AM CXR f/u  -will need MOLST filled out      [X] A ten-point review of systems was otherwise negative except as noted above.  [  ] Due to altered mental status/intubation, subjective information was not attained from the patient. History was obtained, to the extent possible, from review of the chart and collateral sources of information.    =========================================================================================================================================  =========================================================================================================================================    PMH  PAST MEDICAL & SURGICAL HISTORY:    Home Meds:  Home Medications: Valsartan, Metoprolol 50 QD     Allergies  Allergies    No Known Allergies       Current Medications:    ICU Vital Signs Last 24 Hrs  T(C): 36.5 (15 Wenceslao 2024 19:00), Max: 36.7 (15 Wenceslao 2024 15:30)  T(F): 97.7 (15 Wenceslao 2024 19:00), Max: 98 (15 Wenceslao 2024 15:30)  HR: 80 (15 Wenceslao 2024 19:00) (71 - 97)  BP: 159/60 (15 Wenceslao 2024 19:00) (119/55 - 159/60)  BP(mean): --  ABP: --  ABP(mean): --  RR: 19 (15 Wenceslao 2024 19:00) (15 - 19)  SpO2: 98% (15 Wenceslao 2024 19:00) (97% - 100%)    O2 Parameters below as of 15 Wenceslao 2024 19:00  Patient On (Oxygen Delivery Method): room air    I&O's Summary    15 Wenceslao 2024 07:01  -  15 Wenceslao 2024 21:46  --------------------------------------------------------  IN: 2000 mL / OUT: 0 mL / NET: 2000 mL            < from: CT Abdomen and Pelvis w/ IV Cont (01.15.24 @ 16:49) >  1.  Acute displaced left anterior 6th, 7th ribs, lateral 4th rib, right   anterior 5th and 6th ribs and nondisplaced right L1 transverse process   fractures. No pneumothorax.  2.  Patchy bilateral groundglass opacities, nonspecific and possibly   infectious/inflammatory in etiology.  3.  Few nonspecific subcentimeter periduodenal lymph nodes, possibly   reactive. Correlate for history of duodenitis.      < from: CT Cervical Spine No Cont (01.15.24 @ 16:35) >  CT HEAD:  No acute intracranial pathology.    Scattered paranasal sinus mucosal thickening within left maxillary sinus   air-fluid level, correlate for sinusitis.    CT CERVICAL SPINE:  No acute fracture or dislocation.    Similar nonspecific scattered lucent osseous lesions are noted.   Nonemergent outpatient contrast-enhanced cervical MRI may be considered   for further evaluation.      Fatigue: How much time during the previous 4 weeks did you feel tired?   All or most of the time [   ] Yes (1pt)    [  x] No  (0pts)  Resistance: Do you have any difficulty walking up 10 steps alone without resting and without aids? [   ] Yes (1pt)    [ x ] No  (0pts)  Ambulation: Do you have any difficulty walking several hundred yards alone without aids? [x   ] Yes (1pt)    [  ] No  (0pts)  Illness: how many illnesses do you have out of list of 11 total? [   ] 5 or more (1pt) [x  ] < 5 (0pts)  Loss of weight: Have you had weight loss of 5% or more? [   ] Yes (1pt)    [  x] No  (0pts)    Total Score: 1    Score 1-2: Consult medical comangement  Score 3-4: Consult Geriatric service   Score 5: Consult Palliative service [phone]    Jackie Garcia G, Jabari MEYER, Jojo JE, Maynor B. Frality: toward a clinical definition. J AM Med Dir Assoc. 2008; 9 (2): 71-72  Lauro JE, Sree TK, Gutierrez DK. A simple frailty questionnaire (FRAIL) predicts outcomes in middle aged  Americans. J Nutr Health Aging. 2012; 16 (7): 601-608

## 2024-01-16 NOTE — PHYSICAL THERAPY INITIAL EVALUATION ADULT - GENERAL OBSERVATIONS, REHAB EVAL
cognitive assessment completed,  and prior level of function obtained. pt falling asleep while speaking. Will hold PT and follow up as appropriate. cognitive assessment completed,  and prior level of function obtained. PT goals and procedures explained to pt. Pt falling asleep while speaking. Will hold PT and follow up as appropriate. [id number] cognitive assessment completed,  and prior level of function obtained. PT goals and procedures explained to pt. Pt falling asleep while speaking. Will hold PT and follow up as appropriate. [id number]

## 2024-01-16 NOTE — PROGRESS NOTE ADULT - SUBJECTIVE AND OBJECTIVE BOX
GENERAL SURGERY PROGRESS NOTE     DEANDRAPARVINSUSAN MULLEN  68y  Male  Hospital day :1d  POD:  Procedure:     OVERNIGHT EVENTS:  - admitted to SICU for multiple rib fx and age  - pain controlled  - IS [id number]  - saturating high 90s on 2L NC    T(F): 99.1 (01-16-24 @ 08:00), Max: 99.1 (01-16-24 @ 03:00)  HR: 71 (01-16-24 @ 08:00) (71 - 109)  BP: 110/56 (01-16-24 @ 08:00) (79/58 - 159/60)  RR: 19 (01-16-24 @ 08:00) (15 - 20)  SpO2: 98% (01-16-24 @ 08:00) (95% - 100%)    DIET/FLUIDS: folic acid 1 milliGRAM(s) Oral daily  lactated ringers. 1000 milliLiter(s) IV Continuous <Continuous>  multivitamin 1 Tablet(s) Oral daily  thiamine 100 milliGRAM(s) Oral daily    GI proph:  pantoprazole  Injectable 40 milliGRAM(s) IV Push every 12 hours    AC/ proph: heparin   Injectable 5000 Unit(s) SubCutaneous every 8 hours    ABx: hydroxychloroquine 400 milliGRAM(s) Oral daily      PHYSICAL EXAM:  GENERAL: NAD  CHEST/LUNG: normal respiratory effort, mild chest wall tenderness, NC in place  HEART: Regular rate and rhythm  ABDOMEN: Soft, Nontender, Nondistended;       LABS  POCT Blood Glucose.: 98 mg/dL (15 Wenceslao 2024 15:29)                        13.1   14.95 )-----------( 323      ( 16 Jan 2024 00:41 )             37.6       Auto Neutrophil %: 73.9 % (01-16-24 @ 00:41)  Auto Immature Granulocyte %: 1.3 % (01-16-24 @ 00:41)  Auto Neutrophil %: 77.3 % (01-15-24 @ 16:30)  Auto Immature Granulocyte %: 1.8 % (01-15-24 @ 16:30)    01-16    139  |  99  |  71<HH>  ----------------------------<  98  3.8   |  20  |  1.4    Calcium: 8.1 mg/dL (01-16-24 @ 00:41)    LFTs:             6.2  | 0.7  | 309      ------------------[79      ( 15 Wenceslao 2024 16:30 )  3.4  | x    | 110         Lipase:56       Lactate, Blood: 1.2 mmol/L (01-16-24 @ 00:41)  Blood Gas Venous - Lactate: 1.8 mmol/L (01-15-24 @ 19:45)  Lactate, Blood: 3.3 mmol/L (01-15-24 @ 16:30)    Coags:     12.20  ----< 1.07    ( 15 Wenceslao 2024 16:30 )     29.4      CARDIAC MARKERS ( 16 Jan 2024 00:41 )  x     / x     / 3911 U/L / x     / x      CARDIAC MARKERS ( 15 Wenceslao 2024 16:30 )  x     / x     / 5374 U/L / x     / x        Urinalysis Basic - ( 16 Jan 2024 00:41 )    Color: x / Appearance: x / SG: x / pH: x  Gluc: 98 mg/dL / Ketone: x  / Bili: x / Urobili: x   Blood: x / Protein: x / Nitrite: x   Leuk Esterase: x / RBC: x / WBC x   Sq Epi: x / Non Sq Epi: x / Bacteria: x GENERAL SURGERY PROGRESS NOTE     DEANDRAPARVINSUSAN MULLEN  68y  Male  Hospital day :1d  POD:  Procedure:     OVERNIGHT EVENTS:  - admitted to SICU for multiple rib fx and age  - pain controlled  - IS [id number]  - saturating high 90s on 2L NC    T(F): 99.1 (01-16-24 @ 08:00), Max: 99.1 (01-16-24 @ 03:00)  HR: 71 (01-16-24 @ 08:00) (71 - 109)  BP: 110/56 (01-16-24 @ 08:00) (79/58 - 159/60)  RR: 19 (01-16-24 @ 08:00) (15 - 20)  SpO2: 98% (01-16-24 @ 08:00) (95% - 100%)    DIET/FLUIDS: folic acid 1 milliGRAM(s) Oral daily  lactated ringers. 1000 milliLiter(s) IV Continuous <Continuous>  multivitamin 1 Tablet(s) Oral daily  thiamine 100 milliGRAM(s) Oral daily    GI proph:  pantoprazole  Injectable 40 milliGRAM(s) IV Push every 12 hours    AC/ proph: heparin   Injectable 5000 Unit(s) SubCutaneous every 8 hours    ABx: hydroxychloroquine 400 milliGRAM(s) Oral daily      PHYSICAL EXAM:  GENERAL: NAD  CHEST/LUNG: normal respiratory effort, mild chest wall tenderness, NC in place  HEART: Regular rate and rhythm  ABDOMEN: Soft, Nontender, Nondistended;       LABS  POCT Blood Glucose.: 98 mg/dL (15 Wenceslao 2024 15:29)                        13.1   14.95 )-----------( 323      ( 16 Jan 2024 00:41 )             37.6       Auto Neutrophil %: 73.9 % (01-16-24 @ 00:41)  Auto Immature Granulocyte %: 1.3 % (01-16-24 @ 00:41)  Auto Neutrophil %: 77.3 % (01-15-24 @ 16:30)  Auto Immature Granulocyte %: 1.8 % (01-15-24 @ 16:30)    01-16    139  |  99  |  71<HH>  ----------------------------<  98  3.8   |  20  |  1.4    Calcium: 8.1 mg/dL (01-16-24 @ 00:41)    LFTs:             6.2  | 0.7  | 309      ------------------[79      ( 15 Wenceslao 2024 16:30 )  3.4  | x    | 110         Lipase:56       Lactate, Blood: 1.2 mmol/L (01-16-24 @ 00:41)  Blood Gas Venous - Lactate: 1.8 mmol/L (01-15-24 @ 19:45)  Lactate, Blood: 3.3 mmol/L (01-15-24 @ 16:30)    Coags:     12.20  ----< 1.07    ( 15 Wenceslao 2024 16:30 )     29.4      CARDIAC MARKERS ( 16 Jan 2024 00:41 )  x     / x     / 3911 U/L / x     / x      CARDIAC MARKERS ( 15 Wenceslao 2024 16:30 )  x     / x     / 5374 U/L / x     / x        Urinalysis Basic - ( 16 Jan 2024 00:41 )    Color: x / Appearance: x / SG: x / pH: x  Gluc: 98 mg/dL / Ketone: x  / Bili: x / Urobili: x   Blood: x / Protein: x / Nitrite: x   Leuk Esterase: x / RBC: x / WBC x   Sq Epi: x / Non Sq Epi: x / Bacteria: x

## 2024-01-16 NOTE — CONSULT NOTE ADULT - ASSESSMENT
Assessment and Plan  Case of a 68 year old male patient known to have a history of HTN, DL, and BPH who was brought to the ED on 01/15 after being found on the floor unable to stand, found to be hemodynamically stable with evidence of acute rib fractures and pneumonia on imaging, admitted for further management. Stay was complicated on 01/16 by 2 episodes of melena per nurse. We are consulted for concern of melena.      Reported Melena on 01/16 with History of Intermittent Hematochezia  Diane-Duodenal Lymphadenopathy  No Drop in Hemoglobin  * Baseline hemoglobin (prior to admission): Hb 13.1 on 12/06/2023  * ED Vitals:  /70 mmHg, HR 71 bpm   * Hemoglobin on admission: Hb 14.7 -> 01/16 Hb 13.1   * Coags: INR 1.07 on 01/15    * Not on AP or AC  * SWEETIE dark brown  * CT Abdomen and Pelvis w/ IV Cont (01.15.24 @ 16:49) Acute displaced left anterior 6th, 7th ribs, lateral 4th rib, right anterior 5th and 6th ribs and nondisplaced right L1 transverse process fractures. No pneumothorax. Patchy bilateral groundglass opacities, nonspecific and possibly infectious/inflammatory in etiology.Few nonspecific subcentimeter periduodenal lymph nodes, possibly reactive. Correlate for history of duodenitis.  * No prior EGD  * Prior Colonoscopy around 3 years ago: at [facility]  * Family History: no GI malignancies  * Had hypotension overnight on 01/16; reported melena x2 by nurse; SWEETIE dark brown on 01/16 at 09:00 AM    RECOMMENDATIONS  - Would Ideally benefit of EGD and Colonoscopy for reported melena and hematochezia pending pulmonary optimization in setting of pneumonia and oxygen requirements (can start treatment for pneumonia). Patient would like to think about it first  - Please switch diet to clear liquid diet for now  - Trend CBC closely BID and transfuse as needed (target Hb >8). Maintain active Type and screen  - Trend BUN; Ensure placement x2 large 18G IV Bores  - Monitor MAP and keep > 65mmHg and continue IV fluid hydration  - Continue pantoprazole 40 IV BID  - On DVT prophylaxis (Heparin 65585 units Q8h)  - Monitor BM for melena or hematochezia   - Please avoid any NSAIDs  - If any unstable bleed, please call GI stat      Thank you for your consult.  - Please note that plan was communicated with medical team.   - Please reach GI on [phone] during weekdays till 5pm.  - Please call the GI service line after 5pm on Weekdays and anytime on Weekends: [phone].      Alexsandra Jerome MD  PGY - 4 Gastroenterology Fellow   [facility]     Assessment and Plan  Case of a 68 year old male patient known to have a history of HTN, DL, and BPH who was brought to the ED on 01/15 after being found on the floor unable to stand, found to be hemodynamically stable with evidence of acute rib fractures and pneumonia on imaging, admitted for further management. Stay was complicated on 01/16 by 2 episodes of melena per nurse. We are consulted for concern of melena.      Reported Melena on 01/16 with History of Intermittent Hematochezia  Diane-Duodenal Lymphadenopathy  No Drop in Hemoglobin  * Baseline hemoglobin (prior to admission): Hb 13.1 on 12/06/2023  * ED Vitals:  /70 mmHg, HR 71 bpm   * Hemoglobin on admission: Hb 14.7 -> 01/16 Hb 13.1   * Coags: INR 1.07 on 01/15    * Not on AP or AC  * SWEETIE dark brown  * CT Abdomen and Pelvis w/ IV Cont (01.15.24 @ 16:49) Acute displaced left anterior 6th, 7th ribs, lateral 4th rib, right anterior 5th and 6th ribs and nondisplaced right L1 transverse process fractures. No pneumothorax. Patchy bilateral groundglass opacities, nonspecific and possibly infectious/inflammatory in etiology.Few nonspecific subcentimeter periduodenal lymph nodes, possibly reactive. Correlate for history of duodenitis.  * No prior EGD  * Prior Colonoscopy around 3 years ago: at [facility]  * Family History: no GI malignancies  * Had hypotension overnight on 01/16; reported melena x2 by nurse; SWEETIE dark brown on 01/16 at 09:00 AM    RECOMMENDATIONS  - Would Ideally benefit of EGD and Colonoscopy for reported melena and hematochezia pending pulmonary optimization in setting of pneumonia and oxygen requirements (can start treatment for pneumonia). Patient would like to think about it first  - Please switch diet to clear liquid diet for now  - Trend CBC closely BID and transfuse as needed (target Hb >8). Maintain active Type and screen  - Trend BUN; Ensure placement x2 large 18G IV Bores  - Monitor MAP and keep > 65mmHg and continue IV fluid hydration  - Continue pantoprazole 40 IV BID  - On DVT prophylaxis (Heparin 52196 units Q8h)  - Monitor BM for melena or hematochezia   - Please avoid any NSAIDs  - If any unstable bleed, please call GI stat      Thank you for your consult.  - Please note that plan was communicated with medical team.   - Please reach GI on [phone] during weekdays till 5pm.  - Please call the GI service line after 5pm on Weekdays and anytime on Weekends: [phone].      Alexsandra Jerome MD  PGY - 4 Gastroenterology Fellow   [facility]     Assessment and Plan  Case of a 68 year old male patient known to have a history of HTN, DL, and BPH who was brought to the ED on 01/15 after being found on the floor unable to stand, found to be hemodynamically stable with evidence of acute rib fractures and pneumonia on imaging, admitted for further management. Stay was complicated on 01/16 by 2 episodes of melena per nurse. We are consulted for concern of melena.      Reported Melena on 01/16 with History of Intermittent Hematochezia  Diane-Duodenal Lymphadenopathy  No Drop in Hemoglobin  * Baseline hemoglobin (prior to admission): Hb 13.1 on 12/06/2023  * ED Vitals:  /70 mmHg, HR 71 bpm   * Hemoglobin on admission: Hb 14.7 -> 01/16 Hb 13.1   * BUN 19 on 12/06/2023 -> BUN 68 on 01/15 -> BUN 71 on 01/16  * LA 3.3 on 01/15 -> 1.2 on 01/16  * Coags: INR 1.07 on 01/15    * Not on AP or AC  * SWEETIE dark brown  * CT Abdomen and Pelvis w/ IV Cont (01.15.24 @ 16:49) Acute displaced left anterior 6th, 7th ribs, lateral 4th rib, right anterior 5th and 6th ribs and nondisplaced right L1 transverse process fractures. No pneumothorax. Patchy bilateral groundglass opacities, nonspecific and possibly infectious/inflammatory in etiology.Few nonspecific subcentimeter periduodenal lymph nodes, possibly reactive. Correlate for history of duodenitis.  * No prior EGD  * Prior Colonoscopy around 3 years ago: at [facility]  * Family History: no GI malignancies  * Had hypotension overnight on 01/16; reported melena x2 by nurse; SWEETIE dark brown on 01/16 at 09:00 AM    RECOMMENDATIONS  - Would Ideally benefit of EGD and Colonoscopy for reported melena and hematochezia pending pulmonary optimization in setting of pneumonia and oxygen requirements (can start treatment for pneumonia). Patient would like to think about it first  - Please switch diet to clear liquid diet for now  - Trend CBC closely BID and transfuse as needed (target Hb >8). Maintain active Type and screen  - Trend BUN; Ensure placement x2 large 18G IV Bores  - Monitor MAP and keep > 65mmHg and continue IV fluid hydration  - Continue pantoprazole 40 IV BID  - On DVT prophylaxis (Heparin 32543 units Q8h)  - Monitor BM for melena or hematochezia   - Please avoid any NSAIDs  - If any unstable bleed, please call GI stat      Hepatic Dome Cysts   Transaminitis Likely from Hypotension  * Denies alcohol use  * Cysts noted on imaging  * LFTs 12/06/2023 0.5/135/28/11  * LFTs 01/15 0.7/79/309/110  * INR 1.07 on 01/15  * Had hypotension on 01/16 and reported melena    RECOMMENDATIONS  - Trend LFTs  - Avoid hepatotoxic agents  - Monitor BP and avoid hypotension  - Check hepatitis panel       Thank you for your consult.  - Please note that plan was communicated with medical team.   - Please reach GI on [phone] during weekdays till 5pm.  - Please call the GI service line after 5pm on Weekdays and anytime on Weekends: [phone].      Alexsandra Jerome MD  PGY - 4 Gastroenterology Fellow   [facility]     Assessment and Plan  Case of a 68 year old male patient known to have a history of HTN, DL, and BPH who was brought to the ED on 01/15 after being found on the floor unable to stand, found to be hemodynamically stable with evidence of acute rib fractures and pneumonia on imaging, admitted for further management. Stay was complicated on 01/16 by 2 episodes of melena per nurse. We are consulted for concern of melena.      Reported Melena on 01/16 with History of Intermittent Hematochezia  Diane-Duodenal Lymphadenopathy  No Drop in Hemoglobin  * Baseline hemoglobin (prior to admission): Hb 13.1 on 12/06/2023  * ED Vitals:  /70 mmHg, HR 71 bpm   * Hemoglobin on admission: Hb 14.7 -> 01/16 Hb 13.1   * BUN 19 on 12/06/2023 -> BUN 68 on 01/15 -> BUN 71 on 01/16  * LA 3.3 on 01/15 -> 1.2 on 01/16  * Coags: INR 1.07 on 01/15    * Not on AP or AC  * SWEETIE dark brown  * CT Abdomen and Pelvis w/ IV Cont (01.15.24 @ 16:49) Acute displaced left anterior 6th, 7th ribs, lateral 4th rib, right anterior 5th and 6th ribs and nondisplaced right L1 transverse process fractures. No pneumothorax. Patchy bilateral groundglass opacities, nonspecific and possibly infectious/inflammatory in etiology.Few nonspecific subcentimeter periduodenal lymph nodes, possibly reactive. Correlate for history of duodenitis.  * No prior EGD  * Prior Colonoscopy around 3 years ago: at [facility]  * Family History: no GI malignancies  * Had hypotension overnight on 01/16; reported melena x2 by nurse; SWEETIE dark brown on 01/16 at 09:00 AM    RECOMMENDATIONS  - Would Ideally benefit of EGD and Colonoscopy for reported melena and hematochezia pending pulmonary optimization in setting of pneumonia and oxygen requirements (can start treatment for pneumonia). Patient would like to think about it first  - Please switch diet to clear liquid diet for now  - Trend CBC closely BID and transfuse as needed (target Hb >8). Maintain active Type and screen  - Trend BUN; Ensure placement x2 large 18G IV Bores  - Monitor MAP and keep > 65mmHg and continue IV fluid hydration  - Continue pantoprazole 40 IV BID  - On DVT prophylaxis (Heparin 62169 units Q8h)  - Monitor BM for melena or hematochezia   - Please avoid any NSAIDs  - If any unstable bleed, please call GI stat      Hepatic Dome Cysts   Transaminitis Likely from Hypotension  * Denies alcohol use  * Cysts noted on imaging  * LFTs 12/06/2023 0.5/135/28/11  * LFTs 01/15 0.7/79/309/110  * INR 1.07 on 01/15  * Had hypotension on 01/16 and reported melena    RECOMMENDATIONS  - Trend LFTs  - Avoid hepatotoxic agents  - Monitor BP and avoid hypotension  - Check hepatitis panel       Thank you for your consult.  - Please note that plan was communicated with medical team.   - Please reach GI on [phone] during weekdays till 5pm.  - Please call the GI service line after 5pm on Weekdays and anytime on Weekends: [phone].      Alexsandra Jerome MD  PGY - 4 Gastroenterology Fellow   [facility]     Assessment and Plan  Case of a 68 year old male patient known to have a history of HTN, DL, and BPH who was brought to the ED on 01/15 after being found on the floor unable to stand, found to be hemodynamically stable with evidence of acute rib fractures and pneumonia on imaging, admitted for further management. Stay was complicated on 01/16 by 2 episodes of melena per nurse. We are consulted for concern of melena.      Melena on 01/16 with History of Intermittent Hematochezia  Diane-Duodenal Lymphadenopathy  Acute Drop in Hemoglobin  * Baseline hemoglobin (prior to admission): Hb 13.1 on 12/06/2023  * ED Vitals:  /70 mmHg, HR 71 bpm   * Hemoglobin on admission: Hb 14.7 -> 01/16 Hb 13.1 dropped to 10.3  * BUN 19 on 12/06/2023 -> BUN 68 on 01/15 -> BUN 71 on 01/16  * LA 3.3 on 01/15 -> 1.2 on 01/16  * Coags: INR 1.07 on 01/15    * Not on AP or AC  * SWEETIE dark brown  * CT Abdomen and Pelvis w/ IV Cont (01.15.24 @ 16:49) Acute displaced left anterior 6th, 7th ribs, lateral 4th rib, right anterior 5th and 6th ribs and nondisplaced right L1 transverse process fractures. No pneumothorax. Patchy bilateral groundglass opacities, nonspecific and possibly infectious/inflammatory in etiology.Few nonspecific subcentimeter periduodenal lymph nodes, possibly reactive. Correlate for history of duodenitis.  * No prior EGD  * Prior Colonoscopy around 3 years ago: at [facility]  * Family History: no GI malignancies  * Had hypotension overnight on 01/16; reported melena x2 by nurse then again at 12:00 PM; SWEETIE dark brown with maroon tinge on 01/16 at 09:00 AM    RECOMMENDATIONS  - Will schedule for EGD (needs to be NPO for at least 8 hours; ate scrambled eggs at 8 AM)  - In case of unremarkable EGD, would consider Colonoscopy  - Keep NPO for now  - Trend CBC closely BID and transfuse as needed (target Hb >8). Maintain active Type and screen  - Trend BUN; Ensure placement x2 large 18G IV Bores  - Monitor MAP and keep > 65mmHg and continue IV fluid hydration  - Continue pantoprazole 40 IV BID  - On DVT prophylaxis (Heparin 97951 units Q8h)  - Monitor BM for melena or hematochezia   - Please avoid any NSAIDs  - If any unstable bleed, please call GI stat      Hepatic Dome Cysts   Transaminitis Likely from Hypotension  * Denies alcohol use  * Cysts noted on imaging  * LFTs 12/06/2023 0.5/135/28/11  * LFTs 01/15 0.7/79/309/110  * INR 1.07 on 01/15  * Had hypotension on 01/16 and reported melena    RECOMMENDATIONS  - Trend LFTs  - Avoid hepatotoxic agents  - Monitor BP and avoid hypotension  - Check hepatitis panel       Thank you for your consult.  - Please note that plan was communicated with medical team.   - Please reach GI on [phone] during weekdays till 5pm.  - Please call the GI service line after 5pm on Weekdays and anytime on Weekends: [phone].      Alexsandra Jerome MD  PGY - 4 Gastroenterology Fellow   [facility]     Assessment and Plan  Case of a 68 year old male patient known to have a history of HTN, DL, and BPH who was brought to the ED on 01/15 after being found on the floor unable to stand, found to be hemodynamically stable with evidence of acute rib fractures and pneumonia on imaging, admitted for further management. Stay was complicated on 01/16 by 2 episodes of melena per nurse. We are consulted for concern of melena.      Melena on 01/16 with History of Intermittent Hematochezia  Diane-Duodenal Lymphadenopathy  Acute Drop in Hemoglobin  * Baseline hemoglobin (prior to admission): Hb 13.1 on 12/06/2023  * ED Vitals:  /70 mmHg, HR 71 bpm   * Hemoglobin on admission: Hb 14.7 -> 01/16 Hb 13.1 dropped to 10.3  * BUN 19 on 12/06/2023 -> BUN 68 on 01/15 -> BUN 71 on 01/16  * LA 3.3 on 01/15 -> 1.2 on 01/16  * Coags: INR 1.07 on 01/15    * Not on AP or AC  * SWEETIE dark brown  * CT Abdomen and Pelvis w/ IV Cont (01.15.24 @ 16:49) Acute displaced left anterior 6th, 7th ribs, lateral 4th rib, right anterior 5th and 6th ribs and nondisplaced right L1 transverse process fractures. No pneumothorax. Patchy bilateral groundglass opacities, nonspecific and possibly infectious/inflammatory in etiology.Few nonspecific subcentimeter periduodenal lymph nodes, possibly reactive. Correlate for history of duodenitis.  * No prior EGD  * Prior Colonoscopy around 3 years ago: at [facility]  * Family History: no GI malignancies  * Had hypotension overnight on 01/16; reported melena x2 by nurse then again at 12:00 PM; SWEETIE dark brown with maroon tinge on 01/16 at 09:00 AM    RECOMMENDATIONS  - Will schedule for EGD (needs to be NPO for at least 8 hours; ate scrambled eggs at 8 AM)  - In case of unremarkable EGD, would consider Colonoscopy  - Keep NPO for now  - Trend CBC closely BID and transfuse as needed (target Hb >8). Maintain active Type and screen  - Trend BUN; Ensure placement x2 large 18G IV Bores  - Monitor MAP and keep > 65mmHg and continue IV fluid hydration  - Continue pantoprazole 40 IV BID  - On DVT prophylaxis (Heparin 47504 units Q8h)  - Monitor BM for melena or hematochezia   - Please avoid any NSAIDs  - If any unstable bleed, please call GI stat      Hepatic Dome Cysts   Transaminitis Likely from Hypotension  * Denies alcohol use  * Cysts noted on imaging  * LFTs 12/06/2023 0.5/135/28/11  * LFTs 01/15 0.7/79/309/110  * INR 1.07 on 01/15  * Had hypotension on 01/16 and reported melena    RECOMMENDATIONS  - Trend LFTs  - Avoid hepatotoxic agents  - Monitor BP and avoid hypotension  - Check hepatitis panel       Thank you for your consult.  - Please note that plan was communicated with medical team.   - Please reach GI on [phone] during weekdays till 5pm.  - Please call the GI service line after 5pm on Weekdays and anytime on Weekends: [phone].      Alexsandra Jerome MD  PGY - 4 Gastroenterology Fellow   [facility]     Assessment and Plan  Case of a 68 year old male patient known to have a history of HTN, DL, and BPH who was brought to the ED on 01/15 after being found on the floor unable to stand, found to be hemodynamically stable with evidence of acute rib fractures and pneumonia on imaging, admitted for further management. Stay was complicated on 01/16 by 2 episodes of melena per nurse. We are consulted for concern of melena.      Melena on 01/16 with History of Intermittent Hematochezia  Diane-Duodenal Lymphadenopathy  Acute Drop in Hemoglobin  * Baseline hemoglobin (prior to admission): Hb 13.1 on 12/06/2023  * ED Vitals:  /70 mmHg, HR 71 bpm   * Hemoglobin on admission: Hb 14.7 -> 01/16 Hb 13.1 dropped to 10.3  * BUN 19 on 12/06/2023 -> BUN 68 on 01/15 -> BUN 71 on 01/16  * LA 3.3 on 01/15 -> 1.2 on 01/16  * Coags: INR 1.07 on 01/15    * Not on AP or AC  * SWEETIE dark brown  * CT Abdomen and Pelvis w/ IV Cont (01.15.24 @ 16:49) Acute displaced left anterior 6th, 7th ribs, lateral 4th rib, right anterior 5th and 6th ribs and nondisplaced right L1 transverse process fractures. No pneumothorax. Patchy bilateral groundglass opacities, nonspecific and possibly infectious/inflammatory in etiology.Few nonspecific subcentimeter periduodenal lymph nodes, possibly reactive. Correlate for history of duodenitis.  * No prior EGD  * Prior Colonoscopy around 3 years ago: at [facility]  * Family History: no GI malignancies  * Had hypotension overnight on 01/16; reported melena x2 by nurse then again at 12:00 PM; SWEETIE dark brown with maroon tinge on 01/16 at 09:00 AM    RECOMMENDATIONS  - Will schedule for EGD (needs to be NPO for at least 8 hours; ate scrambled eggs at 8 AM)  - In case of unremarkable EGD, would consider Colonoscopy  - Keep NPO for now  - Trend CBC closely BID and transfuse as needed (target Hb >8). Maintain active Type and screen  - Trend BUN; Ensure placement x2 large 18G IV Bores  - Monitor MAP and keep > 65mmHg and continue IV fluid hydration  - Continue pantoprazole 40 IV BID  - On DVT prophylaxis (Heparin 93901 units Q8h)  - Monitor BM for melena or hematochezia   - Please avoid any NSAIDs  - If any unstable bleed, please call GI stat      Hepatic Dome Cysts   Transaminitis Likely from Rhabdomyolysis  > Hypotension  * Denies alcohol use  * CK >5000 on admission after being found on floor for 3 at least 3 days  * Cysts noted on imaging  * LFTs 12/06/2023 0.5/135/28/11  * LFTs 01/15 0.7/79/309/110  * INR 1.07 on 01/15  * Had hypotension on 01/16 and reported melena    RECOMMENDATIONS  - Trend LFTs  - IV fluid hydration for management of rhabdomyolysis; trend CK daily  - Avoid hepatotoxic agents  - Monitor BP and avoid hypotension  - Check hepatitis panel       Thank you for your consult.  - Please note that plan was communicated with medical team.   - Please reach GI on [phone] during weekdays till 5pm.  - Please call the GI service line after 5pm on Weekdays and anytime on Weekends: [phone].      Alexsandra Jerome MD  PGY - 4 Gastroenterology Fellow   [facility]     Assessment and Plan  Case of a 68 year old male patient known to have a history of HTN, DL, and BPH who was brought to the ED on 01/15 after being found on the floor unable to stand, found to be hemodynamically stable with evidence of acute rib fractures and pneumonia on imaging, admitted for further management. Stay was complicated on 01/16 by 2 episodes of melena per nurse. We are consulted for concern of melena.      Melena on 01/16 with History of Intermittent Hematochezia  Diane-Duodenal Lymphadenopathy  Acute Drop in Hemoglobin  * Baseline hemoglobin (prior to admission): Hb 13.1 on 12/06/2023  * ED Vitals:  /70 mmHg, HR 71 bpm   * Hemoglobin on admission: Hb 14.7 -> 01/16 Hb 13.1 dropped to 10.3  * BUN 19 on 12/06/2023 -> BUN 68 on 01/15 -> BUN 71 on 01/16  * LA 3.3 on 01/15 -> 1.2 on 01/16  * Coags: INR 1.07 on 01/15    * Not on AP or AC  * SWEETIE dark brown  * CT Abdomen and Pelvis w/ IV Cont (01.15.24 @ 16:49) Acute displaced left anterior 6th, 7th ribs, lateral 4th rib, right anterior 5th and 6th ribs and nondisplaced right L1 transverse process fractures. No pneumothorax. Patchy bilateral groundglass opacities, nonspecific and possibly infectious/inflammatory in etiology.Few nonspecific subcentimeter periduodenal lymph nodes, possibly reactive. Correlate for history of duodenitis.  * No prior EGD  * Prior Colonoscopy around 3 years ago: at [facility]  * Family History: no GI malignancies  * Had hypotension overnight on 01/16; reported melena x2 by nurse then again at 12:00 PM; SWEETIE dark brown with maroon tinge on 01/16 at 09:00 AM    RECOMMENDATIONS  - Will schedule for EGD (needs to be NPO for at least 8 hours; ate scrambled eggs at 8 AM)  - In case of unremarkable EGD, would consider Colonoscopy  - Keep NPO for now  - Trend CBC closely BID and transfuse as needed (target Hb >8). Maintain active Type and screen  - Trend BUN; Ensure placement x2 large 18G IV Bores  - Monitor MAP and keep > 65mmHg and continue IV fluid hydration  - Continue pantoprazole 40 IV BID  - On DVT prophylaxis (Heparin 37730 units Q8h)  - Monitor BM for melena or hematochezia   - Please avoid any NSAIDs  - If any unstable bleed, please call GI stat      Hepatic Dome Cysts   Transaminitis Likely from Rhabdomyolysis  > Hypotension  * Denies alcohol use  * CK >5000 on admission after being found on floor for 3 at least 3 days  * Cysts noted on imaging  * LFTs 12/06/2023 0.5/135/28/11  * LFTs 01/15 0.7/79/309/110  * INR 1.07 on 01/15  * Had hypotension on 01/16 and reported melena    RECOMMENDATIONS  - Trend LFTs  - IV fluid hydration for management of rhabdomyolysis; trend CK daily  - Avoid hepatotoxic agents  - Monitor BP and avoid hypotension  - Check hepatitis panel       Thank you for your consult.  - Please note that plan was communicated with medical team.   - Please reach GI on [phone] during weekdays till 5pm.  - Please call the GI service line after 5pm on Weekdays and anytime on Weekends: [phone].      Alexsandra Jerome MD  PGY - 4 Gastroenterology Fellow   [facility]     Assessment and Plan  Case of a 68 year old male patient known to have a history of HTN, DL, and BPH who was brought to the ED on 01/15 after being found on the floor unable to stand, found to be hemodynamically stable with evidence of acute rib fractures and pneumonia on imaging, admitted for further management. Stay was complicated on 01/16 by 2 episodes of melena per nurse. We are consulted for concern of melena.      Melena on 01/16 with History of Intermittent Hematochezia  Diane-Duodenal Lymphadenopathy  Acute Drop in Hemoglobin  * Baseline hemoglobin (prior to admission): Hb 13.1 on 12/06/2023  * ED Vitals:  /70 mmHg, HR 71 bpm   * Hemoglobin on admission: Hb 14.7 -> 01/16 Hb 13.1 dropped to 10.3  * BUN 19 on 12/06/2023 -> BUN 68 on 01/15 -> BUN 71 on 01/16  * LA 3.3 on 01/15 -> 1.2 on 01/16  * Coags: INR 1.07 on 01/15    * Not on AP or AC  * SWEETIE dark brown  * CT Abdomen and Pelvis w/ IV Cont (01.15.24 @ 16:49) Acute displaced left anterior 6th, 7th ribs, lateral 4th rib, right anterior 5th and 6th ribs and nondisplaced right L1 transverse process fractures. No pneumothorax. Patchy bilateral groundglass opacities, nonspecific and possibly infectious/inflammatory in etiology.Few nonspecific subcentimeter periduodenal lymph nodes, possibly reactive. Correlate for history of duodenitis.  * No prior EGD  * Prior Colonoscopy around 3 years ago: at [facility]  * Family History:  colon ca in father   * Had hypotension overnight on 01/16; reported melena x2 by nurse then again at 12:00 PM; SWEETIE dark brown with maroon tinge on 01/16 at 09:00 AM    RECOMMENDATIONS  - Will schedule for EGD (needs to be NPO for at least 8 hours; ate scrambled eggs at 8 AM)  - In case of unremarkable EGD, would consider Colonoscopy  - Keep NPO for now  - Trend CBC closely BID and transfuse as needed (target Hb >8). Maintain active Type and screen  - Trend BUN; Ensure placement x2 large 18G IV Bores  - Monitor MAP and keep > 65mmHg and continue IV fluid hydration  - Continue pantoprazole 40 IV BID  - On DVT prophylaxis (Heparin 43525 units Q8h)  - Monitor BM for melena or hematochezia   - Please avoid any NSAIDs  - If any unstable bleed, please call GI stat      Hepatic Dome Cysts   Transaminitis Likely from Rhabdomyolysis  > Hypotension  * Denies alcohol use  * CK >5000 on admission after being found on floor for 3 at least 3 days  * Cysts noted on imaging  * LFTs 12/06/2023 0.5/135/28/11  * LFTs 01/15 0.7/79/309/110  * INR 1.07 on 01/15  * Had hypotension on 01/16 and reported melena    RECOMMENDATIONS  - Trend LFTs  - IV fluid hydration for management of rhabdomyolysis; trend CK daily  - Avoid hepatotoxic agents  - Monitor BP and avoid hypotension  - Check hepatitis panel       Thank you for your consult.  - Please note that plan was communicated with medical team.   - Please reach GI on [phone] during weekdays till 5pm.  - Please call the GI service line after 5pm on Weekdays and anytime on Weekends: [phone].      Alexsandra Jerome MD  PGY - 4 Gastroenterology Fellow   [facility]     Assessment and Plan  Case of a 68 year old male patient known to have a history of HTN, DL, and BPH who was brought to the ED on 01/15 after being found on the floor unable to stand, found to be hemodynamically stable with evidence of acute rib fractures and pneumonia on imaging, admitted for further management. Stay was complicated on 01/16 by 2 episodes of melena per nurse. We are consulted for concern of melena.      Melena on 01/16 with History of Intermittent Hematochezia  Diane-Duodenal Lymphadenopathy  Acute Drop in Hemoglobin  * Baseline hemoglobin (prior to admission): Hb 13.1 on 12/06/2023  * ED Vitals:  /70 mmHg, HR 71 bpm   * Hemoglobin on admission: Hb 14.7 -> 01/16 Hb 13.1 dropped to 10.3  * BUN 19 on 12/06/2023 -> BUN 68 on 01/15 -> BUN 71 on 01/16  * LA 3.3 on 01/15 -> 1.2 on 01/16  * Coags: INR 1.07 on 01/15    * Not on AP or AC  * SWEETIE dark brown  * CT Abdomen and Pelvis w/ IV Cont (01.15.24 @ 16:49) Acute displaced left anterior 6th, 7th ribs, lateral 4th rib, right anterior 5th and 6th ribs and nondisplaced right L1 transverse process fractures. No pneumothorax. Patchy bilateral groundglass opacities, nonspecific and possibly infectious/inflammatory in etiology.Few nonspecific subcentimeter periduodenal lymph nodes, possibly reactive. Correlate for history of duodenitis.  * No prior EGD  * Prior Colonoscopy around 3 years ago: at [facility]  * Family History:  colon ca in father   * Had hypotension overnight on 01/16; reported melena x2 by nurse then again at 12:00 PM; SWEETIE dark brown with maroon tinge on 01/16 at 09:00 AM    RECOMMENDATIONS  - Will schedule for EGD (needs to be NPO for at least 8 hours; ate scrambled eggs at 8 AM)  - In case of unremarkable EGD, would consider Colonoscopy  - Keep NPO for now  - Trend CBC closely BID and transfuse as needed (target Hb >8). Maintain active Type and screen  - Trend BUN; Ensure placement x2 large 18G IV Bores  - Monitor MAP and keep > 65mmHg and continue IV fluid hydration  - Continue pantoprazole 40 IV BID  - On DVT prophylaxis (Heparin 62628 units Q8h)  - Monitor BM for melena or hematochezia   - Please avoid any NSAIDs  - If any unstable bleed, please call GI stat      Hepatic Dome Cysts   Transaminitis Likely from Rhabdomyolysis  > Hypotension  * Denies alcohol use  * CK >5000 on admission after being found on floor for 3 at least 3 days  * Cysts noted on imaging  * LFTs 12/06/2023 0.5/135/28/11  * LFTs 01/15 0.7/79/309/110  * INR 1.07 on 01/15  * Had hypotension on 01/16 and reported melena    RECOMMENDATIONS  - Trend LFTs  - IV fluid hydration for management of rhabdomyolysis; trend CK daily  - Avoid hepatotoxic agents  - Monitor BP and avoid hypotension  - Check hepatitis panel       Thank you for your consult.  - Please note that plan was communicated with medical team.   - Please reach GI on [phone] during weekdays till 5pm.  - Please call the GI service line after 5pm on Weekdays and anytime on Weekends: [phone].      Alexsandra Jerome MD  PGY - 4 Gastroenterology Fellow   [facility]

## 2024-01-16 NOTE — PATIENT PROFILE ADULT - FALL HARM RISK - HARM RISK INTERVENTIONS
Assistance with ambulation/Assistance OOB with selected safe patient handling equipment/Communicate Risk of Fall with Harm to all staff/Discuss with provider need for PT consult/Monitor gait and stability/Provide patient with walking aids - walker, cane, crutches/Reinforce activity limits and safety measures with patient and family/Sit up slowly, dangle for a short time, stand at bedside before walking/Tailored Fall Risk Interventions/Visual Cue: Yellow wristband and red socks/Bed in lowest position, wheels locked, appropriate side rails in place/Call bell, personal items and telephone in reach/Instruct patient to call for assistance before getting out of bed or chair/Non-slip footwear when patient is out of bed/[place] to call system/Physically safe environment - no spills, clutter or unnecessary equipment/Purposeful Proactive Rounding/Room/bathroom lighting operational, light cord in reach Assistance with ambulation/Assistance OOB with selected safe patient handling equipment/Communicate Risk of Fall with Harm to all staff/Discuss with provider need for PT consult/Monitor gait and stability/Provide patient with walking aids - walker, cane, crutches/Reinforce activity limits and safety measures with patient and family/Sit up slowly, dangle for a short time, stand at bedside before walking/Tailored Fall Risk Interventions/Visual Cue: Yellow wristband and red socks/Bed in lowest position, wheels locked, appropriate side rails in place/Call bell, personal items and telephone in reach/Instruct patient to call for assistance before getting out of bed or chair/Non-slip footwear when patient is out of bed/[place] to call system/Physically safe environment - no spills, clutter or unnecessary equipment/Purposeful Proactive Rounding/Room/bathroom lighting operational, light cord in reach

## 2024-01-16 NOTE — CONSULT NOTE ADULT - SUBJECTIVE AND OBJECTIVE BOX
SUSAN AWAD  68y Male    CHIEF COMPLAINT:    Patient is a 68y old  Male who presents with a chief complaint of b/l rib fx (16 Jan 2024 10:44)    INTERVAL HPI/OVERNIGHT EVENTS:    Patient seen and examined. No acute events overnight. Comfortable on NC, denies any complaints currently     ROS: All other systems are negative.    Vital Signs:    T(F): 99.1 (01-16-24 @ 08:00), Max: 99.1 (01-16-24 @ 03:00)  HR: 86 (01-16-24 @ 11:00) (71 - 109)  BP: 119/51 (01-16-24 @ 11:00) (79/58 - 159/60)  RR: 32 (01-16-24 @ 11:00) (15 - 32)  SpO2: 98% (01-16-24 @ 11:00) (95% - 100%)    15 Wenceslao 2024 07:01  -  16 Jan 2024 07:00  --------------------------------------------------------  IN: 3475 mL / OUT: 0 mL / NET: 3475 mL    16 Jan 2024 07:01  -  16 Jan 2024 11:28  --------------------------------------------------------  IN: 875 mL / OUT: 0 mL / NET: 875 mL    POCT Blood Glucose.: 98 mg/dL (15 Wenceslao 2024 15:29)    PHYSICAL EXAM:    GENERAL:  NAD  SKIN: No rashes or lesions  HEENT: Atraumatic. Normocephalic.    NECK: Supple, No JVD.    PULMONARY: POor inspiratory effort. CTA B/L. No wheezing.   CVS: Normal S1, S2. Rate and Rhythm are regular. .  ABDOMEN/GI: Soft, Nontender, Nondistended   MSK:  No clubbing or cyanosis   NEUROLOGIC:  follows commands   PSYCH: Awake and alert, confused at times     Consultant(s) Notes Reviewed:  [x ] YES  [ ] NO  Care Discussed with Consultants/Other Providers [ x] YES  [ ] NO    LABS:                        13.1   14.95 )-----------( 323      ( 16 Jan 2024 00:41 )             37.6     139  |  99  |  71<HH>  ----------------------------<  98  3.8   |  20  |  1.4    Ca    8.1<L>      16 Jan 2024 00:41  Phos  4.0     01-16  Mg     2.8     01-16    TPro  6.2  /  Alb  3.4<L>  /  TBili  0.7  /  DBili  x   /  AST  309<H>  /  ALT  110<H>  /  AlkPhos  79  01-15    PT/INR - ( 15 Wenceslao 2024 16:30 )   PT: 12.20 sec;   INR: 1.07 ratio       PTT - ( 15 Wenceslao 2024 16:30 )  PTT:29.4 sec    Trop --, CKMB --, CK 3911, 01-16-24 @ 00:41  Trop --, CKMB --, CK 5374, 01-15-24 @ 16:30    RADIOLOGY & ADDITIONAL TESTS:  Imaging or report Personally Reviewed:  [x] YES  [ ] NO  EKG reviewed: [x] YES  [ ] NO    Medications:  Standing  acetaminophen     Tablet .. 650 milliGRAM(s) Oral every 6 hours  chlorhexidine 2% Cloths 1 Application(s) Topical <User Schedule>  folic acid 1 milliGRAM(s) Oral daily  gabapentin 100 milliGRAM(s) Oral three times a day  heparin   Injectable 5000 Unit(s) SubCutaneous every 8 hours  hydroxychloroquine 400 milliGRAM(s) Oral daily  lactated ringers Bolus 500 milliLiter(s) IV Bolus once  lactated ringers. 1000 milliLiter(s) IV Continuous <Continuous>  lidocaine   4% Patch 1 Patch Transdermal every 24 hours  LORazepam     Tablet   Oral   LORazepam     Tablet 2 milliGRAM(s) Oral every 4 hours  methocarbamol 500 milliGRAM(s) Oral three times a day  metoprolol tartrate 25 milliGRAM(s) Oral two times a day  multivitamin 1 Tablet(s) Oral daily  pantoprazole  Injectable 40 milliGRAM(s) IV Push every 12 hours  tamsulosin 0.4 milliGRAM(s) Oral at bedtime  thiamine 100 milliGRAM(s) Oral daily    PRN Meds

## 2024-01-16 NOTE — CHART NOTE - NSCHARTNOTEFT_GEN_A_CORE
EGD Impressions:  	Grade C esophagitis compatible with nonspecific erosive esophagitis.  	Erosions in the stomach compatible with erosive gastritis.  	A 2mm ulcer was noted in the antrum with adherant clot (Flaco Class IIb). .  	A 3cm Deep crated ulcer with non bleeding visible vessel was noted in the anterior duodenal bulb (Flaco Class IIa). 4cc of 1:10.000 epinephrine was injected around the ulcer in 4 quadrants with adequate blanching. Using coagulation grasper, thermal therapy was applied with adequate hemostasis. (Injection, Thermal Therapy).  	Normal mucosa in the Second part of duodenum    PLAN:  Keep NPO today    Continue PPI drip for 48 hours   Monitor CBC q12, transfuse prn, monitor for further bleeding    Recommend repeat EGD in 2 months    Avoid NSAIDs    Communicated with primary team

## 2024-01-16 NOTE — CONSULT NOTE ADULT - ATTENDING COMMENTS
69yo male presents after being found on floor with rib fractures asked to evaluate for melena with decline in Hb. CT imaging reviewed possible duodenitis r/o PUD. Continue resuscitative measures, closely monitor Hb, transfuse as needed. Recommend IV PPI. Keep NPO. Plan for EGD today. 67yo male presents after being found on floor with rib fractures asked to evaluate for melena with decline in Hb. CT imaging reviewed possible duodenitis r/o PUD. Continue resuscitative measures, closely monitor Hb, transfuse as needed. Recommend IV PPI. Keep NPO. Plan for EGD today.

## 2024-01-17 LAB
ANION GAP SERPL CALC-SCNC: 10 MMOL/L — SIGNIFICANT CHANGE UP (ref 7–14)
BUN SERPL-MCNC: 105 MG/DL — CRITICAL HIGH (ref 10–20)
CALCIUM SERPL-MCNC: 7.5 MG/DL — LOW (ref 8.4–10.5)
CHLORIDE SERPL-SCNC: 99 MMOL/L — SIGNIFICANT CHANGE UP (ref 98–110)
CK SERPL-CCNC: 3906 U/L — HIGH (ref 0–225)
CO2 SERPL-SCNC: 26 MMOL/L — SIGNIFICANT CHANGE UP (ref 17–32)
CREAT SERPL-MCNC: 1.7 MG/DL — HIGH (ref 0.7–1.5)
EGFR: 43 ML/MIN/1.73M2 — LOW
GLUCOSE SERPL-MCNC: 128 MG/DL — HIGH (ref 70–99)
LACTATE SERPL-SCNC: 0.9 MMOL/L — SIGNIFICANT CHANGE UP (ref 0.7–2)
MAGNESIUM SERPL-MCNC: 2.7 MG/DL — HIGH (ref 1.8–2.4)
PHOSPHATE SERPL-MCNC: 4.6 MG/DL — SIGNIFICANT CHANGE UP (ref 2.1–4.9)
POTASSIUM SERPL-MCNC: 4 MMOL/L — SIGNIFICANT CHANGE UP (ref 3.5–5)
POTASSIUM SERPL-SCNC: 4 MMOL/L — SIGNIFICANT CHANGE UP (ref 3.5–5)
SODIUM SERPL-SCNC: 135 MMOL/L — SIGNIFICANT CHANGE UP (ref 135–146)

## 2024-01-17 PROCEDURE — 99233 SBSQ HOSP IP/OBS HIGH 50: CPT

## 2024-01-17 PROCEDURE — 71045 X-RAY EXAM CHEST 1 VIEW: CPT | Mod: 26

## 2024-01-17 PROCEDURE — 99221 1ST HOSP IP/OBS SF/LOW 40: CPT | Mod: FS

## 2024-01-17 PROCEDURE — 99291 CRITICAL CARE FIRST HOUR: CPT

## 2024-01-17 RX ORDER — METHOCARBAMOL 500 MG/1
500 TABLET, FILM COATED ORAL THREE TIMES A DAY
Refills: 0 | Status: DISCONTINUED | OUTPATIENT
Start: 2024-01-17 | End: 2024-01-18

## 2024-01-17 RX ORDER — SALICYLIC ACID 0.5 %
1 CLEANSER (GRAM) TOPICAL DAILY
Refills: 0 | Status: DISCONTINUED | OUTPATIENT
Start: 2024-01-17 | End: 2024-01-18

## 2024-01-17 RX ADMIN — Medication 650 MILLIGRAM(S): at 05:43

## 2024-01-17 RX ADMIN — Medication 650 MILLIGRAM(S): at 12:36

## 2024-01-17 RX ADMIN — Medication 400 MILLIGRAM(S): at 12:37

## 2024-01-17 RX ADMIN — Medication 25 MILLIGRAM(S): at 05:43

## 2024-01-17 RX ADMIN — LIDOCAINE 1 PATCH: 4 CREAM TOPICAL at 18:39

## 2024-01-17 RX ADMIN — Medication 1 APPLICATION(S): at 17:51

## 2024-01-17 RX ADMIN — Medication 650 MILLIGRAM(S): at 18:39

## 2024-01-17 RX ADMIN — HEPARIN SODIUM 5000 UNIT(S): 5000 INJECTION INTRAVENOUS; SUBCUTANEOUS at 14:00

## 2024-01-17 RX ADMIN — LIDOCAINE 1 PATCH: 4 CREAM TOPICAL at 07:08

## 2024-01-17 RX ADMIN — CHLORHEXIDINE GLUCONATE 1 APPLICATION(S): 213 SOLUTION TOPICAL at 05:56

## 2024-01-17 RX ADMIN — HEPARIN SODIUM 5000 UNIT(S): 5000 INJECTION INTRAVENOUS; SUBCUTANEOUS at 21:05

## 2024-01-17 RX ADMIN — TAMSULOSIN HYDROCHLORIDE 0.4 MILLIGRAM(S): 0.4 CAPSULE ORAL at 21:04

## 2024-01-17 RX ADMIN — Medication 1 MILLIGRAM(S): at 12:37

## 2024-01-17 RX ADMIN — GABAPENTIN 100 MILLIGRAM(S): 400 CAPSULE ORAL at 05:43

## 2024-01-17 RX ADMIN — Medication 650 MILLIGRAM(S): at 17:51

## 2024-01-17 RX ADMIN — Medication 650 MILLIGRAM(S): at 12:38

## 2024-01-17 RX ADMIN — Medication 650 MILLIGRAM(S): at 01:08

## 2024-01-17 RX ADMIN — GABAPENTIN 100 MILLIGRAM(S): 400 CAPSULE ORAL at 14:00

## 2024-01-17 RX ADMIN — HEPARIN SODIUM 5000 UNIT(S): 5000 INJECTION INTRAVENOUS; SUBCUTANEOUS at 05:43

## 2024-01-17 RX ADMIN — GABAPENTIN 100 MILLIGRAM(S): 400 CAPSULE ORAL at 21:05

## 2024-01-17 RX ADMIN — Medication 1 TABLET(S): at 12:37

## 2024-01-17 RX ADMIN — Medication 100 MILLIGRAM(S): at 12:37

## 2024-01-17 RX ADMIN — Medication 1 APPLICATION(S): at 12:33

## 2024-01-17 NOTE — PROGRESS NOTE ADULT - SUBJECTIVE AND OBJECTIVE BOX
Gastroenterology Follow Up Note      [facility]  Patient Name: SUSAN AWAD  Age: 68y  Gender: Male      Chief Complaint  Patient is a 68y old Male who presents with a chief complaint of b/l rib fx (17 Jan 2024 15:49)  Primary diagnosis of Acute renal failure      Reason for Consult  Reported Melena  Intermittent Hematochezia      Progress Note  This morning patient was seen and examined at bedside.    Today is hospital day 2d.  Patient is doing fine. No acute events overnight.   Patient is tolerating clear liquid diet and denies nausea or vomiting.   Patient denies any abdominal pain.  Last bowel movement was soft and was on 01/16 (melena prior to EGD). No recurrence of melena since then.      Vital Signs in the last 24 hours   Vitals Summary T(C): 36.4 (01-17-24 @ 16:00), Max: 36.4 (01-16-24 @ 20:00)  HR: 91 (01-17-24 @ 18:00) (56 - 91)  BP: 108/52 (01-17-24 @ 18:00) (106/53 - 153/67)  RR: 27 (01-17-24 @ 18:00) (15 - 27)  SpO2: 98% (01-17-24 @ 18:00) (94% - 100%)  Vent Data   Intake/ Output   01-16-24 @ 07:01  -  01-17-24 @ 07:00  --------------------------------------------------------  IN: 3985 mL / OUT: 450 mL / NET: 3535 mL    01-17-24 @ 07:01  -  01-17-24 @ 18:45  --------------------------------------------------------  IN: 1120 mL / OUT: 1485 mL / NET: -365 mL      Measurements       Physical Exam  * General Appearance: Alert, sleepy, not in distress  * Lungs: initially snoring during sleep, Good bilateral air entry after waking up, crackles on Nasal Canula  * Heart: Regular Rate and Rhythm, normal S1 and S2, no audible murmur, rub, or gallop  * Abdomen: Symmetric, non-distended, soft, non-tender, bowel sounds active all four quadrants, no masses, no organomegaly (no hepatosplenomegaly)      Investigations   Laboratory Workup      - CBC:                        9.5    13.16 )-----------( 266      ( 16 Jan 2024 22:02 )             27.4       - Hgb Trend:  9.5  01-16-24 @ 22:02  9.4  01-16-24 @ 14:49  10.3  01-16-24 @ 11:13  13.1  01-16-24 @ 00:41  14.7  01-15-24 @ 16:30          - Chemistry:  01-16    135  |  99  |  105<HH>  ----------------------------<  128<H>  4.0   |  26  |  1.7<H>    Ca    7.5<L>      16 Jan 2024 22:02  Phos  4.6     01-16  Mg     2.7     01-16    TPro  4.5<L>  /  Alb  2.5<L>  /  TBili  0.5  /  DBili  0.2  /  AST  193<H>  /  ALT  69<H>  /  AlkPhos  54  01-16    Liver panel trend:  TBili 0.5   /      /   ALT 69   /   AlkP 54   /   Tptn 4.5   /   Alb 2.5    /   DBili 0.2      01-16  TBili 0.7   /      /      /   AlkP 79   /   Tptn 6.2   /   Alb 3.4    /   DBili --      01-15      - Coagulation Studies:      - ABG:      - Cardiac Markers:  CARDIAC MARKERS ( 16 Jan 2024 22:02 )  x     / x     / 3906 U/L / x     / x      CARDIAC MARKERS ( 16 Jan 2024 00:41 )  x     / x     / 3911 U/L / x     / x            Microbiological Workup  Urinalysis Basic - ( 16 Jan 2024 22:02 )    Color: x / Appearance: x / SG: x / pH: x  Gluc: 128 mg/dL / Ketone: x  / Bili: x / Urobili: x   Blood: x / Protein: x / Nitrite: x   Leuk Esterase: x / RBC: x / WBC x   Sq Epi: x / Non Sq Epi: x / Bacteria: x        Current Medications  Standing Medications  acetaminophen     Tablet .. 650 milliGRAM(s) Oral every 6 hours  chlorhexidine 2% Cloths 1 Application(s) Topical <User Schedule>  folic acid 1 milliGRAM(s) Oral daily  gabapentin 100 milliGRAM(s) Oral three times a day  heparin   Injectable 5000 Unit(s) SubCutaneous every 8 hours  hydroxychloroquine 400 milliGRAM(s) Oral daily  lactated ringers. 1000 milliLiter(s) (75 mL/Hr) IV Continuous <Continuous>  lidocaine   4% Patch 1 Patch Transdermal every 24 hours  metoprolol tartrate 25 milliGRAM(s) Oral two times a day  multivitamin 1 Tablet(s) Oral daily  pantoprazole Infusion 8 mG/Hr (10 mL/Hr) IV Continuous <Continuous>  silver sulfADIAZINE 1% Cream 1 Application(s) Topical every 12 hours  tamsulosin 0.4 milliGRAM(s) Oral at bedtime  thiamine 100 milliGRAM(s) Oral daily  vitamin A &amp; D Ointment 1 Application(s) Topical daily    PRN Medications  methocarbamol 500 milliGRAM(s) Oral three times a day PRN Muscle Spasm    Singles Doses Administered  (ADM OVERRIDE) 1 each &lt;see task&gt; GiveOnce  (ADM OVERRIDE) 1 each &lt;see task&gt; GiveOnce  (ADM OVERRIDE) 1 each &lt;see task&gt; GiveOnce  diazepam  Injectable 5 milliGRAM(s) IV Push once  lactated ringers Bolus 500 milliLiter(s) IV Bolus once  lactated ringers Bolus 1000 milliLiter(s) IV Bolus once  lactated ringers Bolus 500 milliLiter(s) IV Bolus once  lactated ringers Bolus 500 milliLiter(s) IV Bolus once  lactated ringers Bolus 500 milliLiter(s) IV Bolus once  lactated ringers Bolus 1000 milliLiter(s) IV Bolus once  sodium chloride 0.9% Bolus 250 milliLiter(s) IV Bolus once

## 2024-01-17 NOTE — PROGRESS NOTE ADULT - ATTENDING COMMENTS
EGD performed yesterday for melena revealing large duodenal bulb ulcer with visible vessel s/p epi and thermal therapy with coag grasper. No further bleeding reported. Recommend IV PPI for 72 hours then change to PPI BID. If further overt GI bleeding please notify GI. LFTs improving, likely secondary to rhabdomyolysis. Check hepatitis panel. Continue to monitor LFT trend to ensure normalization.

## 2024-01-17 NOTE — PHYSICAL THERAPY INITIAL EVALUATION ADULT - GAIT DEVIATIONS NOTED, PT EVAL
decreased bettie/increased time in double stance/decreased velocity of limb motion/decreased step length/decreased stride length

## 2024-01-17 NOTE — PHYSICAL THERAPY INITIAL EVALUATION ADULT - GENERAL OBSERVATIONS, REHAB EVAL
pt. encountered in bed in NAD + tele, +O2 via nc, +sequentials, +IV locked by SY Alejandro prior to OOB.  [phone]

## 2024-01-17 NOTE — PROGRESS NOTE ADULT - SUBJECTIVE AND OBJECTIVE BOX
SUSAN AWAD   [id number]   [birth year]  68yM    Admit Date: 01-15-24  Indication for SICU:        ============================  HPI    Patient is a 67 y/o male with PMH of HTN, HLD, vertigo, Lupus who presents to the ED s/p found down for 3 days. The patient reports that he was walking when the banister on his staircase broke and he fell down a flight of stairs on 1/13 and was unable to get up from the ground until a friend was able to find him three days after the fall. Patient is A&O x4, HD stable, ?HT, +LOC, -AC. The patient reports that he did lose consciousness at one point but was awake and unable to get himself up from the ground. The patient was reportedly found soiled in urine, feces, with water bottles scattered around him. The patient reports that he had tried to get himself up from the ground but was unable to and sustained several rashes 2/2 rug burn while on the ground on his arms and abdomen. The patient was recently seen by the Trauma Service in 12/2023 for a fall and was since diagnosed with Vertigo that the patient reports was prescribed a medication that he cannot recall but does not wish to begin taking it. The patient currently reports bilateral rib pain, however denies CP, SOB, nausea, vomiting, dizziness, fever or chills.     SICU Consult for monitoring in the setting of advanced age with multiple rib fractures. The patient is afebrile, HR 80, 159/60, satting 98 on RA. The patient can pull 2.5L on incentive spirometer.      24 Hour Events  1/16  NIGHT  -upper airway secretions on PM rounds, encouraged to cough up  -AM CXR ordered   -Hgb stable 9.5 from 9.4    1/16   DAY  - BP 78 SBP after HTN meds--> [id number]-->110's improved  - D/C'd valsartan for now  - CIWA initiated for daily drinking, confused in AM    - F/u voiding (episode of incontience)   - GI consult > 15:00 repeat, plan for EGD after 4PM today  - Burn consult for rugburn lesions  - PPI q12  - PT/OT; OOB  - No am cxr as long as sating well  - bilateral Arm/elbow xrays, 1/15 - negative  - bilateral knee xrays - negative  -11AM labs: Na - 136// K - 3.8// Cl 100// CO2 - 22// BUN/Cr - 95/1.7// Mg - 2.7// PO4 - 3.4  -16:00 T&S - A- , Ab neg  - Trend lactate/CK 8PM  - Hep C negative  - CXR, 1/16 - Right mid/lower zone atelectasis.   - EGD, 1/16 - erosive esophagitis and gastritis. 2mm antrum ulcer, 3mm duodenal ulcer (injected w/ epi, hemostasis w/ thermal therapy.)  - f/u repeat cbc at 8pm  - Protonix gtt      [X] A ten-point review of systems was otherwise negative except as noted above.  [  ] Due to altered mental status/intubation, subjective information was not attained from the patient. History was obtained, to the extent possible, from review of the chart and collateral sources of information.    =========================================================================================================================================  =========================================================================================================================================   SUSAN AWAD   [id number]   [birth year]  68yM    Admit Date: 01-15-24  Indication for SICU:        ============================  HPI    Patient is a 67 y/o male with PMH of HTN, HLD, vertigo, Lupus who presents to the ED s/p found down for 3 days. The patient reports that he was walking when the banister on his staircase broke and he fell down a flight of stairs on 1/13 and was unable to get up from the ground until a friend was able to find him three days after the fall. Patient is A&O x4, HD stable, ?HT, +LOC, -AC. The patient reports that he did lose consciousness at one point but was awake and unable to get himself up from the ground. The patient was reportedly found soiled in urine, feces, with water bottles scattered around him. The patient reports that he had tried to get himself up from the ground but was unable to and sustained several rashes 2/2 rug burn while on the ground on his arms and abdomen. The patient was recently seen by the Trauma Service in 12/2023 for a fall and was since diagnosed with Vertigo that the patient reports was prescribed a medication that he cannot recall but does not wish to begin taking it. The patient currently reports bilateral rib pain, however denies CP, SOB, nausea, vomiting, dizziness, fever or chills.     SICU Consult for monitoring in the setting of advanced age with multiple rib fractures. The patient is afebrile, HR 80, 159/60, satting 98 on RA. The patient can pull 2.5L on incentive spirometer.      24 Hour Events  1/16  NIGHT  -upper airway secretions on PM rounds, encouraged to cough up  -AM CXR ordered   -Hgb stable 9.5 from 9.4    1/16   DAY  - BP 78 SBP after HTN meds--> [id number]-->110's improved  - D/C'd valsartan for now  - CIWA initiated for daily drinking, confused in AM    - F/u voiding (episode of incontience)   - GI consult > 15:00 repeat, plan for EGD after 4PM today  - Burn consult for rugburn lesions  - PPI q12  - PT/OT; OOB  - No am cxr as long as sating well  - bilateral Arm/elbow xrays, 1/15 - negative  - bilateral knee xrays - negative  -11AM labs: Na - 136// K - 3.8// Cl 100// CO2 - 22// BUN/Cr - 95/1.7// Mg - 2.7// PO4 - 3.4  -16:00 T&S - A- , Ab neg  - Trend lactate/CK 8PM  - Hep C negative  - CXR, 1/16 - Right mid/lower zone atelectasis.   - EGD, 1/16 - erosive esophagitis and gastritis. 2mm antrum ulcer, 3mm duodenal ulcer (injected w/ epi, hemostasis w/ thermal therapy.)  - f/u repeat cbc at 8pm  - Protonix gtt      [X] A ten-point review of systems was otherwise negative except as noted above.  [  ] Due to altered mental status/intubation, subjective information was not attained from the patient. History was obtained, to the extent possible, from review of the chart and collateral sources of information.    =========================================================================================================================================  =========================================================================================================================================  Daily     Daily     Diet, NPO:   Except Medications (01-16-24 @ 11:22)      CURRENT MEDS:  Neurologic Medications  acetaminophen     Tablet .. 650 milliGRAM(s) Oral every 6 hours  gabapentin 100 milliGRAM(s) Oral three times a day  LORazepam     Tablet   Oral   LORazepam     Tablet 1.5 milliGRAM(s) Oral every 4 hours  LORazepam     Tablet 2 milliGRAM(s) Oral every 4 hours  methocarbamol 500 milliGRAM(s) Oral three times a day PRN Muscle Spasm    Respiratory Medications    Cardiovascular Medications  metoprolol tartrate 25 milliGRAM(s) Oral two times a day    Gastrointestinal Medications  folic acid 1 milliGRAM(s) Oral daily  lactated ringers. 1000 milliLiter(s) IV Continuous <Continuous>  multivitamin 1 Tablet(s) Oral daily  pantoprazole Infusion 8 mG/Hr IV Continuous <Continuous>  thiamine 100 milliGRAM(s) Oral daily    Genitourinary Medications  tamsulosin 0.4 milliGRAM(s) Oral at bedtime    Hematologic/Oncologic Medications  heparin   Injectable 5000 Unit(s) SubCutaneous every 8 hours    Antimicrobial/Immunologic Medications  hydroxychloroquine 400 milliGRAM(s) Oral daily    Endocrine/Metabolic Medications    Topical/Other Medications  chlorhexidine 2% Cloths 1 Application(s) Topical <User Schedule>  lidocaine   4% Patch 1 Patch Transdermal every 24 hours  vitamin A &amp; D Ointment 1 Application(s) Topical daily      ICU Vital Signs Last 24 Hrs  T(C): 36.3 (17 Jan 2024 04:00), Max: 37.3 (16 Jan 2024 08:00)  T(F): 97.4 (17 Jan 2024 04:00), Max: 99.1 (16 Jan 2024 08:00)  HR: 64 (17 Jan 2024 07:00) (56 - 86)  BP: 108/52 (17 Jan 2024 07:00) (87/52 - 146/60)  BP(mean): 74 (17 Jan 2024 07:00) (63 - 88)  ABP: --  ABP(mean): --  RR: 15 (17 Jan 2024 07:00) (15 - 32)  SpO2: 97% (17 Jan 2024 07:00) (94% - 100%)    O2 Parameters below as of 17 Jan 2024 07:00  Patient On (Oxygen Delivery Method): nasal cannula  O2 Flow (L/min): 2          I&O's Summary    16 Jan 2024 07:01  -  17 Jan 2024 07:00  --------------------------------------------------------  IN: 3985 mL / OUT: 450 mL / NET: 3535 mL      I&O's Detail    16 Jan 2024 07:01  -  17 Jan 2024 07:00  --------------------------------------------------------  IN:    Lactated Ringers: 2375 mL    Lactated Ringers Bolus: 1500 mL    Pantoprazole: 110 mL  Total IN: 3985 mL    OUT:    Intermittent Catheterization - Urethral (mL): 450 mL  Total OUT: 450 mL    Total NET: 3535 mL      PHYSICAL EXAM:    General/Neuro  Deficits:                             alert & oriented x 3, no focal deficits  Pupils:    Lungs:      clear to auscultation, Normal expansion/effort.     Cardiovascular : S1, S2.  Regular rate and rhythm. No Peripheral edema   Cardiac Rhythm: Normal Sinus Rhythm    GI: Abdomen soft, Non-tender, Non-distended.        Extremities: Extremities warm, pink, well-perfused. Pulses: palpable dp b/l    Derm: Good skin turgor, no skin breakdown.  RUQ abdominal abrasion, b/l knee abrasions    :       voiding       CXR:     LABS:  CAPILLARY BLOOD GLUCOSE                              9.5    13.16 )-----------( 266      ( 16 Jan 2024 22:02 )             27.4       01-16    135  |  99  |  105<HH>  ----------------------------<  128<H>  4.0   |  26  |  1.7<H>    Ca    7.5<L>      16 Jan 2024 22:02  Phos  4.6     01-16  Mg     2.7     01-16    TPro  4.5<L>  /  Alb  2.5<L>  /  TBili  0.5  /  DBili  0.2  /  AST  193<H>  /  ALT  69<H>  /  AlkPhos  54  01-16      PT/INR - ( 15 Wenceslao 2024 16:30 )   PT: 12.20 sec;   INR: 1.07 ratio         PTT - ( 15 Wenceslao 2024 16:30 )  PTT:29.4 sec  CARDIAC MARKERS ( 16 Jan 2024 22:02 )  x     / x     / 3906 U/L / x     / x      CARDIAC MARKERS ( 16 Jan 2024 00:41 )  x     / x     / 3911 U/L / x     / x      CARDIAC MARKERS ( 15 Wenceslao 2024 16:30 )  x     / x     / 5374 U/L / x     / x          Urinalysis Basic - ( 16 Jan 2024 22:02 )    Color: x / Appearance: x / SG: x / pH: x  Gluc: 128 mg/dL / Ketone: x  / Bili: x / Urobili: x   Blood: x / Protein: x / Nitrite: x   Leuk Esterase: x / RBC: x / WBC x   Sq Epi: x / Non Sq Epi: x / Bacteria: x

## 2024-01-17 NOTE — OCCUPATIONAL THERAPY INITIAL EVALUATION ADULT - DIAGNOSIS, OT EVAL
Rehab of multitrauma, s/p fall with R 5-6 and L 4/6/7 rib fx and L1 transverse process fx / HTN, HLD, vertigo, and lupus

## 2024-01-17 NOTE — CONSULT NOTE ADULT - SUBJECTIVE AND OBJECTIVE BOX
Patient is a 68y old  Male who presents with a chief complaint of b/l rib fx (17 Jan 2024 09:26)  AM ROUNDS  Burn consulted for multiple pressure/friction wounds    Vital Signs Last 24 Hrs  T(C): 36.4 (17 Jan 2024 08:00), Max: 37 (16 Jan 2024 12:00)  T(F): 97.5 (17 Jan 2024 08:00), Max: 98.6 (16 Jan 2024 12:00)  HR: 62 (17 Jan 2024 08:00) (56 - 85)  BP: 118/56 (17 Jan 2024 08:00) (94/47 - 146/60)  BP(mean): 81 (17 Jan 2024 08:00) (68 - 88)  RR: 15 (17 Jan 2024 08:00) (15 - 24)  SpO2: 96% (17 Jan 2024 08:00) (94% - 100%)    LABS:                        9.5    13.16 )-----------( 266      ( 16 Jan 2024 22:02 )             27.4     01-16    135  |  99  |  105<HH>  ----------------------------<  128<H>  4.0   |  26  |  1.7<H>    Ca    7.5<L>      16 Jan 2024 22:02  Phos  4.6     01-16  Mg     2.7     01-16    TPro  4.5<L>  /  Alb  2.5<L>  /  TBili  0.5  /  DBili  0.2  /  AST  193<H>  /  ALT  69<H>  /  AlkPhos  54  01-16    PT/INR - ( 15 Wenceslao 2024 16:30 )   PT: 12.20 sec;   INR: 1.07 ratio    PTT - ( 15 Wenceslao 2024 16:30 )  PTT:29.4 sec    MEDICATIONS  (STANDING):  acetaminophen     Tablet .. 650 milliGRAM(s) Oral every 6 hours  chlorhexidine 2% Cloths 1 Application(s) Topical <User Schedule>  folic acid 1 milliGRAM(s) Oral daily  gabapentin 100 milliGRAM(s) Oral three times a day  heparin   Injectable 5000 Unit(s) SubCutaneous every 8 hours  hydroxychloroquine 400 milliGRAM(s) Oral daily  lactated ringers. 1000 milliLiter(s) (75 mL/Hr) IV Continuous <Continuous>  lidocaine   4% Patch 1 Patch Transdermal every 24 hours  metoprolol tartrate 25 milliGRAM(s) Oral two times a day  multivitamin 1 Tablet(s) Oral daily  pantoprazole Infusion 8 mG/Hr (10 mL/Hr) IV Continuous <Continuous>  tamsulosin 0.4 milliGRAM(s) Oral at bedtime  thiamine 100 milliGRAM(s) Oral daily  vitamin A &amp; D Ointment 1 Application(s) Topical daily    MEDICATIONS  (PRN):  methocarbamol 500 milliGRAM(s) Oral three times a day PRN Muscle Spasm      PHYSICAL EXAM:  General: Pt lying in bed, no acute distress  Skin: R chest- approx. 6x6 cm black eschar under left breast. No surrounding erythema, active purulence, bleeding or underlying hematoma. b/l UEs and LEs- scattered partial thickness abrasions to extremities, no surrounding erythema, swelling or active drainage.    + large dressing change performed Patient is a 68y old  Male who presents with a chief complaint of b/l rib fx (17 Jan 2024 09:26)  AM ROUNDS  Burn consulted for multiple pressure/friction wounds    Vital Signs Last 24 Hrs  T(C): 36.4 (17 Jan 2024 08:00), Max: 37 (16 Jan 2024 12:00)  T(F): 97.5 (17 Jan 2024 08:00), Max: 98.6 (16 Jan 2024 12:00)  HR: 62 (17 Jan 2024 08:00) (56 - 85)  BP: 118/56 (17 Jan 2024 08:00) (94/47 - 146/60)  BP(mean): 81 (17 Jan 2024 08:00) (68 - 88)  RR: 15 (17 Jan 2024 08:00) (15 - 24)  SpO2: 96% (17 Jan 2024 08:00) (94% - 100%)    LABS:                        9.5    13.16 )-----------( 266      ( 16 Jan 2024 22:02 )             27.4     01-16    135  |  99  |  105<HH>  ----------------------------<  128<H>  4.0   |  26  |  1.7<H>    Ca    7.5<L>      16 Jan 2024 22:02  Phos  4.6     01-16  Mg     2.7     01-16    TPro  4.5<L>  /  Alb  2.5<L>  /  TBili  0.5  /  DBili  0.2  /  AST  193<H>  /  ALT  69<H>  /  AlkPhos  54  01-16    PT/INR - ( 15 Wenceslao 2024 16:30 )   PT: 12.20 sec;   INR: 1.07 ratio    PTT - ( 15 Wenceslao 2024 16:30 )  PTT:29.4 sec    MEDICATIONS  (STANDING):  acetaminophen     Tablet .. 650 milliGRAM(s) Oral every 6 hours  chlorhexidine 2% Cloths 1 Application(s) Topical <User Schedule>  folic acid 1 milliGRAM(s) Oral daily  gabapentin 100 milliGRAM(s) Oral three times a day  heparin   Injectable 5000 Unit(s) SubCutaneous every 8 hours  hydroxychloroquine 400 milliGRAM(s) Oral daily  lactated ringers. 1000 milliLiter(s) (75 mL/Hr) IV Continuous <Continuous>  lidocaine   4% Patch 1 Patch Transdermal every 24 hours  metoprolol tartrate 25 milliGRAM(s) Oral two times a day  multivitamin 1 Tablet(s) Oral daily  pantoprazole Infusion 8 mG/Hr (10 mL/Hr) IV Continuous <Continuous>  tamsulosin 0.4 milliGRAM(s) Oral at bedtime  thiamine 100 milliGRAM(s) Oral daily  vitamin A &amp; D Ointment 1 Application(s) Topical daily    MEDICATIONS  (PRN):  methocarbamol 500 milliGRAM(s) Oral three times a day PRN Muscle Spasm      PHYSICAL EXAM:  General: Pt lying in bed, no acute distress  Skin: R chest- approx. 6x6 cm black eschar under right breast. No surrounding erythema, active purulence, bleeding or underlying hematoma. b/l UEs and LEs- scattered partial thickness abrasions to extremities, no surrounding erythema, swelling or active drainage.    + large dressing change performed

## 2024-01-17 NOTE — SBIRT NOTE ADULT - NSSBIRTBRIEFINTDET_GEN_A_CORE
LMSW provided psychoeducation on the effects of continued ETOH use, as well as offered JOE resources/support which pt declined interest in at this time, noting that he does not feel he requires any assistance.

## 2024-01-17 NOTE — PROGRESS NOTE ADULT - ASSESSMENT
A/P:  68y Male  s/p fall down flight of stairs, found down three days later, Rib fractures R 5-6th L 4th, 6th, 7th rib fracture, and L1 TP fracture    PLAN:   plan for scope with gi, burn cs for road rash. hx of etoh abuse- on ciwa.   pain: apap, aracelis, lido patch, robaxin  resp: is 2.5l; fu am cxr  card: valsartan, metoprolol  gi: reg diet  gu: lr 125; tamsulosin, jeff (bl cr 0.8) - 1.6 > 1.4; ck 5374, lactate 3.3  heme: hsq, hgb 9 from 13"      TAP (Trauma, Acute care, Pediatrics) Spectra [phone]

## 2024-01-17 NOTE — PROGRESS NOTE ADULT - SUBJECTIVE AND OBJECTIVE BOX
GENERAL SURGERY PROGRESS NOTE     SUSAN AWAD  [address] [place] [address] day :2d    24 Hour Events  1/16  NIGHT  -upper airway secretions on PM rounds, encouraged to cough up  -AM CXR ordered   -Hgb stable 9.5 from 9.4    1/16   DAY  - BP 78 SBP after HTN meds--> [id number]-->110's improved  - D/C'd valsartan for now  - CIWA initiated for daily drinking, confused in AM    - F/u voiding (episode of incontience)   - GI consult > 15:00 repeat, plan for EGD after 4PM today  - Burn consult for rugburn lesions  - PPI q12  - PT/OT; OOB  - No am cxr as long as sating well  - bilateral Arm/elbow xrays, 1/15 - negative  - bilateral knee xrays - negative  -11AM labs: Na - 136// K - 3.8// Cl 100// CO2 - 22// BUN/Cr - 95/1.7// Mg - 2.7// PO4 - 3.4  -16:00 T&S - A- , Ab neg  - Trend lactate/CK 8PM  - Hep C negative  - CXR, 1/16 - Right mid/lower zone atelectasis.   - EGD, 1/16 - erosive esophagitis and gastritis. 2mm antrum ulcer, 3mm duodenal ulcer (injected w/ epi, hemostasis w/ thermal therapy.)  - f/u repeat cbc at 8pm  - Protonix gtt    PHYSICAL EXAM:  GENERAL: NAD, well-appearing  CHEST/LUNG: Clear to auscultation bilaterally  HEART: Regular rate and rhythm  ABDOMEN: Soft, Nontender, Nondistended;   EXTREMITIES:  No clubbing, cyanosis, or edema        T(F): 97.5 (01-17-24 @ 08:00), Max: 98.6 (01-16-24 @ 12:00)  HR: 62 (01-17-24 @ 08:00) (56 - 86)  BP: 118/56 (01-17-24 @ 08:00) (87/52 - 146/60)  ABP: --  ABP(mean): --  RR: 15 (01-17-24 @ 08:00) (15 - 32)  SpO2: 96% (01-17-24 @ 08:00) (94% - 100%)    IN'S / OUT's:    01-16-24 @ 07:01  -  01-17-24 @ 07:00  --------------------------------------------------------  IN:    Lactated Ringers: 2375 mL    Lactated Ringers Bolus: 1500 mL    Pantoprazole: 110 mL  Total IN: 3985 mL    OUT:    Intermittent Catheterization - Urethral (mL): 450 mL  Total OUT: 450 mL    Total NET: 3535 mL      01-17-24 @ 07:01 - 01-17-24 @ 09:27  --------------------------------------------------------  IN:    Lactated Ringers: 250 mL    Pantoprazole: 20 mL  Total IN: 270 mL    OUT:    Voided (mL): 785 mL  Total OUT: 785 mL    Total NET: -515 mL          LABS  Labs:  CAPILLARY BLOOD GLUCOSE                              9.5    13.16 )-----------( 266      ( 16 Jan 2024 22:02 )             27.4       Auto Neutrophil %: 83.5 % (01-16-24 @ 22:02)  Auto Immature Granulocyte %: 1.7 % (01-16-24 @ 22:02)  Auto Neutrophil %: 68.9 % (01-16-24 @ 14:49)  Auto Immature Granulocyte %: 1.8 % (01-16-24 @ 14:49)  Auto Neutrophil %: 71.9 % (01-16-24 @ 11:13)  Auto Immature Granulocyte %: 1.6 % (01-16-24 @ 11:13)    01-16    135  |  99  |  105<HH>  ----------------------------<  128<H>  4.0   |  26  |  1.7<H>      Calcium: 7.5 mg/dL (01-16-24 @ 22:02)      LFTs:             4.5  | 0.5  | 193      ------------------[54      ( 16 Jan 2024 11:13 )  2.5  | 0.2  | 69          Lipase:x      Amylase:x         Lactate, Blood: 0.9 mmol/L (01-16-24 @ 22:02)  Lactate, Blood: 1.2 mmol/L (01-16-24 @ 00:41)  Blood Gas Venous - Lactate: 1.8 mmol/L (01-15-24 @ 19:45)  Lactate, Blood: 3.3 mmol/L (01-15-24 @ 16:30)      Coags:     12.20  ----< 1.07    ( 15 Wenceslao 2024 16:30 )     29.4        CARDIAC MARKERS ( 16 Jan 2024 22:02 )  x     / x     / 3906 U/L / x     / x      CARDIAC MARKERS ( 16 Jan 2024 00:41 )  x     / x     / 3911 U/L / x     / x      CARDIAC MARKERS ( 15 Wenceslao 2024 16:30 )  x     / x     / 5374 U/L / x     / x              Urinalysis Basic - ( 16 Jan 2024 22:02 )    Color: x / Appearance: x / SG: x / pH: x  Gluc: 128 mg/dL / Ketone: x  / Bili: x / Urobili: x   Blood: x / Protein: x / Nitrite: x   Leuk Esterase: x / RBC: x / WBC x   Sq Epi: x / Non Sq Epi: x / Bacteria: x

## 2024-01-17 NOTE — OCCUPATIONAL THERAPY INITIAL EVALUATION ADULT - LIVES WITH, PROFILE
Pt lives alone in pvt home with steps to enter and 1 flight of steps inside to bathroom. Pt lives alone in pvt home with 4-5 steps to enter and 1 flight of steps inside to bathroom.

## 2024-01-17 NOTE — PHYSICAL THERAPY INITIAL EVALUATION ADULT - ADDITIONAL COMMENTS
pt reports living alone in a house with 4 + 6 steps to enter and one flight to bedrooms. he was independent prior to admission.
pt reports living alone in a house with 4 + 6 steps to enter and one flight to bedrooms. he was independent prior to admission.

## 2024-01-17 NOTE — PROGRESS NOTE ADULT - ATTENDING COMMENTS
Patient had melena yesterday.  Underwent EGD and was found with gastric and duodenal ulcers - hemostasis was achieved.  No new bleeding overnight.  Hb 9.5.  Pain seems to be controlled.  Patient is more awake and alert today.  No signs of alcohol withdrawal at this time.  Creat 1.7    ASSESSMENT:  69 y/o male, S/P Found down.  Closed Right 5,6th Rib Fractures.  Closed Left 4,6,7th Rib Fractures.  Acute pain due to trauma.  L1 Right Transverse Process Fracture.  Traumatic Rhabdomyolysis.  Dehydration.  JHON (Creat 1.6 from baseline 0.8).  Chronic Alcohol Use.  At risk for hemodynamic instability.  Upper GI Bleeding.  Ulcer disease.    PLAN:  - pain control as needed - avoid opioids  - encourage incentive spirometer  - use O2 with NC as needed  - keep MAP>65; euvolemic at this time  - ok for clear liquid diet  - continue PPI drip for another 24 h as per GI  - follow serum electrolytes and UOP  - follow H&H  - DVT prophylaxis  - Burn team evaluation for pressure ulcers on his abdomen appreciated  SICU care

## 2024-01-17 NOTE — PROGRESS NOTE ADULT - SUBJECTIVE AND OBJECTIVE BOX
INTERVAL HPI/OVERNIGHT EVENTS:    SUBJECTIVE: Patient seen and examined at bedside.     no cp, sob, abd pain, fever  no abd pain, nausea, vomiting, diarrhea    OBJECTIVE:    VITAL SIGNS:  Vital Signs Last 24 Hrs  T(C): 36.1 (17 Jan 2024 12:00), Max: 36.6 (16 Jan 2024 16:36)  T(F): 97 (17 Jan 2024 12:00), Max: 97.8 (16 Jan 2024 16:36)  HR: 90 (17 Jan 2024 12:00) (56 - 90)  BP: 115/59 (17 Jan 2024 12:00) (106/53 - 153/67)  BP(mean): 79 (17 Jan 2024 12:00) (74 - 96)  RR: 22 (17 Jan 2024 12:00) (15 - 24)  SpO2: 98% (17 Jan 2024 12:00) (94% - 100%)    Parameters below as of 17 Jan 2024 12:00  Patient On (Oxygen Delivery Method): nasal cannula  O2 Flow (L/min): 2        PHYSICAL EXAM:    General: NAD  HEENT: NC/AT; PERRL, clear conjunctiva  Neck: supple  Respiratory: CTA b/l  Cardiovascular: +S1/S2; RRR  Abdomen: soft, NT/ND; +BS x4  Extremities: WWP, 2+ peripheral pulses b/l; no LE edema  Skin: normal color and turgor; no rash  Neurological:    MEDICATIONS:  MEDICATIONS  (STANDING):  acetaminophen     Tablet .. 650 milliGRAM(s) Oral every 6 hours  chlorhexidine 2% Cloths 1 Application(s) Topical <User Schedule>  folic acid 1 milliGRAM(s) Oral daily  gabapentin 100 milliGRAM(s) Oral three times a day  heparin   Injectable 5000 Unit(s) SubCutaneous every 8 hours  hydroxychloroquine 400 milliGRAM(s) Oral daily  lactated ringers. 1000 milliLiter(s) (75 mL/Hr) IV Continuous <Continuous>  lidocaine   4% Patch 1 Patch Transdermal every 24 hours  metoprolol tartrate 25 milliGRAM(s) Oral two times a day  multivitamin 1 Tablet(s) Oral daily  pantoprazole Infusion 8 mG/Hr (10 mL/Hr) IV Continuous <Continuous>  silver sulfADIAZINE 1% Cream 1 Application(s) Topical every 12 hours  tamsulosin 0.4 milliGRAM(s) Oral at bedtime  thiamine 100 milliGRAM(s) Oral daily  vitamin A &amp; D Ointment 1 Application(s) Topical daily    MEDICATIONS  (PRN):  methocarbamol 500 milliGRAM(s) Oral three times a day PRN Muscle Spasm      ALLERGIES:  Allergies    No Known Allergies    Intolerances        LABS:                        9.5    13.16 )-----------( 266      ( 16 Jan 2024 22:02 )             27.4     Hemoglobin: 9.5 g/dL (01-16 @ 22:02)  Hemoglobin: 9.4 g/dL (01-16 @ 14:49)  Hemoglobin: 10.3 g/dL (01-16 @ 11:13)  Hemoglobin: 13.1 g/dL (01-16 @ 00:41)  Hemoglobin: 14.7 g/dL (01-15 @ 16:30)    CBC Full  -  ( 16 Jan 2024 22:02 )  WBC Count : 13.16 K/uL  RBC Count : 3.00 M/uL  Hemoglobin : 9.5 g/dL  Hematocrit : 27.4 %  Platelet Count - Automated : 266 K/uL  Mean Cell Volume : 91.3 fL  Mean Cell Hemoglobin : 31.7 pg  Mean Cell Hemoglobin Concentration : 34.7 g/dL  Auto Neutrophil # : 10.99 K/uL  Auto Lymphocyte # : 1.57 K/uL  Auto Monocyte # : 0.35 K/uL  Auto Eosinophil # : 0.00 K/uL  Auto Basophil # : 0.02 K/uL  Auto Neutrophil % : 83.5 %  Auto Lymphocyte % : 11.9 %  Auto Monocyte % : 2.7 %  Auto Eosinophil % : 0.0 %  Auto Basophil % : 0.2 %    01-16    135  |  99  |  105<HH>  ----------------------------<  128<H>  4.0   |  26  |  1.7<H>    Ca    7.5<L>      16 Jan 2024 22:02  Phos  4.6     01-16  Mg     2.7     01-16    TPro  4.5<L>  /  Alb  2.5<L>  /  TBili  0.5  /  DBili  0.2  /  AST  193<H>  /  ALT  69<H>  /  AlkPhos  54  01-16    Creatinine Trend: 1.7<--, 1.7<--, 1.4<--, 1.6<--  LIVER FUNCTIONS - ( 16 Jan 2024 11:13 )  Alb: 2.5 g/dL / Pro: 4.5 g/dL / ALK PHOS: 54 U/L / ALT: 69 U/L / AST: 193 U/L / GGT: x           PT/INR - ( 15 Wenceslao 2024 16:30 )   PT: 12.20 sec;   INR: 1.07 ratio         PTT - ( 15 Wenceslao 2024 16:30 )  PTT:29.4 sec    hs Troponin:            Urinalysis Basic - ( 16 Jan 2024 22:02 )    Color: x / Appearance: x / SG: x / pH: x  Gluc: 128 mg/dL / Ketone: x  / Bili: x / Urobili: x   Blood: x / Protein: x / Nitrite: x   Leuk Esterase: x / RBC: x / WBC x   Sq Epi: x / Non Sq Epi: x / Bacteria: x      CSF:                      EKG:   MICROBIOLOGY:    IMAGING:      Labs, imaging, EKG personally reviewed    RADIOLOGY & ADDITIONAL TESTS: Reviewed.

## 2024-01-17 NOTE — PROGRESS NOTE ADULT - ASSESSMENT
Patient is a 67 y/o male with PMH of HTN, HLD, vertigo, possible Lupus who presents to the ED s/p found down for 3 days. Patient reports falling out of bed, denies any LOC, but does have intermittent dizziness at baseline. He lives alone and was found to his friend 3 days later, admitted to trauma service due to rib fx and possible GIB. Hospitalist following for comanagement    #Melena  s/p egd 1/16 with ulcer, visible vessel s/p hemostasis  protonix gtt  trend h/h  cld  f/u gi    #Unwitnessed fall resulting in rib fx  #Rhabdomyolysis  orthostatics  tte  check rvp  trend cpk; lr 75 cc/hr    #Osseous lesions  incidentally noted on ct c spine  outpt f/u with mri    #History OF MVP  #HTN  #CAD  - follows with Dr Rosenberg, please confirm home meds  - check echo    #Transaminitis - could be due to transient hypotension  #Hepatic Cysts   - prior records reviewed, LFTs previously normal  - c/w monitoring, avoid hepatotoxic meds  -   #SLE - follows with Dr Sanchez, per OP records, patient on Hydroxychloroquine 400 Q12H    #Vertigo - patient was prescribed meclizine but not currently taking

## 2024-01-17 NOTE — CHART NOTE - NSCHARTNOTEFT_GEN_A_CORE
68y Male  s/p fall down flight of stairs, found down three days later, Rib fractures R 5-6th L 4th, 6th, 7th rib fracture, and L1 TP fracture    NEURO:  #Acute pain    -APAP, Gabapentin 100mg q8h, robaxin, Lidocaine patches  #recently diagnosed Vertigo  -Was prescribed Meclizine but has not taken   #nondisplaced right L1 transverse process   #CIWA  -d/c ativan  RESP:   #Oxygenation    -RA  #Activity    -increase as tolerated  #Acute displaced left anterior 6th, 7th ribs, lateral 4th rib, right anterior 5th and 6th ribs fractures.  -no evidence of PTX on CT  -pulling 2.5L on incentive spirometry  -continue IS, cough, deep breathing exercises    CARDS:   #HTN  -takes Valsartan 325mnQD and Metoprolol Succinate XL 50mgQD although has filled 100mgQD-->restarted Metoprolol Tartrate 25 BID for now  Imaging: Echo 06/2022-Normal global left ventricular systolic function. Normal left ventricular internal cavity size.  The left ventricular ejection fraction is 57 %. Mildly dilated left atrium. Trace mitral valve regurgitation. Trace tricuspid regurgitation. Trace pulmonic valve regurgitation.  #HLD  -takes no medication  #tachycardia  -resolved     GI/NUTR:  #Diet  -clears   #GI bleed, melanotic stool  -s/p EGD, 1/16 - erosive esophagitis and gastritis. 2mm antrum ulcer, 3mm duodenal ulcer (injected w/ epi, hemostasis w/ thermal therapy.  -on PPI gtt  #History of right inguinal hernia repair with mesh in 1980  #GI Prophylaxis    -Protonix gtt til tomorrow, plan to D/c tomorrow and start BID   #bowel regimen  -last BM 1/16    /RENAL:   #JHON  -Cr 1.6 from 0.8 12/2023  -IVF resuscitation-->received 2L bolus in ED on admission  #Elevated CK, Lactate  -CK elevated to 5374-->3911  -Lactate 3.3-->1.2  > 0.9  -LR @75  #urine output in critically ill  -voiding   #mild transaminitis  -continue to monitor ,   #urinary retention  -Tamsulosin 0.4mg QHS  Labs:          BUN/Cr- 71/1.4  -->,  95/1.7  -->,  105/1.7  -->          Electrolytes-Na 135 // K 4.0 // Mg 2.7 //  Phos 4.6 (01-16 @ 22:02)    HEME/ONC:   DVT propylaxis-heparin   Injectable    Hb/Hct:  10.3/29.9  -->,  9.4/27.6  -->,  9.5/27.4  -->  Plts:  286  -->,  268  -->,  266  -->    PTT/INR:   T&S:ABO RH Interpretation:  (01-16)    #history of Lupus  -follows with Dr. Sanchez  -takes 400mg Hydroxychloroquine QD-->restarted    ID:  #leukocytosis   WBC- 14.71  --->>,  13.77  --->>,  13.16  --->>  Temp trend- 24hrs T(F): 97.1 (01-17 @ 00:00), Max: 99.1 (01-16 @ 03:00)  Antibiotics-hydroxychloroquine 400 daily    ENDO:    -FSG q6 if NPO or Tube feeds    -Glucose goal 140-180. if above 180 start ISS    MSK:  #Acute displaced left anterior 6th, 7th ribs, lateral 4th rib, right anterior 5th and 6th ribs fractures.nondisplaced right L1 transverse process     #Derm:  -s/p abrasions on stomach, knees  -Burn c/s -no surgical intervention at this time, continue local wound care BID, wash with soap and water, apply silvadene, adaptic, secure with dry dressing    LINES/DRAINS:  PIV    ADVANCED DIRECTIVES:  DNR/DNI    HCP/Emergency Contact-Cousin Jose L Laura-will be coming to hospital 1/16    f/u  -DNR/DNI MOLST  -evening labs   -advance diet as tolerated   -d/c PPI tomorrow, BID tomorrow    full sign out given to primary team DEANNA Ramsey @ 4:30pm 68y Male  s/p fall down flight of stairs, found down three days later, Rib fractures R 5-6th L 4th, 6th, 7th rib fracture, and L1 TP fracture    NEURO:  #Acute pain    -APAP, Gabapentin 100mg q8h, robaxin, Lidocaine patches  #recently diagnosed Vertigo  -Was prescribed Meclizine but has not taken   #nondisplaced right L1 transverse process   #CIWA  -d/c ativan  RESP:   #Oxygenation    -RA  #Activity    -increase as tolerated  #Acute displaced left anterior 6th, 7th ribs, lateral 4th rib, right anterior 5th and 6th ribs fractures.  -no evidence of PTX on CT  -pulling 2.5L on incentive spirometry  -continue IS, cough, deep breathing exercises    CARDS:   #HTN  -takes Valsartan 325mnQD and Metoprolol Succinate XL 50mgQD although has filled 100mgQD-->restarted Metoprolol Tartrate 25 BID for now  Imaging: Echo 06/2022-Normal global left ventricular systolic function. Normal left ventricular internal cavity size.  The left ventricular ejection fraction is 57 %. Mildly dilated left atrium. Trace mitral valve regurgitation. Trace tricuspid regurgitation. Trace pulmonic valve regurgitation.  #HLD  -takes no medication  #tachycardia  -resolved     GI/NUTR:  #Diet  -clears   #GI bleed, melanotic stool  -s/p EGD, 1/16 - erosive esophagitis and gastritis. 2mm antrum ulcer, 3mm duodenal ulcer (injected w/ epi, hemostasis w/ thermal therapy.  -on PPI gtt  #History of right inguinal hernia repair with mesh in 1980  #GI Prophylaxis    -Protonix gtt til tomorrow, plan to D/c tomorrow and start BID   #bowel regimen  -last BM 1/17, melanotic (primary team aware)    /RENAL:   #JHON  -Cr 1.6 from 0.8 12/2023  -IVF resuscitation-->received 2L bolus in ED on admission  #Elevated CK, Lactate  -CK elevated to 5374-->3911  -Lactate 3.3-->1.2  > 0.9  -LR @75  #urine output in critically ill  -voiding   #mild transaminitis  -continue to monitor ,   #urinary retention  -Tamsulosin 0.4mg QHS  Labs:          BUN/Cr- 71/1.4  -->,  95/1.7  -->,  105/1.7  -->          Electrolytes-Na 135 // K 4.0 // Mg 2.7 //  Phos 4.6 (01-16 @ 22:02)    HEME/ONC:   DVT propylaxis-heparin   Injectable    Hb/Hct:  10.3/29.9  -->,  9.4/27.6  -->,  9.5/27.4  -->  Plts:  286  -->,  268  -->,  266  -->    PTT/INR:   T&S:ABO RH Interpretation:  (01-16)    #history of Lupus  -follows with Dr. Sanchez  -takes 400mg Hydroxychloroquine QD-->restarted    ID:  #leukocytosis   WBC- 14.71  --->>,  13.77  --->>,  13.16  --->>  Temp trend- 24hrs T(F): 97.1 (01-17 @ 00:00), Max: 99.1 (01-16 @ 03:00)  Antibiotics-hydroxychloroquine 400 daily    ENDO:    -FSG q6 if NPO or Tube feeds    -Glucose goal 140-180. if above 180 start ISS    MSK:  #Acute displaced left anterior 6th, 7th ribs, lateral 4th rib, right anterior 5th and 6th ribs fractures.nondisplaced right L1 transverse process     #Derm:  -s/p abrasions on stomach, knees  -Burn c/s -no surgical intervention at this time, continue local wound care BID, wash with soap and water, apply silvadene, adaptic, secure with dry dressing    LINES/DRAINS:  PIV    ADVANCED DIRECTIVES:  DNR/DNI    HCP/Emergency Contact-Cousin Jose L Thomaskala-will be coming to hospital 1/16    f/u  -DNR/DNI MOLST  -evening labs   -advance diet as tolerated   -d/c PPI tomorrow, BID tomorrow    full sign out given to primary team DEANNA Ramsey @ 4:30pm

## 2024-01-17 NOTE — CONSULT NOTE ADULT - CONSULT REASON
fall
multiple pressure/friction wounds
s/p Found Down, multiple rib fractures and Advanced Age
Reported Melena
comanagement

## 2024-01-17 NOTE — PROGRESS NOTE ADULT - ASSESSMENT
Assessment & Plan    68y Male  s/p fall down flight of stairs, found down three days later, Rib fractures R 5-6th L 4th, 6th, 7th rib fracture, and L1 TP fracture    NEURO:  #Acute pain    -APAP, Gabapentin 100mg q8h, robaxin, Lidocaine patches  #recently diagnosed Vertigo  -Was prescribed Meclizine but has not taken   #nondisplaced right L1 transverse process      RESP:   #Oxygenation    -RA  #Activity    -increase as tolerated  #Acute displaced left anterior 6th, 7th ribs, lateral 4th rib, right anterior 5th and 6th ribs fractures.  -no evidence of PTX on CT  -pulling 2.5L on incentive spirometry  -F/U AM CXR 1/16  -continue IS, cough, deep breathing exercises    CARDS:   #HTN  -takes Valsartan 325mnQD and Metoprolol Succinate XL 50mgQD although has filled 100mgQD-->restarted Metoprolol Tartrate 25 BID for now  Imaging: Echo 06/2022-Normal global left ventricular systolic function. Normal left ventricular internal cavity size.  The left ventricular ejection fraction is 57 %. Mildly dilated left atrium. Trace mitral valve regurgitation. Trace tricuspid regurgitation.Trace pulmonic valve regurgitation.  #HLD  -takes no medication  #tachycardia  -NICOM + 30%-->tachy to 110's 1/16 at 6:00am LR 500cc bolus x1    GI/NUTR:  #Diet  -Regular Diet  #History of right inguinal hernia repair with mesh in 1980  #GI Prophylaxis    -Protonix 40 mg IV q12h (melanic stools)  #bowel regimen  -last BM 1/16    /RENAL:   #JHON  -Cr 1.6 from 0.8 12/2023  -IVF resuscitation-->received 2L bolus in ED on admission  #Elevated CK, Lactate  -CK elevated to 5374-->3911  -Lactate 3.3-->1.2  -LR @125  #urine output in critically ill  -Pending U/A -voided x1 large volume in bed overnight  #mild transaminitis  -continue to monitor ,   #urinary retention  -Tamsulosin 0.4mg QHS  Labs:          BUN/Cr- 71/1.4  -->,  95/1.7  -->,  105/1.7  -->          Electrolytes-Na 135 // K 4.0 // Mg 2.7 //  Phos 4.6 (01-16 @ 22:02)    HEME/ONC:   DVT propylaxis-HSQ  DVT propylaxis-heparin   Injectable    Hb/Hct:  10.3/29.9  -->,  9.4/27.6  -->,  9.5/27.4  -->  Plts:  286  -->,  268  -->,  266  -->    PTT/INR:   T&S:ABO RH Interpretation:  (01-16)    #history of Lupus  -follows with Dr. Sanchez  -takes 400mg Hydroxychloroquine QD-->restarted    ID:  #leukocytosis   WBC- 14.71  --->>,  13.77  --->>,  13.16  --->>  Temp trend- 24hrs T(F): 97.1 (01-17 @ 00:00), Max: 99.1 (01-16 @ 03:00)  Antibiotics-hydroxychloroquine 400 daily    ENDO:    -FSG q6 if NPO or Tube feeds    -Glucose goal 140-180. if above 180 start ISS    MSK:  #Acute displaced left anterior 6th, 7th ribs, lateral 4th rib, right anterior 5th and 6th ribs fractures.nondisplaced right L1 transverse process       LINES/DRAINS:  PIV    ADVANCED DIRECTIVES:  DNR/DNI    HCP/Emergency Contact-Cousin Jose L Obriensalinasarmando-will be coming to hospital 1/16    INDICATION FOR SICU: Acute renal failure, multiple rib fractures with advanced age      DISPO:   Case discussed with attending Dr. Wise       Assessment & Plan    68y Male  s/p fall down flight of stairs, found down three days later, Rib fractures R 5-6th L 4th, 6th, 7th rib fracture, and L1 TP fracture    NEURO:  #Acute pain    -APAP, Gabapentin 100mg q8h, robaxin, Lidocaine patches  #recently diagnosed Vertigo  -Was prescribed Meclizine but has not taken   #nondisplaced right L1 transverse process      RESP:   #Oxygenation    -RA  #Activity    -increase as tolerated  #Acute displaced left anterior 6th, 7th ribs, lateral 4th rib, right anterior 5th and 6th ribs fractures.  -no evidence of PTX on CT  -pulling 2.5L on incentive spirometry  -F/U AM CXR 1/16  -continue IS, cough, deep breathing exercises    CARDS:   #HTN  -takes Valsartan 325mnQD and Metoprolol Succinate XL 50mgQD although has filled 100mgQD-->restarted Metoprolol Tartrate 25 BID for now  Imaging: Echo 06/2022-Normal global left ventricular systolic function. Normal left ventricular internal cavity size.  The left ventricular ejection fraction is 57 %. Mildly dilated left atrium. Trace mitral valve regurgitation. Trace tricuspid regurgitation.Trace pulmonic valve regurgitation.  #HLD  -takes no medication  #tachycardia  -NICOM + 30%-->tachy to 110's 1/16 at 6:00am LR 500cc bolus x1    GI/NUTR:  #Diet  -Regular Diet  #History of right inguinal hernia repair with mesh in 1980  #GI Prophylaxis    -Protonix 40 mg IV q12h (melanic stools)  #bowel regimen  -last BM 1/16    /RENAL:   #JHON  -Cr 1.6 from 0.8 12/2023  -IVF resuscitation-->received 2L bolus in ED on admission  #Elevated CK, Lactate  -CK elevated to 5374-->3911  -Lactate 3.3-->1.2  > 0.9  -LR @125  #urine output in critically ill  -Pending U/A -voided x1 large volume in bed overnight  #mild transaminitis  -continue to monitor ,   #urinary retention  -Tamsulosin 0.4mg QHS  Labs:          BUN/Cr- 71/1.4  -->,  95/1.7  -->,  105/1.7  -->          Electrolytes-Na 135 // K 4.0 // Mg 2.7 //  Phos 4.6 (01-16 @ 22:02)    HEME/ONC:   DVT propylaxis-heparin   Injectable    Hb/Hct:  10.3/29.9  -->,  9.4/27.6  -->,  9.5/27.4  -->  Plts:  286  -->,  268  -->,  266  -->    PTT/INR:   T&S:ABO RH Interpretation:  (01-16)    #history of Lupus  -follows with Dr. Sanchez  -takes 400mg Hydroxychloroquine QD-->restarted    ID:  #leukocytosis   WBC- 14.71  --->>,  13.77  --->>,  13.16  --->>  Temp trend- 24hrs T(F): 97.1 (01-17 @ 00:00), Max: 99.1 (01-16 @ 03:00)  Antibiotics-hydroxychloroquine 400 daily    ENDO:    -FSG q6 if NPO or Tube feeds    -Glucose goal 140-180. if above 180 start ISS    MSK:  #Acute displaced left anterior 6th, 7th ribs, lateral 4th rib, right anterior 5th and 6th ribs fractures.nondisplaced right L1 transverse process       LINES/DRAINS:  PIV    ADVANCED DIRECTIVES:  DNR/DNI    HCP/Emergency Contact-Cousin Jose L Sanchez-will be coming to hospital 1/16    INDICATION FOR SICU: Acute renal failure, multiple rib fractures with advanced age      DISPO:   Case discussed with attending Dr. Wise

## 2024-01-17 NOTE — CONSULT NOTE ADULT - ASSESSMENT
#multiple pressure/friction wounds to R chest and b/l UE and LEs    - no surgical intervention at this time, may eventually need debridement  - continue local wound care BID  - wash with soap and water, apply silvadene, adaptic, secure with dry dressing  - antibiotics per primary team

## 2024-01-17 NOTE — PROGRESS NOTE ADULT - ASSESSMENT
Assessment and Plan  Case of a 68 year old male patient known to have a history of HTN, DL, and BPH who was brought to the ED on 01/15 after being found on the floor unable to stand, found to be hemodynamically stable with evidence of acute rib fractures and pneumonia on imaging, admitted for further management. Stay was complicated on 01/16 by 2 episodes of melena per nurse. We are consulted for concern of melena.      Melena on 01/16 with History of Intermittent Hematochezia and Diane-Duodenal Lymphadenopathy  3 cm Deep Cratered Duodenal Ulcer with Non-Bleeding Vessel s/p Hemostasis on 01/16  2mm Class II B Antral Ulcer  Grade C Esophagitis  Acute Drop in Hemoglobin  * Baseline hemoglobin (prior to admission): Hb 13.1 on 12/06/2023  * ED Vitals:  /70 mmHg, HR 71 bpm   * Hemoglobin on admission: Hb 14.7 -> 01/16 Hb 13.1 dropped to 10.3/9.4/9.5  * BUN 19 on 12/06/2023 -> BUN 68 on 01/15 -> BUN 71 on 01/16  * LA 3.3 on 01/15 -> 1.2 on 01/16  * Coags: INR 1.07 on 01/15    * Not on AP or AC  * SWEETIE dark brown  * CT Abdomen and Pelvis w/ IV Cont (01.15.24 @ 16:49) Acute displaced left anterior 6th, 7th ribs, lateral 4th rib, right anterior 5th and 6th ribs and nondisplaced right L1 transverse process fractures. No pneumothorax. Patchy bilateral groundglass opacities, nonspecific and possibly infectious/inflammatory in etiology.Few nonspecific subcentimeter periduodenal lymph nodes, possibly reactive. Correlate for history of duodenitis.  * No prior EGD  * Prior Colonoscopy around 3 years ago: at [facility]  * Family History: no GI malignancies  * Had hypotension overnight on 01/16; reported melena x2 by nurse then again at 12:00 PM; SWEETIE dark brown with maroon tinge on 01/16 at 09:00 AM  * Status Post EGD on 01/16: Grade C esophagitis compatible with nonspecific erosive esophagitis. Erosions in the stomach compatible with erosive gastritis. A 2mm ulcer was noted in the antrum with adherant clot (Flaco Class IIb). A 3cm Deep crated ulcer with non bleeding visible vessel was noted in the anterior duodenal bulb (Flaco Class IIa). 4cc of 1:10.000 epinephrine was injected around the ulcer in 4 quadrants with adequate blanching. Using coagulation grasper, thermal therapy was applied with adequate hemostasis. (Injection, Thermal Therapy). Normal mucosa in the Second part of duodenum    RECOMMENDATIONS  - Repeat CBC today. Last Hb was from 01/16 and patient is s/p endoscopic intervention on 01/16 for bleeding. Trend CBC closely BID and transfuse as needed (target Hb >8). Maintain active Type and screen  - Continue IV protonix drip for 72 hours (counting from 01/16)  - Can advance to clear liquid diet in absence of active bleeding  - Recommend repeat EGD in 2 months  - Trend BUN; Ensure placement x2 large 18G IV Bores  - Monitor MAP and keep > 65mmHg and continue IV fluid hydration  - On DVT prophylaxis (Heparin 61038 units Q8h)  - Monitor BM for melena or hematochezia   - Please avoid any NSAIDs  - If any unstable bleed, please call GI stat      Hepatic Dome Cysts   Transaminitis Likely from Rhabdomyolysis  > Hypotension  * Denies alcohol use  * CK >5000 on admission after being found on floor for 3 at least 3 days  * Cysts noted on imaging  * LFTs 12/06/2023 0.5/135/28/11  * LFTs 01/15 0.7/79/309/110  * INR 1.07 on 01/15  * Had hypotension on 01/16 and reported melena    RECOMMENDATIONS  - Trend LFTs  - IV fluid hydration for management of rhabdomyolysis; trend CK daily  - Avoid hepatotoxic agents  - Monitor BP and avoid hypotension  - Check hepatitis panel       Thank you for your consult.  - Please note that plan was communicated with medical team.   - Please reach GI on [phone] during weekdays till 5pm.  - Please call the GI service line after 5pm on Weekdays and anytime on Weekends: [phone].      Alexsandra Jreome MD  PGY - 4 Gastroenterology Fellow   [facility]       Assessment and Plan  Case of a 68 year old male patient known to have a history of HTN, DL, and BPH who was brought to the ED on 01/15 after being found on the floor unable to stand, found to be hemodynamically stable with evidence of acute rib fractures and pneumonia on imaging, admitted for further management. Stay was complicated on 01/16 by 2 episodes of melena per nurse. We are consulted for concern of melena.      Melena on 01/16 with History of Intermittent Hematochezia and Diane-Duodenal Lymphadenopathy  3 cm Deep Cratered Duodenal Ulcer with Non-Bleeding Vessel s/p Hemostasis on 01/16  2mm Class II B Antral Ulcer  Grade C Esophagitis  Acute Drop in Hemoglobin  * Baseline hemoglobin (prior to admission): Hb 13.1 on 12/06/2023  * ED Vitals:  /70 mmHg, HR 71 bpm   * Hemoglobin on admission: Hb 14.7 -> 01/16 Hb 13.1 dropped to 10.3/9.4/9.5  * BUN 19 on 12/06/2023 -> BUN 68 on 01/15 -> BUN 71 on 01/16  * LA 3.3 on 01/15 -> 1.2 on 01/16  * Coags: INR 1.07 on 01/15    * Not on AP or AC  * SWEETIE dark brown  * CT Abdomen and Pelvis w/ IV Cont (01.15.24 @ 16:49) Acute displaced left anterior 6th, 7th ribs, lateral 4th rib, right anterior 5th and 6th ribs and nondisplaced right L1 transverse process fractures. No pneumothorax. Patchy bilateral groundglass opacities, nonspecific and possibly infectious/inflammatory in etiology.Few nonspecific subcentimeter periduodenal lymph nodes, possibly reactive. Correlate for history of duodenitis.  * No prior EGD  * Prior Colonoscopy around 3 years ago: at [facility]  * Family History: no GI malignancies  * Had hypotension overnight on 01/16; reported melena x2 by nurse then again at 12:00 PM; SWEETIE dark brown with maroon tinge on 01/16 at 09:00 AM  * Status Post EGD on 01/16: Grade C esophagitis compatible with nonspecific erosive esophagitis. Erosions in the stomach compatible with erosive gastritis. A 2mm ulcer was noted in the antrum with adherant clot (Flaco Class IIb). A 3cm Deep crated ulcer with non bleeding visible vessel was noted in the anterior duodenal bulb (Flaco Class IIa). 4cc of 1:10.000 epinephrine was injected around the ulcer in 4 quadrants with adequate blanching. Using coagulation grasper, thermal therapy was applied with adequate hemostasis. (Injection, Thermal Therapy). Normal mucosa in the Second part of duodenum    RECOMMENDATIONS  - Repeat CBC today. Last Hb was from 01/16 and patient is s/p endoscopic intervention on 01/16 for bleeding. Trend CBC closely BID and transfuse as needed (target Hb >8). Maintain active Type and screen  - Continue IV protonix drip for 72 hours (counting from 01/16)  - Can advance to clear liquid diet in absence of active bleeding  - Recommend repeat EGD in 2 months  - Trend BUN; Ensure placement x2 large 18G IV Bores  - Monitor MAP and keep > 65mmHg and continue IV fluid hydration  - On DVT prophylaxis (Heparin 52520 units Q8h)  - Monitor BM for melena or hematochezia   - Please avoid any NSAIDs  - If any unstable bleed, please call GI stat      Hepatic Dome Cysts   Transaminitis Likely from Rhabdomyolysis > Hypotension  * Denies alcohol use  * CK >5000 on admission after being found on floor for 3 at least 3 days  * Cysts noted on imaging  * LFTs 12/06/2023 0.5/135/28/11  * LFTs 01/15 0.7/79/309/110  * INR 1.07 on 01/15  * Had hypotension on 01/16 and reported melena    RECOMMENDATIONS  - Trend LFTs  - IV fluid hydration for management of rhabdomyolysis; trend CK daily  - Avoid hepatotoxic agents  - Monitor BP and avoid hypotension  - Check hepatitis panel       Thank you for your consult.  - Please note that plan was communicated with medical team.   - Please reach GI on [phone] during weekdays till 5pm.  - Please call the GI service line after 5pm on Weekdays and anytime on Weekends: [phone].      Alexsandra Jerome MD  PGY - 4 Gastroenterology Fellow   [facility]
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
## 2024-01-18 ENCOUNTER — TRANSCRIPTION ENCOUNTER (OUTPATIENT)
Age: 69
End: 2024-01-18

## 2024-01-18 ENCOUNTER — INPATIENT (INPATIENT)
Facility: HOSPITAL | Age: 69
LOS: 19 days | Discharge: NURSING, REHAB OR LONG-TERM CARE | DRG: 463 | End: 2024-02-07
Attending: PHYSICAL MEDICINE & REHABILITATION | Admitting: PHYSICAL MEDICINE & REHABILITATION
Payer: MEDICARE

## 2024-01-18 VITALS
RESPIRATION RATE: 18 BRPM | DIASTOLIC BLOOD PRESSURE: 58 MMHG | SYSTOLIC BLOOD PRESSURE: 129 MMHG | HEART RATE: 71 BPM | TEMPERATURE: 99 F

## 2024-01-18 VITALS
HEART RATE: 72 BPM | DIASTOLIC BLOOD PRESSURE: 58 MMHG | SYSTOLIC BLOOD PRESSURE: 123 MMHG | TEMPERATURE: 98 F | RESPIRATION RATE: 18 BRPM

## 2024-01-18 DIAGNOSIS — T07.XXXA UNSPECIFIED MULTIPLE INJURIES, INITIAL ENCOUNTER: ICD-10-CM

## 2024-01-18 LAB
ANION GAP SERPL CALC-SCNC: 11 MMOL/L — SIGNIFICANT CHANGE UP (ref 7–14)
BASOPHILS # BLD AUTO: 0.04 K/UL — SIGNIFICANT CHANGE UP (ref 0–0.2)
BASOPHILS NFR BLD AUTO: 0.3 % — SIGNIFICANT CHANGE UP (ref 0–1)
BUN SERPL-MCNC: 34 MG/DL — HIGH (ref 10–20)
CALCIUM SERPL-MCNC: 8.1 MG/DL — LOW (ref 8.4–10.4)
CHLORIDE SERPL-SCNC: 105 MMOL/L — SIGNIFICANT CHANGE UP (ref 98–110)
CO2 SERPL-SCNC: 27 MMOL/L — SIGNIFICANT CHANGE UP (ref 17–32)
CREAT SERPL-MCNC: 1 MG/DL — SIGNIFICANT CHANGE UP (ref 0.7–1.5)
EGFR: 81 ML/MIN/1.73M2 — SIGNIFICANT CHANGE UP
EOSINOPHIL # BLD AUTO: 0.05 K/UL — SIGNIFICANT CHANGE UP (ref 0–0.7)
EOSINOPHIL NFR BLD AUTO: 0.4 % — SIGNIFICANT CHANGE UP (ref 0–8)
GLUCOSE BLDC GLUCOMTR-MCNC: 103 MG/DL — HIGH (ref 70–99)
GLUCOSE SERPL-MCNC: 112 MG/DL — HIGH (ref 70–99)
HCT VFR BLD CALC: 26.9 % — LOW (ref 42–52)
HGB BLD-MCNC: 9.2 G/DL — LOW (ref 14–18)
IMM GRANULOCYTES NFR BLD AUTO: 2.4 % — HIGH (ref 0.1–0.3)
LYMPHOCYTES # BLD AUTO: 19.9 % — LOW (ref 20.5–51.1)
LYMPHOCYTES # BLD AUTO: 2.61 K/UL — SIGNIFICANT CHANGE UP (ref 1.2–3.4)
MAGNESIUM SERPL-MCNC: 2.1 MG/DL — SIGNIFICANT CHANGE UP (ref 1.8–2.4)
MCHC RBC-ENTMCNC: 31.3 PG — HIGH (ref 27–31)
MCHC RBC-ENTMCNC: 34.2 G/DL — SIGNIFICANT CHANGE UP (ref 32–37)
MCV RBC AUTO: 91.5 FL — SIGNIFICANT CHANGE UP (ref 80–94)
MONOCYTES # BLD AUTO: 0.92 K/UL — HIGH (ref 0.1–0.6)
MONOCYTES NFR BLD AUTO: 7 % — SIGNIFICANT CHANGE UP (ref 1.7–9.3)
NEUTROPHILS # BLD AUTO: 9.16 K/UL — HIGH (ref 1.4–6.5)
NEUTROPHILS NFR BLD AUTO: 70 % — SIGNIFICANT CHANGE UP (ref 42.2–75.2)
NRBC # BLD: 0 /100 WBCS — SIGNIFICANT CHANGE UP (ref 0–0)
PHOSPHATE SERPL-MCNC: 1.9 MG/DL — LOW (ref 2.1–4.9)
PLATELET # BLD AUTO: 266 K/UL — SIGNIFICANT CHANGE UP (ref 130–400)
PMV BLD: 10 FL — SIGNIFICANT CHANGE UP (ref 7.4–10.4)
POTASSIUM SERPL-MCNC: 3.5 MMOL/L — SIGNIFICANT CHANGE UP (ref 3.5–5)
POTASSIUM SERPL-SCNC: 3.5 MMOL/L — SIGNIFICANT CHANGE UP (ref 3.5–5)
RBC # BLD: 2.94 M/UL — LOW (ref 4.7–6.1)
RBC # FLD: 13.2 % — SIGNIFICANT CHANGE UP (ref 11.5–14.5)
SODIUM SERPL-SCNC: 143 MMOL/L — SIGNIFICANT CHANGE UP (ref 135–146)
WBC # BLD: 13.1 K/UL — HIGH (ref 4.8–10.8)
WBC # FLD AUTO: 13.1 K/UL — HIGH (ref 4.8–10.8)

## 2024-01-18 PROCEDURE — 82306 VITAMIN D 25 HYDROXY: CPT

## 2024-01-18 PROCEDURE — 82962 GLUCOSE BLOOD TEST: CPT

## 2024-01-18 PROCEDURE — 97129 THER IVNTJ 1ST 15 MIN: CPT | Mod: GO

## 2024-01-18 PROCEDURE — 97110 THERAPEUTIC EXERCISES: CPT | Mod: GP

## 2024-01-18 PROCEDURE — 97112 NEUROMUSCULAR REEDUCATION: CPT | Mod: GO

## 2024-01-18 PROCEDURE — 92610 EVALUATE SWALLOWING FUNCTION: CPT | Mod: GN

## 2024-01-18 PROCEDURE — C9113: CPT

## 2024-01-18 PROCEDURE — 85379 FIBRIN DEGRADATION QUANT: CPT

## 2024-01-18 PROCEDURE — 70551 MRI BRAIN STEM W/O DYE: CPT

## 2024-01-18 PROCEDURE — 99233 SBSQ HOSP IP/OBS HIGH 50: CPT

## 2024-01-18 PROCEDURE — 82607 VITAMIN B-12: CPT

## 2024-01-18 PROCEDURE — 97162 PT EVAL MOD COMPLEX 30 MIN: CPT | Mod: GP

## 2024-01-18 PROCEDURE — 92507 TX SP LANG VOICE COMM INDIV: CPT | Mod: GN

## 2024-01-18 PROCEDURE — 93970 EXTREMITY STUDY: CPT

## 2024-01-18 PROCEDURE — 72141 MRI NECK SPINE W/O DYE: CPT

## 2024-01-18 PROCEDURE — 84425 ASSAY OF VITAMIN B-1: CPT

## 2024-01-18 PROCEDURE — 82746 ASSAY OF FOLIC ACID SERUM: CPT

## 2024-01-18 PROCEDURE — 90791 PSYCH DIAGNOSTIC EVALUATION: CPT

## 2024-01-18 PROCEDURE — 85025 COMPLETE CBC W/AUTO DIFF WBC: CPT

## 2024-01-18 PROCEDURE — 80074 ACUTE HEPATITIS PANEL: CPT

## 2024-01-18 PROCEDURE — 96132 NRPSYC TST EVAL PHYS/QHP 1ST: CPT

## 2024-01-18 PROCEDURE — 97116 GAIT TRAINING THERAPY: CPT | Mod: GP

## 2024-01-18 PROCEDURE — ZZZZZ: CPT

## 2024-01-18 PROCEDURE — 84100 ASSAY OF PHOSPHORUS: CPT

## 2024-01-18 PROCEDURE — 99232 SBSQ HOSP IP/OBS MODERATE 35: CPT

## 2024-01-18 PROCEDURE — 71045 X-RAY EXAM CHEST 1 VIEW: CPT | Mod: 26

## 2024-01-18 PROCEDURE — 97166 OT EVAL MOD COMPLEX 45 MIN: CPT | Mod: GO

## 2024-01-18 PROCEDURE — 97130 THER IVNTJ EA ADDL 15 MIN: CPT | Mod: GO

## 2024-01-18 PROCEDURE — 90837 PSYTX W PT 60 MINUTES: CPT

## 2024-01-18 PROCEDURE — 97535 SELF CARE MNGMENT TRAINING: CPT | Mod: GO

## 2024-01-18 PROCEDURE — 90832 PSYTX W PT 30 MINUTES: CPT

## 2024-01-18 PROCEDURE — 80048 BASIC METABOLIC PNL TOTAL CA: CPT

## 2024-01-18 PROCEDURE — 83735 ASSAY OF MAGNESIUM: CPT

## 2024-01-18 PROCEDURE — 97530 THERAPEUTIC ACTIVITIES: CPT | Mod: GP

## 2024-01-18 PROCEDURE — 92523 SPEECH SOUND LANG COMPREHEN: CPT | Mod: GN

## 2024-01-18 PROCEDURE — 80053 COMPREHEN METABOLIC PANEL: CPT

## 2024-01-18 PROCEDURE — 87635 SARS-COV-2 COVID-19 AMP PRB: CPT

## 2024-01-18 PROCEDURE — 36415 COLL VENOUS BLD VENIPUNCTURE: CPT

## 2024-01-18 PROCEDURE — 82550 ASSAY OF CK (CPK): CPT

## 2024-01-18 RX ORDER — LIDOCAINE 4 G/100G
1 CREAM TOPICAL EVERY 24 HOURS
Refills: 0 | Status: DISCONTINUED | OUTPATIENT
Start: 2024-01-18 | End: 2024-02-07

## 2024-01-18 RX ORDER — ACETAMINOPHEN 500 MG
2 TABLET ORAL
Qty: 0 | Refills: 0 | DISCHARGE
Start: 2024-01-18

## 2024-01-18 RX ORDER — THIAMINE MONONITRATE (VIT B1) 100 MG
1 TABLET ORAL
Qty: 0 | Refills: 0 | DISCHARGE
Start: 2024-01-18

## 2024-01-18 RX ORDER — HYDROXYCHLOROQUINE SULFATE 200 MG
400 TABLET ORAL DAILY
Refills: 0 | Status: DISCONTINUED | OUTPATIENT
Start: 2024-01-18 | End: 2024-02-07

## 2024-01-18 RX ORDER — CHLORHEXIDINE GLUCONATE 213 G/1000ML
1 SOLUTION TOPICAL
Refills: 0 | Status: DISCONTINUED | OUTPATIENT
Start: 2024-01-18 | End: 2024-02-07

## 2024-01-18 RX ORDER — PANTOPRAZOLE SODIUM 20 MG/1
40 TABLET, DELAYED RELEASE ORAL
Refills: 0 | Status: DISCONTINUED | OUTPATIENT
Start: 2024-01-18 | End: 2024-01-18

## 2024-01-18 RX ORDER — THIAMINE MONONITRATE (VIT B1) 100 MG
100 TABLET ORAL DAILY
Refills: 0 | Status: DISCONTINUED | OUTPATIENT
Start: 2024-01-18 | End: 2024-02-07

## 2024-01-18 RX ORDER — ACETAMINOPHEN 500 MG
650 TABLET ORAL EVERY 6 HOURS
Refills: 0 | Status: DISCONTINUED | OUTPATIENT
Start: 2024-01-18 | End: 2024-02-07

## 2024-01-18 RX ORDER — LIDOCAINE 4 G/100G
1 CREAM TOPICAL
Qty: 0 | Refills: 0 | DISCHARGE
Start: 2024-01-18

## 2024-01-18 RX ORDER — METHOCARBAMOL 500 MG/1
500 TABLET, FILM COATED ORAL THREE TIMES A DAY
Refills: 0 | Status: DISCONTINUED | OUTPATIENT
Start: 2024-01-18 | End: 2024-02-07

## 2024-01-18 RX ORDER — TAMSULOSIN HYDROCHLORIDE 0.4 MG/1
0.4 CAPSULE ORAL AT BEDTIME
Refills: 0 | Status: DISCONTINUED | OUTPATIENT
Start: 2024-01-18 | End: 2024-02-07

## 2024-01-18 RX ORDER — SALICYLIC ACID 0.5 %
1 CLEANSER (GRAM) TOPICAL DAILY
Refills: 0 | Status: DISCONTINUED | OUTPATIENT
Start: 2024-01-18 | End: 2024-02-07

## 2024-01-18 RX ORDER — VALSARTAN 80 MG/1
0 TABLET ORAL
Qty: 0 | Refills: 2 | DISCHARGE

## 2024-01-18 RX ORDER — PANTOPRAZOLE SODIUM 20 MG/1
8 TABLET, DELAYED RELEASE ORAL
Qty: 80 | Refills: 0 | Status: DISCONTINUED | OUTPATIENT
Start: 2024-01-18 | End: 2024-01-20

## 2024-01-18 RX ORDER — METOPROLOL TARTRATE 50 MG
25 TABLET ORAL
Refills: 0 | Status: DISCONTINUED | OUTPATIENT
Start: 2024-01-18 | End: 2024-02-07

## 2024-01-18 RX ORDER — FOLIC ACID 0.8 MG
1 TABLET ORAL
Qty: 0 | Refills: 0 | DISCHARGE
Start: 2024-01-18

## 2024-01-18 RX ORDER — SALICYLIC ACID 0.5 %
1 CLEANSER (GRAM) TOPICAL
Qty: 0 | Refills: 0 | DISCHARGE
Start: 2024-01-18

## 2024-01-18 RX ORDER — GABAPENTIN 400 MG/1
100 CAPSULE ORAL THREE TIMES A DAY
Refills: 0 | Status: DISCONTINUED | OUTPATIENT
Start: 2024-01-18 | End: 2024-02-07

## 2024-01-18 RX ORDER — FOLIC ACID 0.8 MG
1 TABLET ORAL DAILY
Refills: 0 | Status: DISCONTINUED | OUTPATIENT
Start: 2024-01-18 | End: 2024-01-19

## 2024-01-18 RX ORDER — PANTOPRAZOLE SODIUM 20 MG/1
1 TABLET, DELAYED RELEASE ORAL
Qty: 0 | Refills: 0 | DISCHARGE
Start: 2024-01-18

## 2024-01-18 RX ORDER — POTASSIUM CHLORIDE 20 MEQ
40 PACKET (EA) ORAL ONCE
Refills: 0 | Status: DISCONTINUED | OUTPATIENT
Start: 2024-01-18 | End: 2024-01-18

## 2024-01-18 RX ORDER — HEPARIN SODIUM 5000 [USP'U]/ML
5000 INJECTION INTRAVENOUS; SUBCUTANEOUS EVERY 8 HOURS
Refills: 0 | Status: DISCONTINUED | OUTPATIENT
Start: 2024-01-18 | End: 2024-02-07

## 2024-01-18 RX ORDER — CHLORTHALIDONE 50 MG
0 TABLET ORAL
Qty: 0 | Refills: 0 | DISCHARGE

## 2024-01-18 RX ORDER — SODIUM,POTASSIUM PHOSPHATES 278-250MG
1 POWDER IN PACKET (EA) ORAL ONCE
Refills: 0 | Status: COMPLETED | OUTPATIENT
Start: 2024-01-18 | End: 2024-01-18

## 2024-01-18 RX ADMIN — Medication 1 PACKET(S): at 17:51

## 2024-01-18 RX ADMIN — Medication 1 TABLET(S): at 12:16

## 2024-01-18 RX ADMIN — Medication 650 MILLIGRAM(S): at 17:51

## 2024-01-18 RX ADMIN — Medication 400 MILLIGRAM(S): at 12:24

## 2024-01-18 RX ADMIN — Medication 650 MILLIGRAM(S): at 12:09

## 2024-01-18 RX ADMIN — Medication 1 APPLICATION(S): at 12:16

## 2024-01-18 RX ADMIN — CHLORHEXIDINE GLUCONATE 1 APPLICATION(S): 213 SOLUTION TOPICAL at 05:41

## 2024-01-18 RX ADMIN — TAMSULOSIN HYDROCHLORIDE 0.4 MILLIGRAM(S): 0.4 CAPSULE ORAL at 22:04

## 2024-01-18 RX ADMIN — PANTOPRAZOLE SODIUM 10 MG/HR: 20 TABLET, DELAYED RELEASE ORAL at 05:38

## 2024-01-18 RX ADMIN — PANTOPRAZOLE SODIUM 40 MILLIGRAM(S): 20 TABLET, DELAYED RELEASE ORAL at 17:56

## 2024-01-18 RX ADMIN — PANTOPRAZOLE SODIUM 10 MG/HR: 20 TABLET, DELAYED RELEASE ORAL at 22:03

## 2024-01-18 RX ADMIN — GABAPENTIN 100 MILLIGRAM(S): 400 CAPSULE ORAL at 13:43

## 2024-01-18 RX ADMIN — Medication 650 MILLIGRAM(S): at 05:39

## 2024-01-18 RX ADMIN — Medication 25 MILLIGRAM(S): at 05:39

## 2024-01-18 RX ADMIN — Medication 650 MILLIGRAM(S): at 06:10

## 2024-01-18 RX ADMIN — GABAPENTIN 100 MILLIGRAM(S): 400 CAPSULE ORAL at 05:39

## 2024-01-18 RX ADMIN — HEPARIN SODIUM 5000 UNIT(S): 5000 INJECTION INTRAVENOUS; SUBCUTANEOUS at 05:40

## 2024-01-18 RX ADMIN — Medication 25 MILLIGRAM(S): at 17:53

## 2024-01-18 RX ADMIN — Medication 1 MILLIGRAM(S): at 12:17

## 2024-01-18 RX ADMIN — Medication 100 MILLIGRAM(S): at 12:26

## 2024-01-18 RX ADMIN — HEPARIN SODIUM 5000 UNIT(S): 5000 INJECTION INTRAVENOUS; SUBCUTANEOUS at 22:05

## 2024-01-18 RX ADMIN — Medication 650 MILLIGRAM(S): at 00:40

## 2024-01-18 RX ADMIN — Medication 650 MILLIGRAM(S): at 00:10

## 2024-01-18 RX ADMIN — GABAPENTIN 100 MILLIGRAM(S): 400 CAPSULE ORAL at 22:04

## 2024-01-18 RX ADMIN — Medication 650 MILLIGRAM(S): at 12:19

## 2024-01-18 RX ADMIN — Medication 1 APPLICATION(S): at 17:54

## 2024-01-18 RX ADMIN — Medication 1 APPLICATION(S): at 05:39

## 2024-01-18 RX ADMIN — HEPARIN SODIUM 5000 UNIT(S): 5000 INJECTION INTRAVENOUS; SUBCUTANEOUS at 13:43
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
## 2024-01-18 NOTE — DISCHARGE NOTE PROVIDER - CARE PROVIDER_API CALL
Lawanda Campbell  Gastroenterology  [address] [place] [address]  Phone: [phone]  Fax: [phone]  Follow Up Time: 2 months    Otis Greco  Plastic Surgery  [address] [place] [id number]  Phone: [phone]  Fax: [phone]  Follow Up Time: 2 weeks
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
## 2024-01-18 NOTE — H&P ADULT - TIME BILLING
educating patient and friend regarding the expectation and goals of the inpatient rehab process, speaking with the nurses and physicians and acute care therapists on the acute med/ surg service regarding the patient's course, speaking and coordinating with the nurses and ACP on the accepting rehab floor and chart review.  Performing an intensive H&P.  I have directed the care.

## 2024-01-18 NOTE — PROGRESS NOTE ADULT - SUBJECTIVE AND OBJECTIVE BOX
Patient is a 68y old  Male who presents with a chief complaint of b/l rib fx         HPI:"68-year-old male history of hypertension hyperlipidemia presenting for evaluation after fall.  Per friend at bedside, he was unable to reach patient for the past 5 days, called 911 to a wellness check however got tired of waiting so went to the house.  Was unable to reach friend so broke in and found friend in the basement crawling.  Patient states he was on the ground for at least 3 days.  Per EMS, patient was found on the floor with 30+ bottles of water, covered in urine and feces.  Patient states he rolled out of bed because he could not get up"      Obvious external signs of injury: chest abrasions    PAST MEDICAL & SURGICAL HISTORY: HTN, HLD, vertigo, R inguinal hernia repair    Allergies    No Known Allergies    Intolerances      Home Medications: metoprolol, valsartan, flomax      ROS: 10-system review is otherwise negative except HPI above.      Primary Survey:    A - airway intact  B - bilateral breath sounds and good chest rise  C - palpable pulses in all extremities  D - GCS 15 on arrival, GARCIA  Exposure obtained    Vital Signs Last 24 Hrs  T(C): 36.5 (15 Wenceslao 2024 19:00), Max: 36.7 (15 Wenceslao 2024 15:30)  T(F): 97.7 (15 Wenceslao 2024 19:00), Max: 98 (15 Wenceslao 2024 15:30)  HR: 80 (15 Wenceslao 2024 19:00) (71 - 97)  BP: 159/60 (15 Wenceslao 2024 19:00) (119/55 - 159/60)  BP(mean): --  RR: 19 (15 Wenceslao 2024 19:00) (15 - 19)  SpO2: 98% (15 Wenceslao 2024 19:00) (97% - 100%)    Parameters below as of 15 Wenceslao 2024 19:00  Patient On (Oxygen Delivery Method): room air      s/p fall down flight of stairs, found down three days later, Rib fractures R 5-6th L 4th, 6th, 7th rib fracture, and L1 TP fracture    CK on admission was 5374        PHYSICAL EXAM    Vital Signs Last 24 Hrs  T(C): 36.8 (18 Jan 2024 07:58), Max: 36.8 (18 Jan 2024 00:04)  T(F): 98.2 (18 Jan 2024 07:58), Max: 98.3 (18 Jan 2024 00:04)  HR: 64 (18 Jan 2024 07:58) (64 - 91)  BP: 132/59 (18 Jan 2024 07:58) (108/52 - 132/59)  BP(mean): 75 (17 Jan 2024 18:00) (75 - 80)  RR: 18 (18 Jan 2024 07:58) (17 - 27)  SpO2: 95% (18 Jan 2024 07:58) (94% - 98%)    Parameters below as of 18 Jan 2024 07:58  Patient On (Oxygen Delivery Method): room air        Constitutional - NAD  Chest - CTA  Cardiovascular - S1S2+  Abdomen -  Soft  Extremities -  No calf tenderness   Function : bed mobility and transfer mod A              ambulate bed to chair with RW CG assist / upper body dressing mod A and lower body dressing dependent     acetaminophen     Tablet .. 650 milliGRAM(s) Oral every 6 hours  chlorhexidine 2% Cloths 1 Application(s) Topical <User Schedule>  folic acid 1 milliGRAM(s) Oral daily  gabapentin 100 milliGRAM(s) Oral three times a day  heparin   Injectable 5000 Unit(s) SubCutaneous every 8 hours  hydroxychloroquine 400 milliGRAM(s) Oral daily  lactated ringers. 1000 milliLiter(s) IV Continuous <Continuous>  lidocaine   4% Patch 1 Patch Transdermal every 24 hours  methocarbamol 500 milliGRAM(s) Oral three times a day PRN  metoprolol tartrate 25 milliGRAM(s) Oral two times a day  multivitamin 1 Tablet(s) Oral daily  pantoprazole    Tablet 40 milliGRAM(s) Oral two times a day  silver sulfADIAZINE 1% Cream 1 Application(s) Topical every 12 hours  tamsulosin 0.4 milliGRAM(s) Oral at bedtime  thiamine 100 milliGRAM(s) Oral daily  vitamin A &amp; D Ointment 1 Application(s) Topical daily      RECENT LABS/IMAGING                        9.5    13.16 )-----------( 266      ( 16 Jan 2024 22:02 )             27.4     01-16    135  |  99  |  105<HH>  ----------------------------<  128<H>  4.0   |  26  |  1.7<H>    Ca    7.5<L>      16 Jan 2024 22:02  Phos  4.6     01-16  Mg     2.7     01-16    TPro  4.5<L>  /  Alb  2.5<L>  /  TBili  0.5  /  DBili  0.2  /  AST  193<H>  /  ALT  69<H>  /  AlkPhos  54  01-16      Urinalysis Basic - ( 16 Jan 2024 22:02 )    Color: x / Appearance: x / SG: x / pH: x  Gluc: 128 mg/dL / Ketone: x  / Bili: x / Urobili: x   Blood: x / Protein: x / Nitrite: x   Leuk Esterase: x / RBC: x / WBC x   Sq Epi: x / Non Sq Epi: x / Bacteria: x
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
## 2024-01-18 NOTE — H&P ADULT - ASSESSMENT
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 falls.     #I added that he has an allergy to iodine that he reported when he had the cardiac cath 10years ago. There was no airway/mouth involvement It was a mild-moderate rash.           -Pain control: Tylenol PRN; Robaxin 500 mg prn    -GI/Bowel Mgmt -  Protonix BID    -Bladder management:     - DVT: Lovenox, TEDs     #Skin:  -chest wall fraction burn  right elbow and right elbow friction burn are healing nicely    FEN   - Diet - DASH                Precautions / PROPHYLAXIS:      - Falls, Cardiac    - Ortho: Weight bearing status:           MEDICAL PROGNOSIS: GOOD            REHAB POTENTIAL: GOOD             ESTIMATED DISPOSITION: HOME WITH HOME CARE              ELOS:  [ x   ]  7-14 Days      [    ] 14 - 21 Days    [    ]  Other    THERAPY ORDERS and INITIAL INDIVIDUALIZED PLAN OF CARE:  This initial individualized interdisciplinary plan of care, which was established by me (the attending physiatrist), is based on elements from the post admission evaluation. The interdisciplinary therapy program is to be at least 3 hrs a day, at least 5 days per week from physical, occupational and/ or speech therapies as ordered by me below.      [ x  ] P.T. 90 mins /day at least 5 out of 7 days:  [  x ] superficial  modalities prn, [ x  ] A/AAROM, [ x  ] PREs, [ x  ] transfer training,            [ x  ] progressive ambulation, [x   ] stairs                                               [ x  ] O.T. 90 mins. /day at least 5 out of 7 days::  [ x  ] modalities prn, [ x  ]A/AAROM, [ x  ] PREs, functional transfer training, [ x  ] ADLs,              [ x  ]cognitive/ perceptual eval and training, [   ] splint eval                                                  [   ] S.L.P:  [   ] speech eval, [   ] swallow eval     [x   ] Neuropsychology      [x   ] Individualized rec. therapy    PRESCREEN COMPARISION:   I have reviewed the prescreen information and I have found no relevant changes between the preadmission screening and my post admission evaluation     RATIONALE FOR INPATIENT ADMISSION - Patient demonstrates the following: (check all that apply)  [X] Medically appropriate for rehabilitation admission  [X] Has attainable rehab goals with an appropriate initial discharge plan  [X] Has rehabilitation potential (expected to make a significant improvement within a reasonable period of time)  [X] Requires close medical management by a rehab physician, rehab nursing care,  and comprehensive interdisciplinary team (including PT, OT)

## 2024-01-18 NOTE — CHART NOTE - NSCHARTNOTEFT_GEN_A_CORE
Discussed with trauma surgery - medical follow up not needed today. Please call with any questions [phone]

## 2024-01-18 NOTE — PROGRESS NOTE ADULT - ASSESSMENT
Assessment and Plan  Case of a 68 year old male patient known to have a history of HTN, DL, and BPH who was brought to the ED on 01/15 after being found on the floor unable to stand, found to be hemodynamically stable with evidence of acute rib fractures and pneumonia on imaging, admitted for further management. Stay was complicated on 01/16 by 2 episodes of melena per nurse. We are consulted for concern of melena.      Melena on 01/16 with History of Intermittent Hematochezia and Diane-Duodenal Lymphadenopathy  3 cm Deep Cratered Duodenal Ulcer with Non-Bleeding Vessel s/p Hemostasis on 01/16  2mm Class II B Antral Ulcer  Grade C Esophagitis  Acute Drop in Hemoglobin- Resolved  * Baseline hemoglobin (prior to admission): Hb 13.1 on 12/06/2023  * ED Vitals:  /70 mmHg, HR 71 bpm   * Hemoglobin on admission: Hb 14.7 -> 01/16 Hb 13.1 dropped to 10.3/9.4/9.5 -> Hb 9.2 on 01/18  * BUN 19 on 12/06/2023 -> BUN 68 on 01/15 -> BUN 71 on 01/16  * LA 3.3 on 01/15 -> 1.2 on 01/16  * Coags: INR 1.07 on 01/15    * Not on AP or AC  * SWEETIE dark brown  * CT Abdomen and Pelvis w/ IV Cont (01.15.24 @ 16:49) Acute displaced left anterior 6th, 7th ribs, lateral 4th rib, right anterior 5th and 6th ribs and nondisplaced right L1 transverse process fractures. No pneumothorax. Patchy bilateral groundglass opacities, nonspecific and possibly infectious/inflammatory in etiology.Few nonspecific subcentimeter periduodenal lymph nodes, possibly reactive. Correlate for history of duodenitis.  * No prior EGD  * Prior Colonoscopy around 3 years ago: at [facility]  * Family History: no GI malignancies  * Had hypotension overnight on 01/16; reported melena x2 by nurse then again at 12:00 PM; SWEETIE dark brown with maroon tinge on 01/16 at 09:00 AM  * Status Post EGD on 01/16: Grade C esophagitis compatible with nonspecific erosive esophagitis. Erosions in the stomach compatible with erosive gastritis. A 2mm ulcer was noted in the antrum with adherant clot (Flaco Class IIb). A 3cm Deep crated ulcer with non bleeding visible vessel was noted in the anterior duodenal bulb (Flaco Class IIa). 4cc of 1:10.000 epinephrine was injected around the ulcer in 4 quadrants with adequate blanching. Using coagulation grasper, thermal therapy was applied with adequate hemostasis. (Injection, Thermal Therapy). Normal mucosa in the Second part of duodenum    RECOMMENDATIONS  - Trend CBC closely BID and transfuse as needed (target Hb >8). Maintain active Type and screen  - Continue IV protonix drip for 72 hours and switch to IV protonix 40mg BID on 01/19  - Can advance diet as tolerated in absence of active bleeding  - Recommend repeat EGD in 2 months  - Trend BUN; Ensure placement x2 large 18G IV Bores  - Monitor MAP and keep > 65mmHg and continue IV fluid hydration  - On DVT prophylaxis (Heparin 72703 units Q8h)  - Monitor BM for melena or hematochezia   - Please avoid any NSAIDs  - If any unstable bleed, please call GI stat      Hepatic Dome Cysts   Transaminitis Likely from Rhabdomyolysis > Hypotension  * Denies alcohol use  * CK >5000 on admission after being found on floor for 3 at least 3 days  * Cysts noted on imaging  * LFTs 12/06/2023 0.5/135/28/11  * LFTs 01/15 0.7/79/309/110  * INR 1.07 on 01/15  * Had hypotension on 01/16 and reported melena    RECOMMENDATIONS  - Trend LFTs  - IV fluid hydration for management of rhabdomyolysis; trend CK daily  - Avoid hepatotoxic agents  - Monitor BP and avoid hypotension  - Check hepatitis panel       Thank you for your consult.  - Please note that plan was communicated with medical team.   - Please reach GI on [phone] during weekdays till 5pm.  - Please call the GI service line after 5pm on Weekdays and anytime on Weekends: [phone].      Alexsandra Jerome MD  PGY - 4 Gastroenterology Fellow   [facility]       Assessment and Plan  Case of a 68 year old male patient known to have a history of HTN, DL, and BPH who was brought to the ED on 01/15 after being found on the floor unable to stand, found to be hemodynamically stable with evidence of acute rib fractures and pneumonia on imaging, admitted for further management. Stay was complicated on 01/16 by 2 episodes of melena per nurse. We are consulted for concern of melena.      Melena on 01/16 with History of Intermittent Hematochezia and Diane-Duodenal Lymphadenopathy  3 cm Deep Cratered Duodenal Ulcer with Non-Bleeding Vessel s/p Hemostasis on 01/16  2mm Class II B Antral Ulcer  Grade C Esophagitis  Acute Drop in Hemoglobin- Resolved  * Baseline hemoglobin (prior to admission): Hb 13.1 on 12/06/2023  * ED Vitals:  /70 mmHg, HR 71 bpm   * Hemoglobin on admission: Hb 14.7 -> 01/16 Hb 13.1 dropped to 10.3/9.4/9.5 -> Hb 9.2 on 01/18  * BUN 19 on 12/06/2023 -> BUN 68 on 01/15 -> BUN 71 on 01/16  * LA 3.3 on 01/15 -> 1.2 on 01/16  * Coags: INR 1.07 on 01/15    * Not on AP or AC  * SWEETIE dark brown  * CT Abdomen and Pelvis w/ IV Cont (01.15.24 @ 16:49) Acute displaced left anterior 6th, 7th ribs, lateral 4th rib, right anterior 5th and 6th ribs and nondisplaced right L1 transverse process fractures. No pneumothorax. Patchy bilateral groundglass opacities, nonspecific and possibly infectious/inflammatory in etiology.Few nonspecific subcentimeter periduodenal lymph nodes, possibly reactive. Correlate for history of duodenitis.  * No prior EGD  * Prior Colonoscopy around 3 years ago: at [facility]  * Family History: no GI malignancies  * Had hypotension overnight on 01/16; reported melena x2 by nurse then again at 12:00 PM; SWEETIE dark brown with maroon tinge on 01/16 at 09:00 AM  * Status Post EGD on 01/16: Grade C esophagitis compatible with nonspecific erosive esophagitis. Erosions in the stomach compatible with erosive gastritis. A 2mm ulcer was noted in the antrum with pigmentation. A 3cm Deep crated ulcer with non bleeding visible vessel was noted in the anterior duodenal bulb (Flaco Class IIa). 4cc of 1:10.000 epinephrine was injected around the ulcer in 4 quadrants with adequate blanching. Using coagulation grasper, thermal therapy was applied with adequate hemostasis. (Injection, Thermal Therapy). Normal mucosa in the Second part of duodenum    RECOMMENDATIONS  - Trend CBC closely BID and transfuse as needed (target Hb >8). Maintain active Type and screen  - Continue IV protonix drip for 72 hours and switch to IV protonix 40mg BID on 01/19  - Can advance diet as tolerated in absence of active bleeding  - Recommend repeat EGD in 2 months  - Trend BUN; Ensure placement x2 large 18G IV Bores  - Monitor MAP and keep > 65mmHg and continue IV fluid hydration  - On DVT prophylaxis (Heparin 13009 units Q8h)  - Monitor BM for melena or hematochezia   - Please avoid any NSAIDs  - If any unstable bleed, please call GI stat      Hepatic Dome Cysts   Transaminitis Likely from Rhabdomyolysis > Hypotension  * Denies alcohol use  * CK >5000 on admission after being found on floor for 3 at least 3 days  * Cysts noted on imaging  * LFTs 12/06/2023 0.5/135/28/11  * LFTs 01/15 0.7/79/309/110  * INR 1.07 on 01/15  * Had hypotension on 01/16 and reported melena    RECOMMENDATIONS  - Trend LFTs  - IV fluid hydration for management of rhabdomyolysis; trend CK daily  - Avoid hepatotoxic agents  - Monitor BP and avoid hypotension  - Check hepatitis panel       Thank you for your consult.  - Please note that plan was communicated with medical team.   - Please reach GI on [phone] during weekdays till 5pm.  - Please call the GI service line after 5pm on Weekdays and anytime on Weekends: [phone].      Alexsandra Jerome MD  PGY - 4 Gastroenterology Fellow   [facility]

## 2024-01-18 NOTE — H&P ADULT - NSHPSOCIALHISTORY_GEN_ALL_CORE
He lives alone in a  with 4 GENESIS and a FOS to the bedroom and bathroom.  He amb with a straight cane for his balance independently.  He was falling down once a week. His friend notices he had an ataxic gait. He took small steps.

## 2024-01-18 NOTE — DISCHARGE NOTE PROVIDER - NSDCMRMEDTOKEN_GEN_ALL_CORE_FT
acetaminophen 325 mg oral tablet: 2 tab(s) orally every 6 hours  folic acid 1 mg oral tablet: 1 tab(s) orally once a day  hydroxychloroquine 200 mg oral tablet: 2 tab(s) orally once a day  lidocaine 4% topical film: Apply topically to affected area once a day apply to affected  area once daily, leave on for 12 hours, remove for 12 hours, wash hands after use.  METOPROL SUC 100MG ER TAB: 1 tab(s) orally once a day  Multiple Vitamins oral tablet: 1 tab(s) orally once a day  pantoprazole 40 mg oral delayed release tablet: 1 tab(s) orally 2 times a day  silver sulfADIAZINE 1% topical cream: 1 Apply topically to affected area every 12 hours  TAMSULOSIN HCL 0.4 MG CAPSULE: 1 cap(s) orally once a day (at bedtime) TAKE 1 CAPSULE BY MOUTH EVERY DAY AT BEDTIME FOR 90 DAYS  thiamine 100 mg oral tablet: 1 tab(s) orally once a day  vitamin A and D topical ointment: 1 Apply topically to affected area once a day

## 2024-01-18 NOTE — PROGRESS NOTE ADULT - ATTENDING COMMENTS
ACS Attending Note Attestation    Patient was seen and examined bedside.  I reviewed the resident/PA note and agreed with above assessment and plan with following additions and corrections.    No acute events overnight. Feels well. Tolerated liquid diet.     T(C): 36.8 (01-18-24 @ 07:58), Max: 36.8 (01-18-24 @ 00:04)  HR: 64 (01-18-24 @ 07:58) (64 - 91)  BP: 132/59 (01-18-24 @ 07:58) (108/52 - 132/59)  RR: 18 (01-18-24 @ 07:58) (17 - 27)  SpO2: 95% (01-18-24 @ 07:58) (94% - 98%)      01-17-24 @ 07:01  -  01-18-24 @ 07:00  --------------------------------------------------------  IN:    Lactated Ringers: 1900 mL    Pantoprazole: 240 mL  Total IN: 2140 mL    OUT:    Voided (mL): 2285 mL  Total OUT: 2285 mL    Total NET: -145 mL      01-18-24 @ 07:01  -  01-18-24 @ 14:17  --------------------------------------------------------  IN:    Lactated Ringers: 375 mL    Oral Fluid: 360 mL    Pantoprazole: 50 mL  Total IN: 785 mL    OUT:    Voided (mL): 400 mL  Total OUT: 400 mL    Total NET: 385 mL    PHYSICAL EXAM:  General: no apparent distress, well-developed, well-nourished  HEENT: NCAT, EOMI, trachea midline  Cardiac: regular rate and rhythm  Respiratory: Normal respiratory effort, breathing even and unlabored  Abdomen: Soft, non-distended, non-tender  Musculoskeletal: ROM grossly intact, no tenderness to palpation  Neuro: Sensation grossly intact throughout, no focal deficits  Psych: calm, cooperative, mood and affect appropriate and congruent  Vascular: Pulses 1+ throughout, extremities well perfused  Skin: Warm and dry, scattered areas of road rash with scabbing                        9.2    13.10 )-----------( 266      ( 18 Jan 2024 11:54 )             26.9     01-18    143  |  105  |  34<H>  ----------------------------<  112<H>  3.5   |  27  |  1.0    Ca    8.1<L>      18 Jan 2024 11:54  Phos  1.9     01-18  Mg     2.1     01-18    Assessment/Plan:  68y Male s/p fall with L 4th, 6th, 7th rib fx along with R 5th and 6th rib fx and R L1 TP fx. Hospital course c/b GI bleeding s/p endoscopy on 1/16 with coagulation and epi injection of gastric ulcers. Doing well now  - Pain control  - Metoprolol for HTN  - Flomax for hx of BPH  - Protonix BID for gastric ulcers, follow up GI recs. Advance diet as tolerated.  - D/C IVF  - PhosNak packets for hypophosphatemia, JHON resolved, hemoglobin stable  - Possible discharge to  for rehab    Jesse Wise MD  Trauma/ACS/Surgical Critical Care Attending ACS Attending Note Attestation    Patient was seen and examined bedside.  I reviewed the resident/PA note and agreed with above assessment and plan with following additions and corrections.    No acute events overnight. Feels well. Tolerated liquid diet.     T(C): 36.8 (01-18-24 @ 07:58), Max: 36.8 (01-18-24 @ 00:04)  HR: 64 (01-18-24 @ 07:58) (64 - 91)  BP: 132/59 (01-18-24 @ 07:58) (108/52 - 132/59)  RR: 18 (01-18-24 @ 07:58) (17 - 27)  SpO2: 95% (01-18-24 @ 07:58) (94% - 98%)      01-17-24 @ 07:01  -  01-18-24 @ 07:00  --------------------------------------------------------  IN:    Lactated Ringers: 1900 mL    Pantoprazole: 240 mL  Total IN: 2140 mL    OUT:    Voided (mL): 2285 mL  Total OUT: 2285 mL    Total NET: -145 mL      01-18-24 @ 07:01  -  01-18-24 @ 14:17  --------------------------------------------------------  IN:    Lactated Ringers: 375 mL    Oral Fluid: 360 mL    Pantoprazole: 50 mL  Total IN: 785 mL    OUT:    Voided (mL): 400 mL  Total OUT: 400 mL    Total NET: 385 mL    PHYSICAL EXAM:  General: no apparent distress, well-developed, well-nourished  HEENT: NCAT, EOMI, trachea midline  Cardiac: regular rate and rhythm  Respiratory: Normal respiratory effort, breathing even and unlabored  Abdomen: Soft, non-distended, non-tender  Musculoskeletal: ROM grossly intact, no tenderness to palpation  Neuro: Sensation grossly intact throughout, no focal deficits  Psych: calm, cooperative, mood and affect appropriate and congruent  Vascular: Pulses 1+ throughout, extremities well perfused  Skin: Warm and dry, scattered areas of road rash with scabbing                        9.2    13.10 )-----------( 266      ( 18 Jan 2024 11:54 )             26.9     01-18    143  |  105  |  34<H>  ----------------------------<  112<H>  3.5   |  27  |  1.0    Ca    8.1<L>      18 Jan 2024 11:54  Phos  1.9     01-18  Mg     2.1     01-18    Assessment/Plan:  68y Male s/p fall with L 4th, 6th, 7th rib fx along with R 5th and 6th rib fx and R L1 TP fx. Hospital course c/b GI bleeding s/p endoscopy on 1/16 with coagulation and epi injection of gastric ulcers. Doing well now  - Pain control  - IS, OOB, deep breathing, mobilize as able  - Metoprolol for HTN  - Flomax for hx of BPH  - Protonix BID for gastric ulcers, follow up GI recs. Advance diet as tolerated.  - D/C IVF  - PhosNak packets for hypophosphatemia, JHON resolved, hemoglobin stable  - Possible discharge to  for rehab    Jesse Wise MD  Trauma/ACS/Surgical Critical Care Attending

## 2024-01-18 NOTE — DISCHARGE NOTE PROVIDER - PROVIDER TOKENS
PROVIDER:[TOKEN:[[id number]],FOLLOWUP:[2 months]],PROVIDER:[TOKEN:[[id number]],FOLLOWUP:[2 weeks]]

## 2024-01-18 NOTE — H&P ADULT - NSHPPHYSICALEXAM_GEN_ALL_CORE
PHYSICAL EXAMINATION   VItals: T(C): 36.8 (01-18-24 @ 07:58), Max: 36.8 (01-18-24 @ 00:04)  HR: 64 (01-18-24 @ 07:58) (64 - 91)  BP: 132/59 (01-18-24 @ 07:58) (108/52 - 132/59)  RR: 18 (01-18-24 @ 07:58) (17 - 27)  SpO2: 95% (01-18-24 @ 07:58) (94% - 98%)    General:[ x  ] NAD, Resting Comfortable,   [   ] other:                                HEENT: [ x  ] NC/AT, EOMI, PERRL , Normal Conjunctivae,   [   ] other:  Cardio: [ x  ] RRR, no murmur,   [   ] other:                              Pulm: [ x  ] No Respiratory Distress,  Lungs CTAB,   [   ] other:                       Abdomen: [  x ]ND/NT, Soft,   [   ] other:    : [ x  ] NO DUEÑAS CATHETER, [   ] DUEÑAS CATHETER- no meatal tear, no discharge, [x  ] other:  Condom catheter                                          MSK: [   ] No joint swelling, Full ROM,   [ x  ] other:  left shoulder FF to 170 degrees passively; there is a mild decrease in the left ankle active dorsiflexion                                   Ext: [ x  ]No C/C/E, No calf tenderness,   [   ]other:    Skin: [   ]intact,   [  x ] other:   -chest wall friction burn/some necrotic tissue---2 lesions--2 inches by 3 inches and 1.5 inches by 1 inches.   Both the right elbow friction burn 1.5 cm by 1 cm and right knee-- triangular 1 inch by 1 inch  by 1 inch lesion are healing very nicely.                                                                 Neurological Examination:  Cognitive: [x    ] AAO x 3,   [  x  ]  other:   there may be some modest executive dysfunction difficultiesat this point                                                                   Attention:  [  x  ] intact,   [    ]  other:                            Memory: [  x  ] intact,    [    ]  other:     Mood/Affect: [  x  ] wnl,    [    ]  other:                                                                             Communication: [  x  ]Fluent, no dysarthria, following commands:  [    ] other:   CN II - XII:  [ x   ] intact,  [    ] other:                                                                                        Motor:   RIGHT UE: [   ] WNL,  [  x ] other: 5-/5  LEFT    UE: [   ] WNL,  [  x ] other:   4+/5  RIGHT LE: [   ] WNL,  [x  ] other:  5-/5  LEFT    LE: [   ] WNL,  [  x ] other: 5-/5, left ankle DF is strong but has a limited ROM.    Tone: [ x   ] wnl,   [    ]  other:  DTRs: [ x  ]symmetric, [   ] other:  Coordination:   [    ] intact,   [  x  ] other:    + ataxia, balance and coordination are reduced out of proportion to weakness                                                                       Sensory: [  x  ] Intact to light touch,   [    ] other:

## 2024-01-18 NOTE — DISCHARGE NOTE PROVIDER - NSFOLLOWUPCLINICS_GEN_ALL_ED_FT
[facility]  Trauma Surgery  [address] [place] [address]  Phone: [phone]  Fax:   Follow Up Time: 2 weeks

## 2024-01-18 NOTE — PROGRESS NOTE ADULT - SUBJECTIVE AND OBJECTIVE BOX
GENERAL SURGERY PROGRESS NOTE     SUSAN AWAD  [address] [place] [address] day :3d      24 hour events:  patient downgraded     PHYSICAL EXAM:  GENERAL: NAD, well-appearing  CHEST/LUNG: Clear to auscultation bilaterally  HEART: Regular rate and rhythm  ABDOMEN: Soft, Nontender, Nondistended;   EXTREMITIES:  No clubbing, cyanosis, or edema        T(F): 98.3 (01-18-24 @ 00:04), Max: 98.3 (01-18-24 @ 00:04)  HR: 77 (01-18-24 @ 00:04) (62 - 91)  BP: 128/58 (01-18-24 @ 00:04) (108/52 - 153/67)  ABP: --  ABP(mean): --  RR: 18 (01-18-24 @ 00:04) (15 - 27)  SpO2: 94% (01-17-24 @ 19:35) (94% - 98%)    IN'S / OUT's:    01-16-24 @ 07:01  -  01-17-24 @ 07:00  --------------------------------------------------------  IN:    Lactated Ringers: 2375 mL    Lactated Ringers Bolus: 1500 mL    Pantoprazole: 110 mL  Total IN: 3985 mL    OUT:    Intermittent Catheterization - Urethral (mL): 450 mL  Total OUT: 450 mL    Total NET: 3535 mL      01-17-24 @ 07:01  -  01-18-24 @ 05:06  --------------------------------------------------------  IN:    Lactated Ringers: 1675 mL    Pantoprazole: 210 mL  Total IN: 1885 mL    OUT:    Voided (mL): 2285 mL  Total OUT: 2285 mL    Total NET: -400 mL          LABS  Labs:  CAPILLARY BLOOD GLUCOSE                              9.5    13.16 )-----------( 266      ( 16 Jan 2024 22:02 )             27.4         01-16    135  |  99  |  105<HH>  ----------------------------<  128<H>  4.0   |  26  |  1.7<H>          LFTs:             4.5  | 0.5  | 193      ------------------[54      ( 16 Jan 2024 11:13 )  2.5  | 0.2  | 69          Lipase:x      Amylase:x         Lactate, Blood: 0.9 mmol/L (01-16-24 @ 22:02)  Lactate, Blood: 1.2 mmol/L (01-16-24 @ 00:41)  Blood Gas Venous - Lactate: 1.8 mmol/L (01-15-24 @ 19:45)  Lactate, Blood: 3.3 mmol/L (01-15-24 @ 16:30)      Coags:    CARDIAC MARKERS ( 16 Jan 2024 22:02 )  x     / x     / 3906 U/L / x     / x              Urinalysis Basic - ( 16 Jan 2024 22:02 )    Color: x / Appearance: x / SG: x / pH: x  Gluc: 128 mg/dL / Ketone: x  / Bili: x / Urobili: x   Blood: x / Protein: x / Nitrite: x   Leuk Esterase: x / RBC: x / WBC x   Sq Epi: x / Non Sq Epi: x / Bacteria: x

## 2024-01-18 NOTE — H&P ADULT - NSHPLABSRESULTS_GEN_ALL_CORE
< from: CT Head No Cont (12.06.23 @ 15:47) >    IMPRESSION:    CT HEAD:  No acute intracranial findings.    Mild chronic microvascular ischemic changes.    CT CERVICAL SPINE:  No acute cervical fracture or facet subluxation.    Nonspecific 1.2 cm lucent osseous lesion in the C3 vertebral body.   Further characterization with nonemergent contrast-enhanced cervical MRI   can be considered.    < end of copied text >    < from: CT Abdomen and Pelvis w/ IV Cont (01.15.24 @ 16:49) >    IMPRESSION:  1.  Acute displaced left anterior 6th, 7th ribs, lateral 4th rib, right   anterior 5th and 6th ribs and nondisplaced right L1 transverse process   fractures. No pneumothorax.  2.  Patchy bilateral groundglass opacities, nonspecific and possibly   infectious/inflammatory in etiology.  3.  Few nonspecific subcentimeter periduodenal lymph nodes, possibly   reactive. Correlate for history of duodenitis.    < end of copied text >    01-18    143  |  105  |  34<H>  ----------------------------<  112<H>  3.5   |  27  |  1.0    Ca    8.1<L>      18 Jan 2024 11:54  Phos  1.9     01-18  Mg     2.1     01-18                              9.2    13.10 )-----------( 266      ( 18 Jan 2024 11:54 )             26.9

## 2024-01-18 NOTE — PROGRESS NOTE ADULT - ASSESSMENT
A/P:  s/p fall down flight of stairs, found down three days later, Rib fractures R 5-6th L 4th, 6th, 7th rib fracture, and L1 TP fracture    PLAN:   - BID PO Protonix   - local wound care per burn for eschar on chest/back  - phys: poss 4A  - PT  - GI: repeat EGD in 2 months, daily CK, IVF, Hb goal > 8

## 2024-01-18 NOTE — DISCHARGE NOTE PROVIDER - CARE PROVIDERS DIRECT ADDRESSES
[email or web address]

## 2024-01-18 NOTE — PATIENT PROFILE ADULT - FALL HARM RISK - HARM RISK INTERVENTIONS
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
## 2024-01-18 NOTE — PROGRESS NOTE ADULT - ATTENDING COMMENTS
No further bleeding reported, hb stable. Recommend IV PPI to transition to BID and monitor hb. If has recurrent overt GI bleeding please notify GI.

## 2024-01-18 NOTE — PATIENT PROFILE ADULT - PATIENT REPRESENTATIVE: ( YOU CAN CHOOSE ANY PERSON THAT CAN ASSIST YOU WITH YOUR HEALTH CARE PREFERENCES, DOES NOT HAVE TO BE A SPOUSE, IMMEDIATE FAMILY OR SIGNIFICANT OTHER/PARTNER)
Called patient vm Normal kidney and liver function. High blood sugar at 224.   Normal thyroid function.   Hemoglobin level improved to 11.3 (up from 10.8)= anemia.   Normal total cholesterol level, high TG level. Continue Crestor medication. Recommend low cholesterol diet and regular exercise      declines

## 2024-01-19 LAB
24R-OH-CALCIDIOL SERPL-MCNC: 25 NG/ML — LOW (ref 30–80)
ALBUMIN SERPL ELPH-MCNC: 2.6 G/DL — LOW (ref 3.5–5.2)
ALP SERPL-CCNC: 73 U/L — SIGNIFICANT CHANGE UP (ref 30–115)
ALT FLD-CCNC: 53 U/L — HIGH (ref 0–41)
ANION GAP SERPL CALC-SCNC: 6 MMOL/L — LOW (ref 7–14)
AST SERPL-CCNC: 120 U/L — HIGH (ref 0–41)
BASOPHILS # BLD AUTO: 0.04 K/UL — SIGNIFICANT CHANGE UP (ref 0–0.2)
BASOPHILS NFR BLD AUTO: 0.4 % — SIGNIFICANT CHANGE UP (ref 0–1)
BILIRUB SERPL-MCNC: 0.4 MG/DL — SIGNIFICANT CHANGE UP (ref 0.2–1.2)
BUN SERPL-MCNC: 19 MG/DL — SIGNIFICANT CHANGE UP (ref 10–20)
CALCIUM SERPL-MCNC: 8 MG/DL — LOW (ref 8.4–10.4)
CHLORIDE SERPL-SCNC: 107 MMOL/L — SIGNIFICANT CHANGE UP (ref 98–110)
CK SERPL-CCNC: 1000 U/L — HIGH (ref 0–225)
CO2 SERPL-SCNC: 31 MMOL/L — SIGNIFICANT CHANGE UP (ref 17–32)
CREAT SERPL-MCNC: 0.8 MG/DL — SIGNIFICANT CHANGE UP (ref 0.7–1.5)
EGFR: 95 ML/MIN/1.73M2 — SIGNIFICANT CHANGE UP
EOSINOPHIL # BLD AUTO: 0.08 K/UL — SIGNIFICANT CHANGE UP (ref 0–0.7)
EOSINOPHIL NFR BLD AUTO: 0.7 % — SIGNIFICANT CHANGE UP (ref 0–8)
GLUCOSE SERPL-MCNC: 90 MG/DL — SIGNIFICANT CHANGE UP (ref 70–99)
HAV IGM SER-ACNC: SIGNIFICANT CHANGE UP
HBV CORE IGM SER-ACNC: SIGNIFICANT CHANGE UP
HBV SURFACE AG SER-ACNC: SIGNIFICANT CHANGE UP
HCT VFR BLD CALC: 26 % — LOW (ref 42–52)
HCV AB S/CO SERPL IA: 0.11 S/CO — SIGNIFICANT CHANGE UP (ref 0–0.99)
HCV AB SERPL-IMP: SIGNIFICANT CHANGE UP
HGB BLD-MCNC: 8.9 G/DL — LOW (ref 14–18)
IMM GRANULOCYTES NFR BLD AUTO: 4.3 % — HIGH (ref 0.1–0.3)
LYMPHOCYTES # BLD AUTO: 2.4 K/UL — SIGNIFICANT CHANGE UP (ref 1.2–3.4)
LYMPHOCYTES # BLD AUTO: 22.4 % — SIGNIFICANT CHANGE UP (ref 20.5–51.1)
MAGNESIUM SERPL-MCNC: 1.8 MG/DL — SIGNIFICANT CHANGE UP (ref 1.8–2.4)
MCHC RBC-ENTMCNC: 31.1 PG — HIGH (ref 27–31)
MCHC RBC-ENTMCNC: 34.2 G/DL — SIGNIFICANT CHANGE UP (ref 32–37)
MCV RBC AUTO: 90.9 FL — SIGNIFICANT CHANGE UP (ref 80–94)
MONOCYTES # BLD AUTO: 0.73 K/UL — HIGH (ref 0.1–0.6)
MONOCYTES NFR BLD AUTO: 6.8 % — SIGNIFICANT CHANGE UP (ref 1.7–9.3)
NEUTROPHILS # BLD AUTO: 7.01 K/UL — HIGH (ref 1.4–6.5)
NEUTROPHILS NFR BLD AUTO: 65.4 % — SIGNIFICANT CHANGE UP (ref 42.2–75.2)
NRBC # BLD: 0 /100 WBCS — SIGNIFICANT CHANGE UP (ref 0–0)
PHOSPHATE SERPL-MCNC: 2.8 MG/DL — SIGNIFICANT CHANGE UP (ref 2.1–4.9)
PLATELET # BLD AUTO: 258 K/UL — SIGNIFICANT CHANGE UP (ref 130–400)
PMV BLD: 9.9 FL — SIGNIFICANT CHANGE UP (ref 7.4–10.4)
POTASSIUM SERPL-MCNC: 3.5 MMOL/L — SIGNIFICANT CHANGE UP (ref 3.5–5)
POTASSIUM SERPL-SCNC: 3.5 MMOL/L — SIGNIFICANT CHANGE UP (ref 3.5–5)
PROT SERPL-MCNC: 4.8 G/DL — LOW (ref 6–8)
RBC # BLD: 2.86 M/UL — LOW (ref 4.7–6.1)
RBC # FLD: 13.1 % — SIGNIFICANT CHANGE UP (ref 11.5–14.5)
SODIUM SERPL-SCNC: 144 MMOL/L — SIGNIFICANT CHANGE UP (ref 135–146)
WBC # BLD: 10.72 K/UL — SIGNIFICANT CHANGE UP (ref 4.8–10.8)
WBC # FLD AUTO: 10.72 K/UL — SIGNIFICANT CHANGE UP (ref 4.8–10.8)

## 2024-01-19 PROCEDURE — 99231 SBSQ HOSP IP/OBS SF/LOW 25: CPT | Mod: FS

## 2024-01-19 PROCEDURE — 72141 MRI NECK SPINE W/O DYE: CPT | Mod: 26

## 2024-01-19 PROCEDURE — 99233 SBSQ HOSP IP/OBS HIGH 50: CPT

## 2024-01-19 PROCEDURE — 70551 MRI BRAIN STEM W/O DYE: CPT | Mod: 26

## 2024-01-19 RX ORDER — MECLIZINE HCL 12.5 MG
12.5 TABLET ORAL DAILY
Refills: 0 | Status: DISCONTINUED | OUTPATIENT
Start: 2024-01-19 | End: 2024-01-19

## 2024-01-19 RX ORDER — MECLIZINE HCL 12.5 MG
12.5 TABLET ORAL
Refills: 0 | Status: DISCONTINUED | OUTPATIENT
Start: 2024-01-19 | End: 2024-01-19

## 2024-01-19 RX ORDER — MECLIZINE HCL 12.5 MG
12.5 TABLET ORAL
Refills: 0 | Status: DISCONTINUED | OUTPATIENT
Start: 2024-01-20 | End: 2024-01-21

## 2024-01-19 RX ADMIN — Medication 400 MILLIGRAM(S): at 11:59

## 2024-01-19 RX ADMIN — PANTOPRAZOLE SODIUM 10 MG/HR: 20 TABLET, DELAYED RELEASE ORAL at 11:57

## 2024-01-19 RX ADMIN — Medication 25 MILLIGRAM(S): at 17:36

## 2024-01-19 RX ADMIN — GABAPENTIN 100 MILLIGRAM(S): 400 CAPSULE ORAL at 13:26

## 2024-01-19 RX ADMIN — Medication 1 TABLET(S): at 11:59

## 2024-01-19 RX ADMIN — Medication 650 MILLIGRAM(S): at 06:08

## 2024-01-19 RX ADMIN — Medication 650 MILLIGRAM(S): at 12:29

## 2024-01-19 RX ADMIN — Medication 25 MILLIGRAM(S): at 06:04

## 2024-01-19 RX ADMIN — Medication 650 MILLIGRAM(S): at 11:59

## 2024-01-19 RX ADMIN — Medication 1 MILLIGRAM(S): at 11:59

## 2024-01-19 RX ADMIN — LIDOCAINE 1 PATCH: 4 CREAM TOPICAL at 19:00

## 2024-01-19 RX ADMIN — HEPARIN SODIUM 5000 UNIT(S): 5000 INJECTION INTRAVENOUS; SUBCUTANEOUS at 21:18

## 2024-01-19 RX ADMIN — Medication 1 APPLICATION(S): at 06:05

## 2024-01-19 RX ADMIN — Medication 1 APPLICATION(S): at 11:59

## 2024-01-19 RX ADMIN — HEPARIN SODIUM 5000 UNIT(S): 5000 INJECTION INTRAVENOUS; SUBCUTANEOUS at 06:08

## 2024-01-19 RX ADMIN — Medication 650 MILLIGRAM(S): at 17:36

## 2024-01-19 RX ADMIN — GABAPENTIN 100 MILLIGRAM(S): 400 CAPSULE ORAL at 21:18

## 2024-01-19 RX ADMIN — GABAPENTIN 100 MILLIGRAM(S): 400 CAPSULE ORAL at 06:04

## 2024-01-19 RX ADMIN — CHLORHEXIDINE GLUCONATE 1 APPLICATION(S): 213 SOLUTION TOPICAL at 06:03

## 2024-01-19 RX ADMIN — Medication 650 MILLIGRAM(S): at 06:05

## 2024-01-19 RX ADMIN — HEPARIN SODIUM 5000 UNIT(S): 5000 INJECTION INTRAVENOUS; SUBCUTANEOUS at 13:27

## 2024-01-19 RX ADMIN — LIDOCAINE 1 PATCH: 4 CREAM TOPICAL at 12:00

## 2024-01-19 RX ADMIN — TAMSULOSIN HYDROCHLORIDE 0.4 MILLIGRAM(S): 0.4 CAPSULE ORAL at 21:17

## 2024-01-19 RX ADMIN — Medication 100 MILLIGRAM(S): at 11:59

## 2024-01-19 RX ADMIN — Medication 1 APPLICATION(S): at 17:36
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
## 2024-01-19 NOTE — PROGRESS NOTE ADULT - SUBJECTIVE AND OBJECTIVE BOX
AM Rounds     Vital Signs Last 24 Hrs  T(C): 36.1 (19 Jan 2024 12:43), Max: 37 (18 Jan 2024 21:46)  T(F): 97 (19 Jan 2024 12:43), Max: 98.6 (18 Jan 2024 21:46)  HR: 70 (19 Jan 2024 12:43) (70 - 74)  BP: 132/60 (19 Jan 2024 12:43) (123/58 - 132/60)  RR: 18 (19 Jan 2024 12:43) (18 - 18)      I&O's Summary    18 Jan 2024 07:01  -  19 Jan 2024 07:00  --------------------------------------------------------  IN: 90 mL / OUT: 0 mL / NET: 90 mL                 8.9    10.72 )-----------( 258      ( 19 Jan 2024 05:56 )             26.0     CAPILLARY BLOOD GLUCOSE      POCT Blood Glucose.: 103 mg/dL (18 Jan 2024 21:18)      GENERAL:  NAD, laying in bed comfortably  HEAD:  Atraumatic, Normocephalic  CHEST/LUNG:  B/L good air entry, no tachypnea/increased WOB/retractions.   HEART: In no acute cardiopulmonary distress   SKIN/Wound:Skin: R chest- approx. 6x6 cm adherent black eschar under right breast. No surrounding erythema, active purulence, bleeding or underlying hematoma. b/l UEs and LEs- scattered partial thickness abrasions to extremities, no surrounding erythema, swelling or active drainage.    + large dressing change performed. Patient tolerated well.
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
## 2024-01-19 NOTE — PROGRESS NOTE ADULT - ATTENDING COMMENTS
Pt feeling better, no further overt GI bleeding s/p EGD revealing pigmented gastric ulcer and large duodenal ulcer with vessel s/p epi and thermal therapy with coag grasper. Recommend PPI BID. If has evidence of overt GI bleeding with drop in Hb please notify GI. Recommend outpt follow up for colonoscopy.
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
## 2024-01-20 RX ORDER — PANTOPRAZOLE SODIUM 20 MG/1
40 TABLET, DELAYED RELEASE ORAL
Refills: 0 | Status: DISCONTINUED | OUTPATIENT
Start: 2024-01-20 | End: 2024-01-20

## 2024-01-20 RX ORDER — PANTOPRAZOLE SODIUM 20 MG/1
40 TABLET, DELAYED RELEASE ORAL
Refills: 0 | Status: COMPLETED | OUTPATIENT
Start: 2024-01-20 | End: 2024-01-20

## 2024-01-20 RX ORDER — PANTOPRAZOLE SODIUM 20 MG/1
40 TABLET, DELAYED RELEASE ORAL
Refills: 0 | Status: DISCONTINUED | OUTPATIENT
Start: 2024-01-21 | End: 2024-02-07

## 2024-01-20 RX ADMIN — Medication 650 MILLIGRAM(S): at 06:05

## 2024-01-20 RX ADMIN — LIDOCAINE 1 PATCH: 4 CREAM TOPICAL at 12:15

## 2024-01-20 RX ADMIN — LIDOCAINE 1 PATCH: 4 CREAM TOPICAL at 00:00

## 2024-01-20 RX ADMIN — Medication 100 MILLIGRAM(S): at 21:19

## 2024-01-20 RX ADMIN — Medication 1 APPLICATION(S): at 06:24

## 2024-01-20 RX ADMIN — Medication 400 MILLIGRAM(S): at 21:15

## 2024-01-20 RX ADMIN — GABAPENTIN 100 MILLIGRAM(S): 400 CAPSULE ORAL at 06:05

## 2024-01-20 RX ADMIN — Medication 650 MILLIGRAM(S): at 06:35

## 2024-01-20 RX ADMIN — Medication 650 MILLIGRAM(S): at 00:30

## 2024-01-20 RX ADMIN — HEPARIN SODIUM 5000 UNIT(S): 5000 INJECTION INTRAVENOUS; SUBCUTANEOUS at 06:05

## 2024-01-20 RX ADMIN — Medication 12.5 MILLIGRAM(S): at 21:16

## 2024-01-20 RX ADMIN — Medication 25 MILLIGRAM(S): at 06:04

## 2024-01-20 RX ADMIN — CHLORHEXIDINE GLUCONATE 1 APPLICATION(S): 213 SOLUTION TOPICAL at 06:23

## 2024-01-20 RX ADMIN — Medication 650 MILLIGRAM(S): at 00:00

## 2024-01-20 RX ADMIN — Medication 1 TABLET(S): at 21:18

## 2024-01-20 RX ADMIN — LIDOCAINE 1 PATCH: 4 CREAM TOPICAL at 19:00
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
## 2024-01-21 RX ORDER — HALOPERIDOL DECANOATE 100 MG/ML
2 INJECTION INTRAMUSCULAR EVERY 8 HOURS
Refills: 0 | Status: DISCONTINUED | OUTPATIENT
Start: 2024-01-21 | End: 2024-02-07

## 2024-01-21 RX ADMIN — TAMSULOSIN HYDROCHLORIDE 0.4 MILLIGRAM(S): 0.4 CAPSULE ORAL at 22:16

## 2024-01-21 RX ADMIN — GABAPENTIN 100 MILLIGRAM(S): 400 CAPSULE ORAL at 22:16

## 2024-01-21 RX ADMIN — LIDOCAINE 1 PATCH: 4 CREAM TOPICAL at 00:00

## 2024-01-21 RX ADMIN — HEPARIN SODIUM 5000 UNIT(S): 5000 INJECTION INTRAVENOUS; SUBCUTANEOUS at 22:16
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
## 2024-01-22 LAB
ALBUMIN SERPL ELPH-MCNC: 2.9 G/DL — LOW (ref 3.5–5.2)
ALP SERPL-CCNC: 104 U/L — SIGNIFICANT CHANGE UP (ref 30–115)
ALT FLD-CCNC: 45 U/L — HIGH (ref 0–41)
ANION GAP SERPL CALC-SCNC: 9 MMOL/L — SIGNIFICANT CHANGE UP (ref 7–14)
AST SERPL-CCNC: 73 U/L — HIGH (ref 0–41)
BASOPHILS # BLD AUTO: 0.06 K/UL — SIGNIFICANT CHANGE UP (ref 0–0.2)
BASOPHILS NFR BLD AUTO: 0.4 % — SIGNIFICANT CHANGE UP (ref 0–1)
BILIRUB SERPL-MCNC: 0.3 MG/DL — SIGNIFICANT CHANGE UP (ref 0.2–1.2)
BUN SERPL-MCNC: 12 MG/DL — SIGNIFICANT CHANGE UP (ref 10–20)
CALCIUM SERPL-MCNC: 8.1 MG/DL — LOW (ref 8.4–10.5)
CHLORIDE SERPL-SCNC: 108 MMOL/L — SIGNIFICANT CHANGE UP (ref 98–110)
CO2 SERPL-SCNC: 26 MMOL/L — SIGNIFICANT CHANGE UP (ref 17–32)
CREAT SERPL-MCNC: 0.9 MG/DL — SIGNIFICANT CHANGE UP (ref 0.7–1.5)
EGFR: 92 ML/MIN/1.73M2 — SIGNIFICANT CHANGE UP
EOSINOPHIL # BLD AUTO: 0.2 K/UL — SIGNIFICANT CHANGE UP (ref 0–0.7)
EOSINOPHIL NFR BLD AUTO: 1.4 % — SIGNIFICANT CHANGE UP (ref 0–8)
GLUCOSE SERPL-MCNC: 125 MG/DL — HIGH (ref 70–99)
HCT VFR BLD CALC: 26.6 % — LOW (ref 42–52)
HGB BLD-MCNC: 8.7 G/DL — LOW (ref 14–18)
IMM GRANULOCYTES NFR BLD AUTO: 2.8 % — HIGH (ref 0.1–0.3)
LYMPHOCYTES # BLD AUTO: 18.1 % — LOW (ref 20.5–51.1)
LYMPHOCYTES # BLD AUTO: 2.65 K/UL — SIGNIFICANT CHANGE UP (ref 1.2–3.4)
MAGNESIUM SERPL-MCNC: 1.7 MG/DL — LOW (ref 1.8–2.4)
MCHC RBC-ENTMCNC: 31.1 PG — HIGH (ref 27–31)
MCHC RBC-ENTMCNC: 32.7 G/DL — SIGNIFICANT CHANGE UP (ref 32–37)
MCV RBC AUTO: 95 FL — HIGH (ref 80–94)
MONOCYTES # BLD AUTO: 0.92 K/UL — HIGH (ref 0.1–0.6)
MONOCYTES NFR BLD AUTO: 6.3 % — SIGNIFICANT CHANGE UP (ref 1.7–9.3)
NEUTROPHILS # BLD AUTO: 10.42 K/UL — HIGH (ref 1.4–6.5)
NEUTROPHILS NFR BLD AUTO: 71 % — SIGNIFICANT CHANGE UP (ref 42.2–75.2)
NRBC # BLD: 0 /100 WBCS — SIGNIFICANT CHANGE UP (ref 0–0)
PLATELET # BLD AUTO: 351 K/UL — SIGNIFICANT CHANGE UP (ref 130–400)
PMV BLD: 10.2 FL — SIGNIFICANT CHANGE UP (ref 7.4–10.4)
POTASSIUM SERPL-MCNC: 3.7 MMOL/L — SIGNIFICANT CHANGE UP (ref 3.5–5)
POTASSIUM SERPL-SCNC: 3.7 MMOL/L — SIGNIFICANT CHANGE UP (ref 3.5–5)
PROT SERPL-MCNC: 5.4 G/DL — LOW (ref 6–8)
RBC # BLD: 2.8 M/UL — LOW (ref 4.7–6.1)
RBC # FLD: 13.6 % — SIGNIFICANT CHANGE UP (ref 11.5–14.5)
SODIUM SERPL-SCNC: 143 MMOL/L — SIGNIFICANT CHANGE UP (ref 135–146)
WBC # BLD: 14.66 K/UL — HIGH (ref 4.8–10.8)
WBC # FLD AUTO: 14.66 K/UL — HIGH (ref 4.8–10.8)

## 2024-01-22 RX ADMIN — PANTOPRAZOLE SODIUM 40 MILLIGRAM(S): 20 TABLET, DELAYED RELEASE ORAL at 06:19

## 2024-01-22 RX ADMIN — CHLORHEXIDINE GLUCONATE 1 APPLICATION(S): 213 SOLUTION TOPICAL at 06:21

## 2024-01-22 RX ADMIN — Medication 650 MILLIGRAM(S): at 06:50

## 2024-01-22 RX ADMIN — Medication 650 MILLIGRAM(S): at 06:20

## 2024-01-22 RX ADMIN — PANTOPRAZOLE SODIUM 40 MILLIGRAM(S): 20 TABLET, DELAYED RELEASE ORAL at 17:30

## 2024-01-22 RX ADMIN — HEPARIN SODIUM 5000 UNIT(S): 5000 INJECTION INTRAVENOUS; SUBCUTANEOUS at 12:44

## 2024-01-22 RX ADMIN — TAMSULOSIN HYDROCHLORIDE 0.4 MILLIGRAM(S): 0.4 CAPSULE ORAL at 21:39

## 2024-01-22 RX ADMIN — HEPARIN SODIUM 5000 UNIT(S): 5000 INJECTION INTRAVENOUS; SUBCUTANEOUS at 21:40

## 2024-01-22 RX ADMIN — LIDOCAINE 1 PATCH: 4 CREAM TOPICAL at 12:46

## 2024-01-22 RX ADMIN — Medication 650 MILLIGRAM(S): at 00:50

## 2024-01-22 RX ADMIN — Medication 1 APPLICATION(S): at 12:47

## 2024-01-22 RX ADMIN — GABAPENTIN 100 MILLIGRAM(S): 400 CAPSULE ORAL at 12:45

## 2024-01-22 RX ADMIN — Medication 1 APPLICATION(S): at 06:21

## 2024-01-22 RX ADMIN — GABAPENTIN 100 MILLIGRAM(S): 400 CAPSULE ORAL at 21:39

## 2024-01-22 RX ADMIN — Medication 650 MILLIGRAM(S): at 12:45

## 2024-01-22 RX ADMIN — Medication 400 MILLIGRAM(S): at 12:47

## 2024-01-22 RX ADMIN — HEPARIN SODIUM 5000 UNIT(S): 5000 INJECTION INTRAVENOUS; SUBCUTANEOUS at 06:20

## 2024-01-22 RX ADMIN — Medication 100 MILLIGRAM(S): at 12:44

## 2024-01-22 RX ADMIN — Medication 25 MILLIGRAM(S): at 17:31

## 2024-01-22 RX ADMIN — Medication 650 MILLIGRAM(S): at 00:20

## 2024-01-22 RX ADMIN — Medication 1 TABLET(S): at 12:45

## 2024-01-22 RX ADMIN — Medication 1 APPLICATION(S): at 17:31

## 2024-01-22 RX ADMIN — Medication 650 MILLIGRAM(S): at 17:31

## 2024-01-22 RX ADMIN — Medication 25 MILLIGRAM(S): at 06:19

## 2024-01-22 RX ADMIN — Medication 650 MILLIGRAM(S): at 13:15

## 2024-01-22 RX ADMIN — GABAPENTIN 100 MILLIGRAM(S): 400 CAPSULE ORAL at 06:20

## 2024-01-22 NOTE — CONSULT NOTE ADULT - ATTENDING COMMENTS
Patient seen and examined and agree with above except as noted.  Patients history, notes, labs, imaging, vitals and meds reviewed personally.  P/w fall  No cog wheel rigidity   proximal LE weakness b/l  Likely lumbar radiculopathy as well as history of lupus  Gait likely multifactorial  Avoid neuroleptics  Would have patient follow up with neurology as out patient following rehab for reassessment

## 2024-01-22 NOTE — CONSULT NOTE ADULT - CONSULT REASON
Pt has ataxia and vertigo of unknown duration and etiology. s/p MR Brain/Cerv without significant findings

## 2024-01-22 NOTE — CONSULT NOTE ADULT - ASSESSMENT
The pt is a 67 y/o male with PMH of HTN, HLD, vertigo, mild SLE (Lupus) who presents to the ED s/p found down for 3 days.    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 for hyperreflexia but Left side > right  -left-sided dysmetria observed on finger-to-nose testing  -pt was unable to name 2/3 objects mentioned at start of interview  -no cogwheel rigidity appreciated    Recommendations  -get b12, folate, b1 labs  -c/w PT/OT  -f/u Neuro OP for further evaluation/neurologic testing
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
## 2024-01-22 NOTE — PROGRESS NOTE ADULT - ASSESSMENT
Neuropsychology Follow Up      Treatment focused on: Cognitive Functioning   Pain: Denied  Location: N/A  Ratin/10  Intervention: N/A   Orientation: Correct except for date (said  when it was the )   Arousal Level: Alert   Behavior: Cooperative, friendly   Affect/Mood Range: [facility]     Needed: No   #:  NA   Attention: [facility]   Insight into illness/deficits: [facility]     Patient was seen for a session following cognitive weakness, including learning, memory, processing speed, working memory, and executive functioning noted after referral following admission secondary to a recent fall with multiple rib fractures.  Other noted medical history includes hypertension, hyperlipidemia, vertigo, mild SLE (Lupus), myopathy, rhabdomyolysis, frequent falls, ataxia, and likely MVP and CAD.        Neuropsychological testing was completed, and the following measures were given: HVLT-R Form 1; Oral Trail Making Test A and B; Double Simultaneous Stimulation task, bedside neuropsychological examination, and NAB Judgement subtest.          Results:    Learning:     Verbal encoding for a list of words showed difficulty initially learning the words, but benefitted slightly from repetition, as he demonstrated a nearly flat learning curve, (4, 5, 5) across 3 trials with 1 repetition error.       Memory:     After a delay, patient was able to spontaneously recall 4 of the 12 words, with 1 intrusion error.  With a yes/no paradigm, he gained 7 additional words, as he gave 11/12 correct recognition responses (hits) with 1 false-positive response.  While patient’s recall was impaired, his retention was in the average range.       Processing Speed/Working Memory/Executive Functioning:    Patient demonstrated impaired basic processing speed, completing Oral Trails A in 14 seconds with no errors.  On a working memory set shifting task, Oral Trails B, patient initiated and quickly shifted between letters and numbers, getting up to item 6-F, however, as the task became more executively demanding he exhibited difficulties with cognitive set shifting, and the task was discontinued after 46 seconds with 4 errors.  Executive functioning, assessed by looking at the patient’s responses to questions evaluating safe judgment and decision-making skills related to situations likely to be encountered in daily life, showed difficulty (14/20).  Patient’s responses were concrete, lacked abstraction, and were preservative.     Attentional System: DSS testing was normal for vision, hearing, and tactile sensation.  Bilateral hearing loss L>R is noted, however there was not evidence of auditory extinction.     Behavioral examination: Examination of cranial nerves V, VI, VII, VIII, IX, and XII was normal.  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  No dysmetria noted on finger-to-nose testing.  ROM reveals LUE weakness and again questionable cogwheeling, but appears less pronounced than previously.  Functional fixedness was not observed when asked to use a banana to scratch his head.  No grasp, rooting, snouting, glabellar, palmomental frontal release signs observed on evaluation.  Patient report appeared notably less confabulatory than in prior session.  Discussion today regarding AH/VH, patient denied recent experience.  When questioned about recent episode of agitation noted in EMR, patient admitted to and able to describe experience, expressing remorse and denying similar subsequent experience/confusion.     Summary:   Patient showed difficulty with learning, benefitting slightly from repetition, but showing an overall flat learning curve.  Spontaneous recall is impaired, however, he demonstrated the ability to encode new information through his average performance on a recognition task.  Processing speed, working memory, and mental abstraction were impaired.  Patient can generally initiate a task, but has more difficulty as the cognitive demands of the task increases, generally stating when he has reached his ceiling.      Most prominent feature of presentation is at least mild-moderate dysexecutive syndrome of unclear origin.  Changes in meclizine administration by physiatry appears beneficial. Etiology remains unclear but Korsakoff syndrome remains high on the list of differential diagnosis given cognitive changes, hx of ataxia, questionable nystagmus/report of blurred vision.  PD/other movement d/o remain on the list of differentials, but seems less likely given current course of symptoms.     As above, results are generally consistent with previous results as the patient demonstrated difficulty with spontaneous recall but benefitted from recognition and showed little mental flexibility as he gave concrete and perseverative responses.       Patient’s medical history and previous falls place him at a higher risk for future falls and/or cerebrovascular burden therefore, further assessment would be warranted to assess memory and other executive functioning deficits.  Patient will require/benefit from memory and attention aids such as reminders, redirection, refocusing, and large print/visual aids, among others.  Cognitive remediation may also be recommended to help strengthen patient’s memory, working memory, retrieval, attention, and self-monitoring, as well as psychoeducation regarding vascular risk factor mitigation and importance of medical management of vascular risk factors.  Since patient is at a higher risk for falls, he would require supervision with cooking, groceries, or other IADLs/activities that require attention to details.       Goals: Monitor cognition, support cognitive recovery and adjustment       Plan: Cognitive remediation; provide feedback prior to discharge; 1-2 sessions weekly at 30-60 minutes each; and refer to outpatient neuropsychological evaluation for diagnosis clarity.

## 2024-01-23 DIAGNOSIS — N40.1 BENIGN PROSTATIC HYPERPLASIA WITH LOWER URINARY TRACT SYMPTOMS: ICD-10-CM

## 2024-01-23 DIAGNOSIS — S22.43XA MULTIPLE FRACTURES OF RIBS, BILATERAL, INITIAL ENCOUNTER FOR CLOSED FRACTURE: ICD-10-CM

## 2024-01-23 DIAGNOSIS — E87.20 ACIDOSIS, UNSPECIFIED: ICD-10-CM

## 2024-01-23 DIAGNOSIS — E86.0 DEHYDRATION: ICD-10-CM

## 2024-01-23 DIAGNOSIS — Z66 DO NOT RESUSCITATE: ICD-10-CM

## 2024-01-23 DIAGNOSIS — R59.0 LOCALIZED ENLARGED LYMPH NODES: ICD-10-CM

## 2024-01-23 DIAGNOSIS — J98.11 ATELECTASIS: ICD-10-CM

## 2024-01-23 DIAGNOSIS — E83.39 OTHER DISORDERS OF PHOSPHORUS METABOLISM: ICD-10-CM

## 2024-01-23 DIAGNOSIS — G72.89 OTHER SPECIFIED MYOPATHIES: ICD-10-CM

## 2024-01-23 DIAGNOSIS — R44.3 HALLUCINATIONS, UNSPECIFIED: ICD-10-CM

## 2024-01-23 DIAGNOSIS — M32.9 SYSTEMIC LUPUS ERYTHEMATOSUS, UNSPECIFIED: ICD-10-CM

## 2024-01-23 DIAGNOSIS — R40.2412 GLASGOW COMA SCALE SCORE 13-15, AT ARRIVAL TO EMERGENCY DEPARTMENT: ICD-10-CM
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
## 2024-01-23 DIAGNOSIS — K22.10 ULCER OF ESOPHAGUS WITHOUT BLEEDING: ICD-10-CM

## 2024-01-23 DIAGNOSIS — Z91.148 PATIENT'S OTHER NONCOMPLIANCE WITH MEDICATION REGIMEN FOR OTHER REASON: ICD-10-CM

## 2024-01-23 DIAGNOSIS — T79.6XXA TRAUMATIC ISCHEMIA OF MUSCLE, INITIAL ENCOUNTER: ICD-10-CM

## 2024-01-23 DIAGNOSIS — Z98.890 OTHER SPECIFIED POSTPROCEDURAL STATES: ICD-10-CM

## 2024-01-23 DIAGNOSIS — I25.10 ATHEROSCLEROTIC HEART DISEASE OF NATIVE CORONARY ARTERY WITHOUT ANGINA PECTORIS: ICD-10-CM

## 2024-01-23 DIAGNOSIS — N17.9 ACUTE KIDNEY FAILURE, UNSPECIFIED: ICD-10-CM

## 2024-01-23 DIAGNOSIS — R42 DIZZINESS AND GIDDINESS: ICD-10-CM

## 2024-01-23 DIAGNOSIS — S40.811A ABRASION OF RIGHT UPPER ARM, INITIAL ENCOUNTER: ICD-10-CM

## 2024-01-23 DIAGNOSIS — S32.018A OTHER FRACTURE OF FIRST LUMBAR VERTEBRA, INITIAL ENCOUNTER FOR CLOSED FRACTURE: ICD-10-CM

## 2024-01-23 DIAGNOSIS — K26.4 CHRONIC OR UNSPECIFIED DUODENAL ULCER WITH HEMORRHAGE: ICD-10-CM

## 2024-01-23 DIAGNOSIS — S40.812A ABRASION OF LEFT UPPER ARM, INITIAL ENCOUNTER: ICD-10-CM

## 2024-01-23 DIAGNOSIS — S20.91XA ABRASION OF UNSPECIFIED PARTS OF THORAX, INITIAL ENCOUNTER: ICD-10-CM

## 2024-01-23 DIAGNOSIS — S80.212A ABRASION, LEFT KNEE, INITIAL ENCOUNTER: ICD-10-CM

## 2024-01-23 DIAGNOSIS — K29.70 GASTRITIS, UNSPECIFIED, WITHOUT BLEEDING: ICD-10-CM

## 2024-01-23 DIAGNOSIS — E78.5 HYPERLIPIDEMIA, UNSPECIFIED: ICD-10-CM

## 2024-01-23 DIAGNOSIS — M89.9 DISORDER OF BONE, UNSPECIFIED: ICD-10-CM

## 2024-01-23 DIAGNOSIS — K76.89 OTHER SPECIFIED DISEASES OF LIVER: ICD-10-CM
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
## 2024-01-23 DIAGNOSIS — S30.811A ABRASION OF ABDOMINAL WALL, INITIAL ENCOUNTER: ICD-10-CM

## 2024-01-23 DIAGNOSIS — I08.8 OTHER RHEUMATIC MULTIPLE VALVE DISEASES: ICD-10-CM

## 2024-01-23 DIAGNOSIS — Z80.0 FAMILY HISTORY OF MALIGNANT NEOPLASM OF DIGESTIVE ORGANS: ICD-10-CM

## 2024-01-23 DIAGNOSIS — I34.1 NONRHEUMATIC MITRAL (VALVE) PROLAPSE: ICD-10-CM

## 2024-01-23 DIAGNOSIS — I95.9 HYPOTENSION, UNSPECIFIED: ICD-10-CM

## 2024-01-23 DIAGNOSIS — I10 ESSENTIAL (PRIMARY) HYPERTENSION: ICD-10-CM

## 2024-01-23 DIAGNOSIS — R33.8 OTHER RETENTION OF URINE: ICD-10-CM

## 2024-01-23 DIAGNOSIS — S80.211A ABRASION, RIGHT KNEE, INITIAL ENCOUNTER: ICD-10-CM

## 2024-01-23 DIAGNOSIS — K25.0 ACUTE GASTRIC ULCER WITH HEMORRHAGE: ICD-10-CM

## 2024-01-23 DIAGNOSIS — F10.10 ALCOHOL ABUSE, UNCOMPLICATED: ICD-10-CM

## 2024-01-23 PROCEDURE — 99222 1ST HOSP IP/OBS MODERATE 55: CPT

## 2024-01-23 RX ADMIN — Medication 650 MILLIGRAM(S): at 12:43

## 2024-01-23 RX ADMIN — CHLORHEXIDINE GLUCONATE 1 APPLICATION(S): 213 SOLUTION TOPICAL at 05:24

## 2024-01-23 RX ADMIN — GABAPENTIN 100 MILLIGRAM(S): 400 CAPSULE ORAL at 17:42

## 2024-01-23 RX ADMIN — Medication 650 MILLIGRAM(S): at 13:13

## 2024-01-23 RX ADMIN — Medication 650 MILLIGRAM(S): at 06:11

## 2024-01-23 RX ADMIN — Medication 1 APPLICATION(S): at 05:25

## 2024-01-23 RX ADMIN — Medication 25 MILLIGRAM(S): at 18:01

## 2024-01-23 RX ADMIN — Medication 100 MILLIGRAM(S): at 12:42

## 2024-01-23 RX ADMIN — TAMSULOSIN HYDROCHLORIDE 0.4 MILLIGRAM(S): 0.4 CAPSULE ORAL at 21:08

## 2024-01-23 RX ADMIN — PANTOPRAZOLE SODIUM 40 MILLIGRAM(S): 20 TABLET, DELAYED RELEASE ORAL at 05:24

## 2024-01-23 RX ADMIN — LIDOCAINE 1 PATCH: 4 CREAM TOPICAL at 12:43

## 2024-01-23 RX ADMIN — Medication 650 MILLIGRAM(S): at 01:00

## 2024-01-23 RX ADMIN — HEPARIN SODIUM 5000 UNIT(S): 5000 INJECTION INTRAVENOUS; SUBCUTANEOUS at 14:42

## 2024-01-23 RX ADMIN — GABAPENTIN 100 MILLIGRAM(S): 400 CAPSULE ORAL at 21:08

## 2024-01-23 RX ADMIN — Medication 400 MILLIGRAM(S): at 12:42

## 2024-01-23 RX ADMIN — HEPARIN SODIUM 5000 UNIT(S): 5000 INJECTION INTRAVENOUS; SUBCUTANEOUS at 05:25

## 2024-01-23 RX ADMIN — HEPARIN SODIUM 5000 UNIT(S): 5000 INJECTION INTRAVENOUS; SUBCUTANEOUS at 21:08

## 2024-01-23 RX ADMIN — Medication 1 TABLET(S): at 12:42

## 2024-01-23 RX ADMIN — PANTOPRAZOLE SODIUM 40 MILLIGRAM(S): 20 TABLET, DELAYED RELEASE ORAL at 18:01

## 2024-01-23 RX ADMIN — Medication 650 MILLIGRAM(S): at 00:05

## 2024-01-23 RX ADMIN — Medication 1 APPLICATION(S): at 18:01

## 2024-01-23 RX ADMIN — LIDOCAINE 1 PATCH: 4 CREAM TOPICAL at 00:45

## 2024-01-23 RX ADMIN — Medication 25 MILLIGRAM(S): at 05:25

## 2024-01-23 RX ADMIN — Medication 650 MILLIGRAM(S): at 05:24

## 2024-01-23 RX ADMIN — GABAPENTIN 100 MILLIGRAM(S): 400 CAPSULE ORAL at 05:25

## 2024-01-23 RX ADMIN — Medication 1 APPLICATION(S): at 12:44

## 2024-01-23 RX ADMIN — Medication 650 MILLIGRAM(S): at 18:01
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
## 2024-01-24 LAB
FOLATE SERPL-MCNC: 9.9 NG/ML — SIGNIFICANT CHANGE UP
VIT B12 SERPL-MCNC: 639 PG/ML — SIGNIFICANT CHANGE UP (ref 232–1245)

## 2024-01-24 RX ADMIN — Medication 1 APPLICATION(S): at 12:13

## 2024-01-24 RX ADMIN — GABAPENTIN 100 MILLIGRAM(S): 400 CAPSULE ORAL at 05:55

## 2024-01-24 RX ADMIN — Medication 650 MILLIGRAM(S): at 18:00

## 2024-01-24 RX ADMIN — Medication 1 APPLICATION(S): at 06:00

## 2024-01-24 RX ADMIN — HEPARIN SODIUM 5000 UNIT(S): 5000 INJECTION INTRAVENOUS; SUBCUTANEOUS at 12:10

## 2024-01-24 RX ADMIN — PANTOPRAZOLE SODIUM 40 MILLIGRAM(S): 20 TABLET, DELAYED RELEASE ORAL at 05:54

## 2024-01-24 RX ADMIN — Medication 25 MILLIGRAM(S): at 17:15

## 2024-01-24 RX ADMIN — Medication 100 MILLIGRAM(S): at 12:10

## 2024-01-24 RX ADMIN — Medication 650 MILLIGRAM(S): at 17:15

## 2024-01-24 RX ADMIN — Medication 650 MILLIGRAM(S): at 12:10

## 2024-01-24 RX ADMIN — HEPARIN SODIUM 5000 UNIT(S): 5000 INJECTION INTRAVENOUS; SUBCUTANEOUS at 05:55

## 2024-01-24 RX ADMIN — Medication 650 MILLIGRAM(S): at 13:00

## 2024-01-24 RX ADMIN — Medication 1 TABLET(S): at 12:10

## 2024-01-24 RX ADMIN — Medication 25 MILLIGRAM(S): at 05:55

## 2024-01-24 RX ADMIN — GABAPENTIN 100 MILLIGRAM(S): 400 CAPSULE ORAL at 12:10

## 2024-01-24 RX ADMIN — LIDOCAINE 1 PATCH: 4 CREAM TOPICAL at 12:13

## 2024-01-24 RX ADMIN — Medication 1 APPLICATION(S): at 17:14

## 2024-01-24 RX ADMIN — Medication 650 MILLIGRAM(S): at 05:54

## 2024-01-24 RX ADMIN — Medication 400 MILLIGRAM(S): at 12:10

## 2024-01-24 RX ADMIN — CHLORHEXIDINE GLUCONATE 1 APPLICATION(S): 213 SOLUTION TOPICAL at 05:53

## 2024-01-24 RX ADMIN — PANTOPRAZOLE SODIUM 40 MILLIGRAM(S): 20 TABLET, DELAYED RELEASE ORAL at 17:15
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
## 2024-01-25 RX ADMIN — Medication 650 MILLIGRAM(S): at 14:24

## 2024-01-25 RX ADMIN — LIDOCAINE 1 PATCH: 4 CREAM TOPICAL at 23:00

## 2024-01-25 RX ADMIN — Medication 650 MILLIGRAM(S): at 17:27

## 2024-01-25 RX ADMIN — TAMSULOSIN HYDROCHLORIDE 0.4 MILLIGRAM(S): 0.4 CAPSULE ORAL at 21:30

## 2024-01-25 RX ADMIN — HEPARIN SODIUM 5000 UNIT(S): 5000 INJECTION INTRAVENOUS; SUBCUTANEOUS at 14:25

## 2024-01-25 RX ADMIN — Medication 650 MILLIGRAM(S): at 23:00

## 2024-01-25 RX ADMIN — Medication 1 TABLET(S): at 12:08

## 2024-01-25 RX ADMIN — PANTOPRAZOLE SODIUM 40 MILLIGRAM(S): 20 TABLET, DELAYED RELEASE ORAL at 17:26

## 2024-01-25 RX ADMIN — Medication 25 MILLIGRAM(S): at 17:26

## 2024-01-25 RX ADMIN — Medication 1 APPLICATION(S): at 12:11

## 2024-01-25 RX ADMIN — Medication 1 APPLICATION(S): at 17:27

## 2024-01-25 RX ADMIN — Medication 650 MILLIGRAM(S): at 19:28

## 2024-01-25 RX ADMIN — GABAPENTIN 100 MILLIGRAM(S): 400 CAPSULE ORAL at 21:30

## 2024-01-25 RX ADMIN — LIDOCAINE 1 PATCH: 4 CREAM TOPICAL at 12:07

## 2024-01-25 RX ADMIN — HEPARIN SODIUM 5000 UNIT(S): 5000 INJECTION INTRAVENOUS; SUBCUTANEOUS at 21:30

## 2024-01-25 RX ADMIN — Medication 400 MILLIGRAM(S): at 12:06

## 2024-01-25 RX ADMIN — GABAPENTIN 100 MILLIGRAM(S): 400 CAPSULE ORAL at 14:25

## 2024-01-25 RX ADMIN — Medication 100 MILLIGRAM(S): at 12:09

## 2024-01-25 RX ADMIN — Medication 650 MILLIGRAM(S): at 12:06

## 2024-01-25 RX ADMIN — LIDOCAINE 1 PATCH: 4 CREAM TOPICAL at 19:53
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
## 2024-01-26 LAB
ALBUMIN SERPL ELPH-MCNC: 3.1 G/DL — LOW (ref 3.5–5.2)
ALP SERPL-CCNC: 119 U/L — HIGH (ref 30–115)
ALT FLD-CCNC: 23 U/L — SIGNIFICANT CHANGE UP (ref 0–41)
ANION GAP SERPL CALC-SCNC: 9 MMOL/L — SIGNIFICANT CHANGE UP (ref 7–14)
AST SERPL-CCNC: 35 U/L — SIGNIFICANT CHANGE UP (ref 0–41)
BASOPHILS # BLD AUTO: 0.07 K/UL — SIGNIFICANT CHANGE UP (ref 0–0.2)
BASOPHILS NFR BLD AUTO: 0.7 % — SIGNIFICANT CHANGE UP (ref 0–1)
BILIRUB SERPL-MCNC: <0.2 MG/DL — SIGNIFICANT CHANGE UP (ref 0.2–1.2)
BUN SERPL-MCNC: 13 MG/DL — SIGNIFICANT CHANGE UP (ref 10–20)
CALCIUM SERPL-MCNC: 8.3 MG/DL — LOW (ref 8.4–10.4)
CHLORIDE SERPL-SCNC: 110 MMOL/L — SIGNIFICANT CHANGE UP (ref 98–110)
CO2 SERPL-SCNC: 24 MMOL/L — SIGNIFICANT CHANGE UP (ref 17–32)
CREAT SERPL-MCNC: 0.9 MG/DL — SIGNIFICANT CHANGE UP (ref 0.7–1.5)
EGFR: 92 ML/MIN/1.73M2 — SIGNIFICANT CHANGE UP
EOSINOPHIL # BLD AUTO: 0.14 K/UL — SIGNIFICANT CHANGE UP (ref 0–0.7)
EOSINOPHIL NFR BLD AUTO: 1.3 % — SIGNIFICANT CHANGE UP (ref 0–8)
GLUCOSE SERPL-MCNC: 105 MG/DL — HIGH (ref 70–99)
HCT VFR BLD CALC: 26.4 % — LOW (ref 42–52)
HGB BLD-MCNC: 8.5 G/DL — LOW (ref 14–18)
IMM GRANULOCYTES NFR BLD AUTO: 0.9 % — HIGH (ref 0.1–0.3)
LYMPHOCYTES # BLD AUTO: 3.26 K/UL — SIGNIFICANT CHANGE UP (ref 1.2–3.4)
LYMPHOCYTES # BLD AUTO: 30.5 % — SIGNIFICANT CHANGE UP (ref 20.5–51.1)
MAGNESIUM SERPL-MCNC: 1.8 MG/DL — SIGNIFICANT CHANGE UP (ref 1.8–2.4)
MCHC RBC-ENTMCNC: 31.7 PG — HIGH (ref 27–31)
MCHC RBC-ENTMCNC: 32.2 G/DL — SIGNIFICANT CHANGE UP (ref 32–37)
MCV RBC AUTO: 98.5 FL — HIGH (ref 80–94)
MONOCYTES # BLD AUTO: 0.83 K/UL — HIGH (ref 0.1–0.6)
MONOCYTES NFR BLD AUTO: 7.8 % — SIGNIFICANT CHANGE UP (ref 1.7–9.3)
NEUTROPHILS # BLD AUTO: 6.28 K/UL — SIGNIFICANT CHANGE UP (ref 1.4–6.5)
NEUTROPHILS NFR BLD AUTO: 58.8 % — SIGNIFICANT CHANGE UP (ref 42.2–75.2)
NRBC # BLD: 0 /100 WBCS — SIGNIFICANT CHANGE UP (ref 0–0)
PLATELET # BLD AUTO: 407 K/UL — HIGH (ref 130–400)
PMV BLD: 9.4 FL — SIGNIFICANT CHANGE UP (ref 7.4–10.4)
POTASSIUM SERPL-MCNC: 3.8 MMOL/L — SIGNIFICANT CHANGE UP (ref 3.5–5)
POTASSIUM SERPL-SCNC: 3.8 MMOL/L — SIGNIFICANT CHANGE UP (ref 3.5–5)
PROT SERPL-MCNC: 5.6 G/DL — LOW (ref 6–8)
RBC # BLD: 2.68 M/UL — LOW (ref 4.7–6.1)
RBC # FLD: 14.9 % — HIGH (ref 11.5–14.5)
SODIUM SERPL-SCNC: 143 MMOL/L — SIGNIFICANT CHANGE UP (ref 135–146)
WBC # BLD: 10.68 K/UL — SIGNIFICANT CHANGE UP (ref 4.8–10.8)
WBC # FLD AUTO: 10.68 K/UL — SIGNIFICANT CHANGE UP (ref 4.8–10.8)

## 2024-01-26 RX ADMIN — Medication 650 MILLIGRAM(S): at 12:37

## 2024-01-26 RX ADMIN — GABAPENTIN 100 MILLIGRAM(S): 400 CAPSULE ORAL at 06:34

## 2024-01-26 RX ADMIN — Medication 1 TABLET(S): at 11:17

## 2024-01-26 RX ADMIN — GABAPENTIN 100 MILLIGRAM(S): 400 CAPSULE ORAL at 13:15

## 2024-01-26 RX ADMIN — Medication 1 APPLICATION(S): at 06:34

## 2024-01-26 RX ADMIN — Medication 400 MILLIGRAM(S): at 11:17

## 2024-01-26 RX ADMIN — GABAPENTIN 100 MILLIGRAM(S): 400 CAPSULE ORAL at 21:18

## 2024-01-26 RX ADMIN — Medication 650 MILLIGRAM(S): at 21:42

## 2024-01-26 RX ADMIN — HEPARIN SODIUM 5000 UNIT(S): 5000 INJECTION INTRAVENOUS; SUBCUTANEOUS at 06:34

## 2024-01-26 RX ADMIN — HEPARIN SODIUM 5000 UNIT(S): 5000 INJECTION INTRAVENOUS; SUBCUTANEOUS at 13:15

## 2024-01-26 RX ADMIN — Medication 1 APPLICATION(S): at 11:31

## 2024-01-26 RX ADMIN — Medication 650 MILLIGRAM(S): at 06:34

## 2024-01-26 RX ADMIN — PANTOPRAZOLE SODIUM 40 MILLIGRAM(S): 20 TABLET, DELAYED RELEASE ORAL at 17:37

## 2024-01-26 RX ADMIN — PANTOPRAZOLE SODIUM 40 MILLIGRAM(S): 20 TABLET, DELAYED RELEASE ORAL at 06:35

## 2024-01-26 RX ADMIN — Medication 650 MILLIGRAM(S): at 06:35

## 2024-01-26 RX ADMIN — Medication 25 MILLIGRAM(S): at 17:37

## 2024-01-26 RX ADMIN — Medication 1 APPLICATION(S): at 17:42

## 2024-01-26 RX ADMIN — Medication 650 MILLIGRAM(S): at 17:37

## 2024-01-26 RX ADMIN — CHLORHEXIDINE GLUCONATE 1 APPLICATION(S): 213 SOLUTION TOPICAL at 06:35

## 2024-01-26 RX ADMIN — Medication 25 MILLIGRAM(S): at 06:34

## 2024-01-26 RX ADMIN — Medication 650 MILLIGRAM(S): at 11:17

## 2024-01-26 RX ADMIN — Medication 100 MILLIGRAM(S): at 11:16

## 2024-01-26 RX ADMIN — TAMSULOSIN HYDROCHLORIDE 0.4 MILLIGRAM(S): 0.4 CAPSULE ORAL at 21:18

## 2024-01-26 RX ADMIN — HEPARIN SODIUM 5000 UNIT(S): 5000 INJECTION INTRAVENOUS; SUBCUTANEOUS at 21:18
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
## 2024-01-26 NOTE — CHART NOTE - NSCHARTNOTEFT_GEN_A_CORE
Mr Sanchez has not needed a 1:1 sitter since admission to . Mr Sanchez has not needed a 1:1 sitter since he was admitted to  on 1/18/24.
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
## 2024-01-27 RX ADMIN — HEPARIN SODIUM 5000 UNIT(S): 5000 INJECTION INTRAVENOUS; SUBCUTANEOUS at 12:55

## 2024-01-27 RX ADMIN — Medication 1 APPLICATION(S): at 12:56

## 2024-01-27 RX ADMIN — PANTOPRAZOLE SODIUM 40 MILLIGRAM(S): 20 TABLET, DELAYED RELEASE ORAL at 17:39

## 2024-01-27 RX ADMIN — Medication 650 MILLIGRAM(S): at 12:55

## 2024-01-27 RX ADMIN — Medication 650 MILLIGRAM(S): at 17:39

## 2024-01-27 RX ADMIN — GABAPENTIN 100 MILLIGRAM(S): 400 CAPSULE ORAL at 21:08

## 2024-01-27 RX ADMIN — Medication 400 MILLIGRAM(S): at 12:56

## 2024-01-27 RX ADMIN — Medication 25 MILLIGRAM(S): at 17:39

## 2024-01-27 RX ADMIN — HEPARIN SODIUM 5000 UNIT(S): 5000 INJECTION INTRAVENOUS; SUBCUTANEOUS at 21:08

## 2024-01-27 RX ADMIN — GABAPENTIN 100 MILLIGRAM(S): 400 CAPSULE ORAL at 12:56

## 2024-01-27 RX ADMIN — TAMSULOSIN HYDROCHLORIDE 0.4 MILLIGRAM(S): 0.4 CAPSULE ORAL at 21:08

## 2024-01-27 RX ADMIN — Medication 100 MILLIGRAM(S): at 12:56

## 2024-01-27 RX ADMIN — Medication 1 APPLICATION(S): at 14:39

## 2024-01-27 RX ADMIN — Medication 1 TABLET(S): at 12:56

## 2024-01-27 RX ADMIN — LIDOCAINE 1 PATCH: 4 CREAM TOPICAL at 17:40

## 2024-01-27 RX ADMIN — Medication 650 MILLIGRAM(S): at 23:49
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
## 2024-01-28 LAB — SARS-COV-2 RNA SPEC QL NAA+PROBE: DETECTED

## 2024-01-28 RX ADMIN — GABAPENTIN 100 MILLIGRAM(S): 400 CAPSULE ORAL at 12:31

## 2024-01-28 RX ADMIN — LIDOCAINE 1 PATCH: 4 CREAM TOPICAL at 12:34

## 2024-01-28 RX ADMIN — HEPARIN SODIUM 5000 UNIT(S): 5000 INJECTION INTRAVENOUS; SUBCUTANEOUS at 21:31

## 2024-01-28 RX ADMIN — CHLORHEXIDINE GLUCONATE 1 APPLICATION(S): 213 SOLUTION TOPICAL at 05:33

## 2024-01-28 RX ADMIN — Medication 1 APPLICATION(S): at 05:31

## 2024-01-28 RX ADMIN — Medication 1 TABLET(S): at 12:31

## 2024-01-28 RX ADMIN — Medication 650 MILLIGRAM(S): at 00:32

## 2024-01-28 RX ADMIN — HEPARIN SODIUM 5000 UNIT(S): 5000 INJECTION INTRAVENOUS; SUBCUTANEOUS at 13:18

## 2024-01-28 RX ADMIN — PANTOPRAZOLE SODIUM 40 MILLIGRAM(S): 20 TABLET, DELAYED RELEASE ORAL at 17:39

## 2024-01-28 RX ADMIN — HEPARIN SODIUM 5000 UNIT(S): 5000 INJECTION INTRAVENOUS; SUBCUTANEOUS at 05:31

## 2024-01-28 RX ADMIN — Medication 650 MILLIGRAM(S): at 17:40

## 2024-01-28 RX ADMIN — Medication 400 MILLIGRAM(S): at 12:31

## 2024-01-28 RX ADMIN — GABAPENTIN 100 MILLIGRAM(S): 400 CAPSULE ORAL at 05:33

## 2024-01-28 RX ADMIN — Medication 100 MILLIGRAM(S): at 12:36

## 2024-01-28 RX ADMIN — Medication 1 APPLICATION(S): at 17:54

## 2024-01-28 RX ADMIN — Medication 25 MILLIGRAM(S): at 17:40

## 2024-01-28 RX ADMIN — Medication 1 APPLICATION(S): at 12:33

## 2024-01-28 RX ADMIN — PANTOPRAZOLE SODIUM 40 MILLIGRAM(S): 20 TABLET, DELAYED RELEASE ORAL at 05:33

## 2024-01-28 RX ADMIN — Medication 650 MILLIGRAM(S): at 13:38

## 2024-01-28 RX ADMIN — GABAPENTIN 100 MILLIGRAM(S): 400 CAPSULE ORAL at 21:31

## 2024-01-28 RX ADMIN — Medication 650 MILLIGRAM(S): at 05:33

## 2024-01-28 RX ADMIN — Medication 650 MILLIGRAM(S): at 12:31

## 2024-01-28 RX ADMIN — Medication 650 MILLIGRAM(S): at 23:32

## 2024-01-28 RX ADMIN — TAMSULOSIN HYDROCHLORIDE 0.4 MILLIGRAM(S): 0.4 CAPSULE ORAL at 21:31
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
## 2024-01-29 ENCOUNTER — TRANSCRIPTION ENCOUNTER (OUTPATIENT)
Age: 69
End: 2024-01-29

## 2024-01-29 LAB
ALBUMIN SERPL ELPH-MCNC: 3 G/DL — LOW (ref 3.5–5.2)
ALP SERPL-CCNC: 108 U/L — SIGNIFICANT CHANGE UP (ref 30–115)
ALT FLD-CCNC: 18 U/L — SIGNIFICANT CHANGE UP (ref 0–41)
ANION GAP SERPL CALC-SCNC: 9 MMOL/L — SIGNIFICANT CHANGE UP (ref 7–14)
AST SERPL-CCNC: 32 U/L — SIGNIFICANT CHANGE UP (ref 0–41)
BASOPHILS # BLD AUTO: 0.07 K/UL — SIGNIFICANT CHANGE UP (ref 0–0.2)
BASOPHILS NFR BLD AUTO: 1 % — SIGNIFICANT CHANGE UP (ref 0–1)
BILIRUB SERPL-MCNC: 0.2 MG/DL — SIGNIFICANT CHANGE UP (ref 0.2–1.2)
BUN SERPL-MCNC: 15 MG/DL — SIGNIFICANT CHANGE UP (ref 10–20)
CALCIUM SERPL-MCNC: 8.5 MG/DL — SIGNIFICANT CHANGE UP (ref 8.4–10.5)
CHLORIDE SERPL-SCNC: 111 MMOL/L — HIGH (ref 98–110)
CO2 SERPL-SCNC: 27 MMOL/L — SIGNIFICANT CHANGE UP (ref 17–32)
CREAT SERPL-MCNC: 0.7 MG/DL — SIGNIFICANT CHANGE UP (ref 0.7–1.5)
EGFR: 99 ML/MIN/1.73M2 — SIGNIFICANT CHANGE UP
EOSINOPHIL # BLD AUTO: 0.16 K/UL — SIGNIFICANT CHANGE UP (ref 0–0.7)
EOSINOPHIL NFR BLD AUTO: 2.3 % — SIGNIFICANT CHANGE UP (ref 0–8)
GLUCOSE SERPL-MCNC: 91 MG/DL — SIGNIFICANT CHANGE UP (ref 70–99)
HCT VFR BLD CALC: 25.9 % — LOW (ref 42–52)
HGB BLD-MCNC: 8.3 G/DL — LOW (ref 14–18)
IMM GRANULOCYTES NFR BLD AUTO: 0.3 % — SIGNIFICANT CHANGE UP (ref 0.1–0.3)
LYMPHOCYTES # BLD AUTO: 2.33 K/UL — SIGNIFICANT CHANGE UP (ref 1.2–3.4)
LYMPHOCYTES # BLD AUTO: 33.2 % — SIGNIFICANT CHANGE UP (ref 20.5–51.1)
MAGNESIUM SERPL-MCNC: 2 MG/DL — SIGNIFICANT CHANGE UP (ref 1.8–2.4)
MCHC RBC-ENTMCNC: 31.3 PG — HIGH (ref 27–31)
MCHC RBC-ENTMCNC: 32 G/DL — SIGNIFICANT CHANGE UP (ref 32–37)
MCV RBC AUTO: 97.7 FL — HIGH (ref 80–94)
MONOCYTES # BLD AUTO: 0.63 K/UL — HIGH (ref 0.1–0.6)
MONOCYTES NFR BLD AUTO: 9 % — SIGNIFICANT CHANGE UP (ref 1.7–9.3)
NEUTROPHILS # BLD AUTO: 3.8 K/UL — SIGNIFICANT CHANGE UP (ref 1.4–6.5)
NEUTROPHILS NFR BLD AUTO: 54.2 % — SIGNIFICANT CHANGE UP (ref 42.2–75.2)
NRBC # BLD: 0 /100 WBCS — SIGNIFICANT CHANGE UP (ref 0–0)
PLATELET # BLD AUTO: 355 K/UL — SIGNIFICANT CHANGE UP (ref 130–400)
PMV BLD: 9.6 FL — SIGNIFICANT CHANGE UP (ref 7.4–10.4)
POTASSIUM SERPL-MCNC: 4 MMOL/L — SIGNIFICANT CHANGE UP (ref 3.5–5)
POTASSIUM SERPL-SCNC: 4 MMOL/L — SIGNIFICANT CHANGE UP (ref 3.5–5)
PROT SERPL-MCNC: 5.5 G/DL — LOW (ref 6–8)
RBC # BLD: 2.65 M/UL — LOW (ref 4.7–6.1)
RBC # FLD: 15.6 % — HIGH (ref 11.5–14.5)
SODIUM SERPL-SCNC: 147 MMOL/L — HIGH (ref 135–146)
VIT B1 SERPL-MCNC: 127.5 NMOL/L — SIGNIFICANT CHANGE UP (ref 66.5–200)
WBC # BLD: 7.01 K/UL — SIGNIFICANT CHANGE UP (ref 4.8–10.8)
WBC # FLD AUTO: 7.01 K/UL — SIGNIFICANT CHANGE UP (ref 4.8–10.8)

## 2024-01-29 RX ORDER — GABAPENTIN 400 MG/1
1 CAPSULE ORAL
Qty: 0 | Refills: 0 | DISCHARGE
Start: 2024-01-29

## 2024-01-29 RX ORDER — HEPARIN SODIUM 5000 [USP'U]/ML
5000 INJECTION INTRAVENOUS; SUBCUTANEOUS
Qty: 0 | Refills: 0 | DISCHARGE
Start: 2024-01-29

## 2024-01-29 RX ORDER — METOPROLOL TARTRATE 50 MG
1 TABLET ORAL
Qty: 0 | Refills: 0 | DISCHARGE
Start: 2024-01-29

## 2024-01-29 RX ORDER — TAMSULOSIN HYDROCHLORIDE 0.4 MG/1
1 CAPSULE ORAL
Qty: 0 | Refills: 0 | DISCHARGE

## 2024-01-29 RX ORDER — METOPROLOL TARTRATE 50 MG
1 TABLET ORAL
Qty: 0 | Refills: 0 | DISCHARGE

## 2024-01-29 RX ADMIN — PANTOPRAZOLE SODIUM 40 MILLIGRAM(S): 20 TABLET, DELAYED RELEASE ORAL at 06:16

## 2024-01-29 RX ADMIN — Medication 25 MILLIGRAM(S): at 17:14

## 2024-01-29 RX ADMIN — HEPARIN SODIUM 5000 UNIT(S): 5000 INJECTION INTRAVENOUS; SUBCUTANEOUS at 21:27

## 2024-01-29 RX ADMIN — TAMSULOSIN HYDROCHLORIDE 0.4 MILLIGRAM(S): 0.4 CAPSULE ORAL at 21:27

## 2024-01-29 RX ADMIN — GABAPENTIN 100 MILLIGRAM(S): 400 CAPSULE ORAL at 13:45

## 2024-01-29 RX ADMIN — Medication 1 APPLICATION(S): at 06:17

## 2024-01-29 RX ADMIN — Medication 1 APPLICATION(S): at 11:38

## 2024-01-29 RX ADMIN — HEPARIN SODIUM 5000 UNIT(S): 5000 INJECTION INTRAVENOUS; SUBCUTANEOUS at 06:15

## 2024-01-29 RX ADMIN — Medication 650 MILLIGRAM(S): at 18:09

## 2024-01-29 RX ADMIN — Medication 650 MILLIGRAM(S): at 17:13

## 2024-01-29 RX ADMIN — Medication 100 MILLIGRAM(S): at 11:38

## 2024-01-29 RX ADMIN — GABAPENTIN 100 MILLIGRAM(S): 400 CAPSULE ORAL at 21:26

## 2024-01-29 RX ADMIN — PANTOPRAZOLE SODIUM 40 MILLIGRAM(S): 20 TABLET, DELAYED RELEASE ORAL at 17:14

## 2024-01-29 RX ADMIN — HEPARIN SODIUM 5000 UNIT(S): 5000 INJECTION INTRAVENOUS; SUBCUTANEOUS at 13:45

## 2024-01-29 RX ADMIN — Medication 400 MILLIGRAM(S): at 11:33

## 2024-01-29 RX ADMIN — CHLORHEXIDINE GLUCONATE 1 APPLICATION(S): 213 SOLUTION TOPICAL at 06:31

## 2024-01-29 RX ADMIN — Medication 1 APPLICATION(S): at 17:16

## 2024-01-29 RX ADMIN — Medication 650 MILLIGRAM(S): at 11:33

## 2024-01-29 RX ADMIN — Medication 1 TABLET(S): at 11:33

## 2024-01-29 RX ADMIN — Medication 650 MILLIGRAM(S): at 12:03

## 2024-01-29 RX ADMIN — LIDOCAINE 1 PATCH: 4 CREAM TOPICAL at 19:00

## 2024-01-29 RX ADMIN — LIDOCAINE 1 PATCH: 4 CREAM TOPICAL at 11:33

## 2024-01-29 RX ADMIN — GABAPENTIN 100 MILLIGRAM(S): 400 CAPSULE ORAL at 06:16

## 2024-01-29 RX ADMIN — Medication 650 MILLIGRAM(S): at 06:16

## 2024-01-29 NOTE — DISCHARGE NOTE PROVIDER - PROVIDER TOKENS
PROVIDER:[TOKEN:[[id number]],ESTABLISHEDPATIENT:[T]],PROVIDER:[TOKEN:[[id number]],FOLLOWUP:[1 month]] PROVIDER:[TOKEN:[[id number]],ESTABLISHEDPATIENT:[T]],PROVIDER:[TOKEN:[[id number]],FOLLOWUP:[1 month]],PROVIDER:[TOKEN:[[id number]]],PROVIDER:[TOKEN:[[id number]]],PROVIDER:[TOKEN:[[id number]]]
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
## 2024-01-29 NOTE — DISCHARGE NOTE PROVIDER - CARE PROVIDERS DIRECT ADDRESSES
,[email or web address]
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
## 2024-01-29 NOTE — DISCHARGE NOTE PROVIDER - CARE PROVIDER_API CALL
Samir Sanchez  Rheumatology  [address] [place] [address]  Phone: [phone]  Fax: [phone]  Established Patient  Follow Up Time:     Lawanda Campbell  Gastroenterology  [address] [place] [address]  Phone: [phone]  Fax: [phone]  Follow Up Time: 1 month   Samir Sanchez  Rheumatology  [address] [facility] [place]  [place] [address]  Phone: [phone]  Fax: [phone]  Established Patient  Follow Up Time:     Lawanda Campbell  Gastroenterology  [address] [place] [address]  Phone: [phone]  Fax: [phone]  Follow Up Time: 1 month    Santiago Jay  Neurology  [address] [facility], [address]  [place] [address]  Phone: [phone]  Fax: [phone]  Follow Up Time:     Otis Greco  Plastic Surgery  [address] [place] [address]  Phone: [phone]  Fax: [phone]  Follow Up Time:     Philipp Johnson  Family Medicine  [address] [place] [address]  Phone: [phone]  Fax: [phone]  Follow Up Time:
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
## 2024-01-29 NOTE — DISCHARGE NOTE PROVIDER - NSDCMRMEDTOKEN_GEN_ALL_CORE_FT
acetaminophen 325 mg oral tablet: 2 tab(s) orally every 6 hours as needed for pain or fever  folic acid 1 mg oral tablet: 1 tab(s) orally once a day  gabapentin 100 mg oral capsule: 1 cap(s) orally 3 times a day (every 8 hours)  heparin: 5,000 unit(s) subcutaneous every 8 hours  hydroxychloroquine 200 mg oral tablet: 2 tab(s) orally once a day  lidocaine 4% topical film: Apply topically to affected area once a day apply to affected  area on right chest wall once daily, leave on for 12 hours, remove for 12 hours, wash hands after use ; avoid open skin - apply to intact skin only  metoprolol tartrate 25 mg oral tablet: 1 tab(s) orally 2 times a day (every 12 hours) -- HOLD FOR SBP LESS THAN 100 OR HR less than 60  Multiple Vitamins oral tablet: 1 tab(s) orally once a day  pantoprazole 40 mg oral delayed release tablet: 1 tab(s) orally 2 times a day ; take 1st tab in the morning 30 minutes before breakfast, and take the 2nd tab at bedtime  silver sulfADIAZINE 1% topical cream: Apply topically to affected area every 12 hours :  wash with soap and water, pat dry, apply silvadene to R chest and b/l  UE&#x27;s and b/l LE&#x27;s, cover with adaptic + DSD + kerlix  TAMSULOSIN HCL 0.4 MG CAPSULE: 1 cap(s) orally once a day (at bedtime)  thiamine 100 mg oral tablet: 1 tab(s) orally once a day  vitamin A and D topical ointment: 1 Apply topically to affected area once a day   acetaminophen 325 mg oral tablet: 2 tab(s) orally every 6 hours as needed for pain or fever  folic acid 1 mg oral tablet: 1 tab(s) orally once a day  gabapentin 100 mg oral capsule: 1 cap(s) orally 3 times a day (every 8 hours)  hydroxychloroquine 200 mg oral tablet: 2 tab(s) orally once a day  lidocaine 4% topical film: Apply topically to affected area once a day apply to affected  area on right chest wall once daily, leave on for 12 hours, remove for 12 hours, wash hands after use ; avoid open skin - apply to intact skin only  metoprolol tartrate 25 mg oral tablet: 1 tab(s) orally 2 times a day (every 12 hours) -- HOLD FOR SBP LESS THAN 100 OR HR less than 60  Multiple Vitamins oral tablet: 1 tab(s) orally once a day  pantoprazole 40 mg oral delayed release tablet: 1 tab(s) orally 2 times a day ; take 1st tab in the morning 30 minutes before breakfast, and take the 2nd tab at bedtime  QUEtiapine 25 mg oral tablet: 1 tab(s) orally once a day (at bedtime)  silver sulfADIAZINE 1% topical cream: Apply topically to affected area every 12 hours :  wash with soap and water, pat dry, apply silvadene to R chest and b/l  UE&#x27;s and b/l LE&#x27;s, cover with adaptic + DSD + kerlix  TAMSULOSIN HCL 0.4 MG CAPSULE: 1 cap(s) orally once a day (at bedtime)  thiamine 100 mg oral tablet: 1 tab(s) orally once a day  vitamin A and D topical ointment: 1 Apply topically to affected area once a day

## 2024-01-30 LAB
ANION GAP SERPL CALC-SCNC: 7 MMOL/L — SIGNIFICANT CHANGE UP (ref 7–14)
BUN SERPL-MCNC: 13 MG/DL — SIGNIFICANT CHANGE UP (ref 10–20)
CALCIUM SERPL-MCNC: 8.8 MG/DL — SIGNIFICANT CHANGE UP (ref 8.4–10.5)
CHLORIDE SERPL-SCNC: 110 MMOL/L — SIGNIFICANT CHANGE UP (ref 98–110)
CO2 SERPL-SCNC: 26 MMOL/L — SIGNIFICANT CHANGE UP (ref 17–32)
CREAT SERPL-MCNC: 0.8 MG/DL — SIGNIFICANT CHANGE UP (ref 0.7–1.5)
D DIMER BLD IA.RAPID-MCNC: 862 NG/ML DDU — HIGH
EGFR: 95 ML/MIN/1.73M2 — SIGNIFICANT CHANGE UP
GLUCOSE SERPL-MCNC: 108 MG/DL — HIGH (ref 70–99)
POTASSIUM SERPL-MCNC: 4.1 MMOL/L — SIGNIFICANT CHANGE UP (ref 3.5–5)
POTASSIUM SERPL-SCNC: 4.1 MMOL/L — SIGNIFICANT CHANGE UP (ref 3.5–5)
SODIUM SERPL-SCNC: 143 MMOL/L — SIGNIFICANT CHANGE UP (ref 135–146)

## 2024-01-30 RX ADMIN — TAMSULOSIN HYDROCHLORIDE 0.4 MILLIGRAM(S): 0.4 CAPSULE ORAL at 21:12

## 2024-01-30 RX ADMIN — Medication 650 MILLIGRAM(S): at 18:45

## 2024-01-30 RX ADMIN — GABAPENTIN 100 MILLIGRAM(S): 400 CAPSULE ORAL at 14:16

## 2024-01-30 RX ADMIN — Medication 25 MILLIGRAM(S): at 18:16

## 2024-01-30 RX ADMIN — PANTOPRAZOLE SODIUM 40 MILLIGRAM(S): 20 TABLET, DELAYED RELEASE ORAL at 06:19

## 2024-01-30 RX ADMIN — GABAPENTIN 100 MILLIGRAM(S): 400 CAPSULE ORAL at 06:19

## 2024-01-30 RX ADMIN — Medication 650 MILLIGRAM(S): at 00:03

## 2024-01-30 RX ADMIN — Medication 1 APPLICATION(S): at 16:01

## 2024-01-30 RX ADMIN — Medication 650 MILLIGRAM(S): at 18:15

## 2024-01-30 RX ADMIN — HEPARIN SODIUM 5000 UNIT(S): 5000 INJECTION INTRAVENOUS; SUBCUTANEOUS at 14:37

## 2024-01-30 RX ADMIN — GABAPENTIN 100 MILLIGRAM(S): 400 CAPSULE ORAL at 21:12

## 2024-01-30 RX ADMIN — LIDOCAINE 1 PATCH: 4 CREAM TOPICAL at 14:38

## 2024-01-30 RX ADMIN — CHLORHEXIDINE GLUCONATE 1 APPLICATION(S): 213 SOLUTION TOPICAL at 06:19

## 2024-01-30 RX ADMIN — Medication 1 APPLICATION(S): at 06:29

## 2024-01-30 RX ADMIN — Medication 650 MILLIGRAM(S): at 00:33

## 2024-01-30 RX ADMIN — Medication 400 MILLIGRAM(S): at 14:16

## 2024-01-30 RX ADMIN — Medication 650 MILLIGRAM(S): at 06:48

## 2024-01-30 RX ADMIN — Medication 1 APPLICATION(S): at 14:38

## 2024-01-30 RX ADMIN — Medication 650 MILLIGRAM(S): at 19:44

## 2024-01-30 RX ADMIN — LIDOCAINE 1 PATCH: 4 CREAM TOPICAL at 00:00

## 2024-01-30 RX ADMIN — Medication 650 MILLIGRAM(S): at 06:18

## 2024-01-30 RX ADMIN — Medication 1 TABLET(S): at 14:17

## 2024-01-30 RX ADMIN — HEPARIN SODIUM 5000 UNIT(S): 5000 INJECTION INTRAVENOUS; SUBCUTANEOUS at 21:13

## 2024-01-30 RX ADMIN — Medication 100 MILLIGRAM(S): at 14:16

## 2024-01-30 RX ADMIN — HEPARIN SODIUM 5000 UNIT(S): 5000 INJECTION INTRAVENOUS; SUBCUTANEOUS at 06:20

## 2024-01-30 RX ADMIN — PANTOPRAZOLE SODIUM 40 MILLIGRAM(S): 20 TABLET, DELAYED RELEASE ORAL at 18:15

## 2024-01-30 RX ADMIN — Medication 650 MILLIGRAM(S): at 14:17
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
## 2024-01-31 PROCEDURE — 93970 EXTREMITY STUDY: CPT | Mod: 26

## 2024-01-31 RX ADMIN — Medication 650 MILLIGRAM(S): at 06:01

## 2024-01-31 RX ADMIN — GABAPENTIN 100 MILLIGRAM(S): 400 CAPSULE ORAL at 06:01

## 2024-01-31 RX ADMIN — Medication 1 APPLICATION(S): at 18:13

## 2024-01-31 RX ADMIN — Medication 25 MILLIGRAM(S): at 18:12

## 2024-01-31 RX ADMIN — Medication 25 MILLIGRAM(S): at 06:01

## 2024-01-31 RX ADMIN — HALOPERIDOL DECANOATE 2 MILLIGRAM(S): 100 INJECTION INTRAMUSCULAR at 20:49

## 2024-01-31 RX ADMIN — Medication 650 MILLIGRAM(S): at 18:12

## 2024-01-31 RX ADMIN — PANTOPRAZOLE SODIUM 40 MILLIGRAM(S): 20 TABLET, DELAYED RELEASE ORAL at 18:13

## 2024-01-31 RX ADMIN — Medication 650 MILLIGRAM(S): at 13:00

## 2024-01-31 RX ADMIN — HEPARIN SODIUM 5000 UNIT(S): 5000 INJECTION INTRAVENOUS; SUBCUTANEOUS at 06:02

## 2024-01-31 RX ADMIN — TAMSULOSIN HYDROCHLORIDE 0.4 MILLIGRAM(S): 0.4 CAPSULE ORAL at 21:06

## 2024-01-31 RX ADMIN — PANTOPRAZOLE SODIUM 40 MILLIGRAM(S): 20 TABLET, DELAYED RELEASE ORAL at 06:01

## 2024-01-31 RX ADMIN — Medication 1 TABLET(S): at 12:38

## 2024-01-31 RX ADMIN — Medication 400 MILLIGRAM(S): at 12:39

## 2024-01-31 RX ADMIN — Medication 650 MILLIGRAM(S): at 00:51

## 2024-01-31 RX ADMIN — GABAPENTIN 100 MILLIGRAM(S): 400 CAPSULE ORAL at 21:06

## 2024-01-31 RX ADMIN — Medication 100 MILLIGRAM(S): at 12:38

## 2024-01-31 RX ADMIN — CHLORHEXIDINE GLUCONATE 1 APPLICATION(S): 213 SOLUTION TOPICAL at 06:03

## 2024-01-31 RX ADMIN — Medication 650 MILLIGRAM(S): at 12:39

## 2024-01-31 RX ADMIN — Medication 1 APPLICATION(S): at 06:00
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
## 2024-02-01 RX ADMIN — PANTOPRAZOLE SODIUM 40 MILLIGRAM(S): 20 TABLET, DELAYED RELEASE ORAL at 17:27

## 2024-02-01 RX ADMIN — Medication 650 MILLIGRAM(S): at 00:21

## 2024-02-01 RX ADMIN — Medication 1 TABLET(S): at 12:11

## 2024-02-01 RX ADMIN — Medication 650 MILLIGRAM(S): at 12:10

## 2024-02-01 RX ADMIN — GABAPENTIN 100 MILLIGRAM(S): 400 CAPSULE ORAL at 13:26

## 2024-02-01 RX ADMIN — Medication 100 MILLIGRAM(S): at 12:10

## 2024-02-01 RX ADMIN — Medication 650 MILLIGRAM(S): at 17:50

## 2024-02-01 RX ADMIN — Medication 650 MILLIGRAM(S): at 23:23

## 2024-02-01 RX ADMIN — Medication 25 MILLIGRAM(S): at 17:27

## 2024-02-01 RX ADMIN — Medication 650 MILLIGRAM(S): at 13:24

## 2024-02-01 RX ADMIN — LIDOCAINE 1 PATCH: 4 CREAM TOPICAL at 23:22

## 2024-02-01 RX ADMIN — Medication 1 APPLICATION(S): at 17:30

## 2024-02-01 RX ADMIN — HEPARIN SODIUM 5000 UNIT(S): 5000 INJECTION INTRAVENOUS; SUBCUTANEOUS at 13:26

## 2024-02-01 RX ADMIN — TAMSULOSIN HYDROCHLORIDE 0.4 MILLIGRAM(S): 0.4 CAPSULE ORAL at 21:55

## 2024-02-01 RX ADMIN — Medication 650 MILLIGRAM(S): at 17:28

## 2024-02-01 RX ADMIN — GABAPENTIN 100 MILLIGRAM(S): 400 CAPSULE ORAL at 21:55

## 2024-02-01 RX ADMIN — LIDOCAINE 1 PATCH: 4 CREAM TOPICAL at 12:13

## 2024-02-01 RX ADMIN — Medication 1 APPLICATION(S): at 12:13

## 2024-02-01 RX ADMIN — Medication 400 MILLIGRAM(S): at 12:11

## 2024-02-01 RX ADMIN — HEPARIN SODIUM 5000 UNIT(S): 5000 INJECTION INTRAVENOUS; SUBCUTANEOUS at 21:55
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
## 2024-02-01 NOTE — PROGRESS NOTE ADULT - ASSESSMENT
Neuropsychology Follow up      Treatment Session Focused on Follow up and Feedback     Patient and family Contact (Jose L and Doug)         The patient and family were seen to share feedback from neurocognitive testing. Patient evinced gains in cognition since his initial evaluation. It is noteworthy to mention that patient is hard of hearing and requires hearing aids to ensure accurate comprehension. He evidenced improvement in attention, visual reasoning, motor, processing and memory, however, continue to show weakness in retrieval and executive function. Moreover, patient is impulsive and requires behavior regulation. Patient appeared weaker on his left side during fine motor test.  At this time, etiology of cognitive weakness is likely multifactorial including vascular, alcohol abuse, and motor weakness. Although it is likely that the patient will continue to make gains over time, current cognitive impairments are consistent with Major Neurocognitive Disorder.           Given history of motor weakness, multiple falls and concussion, it is recommended that patient receive help/supervision with all attentional tasks, such as cooking, medications, finances and other instrumental activities of daily living. Further recommendations were shared, including strategies such as making associations, providing information in small chunks, and providing levels of mental/cognitive stimulation.     Goals: No further goals at this time          Plan: Discharge from neuropsychological services; reconsult as needed          Brain Injury Protocol No                Cognitive Behavioral Guidelines:  No
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
## 2024-02-01 NOTE — PROGRESS NOTE ADULT - TIME BILLING
patient contact and family contact and feedback
Patient seen at bedside for psychodiagnostic evaluation inclusive of neurocognitive testing; test scoring and interpretation; psychoeducationn and feedback (60 min).    Patient family (friend) contact at patient bedside with patient present (30 min).
Coordination of care
Patient seen at bedside for neurocognitive testing; test scoring and interpretation; psychoeducation and feedback
Coordination of care
Patient seen and examined with the resident. We discussed the case. I have directed the care. I edited the note. The patient requires acute rehab with 3 hours of daily therapies at least 5 out of 7 days and close physiatry follow up. I participated in the rehab interdisciplinary team meeting; the patient progressed to ambulate 75 ft RW CS.
Patient seen and examined with the resident. We discussed the case. I have directed the care. I edited the note. The patient requires acute rehab with 3 hours of daily therapies at least 5 out of 7 days and close physiatry follow up. I participated in the rehab interdisciplinary team meeting; the patient progressed to ambulate 75 ft RW steady assist.

## 2024-02-02 LAB
ALBUMIN SERPL ELPH-MCNC: 3.3 G/DL — LOW (ref 3.5–5.2)
ALP SERPL-CCNC: 113 U/L — SIGNIFICANT CHANGE UP (ref 30–115)
ALT FLD-CCNC: 10 U/L — SIGNIFICANT CHANGE UP (ref 0–41)
ANION GAP SERPL CALC-SCNC: 10 MMOL/L — SIGNIFICANT CHANGE UP (ref 7–14)
AST SERPL-CCNC: 21 U/L — SIGNIFICANT CHANGE UP (ref 0–41)
BASOPHILS # BLD AUTO: 0.09 K/UL — SIGNIFICANT CHANGE UP (ref 0–0.2)
BASOPHILS NFR BLD AUTO: 1.3 % — HIGH (ref 0–1)
BILIRUB SERPL-MCNC: 0.3 MG/DL — SIGNIFICANT CHANGE UP (ref 0.2–1.2)
BUN SERPL-MCNC: 15 MG/DL — SIGNIFICANT CHANGE UP (ref 10–20)
CALCIUM SERPL-MCNC: 8.7 MG/DL — SIGNIFICANT CHANGE UP (ref 8.4–10.5)
CHLORIDE SERPL-SCNC: 110 MMOL/L — SIGNIFICANT CHANGE UP (ref 98–110)
CO2 SERPL-SCNC: 25 MMOL/L — SIGNIFICANT CHANGE UP (ref 17–32)
CREAT SERPL-MCNC: 0.7 MG/DL — SIGNIFICANT CHANGE UP (ref 0.7–1.5)
EGFR: 99 ML/MIN/1.73M2 — SIGNIFICANT CHANGE UP
EOSINOPHIL # BLD AUTO: 0.25 K/UL — SIGNIFICANT CHANGE UP (ref 0–0.7)
EOSINOPHIL NFR BLD AUTO: 3.7 % — SIGNIFICANT CHANGE UP (ref 0–8)
GLUCOSE SERPL-MCNC: 99 MG/DL — SIGNIFICANT CHANGE UP (ref 70–99)
HCT VFR BLD CALC: 28.9 % — LOW (ref 42–52)
HGB BLD-MCNC: 9.3 G/DL — LOW (ref 14–18)
IMM GRANULOCYTES NFR BLD AUTO: 0.3 % — SIGNIFICANT CHANGE UP (ref 0.1–0.3)
LYMPHOCYTES # BLD AUTO: 2.12 K/UL — SIGNIFICANT CHANGE UP (ref 1.2–3.4)
LYMPHOCYTES # BLD AUTO: 31.8 % — SIGNIFICANT CHANGE UP (ref 20.5–51.1)
MAGNESIUM SERPL-MCNC: 2.1 MG/DL — SIGNIFICANT CHANGE UP (ref 1.8–2.4)
MCHC RBC-ENTMCNC: 31.6 PG — HIGH (ref 27–31)
MCHC RBC-ENTMCNC: 32.2 G/DL — SIGNIFICANT CHANGE UP (ref 32–37)
MCV RBC AUTO: 98.3 FL — HIGH (ref 80–94)
MONOCYTES # BLD AUTO: 0.74 K/UL — HIGH (ref 0.1–0.6)
MONOCYTES NFR BLD AUTO: 11.1 % — HIGH (ref 1.7–9.3)
NEUTROPHILS # BLD AUTO: 3.45 K/UL — SIGNIFICANT CHANGE UP (ref 1.4–6.5)
NEUTROPHILS NFR BLD AUTO: 51.8 % — SIGNIFICANT CHANGE UP (ref 42.2–75.2)
NRBC # BLD: 0 /100 WBCS — SIGNIFICANT CHANGE UP (ref 0–0)
PLATELET # BLD AUTO: 241 K/UL — SIGNIFICANT CHANGE UP (ref 130–400)
PMV BLD: 9.7 FL — SIGNIFICANT CHANGE UP (ref 7.4–10.4)
POTASSIUM SERPL-MCNC: 3.7 MMOL/L — SIGNIFICANT CHANGE UP (ref 3.5–5)
POTASSIUM SERPL-SCNC: 3.7 MMOL/L — SIGNIFICANT CHANGE UP (ref 3.5–5)
PROT SERPL-MCNC: 5.8 G/DL — LOW (ref 6–8)
RBC # BLD: 2.94 M/UL — LOW (ref 4.7–6.1)
RBC # FLD: 15.8 % — HIGH (ref 11.5–14.5)
SODIUM SERPL-SCNC: 145 MMOL/L — SIGNIFICANT CHANGE UP (ref 135–146)
WBC # BLD: 6.67 K/UL — SIGNIFICANT CHANGE UP (ref 4.8–10.8)
WBC # FLD AUTO: 6.67 K/UL — SIGNIFICANT CHANGE UP (ref 4.8–10.8)

## 2024-02-02 RX ADMIN — Medication 650 MILLIGRAM(S): at 14:43

## 2024-02-02 RX ADMIN — Medication 650 MILLIGRAM(S): at 13:58

## 2024-02-02 RX ADMIN — Medication 1 APPLICATION(S): at 06:00

## 2024-02-02 RX ADMIN — Medication 650 MILLIGRAM(S): at 18:01

## 2024-02-02 RX ADMIN — PANTOPRAZOLE SODIUM 40 MILLIGRAM(S): 20 TABLET, DELAYED RELEASE ORAL at 05:58

## 2024-02-02 RX ADMIN — Medication 400 MILLIGRAM(S): at 13:59

## 2024-02-02 RX ADMIN — LIDOCAINE 1 PATCH: 4 CREAM TOPICAL at 13:41

## 2024-02-02 RX ADMIN — GABAPENTIN 100 MILLIGRAM(S): 400 CAPSULE ORAL at 21:42

## 2024-02-02 RX ADMIN — Medication 650 MILLIGRAM(S): at 06:30

## 2024-02-02 RX ADMIN — GABAPENTIN 100 MILLIGRAM(S): 400 CAPSULE ORAL at 13:59

## 2024-02-02 RX ADMIN — Medication 650 MILLIGRAM(S): at 23:28

## 2024-02-02 RX ADMIN — PANTOPRAZOLE SODIUM 40 MILLIGRAM(S): 20 TABLET, DELAYED RELEASE ORAL at 19:01

## 2024-02-02 RX ADMIN — Medication 1 TABLET(S): at 13:59

## 2024-02-02 RX ADMIN — Medication 100 MILLIGRAM(S): at 13:59

## 2024-02-02 RX ADMIN — HEPARIN SODIUM 5000 UNIT(S): 5000 INJECTION INTRAVENOUS; SUBCUTANEOUS at 05:59

## 2024-02-02 RX ADMIN — Medication 25 MILLIGRAM(S): at 19:01

## 2024-02-02 RX ADMIN — Medication 1 APPLICATION(S): at 13:59

## 2024-02-02 RX ADMIN — Medication 1 APPLICATION(S): at 19:01

## 2024-02-02 RX ADMIN — Medication 650 MILLIGRAM(S): at 05:59

## 2024-02-02 RX ADMIN — GABAPENTIN 100 MILLIGRAM(S): 400 CAPSULE ORAL at 05:58

## 2024-02-02 RX ADMIN — Medication 25 MILLIGRAM(S): at 05:58

## 2024-02-02 RX ADMIN — Medication 650 MILLIGRAM(S): at 00:00

## 2024-02-02 RX ADMIN — HEPARIN SODIUM 5000 UNIT(S): 5000 INJECTION INTRAVENOUS; SUBCUTANEOUS at 21:42

## 2024-02-02 RX ADMIN — LIDOCAINE 1 PATCH: 4 CREAM TOPICAL at 19:00

## 2024-02-02 RX ADMIN — CHLORHEXIDINE GLUCONATE 1 APPLICATION(S): 213 SOLUTION TOPICAL at 05:59

## 2024-02-02 RX ADMIN — TAMSULOSIN HYDROCHLORIDE 0.4 MILLIGRAM(S): 0.4 CAPSULE ORAL at 21:42

## 2024-02-02 RX ADMIN — Medication 650 MILLIGRAM(S): at 18:31
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
## 2024-02-03 ENCOUNTER — TRANSCRIPTION ENCOUNTER (OUTPATIENT)
Age: 69
End: 2024-02-03

## 2024-02-03 PROCEDURE — 11042 DBRDMT SUBQ TIS 1ST 20SQCM/<: CPT

## 2024-02-03 PROCEDURE — 11045 DBRDMT SUBQ TISS EACH ADDL: CPT

## 2024-02-03 RX ADMIN — Medication 1 TABLET(S): at 16:03

## 2024-02-03 RX ADMIN — GABAPENTIN 100 MILLIGRAM(S): 400 CAPSULE ORAL at 16:02

## 2024-02-03 RX ADMIN — Medication 650 MILLIGRAM(S): at 16:25

## 2024-02-03 RX ADMIN — HEPARIN SODIUM 5000 UNIT(S): 5000 INJECTION INTRAVENOUS; SUBCUTANEOUS at 21:51

## 2024-02-03 RX ADMIN — Medication 650 MILLIGRAM(S): at 00:00

## 2024-02-03 RX ADMIN — LIDOCAINE 1 PATCH: 4 CREAM TOPICAL at 00:00

## 2024-02-03 RX ADMIN — Medication 25 MILLIGRAM(S): at 05:06

## 2024-02-03 RX ADMIN — Medication 100 MILLIGRAM(S): at 16:03

## 2024-02-03 RX ADMIN — Medication 650 MILLIGRAM(S): at 21:50

## 2024-02-03 RX ADMIN — Medication 1 APPLICATION(S): at 16:03

## 2024-02-03 RX ADMIN — TAMSULOSIN HYDROCHLORIDE 0.4 MILLIGRAM(S): 0.4 CAPSULE ORAL at 21:51

## 2024-02-03 RX ADMIN — HEPARIN SODIUM 5000 UNIT(S): 5000 INJECTION INTRAVENOUS; SUBCUTANEOUS at 05:05

## 2024-02-03 RX ADMIN — Medication 25 MILLIGRAM(S): at 21:51

## 2024-02-03 RX ADMIN — GABAPENTIN 100 MILLIGRAM(S): 400 CAPSULE ORAL at 21:51

## 2024-02-03 RX ADMIN — Medication 650 MILLIGRAM(S): at 15:02

## 2024-02-03 RX ADMIN — Medication 400 MILLIGRAM(S): at 16:03

## 2024-02-03 RX ADMIN — CHLORHEXIDINE GLUCONATE 1 APPLICATION(S): 213 SOLUTION TOPICAL at 05:07

## 2024-02-03 RX ADMIN — PANTOPRAZOLE SODIUM 40 MILLIGRAM(S): 20 TABLET, DELAYED RELEASE ORAL at 05:07

## 2024-02-03 RX ADMIN — GABAPENTIN 100 MILLIGRAM(S): 400 CAPSULE ORAL at 05:06

## 2024-02-03 RX ADMIN — Medication 650 MILLIGRAM(S): at 05:54

## 2024-02-03 RX ADMIN — Medication 1 APPLICATION(S): at 05:04

## 2024-02-03 RX ADMIN — PANTOPRAZOLE SODIUM 40 MILLIGRAM(S): 20 TABLET, DELAYED RELEASE ORAL at 21:51

## 2024-02-03 RX ADMIN — Medication 1 APPLICATION(S): at 17:46

## 2024-02-03 RX ADMIN — Medication 650 MILLIGRAM(S): at 05:04
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
## 2024-02-03 NOTE — BRIEF OPERATIVE NOTE - NSICDXBRIEFPROCEDURE_GEN_ALL_CORE_FT
PROCEDURES:  Selective debridement 03-Feb-2024 12:13:25 excisional debridement with scalpel right chest wound Otis Greco   PROCEDURES:  Selective debridement 03-Feb-2024 12:13:25 excisional debridement with scissors right chest wound Otis Greco
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
## 2024-02-03 NOTE — PROGRESS NOTE ADULT - SUBJECTIVE AND OBJECTIVE BOX
BURN DAILY PROGRESS NOTE    SUBJECTIVE: No acute events overnight. Patient seen and evaluated on AM rounds. Pt is resting comfortably in bed. Reports pain related to rib fractures, otherwise feeling well.    OBJECTIVE:  Vital Signs Last 24 Hrs  T(C): 36.4 (03 Feb 2024 05:14), Max: 36.4 (02 Feb 2024 12:22)  T(F): 97.6 (03 Feb 2024 05:14), Max: 97.6 (02 Feb 2024 12:22)  HR: 57 (03 Feb 2024 05:14) (57 - 62)  BP: 137/64 (03 Feb 2024 05:14) (137/62 - 162/64)  BP(mean): --  RR: 18 (03 Feb 2024 05:14) (18 - 18)  SpO2: --      I&O's Detail     Daily   MEDICATIONS  (STANDING):  acetaminophen     Tablet .. 650 milliGRAM(s) Oral every 6 hours  chlorhexidine 2% Cloths 1 Application(s) Topical <User Schedule>  gabapentin 100 milliGRAM(s) Oral three times a day  heparin   Injectable 5000 Unit(s) SubCutaneous every 8 hours  hydroxychloroquine 400 milliGRAM(s) Oral daily  lidocaine   4% Patch 1 Patch Transdermal every 24 hours  metoprolol tartrate 25 milliGRAM(s) Oral two times a day  multivitamin 1 Tablet(s) Oral daily  pantoprazole    Tablet 40 milliGRAM(s) Oral two times a day  silver sulfADIAZINE 1% Cream 1 Application(s) Topical every 12 hours  tamsulosin 0.4 milliGRAM(s) Oral at bedtime  thiamine 100 milliGRAM(s) Oral daily  vitamin A &amp; D Ointment 1 Application(s) Topical daily    MEDICATIONS  (PRN):  haloperidol     Tablet 2 milliGRAM(s) Oral every 8 hours PRN severe agitation  methocarbamol 500 milliGRAM(s) Oral three times a day PRN Muscle Spasm    LABS:                        9.3    6.67  )-----------( 241      ( 02 Feb 2024 07:16 )             28.9     02-02    145  |  110  |  15  ----------------------------<  99  3.7   |  25  |  0.7    Ca    8.7      02 Feb 2024 07:16  Mg     2.1     02-02    TPro  5.8<L>  /  Alb  3.3<L>  /  TBili  0.3  /  DBili  x   /  AST  21  /  ALT  10  /  AlkPhos  113  02-02      Urinalysis Basic - ( 02 Feb 2024 07:16 )    Color: x / Appearance: x / SG: x / pH: x  Gluc: 99 mg/dL / Ketone: x  / Bili: x / Urobili: x   Blood: x / Protein: x / Nitrite: x   Leuk Esterase: x / RBC: x / WBC x   Sq Epi: x / Non Sq Epi: x / Bacteria: x    PHYSICAL EXAM:  Constitutional: No acute distress  Neuro: A&O x3, no focal deficit  Pulmonary: Symmetric chest rise bilaterally, no increased WOB  Skin: R chest-  Two area of black eschar under right breast. Superior eschar adherent. Inferior eschar with majority peeling off of skin, adherent in the lateral aspect only. Loose eschar removed with granulation tissue underneath. No surrounding erythema, active purulence, or underlying hematoma.

## 2024-02-04 LAB — SARS-COV-2 RNA SPEC QL NAA+PROBE: SIGNIFICANT CHANGE UP

## 2024-02-04 RX ADMIN — HEPARIN SODIUM 5000 UNIT(S): 5000 INJECTION INTRAVENOUS; SUBCUTANEOUS at 15:30

## 2024-02-04 RX ADMIN — LIDOCAINE 1 PATCH: 4 CREAM TOPICAL at 20:23

## 2024-02-04 RX ADMIN — LIDOCAINE 1 PATCH: 4 CREAM TOPICAL at 15:30

## 2024-02-04 RX ADMIN — Medication 650 MILLIGRAM(S): at 12:00

## 2024-02-04 RX ADMIN — Medication 650 MILLIGRAM(S): at 06:38

## 2024-02-04 RX ADMIN — Medication 1 TABLET(S): at 13:18

## 2024-02-04 RX ADMIN — Medication 650 MILLIGRAM(S): at 19:00

## 2024-02-04 RX ADMIN — Medication 650 MILLIGRAM(S): at 17:17

## 2024-02-04 RX ADMIN — Medication 650 MILLIGRAM(S): at 21:35

## 2024-02-04 RX ADMIN — Medication 650 MILLIGRAM(S): at 13:18

## 2024-02-04 RX ADMIN — Medication 1 APPLICATION(S): at 06:35

## 2024-02-04 RX ADMIN — HEPARIN SODIUM 5000 UNIT(S): 5000 INJECTION INTRAVENOUS; SUBCUTANEOUS at 06:35

## 2024-02-04 RX ADMIN — CHLORHEXIDINE GLUCONATE 1 APPLICATION(S): 213 SOLUTION TOPICAL at 06:34

## 2024-02-04 RX ADMIN — GABAPENTIN 100 MILLIGRAM(S): 400 CAPSULE ORAL at 21:34

## 2024-02-04 RX ADMIN — TAMSULOSIN HYDROCHLORIDE 0.4 MILLIGRAM(S): 0.4 CAPSULE ORAL at 21:34

## 2024-02-04 RX ADMIN — GABAPENTIN 100 MILLIGRAM(S): 400 CAPSULE ORAL at 06:39

## 2024-02-04 RX ADMIN — Medication 1 APPLICATION(S): at 17:20

## 2024-02-04 RX ADMIN — HEPARIN SODIUM 5000 UNIT(S): 5000 INJECTION INTRAVENOUS; SUBCUTANEOUS at 21:34

## 2024-02-04 RX ADMIN — Medication 100 MILLIGRAM(S): at 13:18

## 2024-02-04 RX ADMIN — PANTOPRAZOLE SODIUM 40 MILLIGRAM(S): 20 TABLET, DELAYED RELEASE ORAL at 17:18

## 2024-02-04 RX ADMIN — Medication 400 MILLIGRAM(S): at 13:19

## 2024-02-04 RX ADMIN — Medication 25 MILLIGRAM(S): at 17:17

## 2024-02-04 RX ADMIN — PANTOPRAZOLE SODIUM 40 MILLIGRAM(S): 20 TABLET, DELAYED RELEASE ORAL at 06:39

## 2024-02-04 NOTE — PROGRESS NOTE ADULT - ATTENDING COMMENTS
I reviewed the chart and examined the patient with the resident and we discussed the findings and treatment plan.  The patient is tolerating the rehab program well. I agree with the findings and treatment plan above, which I modified as indicated. The patient requires 3 hrs a day of acute inpatient rehab. No new complaints. Mild cold symptoms. No SOB or cough. Repeat COVID swab ordered. Notes some anterior CW pain where the wound team debrided abrasions. Neuro stable. Participating well in therapies. Continue rehab and d/c planning.    I read, edited and agree with the Assessment.

## 2024-02-04 NOTE — PROGRESS NOTE ADULT - SUBJECTIVE AND OBJECTIVE BOX
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  ----------------------------<  108<H>  4.1   |  26  |  0.8    Ca    8.8      30 Jan 2024 08:37                              8.7    14.66 )-----------( 351      ( 22 Jan 2024 08:25 )             26.6     01-22    143  |  108  |  12  ----------------------------<  125<H>  3.7   |  26  |  0.9    Ca    8.1<L>      22 Jan 2024 08:25  Mg     1.7     01-22    TPro  5.4<L>  /  Alb  2.9<L>  /  TBili  0.3  /  DBili  x   /  AST  73<H>  /  ALT  45<H>  /  AlkPhos  104  01-22
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
## 2024-02-05 LAB
ALBUMIN SERPL ELPH-MCNC: 3.8 G/DL — SIGNIFICANT CHANGE UP (ref 3.5–5.2)
ALP SERPL-CCNC: 113 U/L — SIGNIFICANT CHANGE UP (ref 30–115)
ALT FLD-CCNC: 9 U/L — SIGNIFICANT CHANGE UP (ref 0–41)
ANION GAP SERPL CALC-SCNC: 14 MMOL/L — SIGNIFICANT CHANGE UP (ref 7–14)
AST SERPL-CCNC: 18 U/L — SIGNIFICANT CHANGE UP (ref 0–41)
BASOPHILS # BLD AUTO: 0.06 K/UL — SIGNIFICANT CHANGE UP (ref 0–0.2)
BASOPHILS NFR BLD AUTO: 1 % — SIGNIFICANT CHANGE UP (ref 0–1)
BILIRUB SERPL-MCNC: 0.2 MG/DL — SIGNIFICANT CHANGE UP (ref 0.2–1.2)
BUN SERPL-MCNC: 18 MG/DL — SIGNIFICANT CHANGE UP (ref 10–20)
CALCIUM SERPL-MCNC: 9.2 MG/DL — SIGNIFICANT CHANGE UP (ref 8.4–10.5)
CHLORIDE SERPL-SCNC: 108 MMOL/L — SIGNIFICANT CHANGE UP (ref 98–110)
CO2 SERPL-SCNC: 24 MMOL/L — SIGNIFICANT CHANGE UP (ref 17–32)
CREAT SERPL-MCNC: 0.7 MG/DL — SIGNIFICANT CHANGE UP (ref 0.7–1.5)
EGFR: 99 ML/MIN/1.73M2 — SIGNIFICANT CHANGE UP
EOSINOPHIL # BLD AUTO: 0.28 K/UL — SIGNIFICANT CHANGE UP (ref 0–0.7)
EOSINOPHIL NFR BLD AUTO: 4.7 % — SIGNIFICANT CHANGE UP (ref 0–8)
GLUCOSE SERPL-MCNC: 124 MG/DL — HIGH (ref 70–99)
HCT VFR BLD CALC: 34.1 % — LOW (ref 42–52)
HGB BLD-MCNC: 10.8 G/DL — LOW (ref 14–18)
IMM GRANULOCYTES NFR BLD AUTO: 0.2 % — SIGNIFICANT CHANGE UP (ref 0.1–0.3)
LYMPHOCYTES # BLD AUTO: 1.76 K/UL — SIGNIFICANT CHANGE UP (ref 1.2–3.4)
LYMPHOCYTES # BLD AUTO: 29.5 % — SIGNIFICANT CHANGE UP (ref 20.5–51.1)
MAGNESIUM SERPL-MCNC: 1.9 MG/DL — SIGNIFICANT CHANGE UP (ref 1.8–2.4)
MCHC RBC-ENTMCNC: 31.3 PG — HIGH (ref 27–31)
MCHC RBC-ENTMCNC: 31.7 G/DL — LOW (ref 32–37)
MCV RBC AUTO: 98.8 FL — HIGH (ref 80–94)
MONOCYTES # BLD AUTO: 0.49 K/UL — SIGNIFICANT CHANGE UP (ref 0.1–0.6)
MONOCYTES NFR BLD AUTO: 8.2 % — SIGNIFICANT CHANGE UP (ref 1.7–9.3)
NEUTROPHILS # BLD AUTO: 3.37 K/UL — SIGNIFICANT CHANGE UP (ref 1.4–6.5)
NEUTROPHILS NFR BLD AUTO: 56.4 % — SIGNIFICANT CHANGE UP (ref 42.2–75.2)
NRBC # BLD: 0 /100 WBCS — SIGNIFICANT CHANGE UP (ref 0–0)
PLATELET # BLD AUTO: 301 K/UL — SIGNIFICANT CHANGE UP (ref 130–400)
PMV BLD: 9.8 FL — SIGNIFICANT CHANGE UP (ref 7.4–10.4)
POTASSIUM SERPL-MCNC: 4.2 MMOL/L — SIGNIFICANT CHANGE UP (ref 3.5–5)
POTASSIUM SERPL-SCNC: 4.2 MMOL/L — SIGNIFICANT CHANGE UP (ref 3.5–5)
PROT SERPL-MCNC: 6.8 G/DL — SIGNIFICANT CHANGE UP (ref 6–8)
RBC # BLD: 3.45 M/UL — LOW (ref 4.7–6.1)
RBC # FLD: 15.6 % — HIGH (ref 11.5–14.5)
SARS-COV-2 RNA SPEC QL NAA+PROBE: SIGNIFICANT CHANGE UP
SODIUM SERPL-SCNC: 146 MMOL/L — SIGNIFICANT CHANGE UP (ref 135–146)
WBC # BLD: 5.97 K/UL — SIGNIFICANT CHANGE UP (ref 4.8–10.8)
WBC # FLD AUTO: 5.97 K/UL — SIGNIFICANT CHANGE UP (ref 4.8–10.8)

## 2024-02-05 RX ADMIN — LIDOCAINE 1 PATCH: 4 CREAM TOPICAL at 06:38

## 2024-02-05 RX ADMIN — Medication 1 APPLICATION(S): at 18:34

## 2024-02-05 RX ADMIN — TAMSULOSIN HYDROCHLORIDE 0.4 MILLIGRAM(S): 0.4 CAPSULE ORAL at 21:43

## 2024-02-05 RX ADMIN — PANTOPRAZOLE SODIUM 40 MILLIGRAM(S): 20 TABLET, DELAYED RELEASE ORAL at 18:24

## 2024-02-05 RX ADMIN — Medication 25 MILLIGRAM(S): at 18:24

## 2024-02-05 RX ADMIN — Medication 650 MILLIGRAM(S): at 19:26

## 2024-02-05 RX ADMIN — GABAPENTIN 100 MILLIGRAM(S): 400 CAPSULE ORAL at 21:42

## 2024-02-05 RX ADMIN — Medication 400 MILLIGRAM(S): at 20:28

## 2024-02-05 RX ADMIN — Medication 650 MILLIGRAM(S): at 18:24

## 2024-02-05 RX ADMIN — HEPARIN SODIUM 5000 UNIT(S): 5000 INJECTION INTRAVENOUS; SUBCUTANEOUS at 21:42
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
## 2024-02-06 PROCEDURE — 99222 1ST HOSP IP/OBS MODERATE 55: CPT

## 2024-02-06 RX ORDER — QUETIAPINE FUMARATE 200 MG/1
25 TABLET, FILM COATED ORAL AT BEDTIME
Refills: 0 | Status: DISCONTINUED | OUTPATIENT
Start: 2024-02-06 | End: 2024-02-07

## 2024-02-06 RX ADMIN — Medication 1 APPLICATION(S): at 17:44

## 2024-02-06 RX ADMIN — Medication 400 MILLIGRAM(S): at 12:31

## 2024-02-06 RX ADMIN — Medication 650 MILLIGRAM(S): at 17:40

## 2024-02-06 RX ADMIN — TAMSULOSIN HYDROCHLORIDE 0.4 MILLIGRAM(S): 0.4 CAPSULE ORAL at 21:31

## 2024-02-06 RX ADMIN — Medication 100 MILLIGRAM(S): at 12:32

## 2024-02-06 RX ADMIN — Medication 1 APPLICATION(S): at 05:49

## 2024-02-06 RX ADMIN — PANTOPRAZOLE SODIUM 40 MILLIGRAM(S): 20 TABLET, DELAYED RELEASE ORAL at 17:41

## 2024-02-06 RX ADMIN — Medication 650 MILLIGRAM(S): at 12:31

## 2024-02-06 RX ADMIN — PANTOPRAZOLE SODIUM 40 MILLIGRAM(S): 20 TABLET, DELAYED RELEASE ORAL at 05:45

## 2024-02-06 RX ADMIN — GABAPENTIN 100 MILLIGRAM(S): 400 CAPSULE ORAL at 15:41

## 2024-02-06 RX ADMIN — GABAPENTIN 100 MILLIGRAM(S): 400 CAPSULE ORAL at 21:31

## 2024-02-06 RX ADMIN — HEPARIN SODIUM 5000 UNIT(S): 5000 INJECTION INTRAVENOUS; SUBCUTANEOUS at 21:32

## 2024-02-06 RX ADMIN — LIDOCAINE 1 PATCH: 4 CREAM TOPICAL at 15:42

## 2024-02-06 RX ADMIN — Medication 1 APPLICATION(S): at 15:48

## 2024-02-06 RX ADMIN — QUETIAPINE FUMARATE 25 MILLIGRAM(S): 200 TABLET, FILM COATED ORAL at 21:34

## 2024-02-06 RX ADMIN — Medication 1 TABLET(S): at 12:32

## 2024-02-06 RX ADMIN — GABAPENTIN 100 MILLIGRAM(S): 400 CAPSULE ORAL at 05:45

## 2024-02-06 RX ADMIN — LIDOCAINE 1 PATCH: 4 CREAM TOPICAL at 18:58

## 2024-02-06 RX ADMIN — Medication 650 MILLIGRAM(S): at 05:48

## 2024-02-06 RX ADMIN — CHLORHEXIDINE GLUCONATE 1 APPLICATION(S): 213 SOLUTION TOPICAL at 05:44

## 2024-02-06 RX ADMIN — Medication 25 MILLIGRAM(S): at 05:45

## 2024-02-06 RX ADMIN — HEPARIN SODIUM 5000 UNIT(S): 5000 INJECTION INTRAVENOUS; SUBCUTANEOUS at 05:45

## 2024-02-06 RX ADMIN — Medication 25 MILLIGRAM(S): at 17:40

## 2024-02-06 RX ADMIN — HEPARIN SODIUM 5000 UNIT(S): 5000 INJECTION INTRAVENOUS; SUBCUTANEOUS at 15:42
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
## 2024-02-07 ENCOUNTER — TRANSCRIPTION ENCOUNTER (OUTPATIENT)
Age: 69
End: 2024-02-07

## 2024-02-07 ENCOUNTER — NON-APPOINTMENT (OUTPATIENT)
Age: 69
End: 2024-02-07

## 2024-02-07 VITALS
HEART RATE: 81 BPM | DIASTOLIC BLOOD PRESSURE: 63 MMHG | RESPIRATION RATE: 18 BRPM | SYSTOLIC BLOOD PRESSURE: 147 MMHG | TEMPERATURE: 96 F

## 2024-02-07 RX ORDER — ENOXAPARIN SODIUM 100 MG/ML
40 INJECTION SUBCUTANEOUS
Qty: 22 | Refills: 0
Start: 2024-02-07 | End: 2024-02-28

## 2024-02-07 RX ORDER — QUETIAPINE FUMARATE 200 MG/1
1 TABLET, FILM COATED ORAL
Qty: 0 | Refills: 0 | DISCHARGE
Start: 2024-02-07

## 2024-02-07 RX ADMIN — Medication 400 MILLIGRAM(S): at 11:51

## 2024-02-07 RX ADMIN — Medication 650 MILLIGRAM(S): at 12:25

## 2024-02-07 RX ADMIN — Medication 100 MILLIGRAM(S): at 11:50

## 2024-02-07 RX ADMIN — Medication 650 MILLIGRAM(S): at 05:43

## 2024-02-07 RX ADMIN — GABAPENTIN 100 MILLIGRAM(S): 400 CAPSULE ORAL at 12:01

## 2024-02-07 RX ADMIN — Medication 1 TABLET(S): at 11:50

## 2024-02-07 RX ADMIN — HEPARIN SODIUM 5000 UNIT(S): 5000 INJECTION INTRAVENOUS; SUBCUTANEOUS at 12:01

## 2024-02-07 RX ADMIN — GABAPENTIN 100 MILLIGRAM(S): 400 CAPSULE ORAL at 05:42

## 2024-02-07 RX ADMIN — Medication 25 MILLIGRAM(S): at 05:42

## 2024-02-07 RX ADMIN — Medication 1 APPLICATION(S): at 05:46

## 2024-02-07 RX ADMIN — CHLORHEXIDINE GLUCONATE 1 APPLICATION(S): 213 SOLUTION TOPICAL at 05:42

## 2024-02-07 RX ADMIN — Medication 650 MILLIGRAM(S): at 11:50

## 2024-02-07 RX ADMIN — PANTOPRAZOLE SODIUM 40 MILLIGRAM(S): 20 TABLET, DELAYED RELEASE ORAL at 05:43

## 2024-02-07 RX ADMIN — Medication 650 MILLIGRAM(S): at 06:59

## 2024-02-07 RX ADMIN — LIDOCAINE 1 PATCH: 4 CREAM TOPICAL at 12:01

## 2024-02-07 RX ADMIN — HEPARIN SODIUM 5000 UNIT(S): 5000 INJECTION INTRAVENOUS; SUBCUTANEOUS at 05:43
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
## 2024-02-07 NOTE — PROGRESS NOTE ADULT - ATTENDING SUPERVISION STATEMENT
Resident
Fellow
Resident
Fellow
Resident
Resident

## 2024-02-07 NOTE — PROGRESS NOTE ADULT - PROVIDER SPECIALTY LIST ADULT
Physiatry
Gastroenterology
Neuropsychology
Physiatry
Burn
Gastroenterology
Physiatry
Burn
Physiatry
Neuropsychology
Neuropsychology

## 2024-02-07 NOTE — DISCHARGE NOTE NURSING/CASE MANAGEMENT/SOCIAL WORK - PATIENT PORTAL LINK FT
You can access the FollowMyHealth Patient Portal offered by [facility] by registering at the following website: [email or web address]. By joining [facility]’s FollowMyHealth portal, you will also be able to view your health information using other applications (apps) compatible with our system.
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
## 2024-02-07 NOTE — DISCHARGE NOTE NURSING/CASE MANAGEMENT/SOCIAL WORK - NSDCVIVACCINE_GEN_ALL_CORE_FT
Tdap; 06-Dec-2023 17:06; Tao June (SY); [employer]; [id number] (Exp. Date: 23-Nov-2025); IntraMuscular; Deltoid Left.; 0.5 milliLiter(s); VIS (VIS Published: 09-May-2013, VIS Presented: 06-Dec-2023);

## 2024-02-07 NOTE — DISCHARGE NOTE NURSING/CASE MANAGEMENT/SOCIAL WORK - NSDCPEFALRISK_GEN_ALL_CORE
Telephone Encounter by Pepper Bran RN at 17 09:43 AM     Author:  Pepper Bran RN Service:  (none) Author Type:  Registered Nurse     Filed:  17 10:00 AM Encounter Date:  2017 Status:  Signed     :  Pepper Bran RN (Registered Nurse)            Reviewed medical/surgical history and current med list.  BMI: Data Unavailable   Patient's BMI is[KA1.1T] 28[KA1.1M]    Does patient have a history of a LEEP or  delivery?:[KA1.1T]No[KA1.1M]    Is patient AMA?:[KA1.1T] No[KA1.1M]    Does patient have existing type 2 DM?[KA1.1T] No[KA1.1M]    Was patient a GDM in previous pregnancies? :[KA1.1T]No[KA1.1M]    Is patient taking any medications?[KA1.1T] No[KA1.1M]    Risk Factors this pregnancy:[KA1.1T] No[KA1.1M]    Does patient have HMO insurance?[KA1.1T] Yes -[KA1.1M]     Denies hx of PE, other clotting disorders, smoking, CHTN,GHTN,DM, thyroid issues, hx depression/anxiety.    Blood Type in EPIC:  Lab Results      Component  Value Date    ABORH see fn 2015    ANTIBODYSCR NEGATIVE 2015       Labs to be ordered: CBC, Rubella, HIV, Hep B, RPR[KA1.1T], antibody screen[KA1.1M]    NOB call completed and patient aware of time and location of upcoming visits.[KA1.1T]  FYI to  Dr Fuentes[KA1.1M]    Next and Last Visit with Provider and Department  Next visit with ANDREW FUENTES is on 2017 at  4:45 PM in OBSTETRICS/GYN ST2  Next visit with OBSTETRICS/GYNECOLOGY is on 2017 at  4:45 PM in OBSTETRICS/GYN ST2  Last visit with ANDREW FUENTES was on No match found  Last visit with OBSTETRICS/GYNECOLOGY was on 2017 at  2:00 PM in OBSTETRICS/GYN ST2[KA1.1T]               Revision History        User Key Date/Time User Provider Type Action    > KA1.1 17 10:00 AM Pepper Bran, RN Registered Nurse Sign    M - Manual, T - Template             For information on Fall & Injury Prevention, visit: [email or web address] OR  [email or web address] OR  https://www.cdc.gov/steadi/patient.html
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
## 2024-02-09 DIAGNOSIS — T24.021D: ICD-10-CM

## 2024-02-09 DIAGNOSIS — T22.021A BURN OF UNSPECIFIED DEGREE OF RIGHT ELBOW, INITIAL ENCOUNTER: ICD-10-CM
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
## 2024-02-09 DIAGNOSIS — K26.0 ACUTE DUODENAL ULCER WITH HEMORRHAGE: ICD-10-CM

## 2024-02-09 DIAGNOSIS — T21.01XD: ICD-10-CM
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
## 2024-02-09 DIAGNOSIS — S22.43XD MULTIPLE FRACTURES OF RIBS, BILATERAL, SUBSEQUENT ENCOUNTER FOR FRACTURE WITH ROUTINE HEALING: ICD-10-CM

## 2024-02-09 DIAGNOSIS — I25.10 ATHEROSCLEROTIC HEART DISEASE OF NATIVE CORONARY ARTERY WITHOUT ANGINA PECTORIS: ICD-10-CM

## 2024-02-09 DIAGNOSIS — K20.90 ESOPHAGITIS, UNSPECIFIED WITHOUT BLEEDING: ICD-10-CM

## 2024-02-09 DIAGNOSIS — U07.1 COVID-19: ICD-10-CM

## 2024-02-09 DIAGNOSIS — I34.1 NONRHEUMATIC MITRAL (VALVE) PROLAPSE: ICD-10-CM

## 2024-02-09 DIAGNOSIS — S32.008D OTHER FRACTURE OF UNSPECIFIED LUMBAR VERTEBRA, SUBSEQUENT ENCOUNTER FOR FRACTURE WITH ROUTINE HEALING: ICD-10-CM

## 2024-02-09 DIAGNOSIS — T24.022D: ICD-10-CM

## 2024-02-09 DIAGNOSIS — M54.16 RADICULOPATHY, LUMBAR REGION: ICD-10-CM

## 2024-02-09 DIAGNOSIS — M32.9 SYSTEMIC LUPUS ERYTHEMATOSUS, UNSPECIFIED: ICD-10-CM

## 2024-02-09 DIAGNOSIS — K29.80 DUODENITIS WITHOUT BLEEDING: ICD-10-CM

## 2024-02-09 DIAGNOSIS — R27.0 ATAXIA, UNSPECIFIED: ICD-10-CM

## 2024-02-09 DIAGNOSIS — I10 ESSENTIAL (PRIMARY) HYPERTENSION: ICD-10-CM

## 2024-02-09 DIAGNOSIS — B35.1 TINEA UNGUIUM: ICD-10-CM

## 2024-02-09 DIAGNOSIS — M62.82 RHABDOMYOLYSIS: ICD-10-CM

## 2024-02-09 DIAGNOSIS — Q44.6 CYSTIC DISEASE OF LIVER: ICD-10-CM
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
## 2024-02-09 DIAGNOSIS — R74.01 ELEVATION OF LEVELS OF LIVER TRANSAMINASE LEVELS: ICD-10-CM

## 2024-02-09 DIAGNOSIS — R42 DIZZINESS AND GIDDINESS: ICD-10-CM

## 2024-02-09 DIAGNOSIS — K29.70 GASTRITIS, UNSPECIFIED, WITHOUT BLEEDING: ICD-10-CM

## 2024-02-09 DIAGNOSIS — E78.5 HYPERLIPIDEMIA, UNSPECIFIED: ICD-10-CM

## 2024-02-09 DIAGNOSIS — Z87.891 PERSONAL HISTORY OF NICOTINE DEPENDENCE: ICD-10-CM

## 2024-02-09 DIAGNOSIS — K25.9 GASTRIC ULCER, UNSPECIFIED AS ACUTE OR CHRONIC, WITHOUT HEMORRHAGE OR PERFORATION: ICD-10-CM

## 2024-02-09 DIAGNOSIS — M89.9 DISORDER OF BONE, UNSPECIFIED: ICD-10-CM

## 2024-02-09 DIAGNOSIS — G72.9 MYOPATHY, UNSPECIFIED: ICD-10-CM

## 2024-02-15 ENCOUNTER — TRANSCRIPTION ENCOUNTER (OUTPATIENT)
Age: 69
End: 2024-02-15

## 2024-02-22 ENCOUNTER — TRANSCRIPTION ENCOUNTER (OUTPATIENT)
Age: 69
End: 2024-02-22

## 2024-02-28 ENCOUNTER — TRANSCRIPTION ENCOUNTER (OUTPATIENT)
Age: 69
End: 2024-02-28

## 2024-03-14 NOTE — PATIENT PROFILE ADULT - HAVE YOU EXPERIENCED VIOLENCE OR A TRAUMATIC EVENT?
You are seen in the emergency department today.  A CAT scan and labs were performed.  There is no serious illness or injury at this time.  Take antibiotics as prescribed.  
no

## 2024-11-17 NOTE — ED ADULT TRIAGE NOTE - CHIEF COMPLAINT QUOTE
Pt who is baseline confused, was sent from [facility] for gurgling and difficulty breathing x 1 hour.

## 2024-11-18 PROBLEM — R42 DIZZINESS AND GIDDINESS: Chronic | Status: ACTIVE | Noted: 2024-01-01

## 2024-11-18 PROBLEM — L93.0 DISCOID LUPUS ERYTHEMATOSUS: Chronic | Status: ACTIVE | Noted: 2024-01-01

## 2024-11-18 PROBLEM — I10 ESSENTIAL (PRIMARY) HYPERTENSION: Chronic | Status: ACTIVE | Noted: 2024-01-01

## 2024-11-18 NOTE — ED PROVIDER NOTE - PROGRESS NOTE DETAILS
Pt went into respiratory then cardiac arrest, intubated, 3 epinephrine given, vfib on monitor, 200J defibrillation, will admit to ICU. respiratory arrest most likely 2/2 sepsis/aspiration pna

## 2024-11-18 NOTE — CONSULT NOTE ADULT - ATTENDING COMMENTS
Acute respi Failure  Artifact    Artifact. No fiurther w-up  No firther EP w-up for ARF  Cardio /ICU f-up as needed  Recall as needed

## 2024-11-18 NOTE — CONSULT NOTE ADULT - CRITICAL CARE ATTENDING COMMENT
The patient's injury acutely impairs one or more vital organ systems, and there is a high probability of imminent or life threatening deterioration in the patient’s condition. The care of this patient requires complex medical decision making in the field of Infectious Diseases and extensive interpretation of laboratory, microbiological, and radiographic data   Discussed with Dr Rogers

## 2024-11-18 NOTE — ED PROVIDER NOTE - PHYSICAL EXAMINATION
CONSTITUTIONAL: Uncomfortable, tachypneic, increased WOB  SKIN: warm, dry  HEAD: Normocephalic; atraumatic.  EYES: PERRL, EOMI, no conjunctival erythema  ENT: No nasal discharge; airway clear.  NECK: Supple; non tender.  CARD: S1, S2 normal; no murmurs, gallops, or rubs. Regular rate and rhythm.   RESP: BL Rhonchi  ABD: soft ntnd  EXT: Normal ROM.  No clubbing, cyanosis or edema.   LYMPH: No acute cervical adenopathy.  NEURO: Confused at baseline

## 2024-11-18 NOTE — H&P ADULT - NSHPLABSRESULTS_GEN_ALL_CORE
LABS:      CBC Full  -  ( 18 Nov 2024 00:30 )  WBC Count : 12.67 K/uL  RBC Count : 3.69 M/uL  Hemoglobin : 10.9 g/dL  Hematocrit : 33.0 %  Platelet Count - Automated : 254 K/uL  Mean Cell Volume : 89.4 fL  Mean Cell Hemoglobin : 29.5 pg  Mean Cell Hemoglobin Concentration : 33.0 g/dL  Auto Neutrophil # : 8.63 K/uL  Auto Lymphocyte # : 2.77 K/uL  Auto Monocyte # : 1.03 K/uL  Auto Eosinophil # : 0.12 K/uL  Auto Basophil # : 0.07 K/uL  Auto Neutrophil % : 68.1 %  Auto Lymphocyte % : 21.9 %  Auto Monocyte % : 8.1 %  Auto Eosinophil % : 0.9 %  Auto Basophil % : 0.6 %    11-18    138  |  101  |  17  ----------------------------<  107[H]  3.5   |  24  |  0.8    Ca    8.6      18 Nov 2024 00:30    TPro  6.4  /  Alb  3.8  /  TBili  0.4  /  DBili  x   /  AST  22  /  ALT  6   /  AlkPhos  65  11-18    PT/INR - ( 18 Nov 2024 00:39 )   PT: 11.70 sec;   INR: 0.99 ratio         PTT - ( 18 Nov 2024 00:39 )  PTT:29.9 sec      Urinalysis Basic - ( 18 Nov 2024 00:30 )    Color: x / Appearance: x / SG: x / pH: x  Gluc: 107 mg/dL / Ketone: x  / Bili: x / Urobili: x   Blood: x / Protein: x / Nitrite: x   Leuk Esterase: x / RBC: x / WBC x   Sq Epi: x / Non Sq Epi: x / Bacteria: x                RADIOLOGY & ADDITIONAL STUDIES (The following images were personally reviewed):

## 2024-11-18 NOTE — H&P ADULT - NSHPPHYSICALEXAM_GEN_ALL_CORE
Prior to intubation   CONSTITUTIONAL: Uncomfortable, tachypneic, increased WOB  SKIN: warm, dry  HEAD: Normocephalic; atraumatic.  EYES: PERRL, EOMI, no conjunctival erythema  ENT: No nasal discharge; airway clear.  NECK: Supple; non tender.  CARD: S1, S2 normal; no murmurs, gallops, or rubs. Regular rate and rhythm.   RESP: BL Rhonchi  ABD: soft ntnd  EXT: Normal ROM.  No clubbing, cyanosis or edema.   LYMPH: No acute cervical adenopathy.  NEURO: Confused at baseline Prior to intubation   CONSTITUTIONAL: Uncomfortable, tachypneic, increased WOB  SKIN: warm, dry  HEAD: Normocephalic; atraumatic.  EYES: PERRL, EOMI, no conjunctival erythema  ENT: No nasal discharge; airway clear.  NECK: Supple; non tender.  CARD: S1, S2 normal; no murmurs, gallops, or rubs. Regular rate and rhythm.   RESP: BL Rhonchi  ABD: soft , non tender , not distended   EXT: +1 LLE b/l   Neuro : slight confusion at baseline but AO*3    Physical exam post intubation remained similar except :   Neuro : sedated   Resp : MV sounds b/l

## 2024-11-18 NOTE — ED ADULT NURSE REASSESSMENT NOTE - NS ED NURSE REASSESS COMMENT FT1
Upon reassessment of pt, pt having increased difficulty breathing, lips turning blue, skin color turning gray. Pt has a pulse, placed on nonrebreather @ 15 mL/hr. MD Kincaid notified and aware and pt immediately transported to critical care trauma 2 where pt is intubated, coded, achieved rosc, and care handoff occurred to critical care staff team.

## 2024-11-18 NOTE — ED PROVIDER NOTE - PRO INTERPRETER NEED 2
English need for outpatient follow-up/lab results/radiology results/return to ED if symptoms worsen, persist or questions arise

## 2024-11-18 NOTE — ED PROVIDER NOTE - OBJECTIVE STATEMENT
69 y.o. M confused at baseline with SOB, cough, x 1 day worse one hour ago, no sick contacts/ no f/c 69 y.o. M  Lupus  confused at baseline with SOB, cough, x 1 day worse one hour ago, no sick contacts/ no f/c

## 2024-11-18 NOTE — ED ADULT NURSE REASSESSMENT NOTE - NS ED NURSE REASSESS COMMENT FT1
time zero for code sepsis called at 0415 by ED MD, pt admitted to ICU prior to time zero. as per documentation/orders, cultures drawn/inital fluid bolus&antibiotics given by previous RN in main ED. additional fluids hung as per orders, pt taken to ICU approx 15 minutes after start of fluid bolus.

## 2024-11-18 NOTE — ED PROVIDER NOTE - CLINICAL SUMMARY MEDICAL DECISION MAKING FREE TEXT BOX
labs reviewed. pt to be admitted for aspiration pna  Independently interpretted by Lupillo Kincaid DO:  XR interpretation: BL pna  EKG interpretation: no GENESIS, NSR    pt requiring nasotracheal suctioning, will admit for sepsis workup  Appropriate medications for patient's presenting complaints were ordered and effects were reassessed.  Patient's external records were reviewed.     Escalation to admission/observation was considered.  At this time, patient requires inpatient hospitalization . labs reviewed. pt to be admitted for aspiration pna  Independently interpretted by Lupillo Kincaid DO:  XR interpretation: BL pna  EKG interpretation: no GENESIS, NSR    pt went into respiratory then cardiac arrest, intubated, PEA arrest, then vfib, ROSC achieved, epinephrine infusion, amiodarone drip, versed fentanyl.    Appropriate medications for patient's presenting complaints were ordered and effects were reassessed.  Patient's external records were reviewed.     Escalation to admission/observation was considered.  At this time, patient requires inpatient hospitalization .

## 2024-11-18 NOTE — ED ADULT NURSE NOTE - NSFALLHARMRISKINTERV_ED_ALL_ED
Assistance OOB with selected safe patient handling equipment if applicable/Assistance with ambulation/Communicate risk of Fall with Harm to all staff, patient, and family/Monitor gait and stability/Provide visual cue: red socks, yellow wristband, yellow gown, etc/Reinforce activity limits and safety measures with patient and family/Bed in lowest position, wheels locked, appropriate side rails in place/Call bell, personal items and telephone in reach/Instruct patient to call for assistance before getting out of bed/chair/stretcher/Non-slip footwear applied when patient is off stretcher/[place] to call system/Physically safe environment - no spills, clutter or unnecessary equipment/Purposeful Proactive Rounding/Room/bathroom lighting operational, light cord in reach

## 2024-11-18 NOTE — H&P ADULT - ASSESSMENT
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 ETT care  HOB @ 45 degrees.  Aspiration precautions    PULMONARY: oxygen Target 92-96. CXR noted , s/p intubation and mv for acute hypoxia     CARDIOVASCULAR: on amiodarone gtt due to vtach post arrest? , avoid volume overload, MAP adequate. get ECHO ,get pro-BNP. hold home Losartan for soft BP     GI: GI prophylaxis: PPI twice daily ISO hx of GIB.  Tube feeding when OG tube is in.     RENAL:  Follow up lytes.  Correct as needed , hold tamsulosin for soft BP     INFECTIOUS DISEASE: Multifocal PNA, Follow up cultures, Zosyn. MRSA nasal . FU Legionella and Strep urinary Ag.  RVP. ID eval , DTA cultures if febrile.     HEMATOLOGICAL:  DVT prophylaxis . LE duplex.  Monitor CBC    ENDOCRINE:  Follow up FS.  Insulin protocol if needed.    MUSCULOSKELETAL: Bed rest.  Off loading , hydroxychloroquine for Lupus       LINES: PIV   CODE: FULL ,get palliative care Impression:   #Acute Hypoxic respiratory failure s/p intubation iso Aspiration/Multifocal PNA   #Cardiac arrest   #NSTEMI type II  #Bradycardia   #HO Lupus  #HO HTN  #HO GIB secondary to duodenal ulcer 1/2024   #Multiple falls , Chronic ataxia   #HO progressive supranuclear palsy and Visual Hallucinations        PLAN:    CNS:    Sedation: Fentanyl  & Propofol target RASS 0 to -1, pt bradycardic no precedex   SAT daily  Mental status was at baseline on admission , except for increasing visual hallucinations for the past few days     HEENT: Oral care. ETT care  HOB @ 45 degrees.  Aspiration precautions    PULMONARY: oxygen Target 92-96. CXR noted , s/p intubation and MV for acute hypoxia     CARDIOVASCULAR: started on amiodarone gtt due to vtach post arrest? but became bradycardic, dc amio gtt , switch epinephrine drip to levophed if needed, avoid volume overload, MAP adequate. get ECHO ,get pro-BNP. hold home Losartan for now     GI: GI prophylaxis: PPI twice daily ISO hx of GIB.  Tube feeding when OG tube is in.     RENAL:  Follow up lytes.  Correct as needed , hold tamsulosin for soft BP     INFECTIOUS DISEASE: Multifocal PNA, Follow up cultures, Zosyn. MRSA nasal . FU Legionella and Strep urinary Ag.  RVP. ID eval , DTA cultures if febrile.     HEMATOLOGICAL:  DVT prophylaxis . LE duplex.  Monitor CBC    ENDOCRINE:  Follow up FS.  Insulin protocol if needed.    MUSCULOSKELETAL: Bed rest.  Off loading , hydroxychloroquine for Lupus       LINES: PIV   CODE: FULL ,get palliative care

## 2024-11-18 NOTE — ED ADULT NURSE NOTE - CHIEF COMPLAINT QUOTE
Pt who is baseline confused, was sent from [facility] for gurgling and difficulty breathing x 1 hour.

## 2024-11-18 NOTE — H&P ADULT - HISTORY OF PRESENT ILLNESS
70 y/o male with PMH of HTN, HLD, mild SLE (Lupus) ,GIB secondary to duodenal ulcer 1/2024 Multiple falls , Chronic ataxia and Visual Hallucinations, hx of atypical parkinsonism vs alternate form of dementia who presents to the ED due to cough and SOB for 1 day duration. he had no fever, chills , GI or  symptoms, no sick contacts.     Patient was initially admitted to [facility] for mx of Aspiration PNA and then he started getting tachypneic, increased WOB, nurses suctioned him and then they noticed he is blue, then he became pulseless and CPR was initiated, he was successfully intubated , pt achieved ROSC , post ROSC rythm was Vtach per ED so he was started on amiodarone drip. during code blue ED started him on Epinephrine drip.     Vitals on admission prior to code blue :   /74 , HR 64 , T 98.4 , O2 95%RA    CBC and CMP unremarkable   EKG NSR  Trops 32>>29   Lactate negative    CXR with b/l PNA   was given 1 L LR bolus and 1g cefepime in ED    pt is admitted to MICU  pt is a 70 y/o male with PMH of HTN, HLD, mild SLE (Lupus) ,GIB secondary to duodenal ulcer in 1/2024,  Multiple falls , Chronic ataxia and Visual Hallucinations, hx of recently diagnosed progressive supranuclear palsy for which he follows with a neurologist at [place],  presents to the ED due to cough and SOB for 2 days duration. per cousin belly the HCP who visited him eariler today at the Adult home, the pt has been complaining of cough and SOB for the past 2 days, he said that his chest feels congested then his cousin noticed that his breathing got worse and he started wheezing and having noisy breath sounds so asked the  nurse to get him to ED, pt at that time was denying any chest pain, he also had no fever, chills , GI or  symptoms, and no sick contacts. of note cousin mentions that he had visual hallucinations for the past week or so saying he sees a man in his closet ( this is not the first time the patient has visual hallucinations and is following with a neurologist at [place]).     After ED eval, patient was initially admitted to [facility] for mx of Aspiration PNA and NSTEMI II, then after that he started getting tachypneic, increased WOB, nurses suctioned him and then they noticed he is blue, then he became pulseless and CPR was initiated, he was successfully intubated , pt got ROSC , post ROSC rythm was Vtach per ED so he was started on amiodarone drip. during code blue ED also started him on Epinephrine drip.     Vitals on admission prior to code blue :   /74 , HR 64 , T 98.4 , O2 95%RA    CBC and CMP unremarkable   EKG NSR  Trops 32>>29   Lactate negative    CXR with b/l PNA   was given 1 L LR bolus and 1g cefepime in ED    pt is admitted to MICU

## 2024-11-18 NOTE — PROGRESS NOTE ADULT - ASSESSMENT
pt is a 70 y/o male with PMH of HTN, HLD, mild SLE (Lupus) ,GIB secondary to duodenal ulcer in 1/2024,  Multiple falls , Chronic ataxia and Visual Hallucinations, hx of recently diagnosed progressive supranuclear palsy for which he follows with a neurologist at [place],  presents to the ED due to cough and SOB for 2 days duration. per cousin belly the HCP who visited him eariler today at the Adult home, the pt has been complaining of cough and SOB for the past 2 days, he said that his chest feels congested then his cousin noticed that his breathing got worse and he started wheezing and having noisy breath sounds so asked the  nurse to get him to ED, pt at that time was denying any chest pain, he also had no fever, chills , GI or  symptoms, and no sick contacts. of note cousin mentions that he had visual hallucinations for the past week or so saying he sees a man in his closet ( this is not the first time the patient has visual hallucinations and is following with a neurologist at [place]).     After ED eval, patient was initially admitted to [facility] for mx of Aspiration PNA and NSTEMI II, then after that he started getting tachypneic, increased WOB, nurses suctioned him and then they noticed he is blue, then he became pulseless and CPR was initiated, he was successfully intubated , pt got ROSC , post ROSC rythm was Vtach per ED so he was started on amiodarone drip. during code blue ED, discontinued it d/t bradycardia. Started him on Epinephrine drip.     Vitals on admission prior to code blue :   /74 , HR 64 , T 98.4 , O2 95%RA    CBC and CMP unremarkable   EKG NSR  Trops 32>>29   Lactate negative    CXR with b/l PNA   was given 1 L LR bolus and 1g cefepime in ED    pt is admitted to MICU    #Acute Hypoxic respiratory failure s/p intubation iso Aspiration/Multifocal PNA   CXR 11/18 - b/l hilar opacities, small RLL effusion (my read)  WBC uptrending, 12->20  Plan:  - c/w cefepime  - f/u procal  - CT chest  - ID consult    #Cardiac arrest, ~3min down time, s/p 1 shock for Vtach after ROSC  #NSTEMI type II  #Bradycardia   s/p amio ggt and epi drip started in ED  CPK 300s  Bedside US - not volume depleted  Plan:  - EP consult - for Vtach episode  - dc fluids, start solumedrol 40 q12h, levophed, f/u AM Cortisol  - s/p 1 dose Keppra, vEEG, CTH    #HO Lupus  #HO HTN  #HO GIB secondary to duodenal ulcer 1/2024   #Multiple falls , Chronic ataxia   #HO progressive supranuclear palsy and Visual Hallucinations        PLAN:    CNS:    Sedation: Fentanyl  & Propofol target RASS 0 to -1, pt bradycardic no precedex   SAT daily  Mental status was at baseline on admission , except for increasing visual hallucinations for the past few days     HEENT: Oral care. ETT care  HOB @ 45 degrees.  Aspiration precautions    PULMONARY: oxygen Target 92-96. CXR noted , s/p intubation and MV for acute hypoxia     CARDIOVASCULAR: started on amiodarone gtt due to vtach post arrest? but became bradycardic, dc amio gtt , switch epinephrine drip to levophed if needed, avoid volume overload, MAP adequate. get ECHO ,get pro-BNP. hold home Losartan for now     GI: GI prophylaxis: PPI twice daily ISO hx of GIB.  Tube feeding when OG tube is in.     RENAL:  Follow up lytes.  Correct as needed , hold tamsulosin for soft BP     INFECTIOUS DISEASE: Multifocal PNA, Follow up cultures, Zosyn. MRSA nasal . FU Legionella and Strep urinary Ag.  RVP. ID eval     HEMATOLOGICAL:  DVT prophylaxis . LE duplex.  Monitor CBC    ENDOCRINE:  Follow up FS.  Insulin protocol if needed.    MUSCULOSKELETAL: Bed rest.  Off loading , hydroxychloroquine for Lupus       LINES: PIV   CODE: FULL ,get palliative care  pt is a 68 y/o male with PMH of HTN, HLD, mild SLE (Lupus) ,GIB secondary to duodenal ulcer in 1/2024,  Multiple falls , Chronic ataxia and Visual Hallucinations, hx of recently diagnosed progressive supranuclear palsy for which he follows with a neurologist at [place],  presents to the ED due to cough and SOB for 2 days duration. per cousin belly the HCP who visited him eariler today at the Adult home, the pt has been complaining of cough and SOB for the past 2 days, he said that his chest feels congested then his cousin noticed that his breathing got worse and he started wheezing and having noisy breath sounds so asked the  nurse to get him to ED, pt at that time was denying any chest pain, he also had no fever, chills , GI or  symptoms, and no sick contacts. of note cousin mentions that he had visual hallucinations for the past week or so saying he sees a man in his closet ( this is not the first time the patient has visual hallucinations and is following with a neurologist at [place]).     After ED eval, patient was initially admitted to [facility] for mx of Aspiration PNA and NSTEMI II, then after that he started getting tachypneic, increased WOB, nurses suctioned him and then they noticed he is blue, then he became pulseless and CPR was initiated, he was successfully intubated , pt got ROSC , post ROSC rythm was Vtach per ED so he was started on amiodarone drip. during code blue ED, discontinued it d/t bradycardia. Started him on Epinephrine drip.     Vitals on admission prior to code blue :   /74 , HR 64 , T 98.4 , O2 95%RA    CBC and CMP unremarkable   EKG NSR  Trops 32>>29   Lactate negative    CXR with b/l PNA   was given 1 L LR bolus and 1g cefepime in ED    pt is admitted to MICU    #Acute Hypoxic respiratory failure s/p intubation iso Aspiration/Multifocal PNA   CXR 11/18 - b/l hilar opacities, small RLL effusion (my read)  WBC uptrending, 12->20  ~100.4 Fevers today, shaking  Plan:  - c/w cefepime  - f/u procal, MRSA, RVP, BCx  - CT chest  - ID consult  - Tylenol PRN for fevers    #Cardiac arrest, ~3min down time, s/p 1 shock for Vtach after ROSC  #NSTEMI type II  #Bradycardia   s/p amio ggt and epi drip started in ED. Amio dc'd d/t bradycardia  CPK 300s  Bedside US - not volume depleted  EP - f/u for ischemic w/u when stabilized  Plan:  - dc fluids, start solumedrol 40 q12h, levophed, f/u AM Cortisol    #Possible Seizures  Shaking and jerking  s/p 1 dose Keppra today  Plan:  vEEG, CTH    #HO Lupus  #HO HTN  #HO GIB secondary to duodenal ulcer 1/2024   #Multiple falls , Chronic ataxia   #HO progressive supranuclear palsy and Visual Hallucinations  - on home meds: hydroxychloroquine, thiamine  - held BP meds d/t shock    #Lines: R IJ 11/18, briggs 11/18  #Family: Cousin Jose L is next of kin. Updated today 11/18  #DVT prophylaxis: Lovenox  #GI prophylaxis: PPI suspension  #Diet: tube feeds, bowel regimen  #Activity: bedrest, IAT  #Code status: Full Code  #Disposition: ICU    Pendings:  1. Neuro w/u  2. Infectious w/u  3. f/u cortisol levels  pt is a 70 y/o male with PMH of HTN, HLD, mild SLE (Lupus) ,GIB secondary to duodenal ulcer in 1/2024,  Multiple falls , Chronic ataxia and Visual Hallucinations, hx of recently diagnosed progressive supranuclear palsy for which he follows with a neurologist at [place],  presents to the ED due to cough and SOB for 2 days duration. per cousin belly the HCP who visited him eariler today at the Adult home, the pt has been complaining of cough and SOB for the past 2 days, he said that his chest feels congested then his cousin noticed that his breathing got worse and he started wheezing and having noisy breath sounds so asked the  nurse to get him to ED, pt at that time was denying any chest pain, he also had no fever, chills , GI or  symptoms, and no sick contacts. of note cousin mentions that he had visual hallucinations for the past week or so saying he sees a man in his closet ( this is not the first time the patient has visual hallucinations and is following with a neurologist at [place]).     After ED eval, patient was initially admitted to [facility] for mx of Aspiration PNA and NSTEMI II, then after that he started getting tachypneic, increased WOB, nurses suctioned him and then they noticed he is blue, then he became pulseless and CPR was initiated, he was successfully intubated , pt got ROSC , post ROSC rythm was Vtach per ED so he was started on amiodarone drip. during code blue ED, discontinued it d/t bradycardia. Started him on Epinephrine drip.     Vitals on admission prior to code blue :   /74 , HR 64 , T 98.4 , O2 95%RA    CBC and CMP unremarkable   EKG NSR  Trops 32>>29   Lactate negative    CXR with b/l PNA   was given 1 L LR bolus and 1g cefepime in ED    pt is admitted to MICU    #Acute Hypoxic respiratory failure s/p intubation iso Aspiration/Multifocal PNA   CXR 11/18 - b/l hilar opacities, small RLL effusion (my read)  WBC uptrending, 12->20  ~100.4 Fevers today, shaking  Urine cloudy in briggs bag  Plan:  - c/w cefepime  - UA and UCx to be collected from [facility]  - f/u procal, MRSA, RVP, BCx  - CT chest  - ID consult  - Tylenol PRN for fevers    #Cardiac arrest, ~3min down time, s/p 1 shock for Vtach after ROSC  #NSTEMI type II  #Bradycardia   s/p amio ggt and epi drip started in ED. Amio dc'd d/t bradycardia  CPK 300s  Bedside US - not volume depleted  EP - f/u for ischemic w/u when stabilized  Plan:  - dc fluids, start solumedrol 40 q12h, levophed, f/u AM Cortisol    #Possible Seizures  Shaking and jerking  s/p 1 dose Keppra today  Plan:  vEEG, CTH    #HO Lupus  #HO HTN  #HO GIB secondary to duodenal ulcer 1/2024   #Multiple falls , Chronic ataxia   #HO progressive supranuclear palsy and Visual Hallucinations  - on home meds: hydroxychloroquine, thiamine  - held BP meds d/t shock    #Lines: R IJ 11/18, briggs 11/18  #Family: Cousin Jose L is next of kin. Updated today 11/18  #DVT prophylaxis: Lovenox  #GI prophylaxis: PPI suspension  #Diet: tube feeds, bowel regimen  #Activity: bedrest, IAT  #Code status: Full Code  #Disposition: ICU    Pendings:  1. Neuro w/u  2. Infectious w/u  3. f/u cortisol levels

## 2024-11-18 NOTE — ED ADULT NURSE REASSESSMENT NOTE - NS ED NURSE REASSESS COMMENT FT1
Pt remains with audible gurgling on 3L NC sating 97%, pt encouraged to cough up mucous and deep breathe. MD Kincaid aware of pt status, unable to cough up and completely remove mucous from airway. Chest PT administered in attempt to break up mucous. Nasotracheal suctioning completed as per orders, RT at bedside. Pt tolerated well. Care ongoing. IV(s) assessed and patent. Pt on continuous cardiac monitoring & continuous pulse ox.

## 2024-11-18 NOTE — ED ADULT NURSE NOTE - OBJECTIVE STATEMENT
Pt sent from assisted living [facility] for gurgling & SOB for 1 hour. Pt cousin at bedside endorses noticing pt breathing not per baseline and encouraged facility to call EMS. Pt tachypneic, audible gurgling heard.

## 2024-11-18 NOTE — PROCEDURAL SAFETY CHECKLIST WITH OR WITHOUT SEDATION - NSPOSTCOMMENTFT_GEN_ALL_CORE
MD attempted but pt is noted to be  Jerking MD stopped procedure
placemen unsuccessful, pressure dressing applied
abril placement unsucessful, pressure dressing aplied

## 2024-11-18 NOTE — CONSULT NOTE ADULT - SUBJECTIVE AND OBJECTIVE BOX
Neurology consult    SUSAN [id number]    HPI:   pt is a 68 y/o male with PMH of HTN, HLD, mild SLE (Lupus) ,GIB secondary to duodenal ulcer in 1/2024,  Multiple falls , Chronic ataxia and Visual Hallucinations, hx of recently diagnosed progressive supranuclear palsy for which he follows with a neurologist at [place],  presents to the ED due to cough and SOB for 2 days duration. per cousin belly the HCP who visited him eariler today at the Adult home, the pt has been complaining of cough and SOB for the past 2 days, he said that his chest feels congested then his cousin noticed that his breathing got worse and he started wheezing and having noisy breath sounds so asked the  nurse to get him to ED, pt at that time was denying any chest pain, he also had no fever, chills , GI or  symptoms, and no sick contacts. of note cousin mentions that he had visual hallucinations for the past week or so saying he sees a man in his closet ( this is not the first time the patient has visual hallucinations and is following with a neurologist at [place]).     After ED eval, patient was initially admitted to TELE for mx of Aspiration PNA and NSTEMI II, then after that he started getting tachypneic, increased WOB, nurses suctioned him and then they noticed he is blue, then he became pulseless and CPR was initiated, he was successfully intubated , pt got ROSC , post ROSC rythm was Vtach per ED so he was started on amiodarone drip. during code blue ED also started him on Epinephrine drip.     Vitals on admission prior to code blue :   /74 , HR 64 , T 98.4 , O2 95%RA    CBC and CMP unremarkable   EKG NSR  Trops 32>>29   Lactate negative    CXR with b/l PNA   was given 1 L LR bolus and 1g cefepime in ED    pt is admitted to MICU (18 Nov 2024 03:54)          MEDICATIONS    acetaminophen     Tablet .. 650 milliGRAM(s) Oral every 6 hours PRN  chlorhexidine 0.12% Liquid 15 milliLiter(s) Oral Mucosa every 12 hours  chlorhexidine 2% Cloths 1 Application(s) Topical <User Schedule>  enoxaparin Injectable 40 milliGRAM(s) SubCutaneous every 24 hours  fentaNYL   Infusion 0.5 MICROgram(s)/kG/Hr IV Continuous <Continuous>  folic acid 1 milliGRAM(s) Oral daily  gabapentin 100 milliGRAM(s) Oral three times a day  hydroxychloroquine 400 milliGRAM(s) Oral daily  methylPREDNISolone sodium succinate Injectable 40 milliGRAM(s) IV Push every 12 hours  multivitamin 1 Tablet(s) Oral daily  norepinephrine Infusion 0.05 MICROgram(s)/kG/Min IV Continuous <Continuous>  pantoprazole   Suspension 40 milliGRAM(s) Oral two times a day  piperacillin/tazobactam IVPB.. 3.375 Gram(s) IV Intermittent every 8 hours  polyethylene glycol 3350 17 Gram(s) Oral two times a day  propofol Infusion. 40 MICROgram(s)/kG/Min IV Continuous <Continuous>  senna 2 Tablet(s) Oral at bedtime  silver sulfADIAZINE 1% Cream 1 Application(s) Topical every 12 hours  thiamine 100 milliGRAM(s) Oral daily         Family history: No history of dementia, strokes, or seizures   FAMILY HISTORY:  Family history of colon cancer in father      SOCIAL HISTORY -- No history of tobacco or alcohol use     Allergies    iodine (Rash (Mild to Mod))    Intolerances        Height (cm): 177.8 (11-18 @ 09:08)  Weight (kg): 109.1 (11-18 @ 08:59)  BMI (kg/m2): 34.5 (11-18 @ 09:08)    Vital Signs Last 24 Hrs  T(C): 38.2 (18 Nov 2024 12:00), Max: 38.2 (18 Nov 2024 12:00)  T(F): 100.8 (18 Nov 2024 12:00), Max: 100.8 (18 Nov 2024 12:00)  HR: 63 (18 Nov 2024 14:00) (48 - 93)  BP: 90/53 (18 Nov 2024 14:00) (89/51 - 218/91)  BP(mean): 68 (18 Nov 2024 14:00) (64 - 97)  RR: 22 (18 Nov 2024 14:00) (16 - 24)  SpO2: 95% (18 Nov 2024 14:00) (90% - 100%)    Parameters below as of 18 Nov 2024 08:00  Patient On (Oxygen Delivery Method): ventilator    O2 Concentration (%): 90      REVIEW OF SYSTEMS: No ROS because pt is sedated     PHYSICAL: EXAM  GEN: sedated   NEURO:     MENTAL STATUS: Comatose     CRANIAL NERVES:     - II: Pupils equal and reactive,      - III, IV, VI: Unable to assess      - V: Unalable to assess      - VII: no facial asymmetry     - VIII: unable to assess     MOTOR: sedated, normal tone, does not react to painful stimuli     REFLEXES: 3/4 on the LUE, 2/4 RUE   SKIN:      Normal   No Ecchymosis               LABS:  CBC Full  -  ( 18 Nov 2024 10:10 )  WBC Count : 21.08 K/uL  RBC Count : 3.44 M/uL  Hemoglobin : 10.2 g/dL  Hematocrit : 30.9 %  Platelet Count - Automated : 215 K/uL  Mean Cell Volume : 89.8 fL  Mean Cell Hemoglobin : 29.7 pg  Mean Cell Hemoglobin Concentration : 33.0 g/dL  Auto Neutrophil # : 17.34 K/uL  Auto Lymphocyte # : 2.34 K/uL  Auto Monocyte # : 1.23 K/uL  Auto Eosinophil # : 0.01 K/uL  Auto Basophil # : 0.04 K/uL  Auto Neutrophil % : 82.3 %  Auto Lymphocyte % : 11.1 %  Auto Monocyte % : 5.8 %  Auto Eosinophil % : 0.0 %  Auto Basophil % : 0.2 %    Urinalysis Basic - ( 18 Nov 2024 14:00 )    Color: x / Appearance: x / SG: x / pH: x  Gluc: 88 mg/dL / Ketone: x  / Bili: x / Urobili: x   Blood: x / Protein: x / Nitrite: x   Leuk Esterase: x / RBC: x / WBC x   Sq Epi: x / Non Sq Epi: x / Bacteria: x      11-18    139  |  104  |  18  ----------------------------<  88  3.6   |  26  |  1.0    Ca    8.3[L]      18 Nov 2024 14:00  Phos  4.1     11-18  Mg     2.2     11-18    TPro  5.4[L]  /  Alb  3.4[L]  /  TBili  0.5  /  DBili  x   /  AST  48[H]  /  ALT  23  /  AlkPhos  52  11-18    Hemoglobin A1C:     LIVER FUNCTIONS - ( 18 Nov 2024 14:00 )  Alb: 3.4 g/dL / Pro: 5.4 g/dL / ALK PHOS: 52 U/L / ALT: 23 U/L / AST: 48 U/L / GGT: x           Vitamin B12   PT/INR - ( 18 Nov 2024 08:50 )   PT: 11.60 sec;   INR: 0.98 ratio         PTT - ( 18 Nov 2024 08:50 )  PTT:23.1 sec      RADIOLOGY    EKG                    
SUSAN AWAD  69y, Male  Allergy: iodine (Rash (Mild to Mod))      CHIEF COMPLAINT:   SOB (18 Nov 2024 12:11)      LOS      HPI  HPI:   pt is a 68 y/o male with PMH of HTN, HLD, mild SLE (Lupus) ,GIB secondary to duodenal ulcer in 1/2024,  Multiple falls , Chronic ataxia and Visual Hallucinations, hx of recently diagnosed progressive supranuclear palsy for which he follows with a neurologist at [place],  presents to the ED due to cough and SOB for 2 days duration. per cousin belly the HCP who visited him eariler today at the Adult home, the pt has been complaining of cough and SOB for the past 2 days, he said that his chest feels congested then his cousin noticed that his breathing got worse and he started wheezing and having noisy breath sounds so asked the  nurse to get him to ED, pt at that time was denying any chest pain, he also had no fever, chills , GI or  symptoms, and no sick contacts. of note cousin mentions that he had visual hallucinations for the past week or so saying he sees a man in his closet ( this is not the first time the patient has visual hallucinations and is following with a neurologist at [place]).     After ED eval, patient was initially admitted to TELE for mx of Aspiration PNA and NSTEMI II, then after that he started getting tachypneic, increased WOB, nurses suctioned him and then they noticed he is blue, then he became pulseless and CPR was initiated, he was successfully intubated , pt got ROSC , post ROSC rythm was Vtach per ED so he was started on amiodarone drip. during code blue ED also started him on Epinephrine drip.     Vitals on admission prior to code blue :   /74 , HR 64 , T 98.4 , O2 95%RA    CBC and CMP unremarkable   EKG NSR  Trops 32>>29   Lactate negative    CXR with b/l PNA   was given 1 L LR bolus and 1g cefepime in ED    pt is admitted to MICU (18 Nov 2024 03:54)      INFECTIOUS DISEASE HISTORY:  ID consulted for shock, cardiac arrest, concern for aspiration PNA    Admission WBC 12    Admission CXR no PNA; Repeat CXR bilateral opacities     Afebrile     Currently ordered for:  hydroxychloroquine 400 milliGRAM(s) Oral daily  piperacillin/tazobactam IVPB.. 3.375 Gram(s) IV Intermittent every 8 hours      PMH  PAST MEDICAL & SURGICAL HISTORY:  Vertigo      Hypertension      Lupus erythematosus          FAMILY HISTORY  Family history of colon cancer in father        SOCIAL HISTORY  Social History:  He lives alone in a  with 4 GENESIS and a FOS to the bedroom and bathroom.  He amb with a straight cane for his balance independently.  He was falling down once a week. His friend notices he had an ataxic gait. He took small steps. (18 Jan 2024 14:55)        ROS  unable to obtain history secondary to patient's mental status and/or sedation     VITALS:  T(F): 98, Max: 99.6 (11-17-24 @ 23:45)  HR: 56  BP: 127/55  RR: 22Vital Signs Last 24 Hrs  T(C): 36.7 (18 Nov 2024 08:00), Max: 37.6 (17 Nov 2024 23:45)  T(F): 98 (18 Nov 2024 08:00), Max: 99.6 (17 Nov 2024 23:45)  HR: 56 (18 Nov 2024 11:00) (48 - 93)  BP: 127/55 (18 Nov 2024 11:00) (107/55 - 218/91)  BP(mean): 84 (18 Nov 2024 11:00) (77 - 89)  RR: 22 (18 Nov 2024 11:00) (16 - 24)  SpO2: 98% (18 Nov 2024 11:00) (90% - 100%)    Parameters below as of 18 Nov 2024 08:00  Patient On (Oxygen Delivery Method): ventilator    O2 Concentration (%): 90    PHYSICAL EXAM:  General: intubated  HEENT: NCAT  Neck: supple  CV: RRR  Lungs: symmetric chest expansion, decreased BS at bases  Abd: Soft  Extr: wwp  Skin: no rash  Neuro: sedated  Lines: no phlebitis     TESTS & MEASUREMENTS:                        10.2   21.08 )-----------( 215      ( 18 Nov 2024 10:10 )             30.9     11-18    139  |  103  |  17  ----------------------------<  211[H]  3.1[L]   |  28  |  0.8    Ca    8.9      18 Nov 2024 08:50  Phos  4.1     11-18  Mg     2.3     11-18    TPro  5.8[L]  /  Alb  3.6  /  TBili  0.5  /  DBili  x   /  AST  57[H]  /  ALT  27  /  AlkPhos  62  11-18      LIVER FUNCTIONS - ( 18 Nov 2024 08:50 )  Alb: 3.6 g/dL / Pro: 5.8 g/dL / ALK PHOS: 62 U/L / ALT: 27 U/L / AST: 57 U/L / GGT: x           Urinalysis Basic - ( 18 Nov 2024 08:50 )    Color: x / Appearance: x / SG: x / pH: x  Gluc: 211 mg/dL / Ketone: x  / Bili: x / Urobili: x   Blood: x / Protein: x / Nitrite: x   Leuk Esterase: x / RBC: x / WBC x   Sq Epi: x / Non Sq Epi: x / Bacteria: x          Lactate, Blood: 1.9 mmol/L (11-18-24 @ 08:50)  Blood Gas Venous - Lactate: 1.2 mmol/L (11-18-24 @ 00:51)  Lactate, Blood: 0.9 mmol/L (11-18-24 @ 00:30)      INFECTIOUS DISEASES TESTING  Rapid RVP Result: NotDetec (11-18-24 @ 01:20)  COVID-19 PCR: NotDetec (02-05-24 @ 06:50)  COVID-19 PCR: NotDetec (02-04-24 @ 07:06)  COVID-19 PCR: Detected (01-28-24 @ 06:30)  Hepatitis B Surface Antigen: Nonreact (01-19-24 @ 05:56)  Hepatitis C Virus Interpretation: Nonreact (01-19-24 @ 05:56)      INFLAMMATORY MARKERS      RADIOLOGY & ADDITIONAL TESTS:  I have personally reviewed the last Chest xray  CXR      CT      CARDIOLOGY TESTING       MEDICATIONS  chlorhexidine 2% Cloths 1 Topical <User Schedule>  enoxaparin Injectable 40 SubCutaneous every 24 hours  fentaNYL   Infusion 0.5 IV Continuous <Continuous>  folic acid 1 Oral daily  gabapentin 100 Oral three times a day  hydroxychloroquine 400 Oral daily  methylPREDNISolone sodium succinate Injectable 40 IV Push every 12 hours  multivitamin 1 Oral daily  norepinephrine Infusion 0.05 IV Continuous <Continuous>  pantoprazole   Suspension 40 Oral two times a day  piperacillin/tazobactam IVPB.. 3.375 IV Intermittent every 8 hours  polyethylene glycol 3350 17 Oral two times a day  propofol Infusion. 40 IV Continuous <Continuous>  senna 2 Oral at bedtime  silver sulfADIAZINE 1% Cream 1 Topical every 12 hours  thiamine 100 Oral daily      ANTIBIOTICS:  hydroxychloroquine 400 milliGRAM(s) Oral daily  piperacillin/tazobactam IVPB.. 3.375 Gram(s) IV Intermittent every 8 hours      ALLERGIES:  iodine (Rash (Mild to Mod))        
Patient is a 69y old  Male who presents with a chief complaint of SOB (18 Nov 2024 03:54)      HPI:   pt is a 68 y/o male with PMH of HTN, HLD, mild SLE (Lupus) ,GIB secondary to duodenal ulcer in 1/2024,  Multiple falls , Chronic ataxia and Visual Hallucinations, hx of recently diagnosed progressive supranuclear palsy for which he follows with a neurologist at [place],  presents to the ED due to cough and SOB for 2 days duration. per cousin belly the HCP who visited him eariler today at the Adult home, the pt has been complaining of cough and SOB for the past 2 days, he said that his chest feels congested then his cousin noticed that his breathing got worse and he started wheezing and having noisy breath sounds so asked the  nurse to get him to ED, pt at that time was denying any chest pain, he also had no fever, chills , GI or  symptoms, and no sick contacts. of note cousin mentions that he had visual hallucinations for the past week or so saying he sees a man in his closet ( this is not the first time the patient has visual hallucinations and is following with a neurologist at [place]).     After ED eval, patient was initially admitted to TELE for mx of Aspiration PNA and NSTEMI II, then after that he started getting tachypneic, increased WOB, nurses suctioned him and then they noticed he is blue, then he became pulseless and CPR was initiated, he was successfully intubated , pt got ROSC , post ROSC rythm was Vtach per ED so he was started on amiodarone drip. during code blue ED also started him on Epinephrine drip.     Vitals on admission prior to code blue :   /74 , HR 64 , T 98.4 , O2 95%RA    CBC and CMP unremarkable   EKG NSR  Trops 32>>29   Lactate negative    CXR with b/l PNA   was given 1 L LR bolus and 1g cefepime in ED    pt is admitted to MICU (18 Nov 2024 03:54)      PAST MEDICAL & SURGICAL HISTORY:  Vertigo      Hypertension      Lupus erythematosus          SOCIAL HX:   Smoking    UTO                     ETOH                            Other    FAMILY HISTORY:  Family history of colon cancer in father    :  No known cardiovacular family hisotry     Review Of Systems:     All ROS are negative except per HPI       Allergies    iodine (Rash (Mild to Mod))    Intolerances          PHYSICAL EXAM    ICU Vital Signs Last 24 Hrs  T(C): 36.2 (18 Nov 2024 04:45), Max: 37.6 (17 Nov 2024 23:45)  T(F): 97.1 (18 Nov 2024 04:45), Max: 99.6 (17 Nov 2024 23:45)  HR: 50 (18 Nov 2024 08:59) (48 - 93)  BP: 122/58 (18 Nov 2024 06:15) (107/55 - 218/91)  BP(mean): 83 (18 Nov 2024 06:15) (77 - 89)  ABP: --  ABP(mean): --  RR: 17 (18 Nov 2024 06:30) (16 - 24)  SpO2: 99% (18 Nov 2024 08:59) (90% - 100%)    O2 Parameters below as of 18 Nov 2024 04:27  Patient On (Oxygen Delivery Method): ventilator  O2 Flow (L/min): 34          CONSTITUTIONAL:  NAD    ENT:   Airway patent,   Mouth with normal mucosa.   ET       CARDIAC:   Normal rate,   Regular rhythm.        RESPIRATORY:   No wheezing  Bilateral BS   Not tachypneic,  No use of accessory muscles    GASTROINTESTINAL:  Abdomen soft,   Non-tender,   No guarding,   + BS      NEUROLOGICAL:   Sedated     SKIN:   Skin normal color for race,   No evidence of rash.      11-17-24 @ 07:01  -  11-18-24 @ 07:00  --------------------------------------------------------  IN:    FentaNYL: 30 mL  Total IN: 30 mL    OUT:  Total OUT: 0 mL    Total NET: 30 mL          LABS:                          10.9   12.67 )-----------( 254      ( 18 Nov 2024 00:30 )             33.0                                               11-18    138  |  101  |  17  ----------------------------<  107[H]  3.5   |  24  |  0.8    Ca    8.6      18 Nov 2024 00:30    TPro  6.4  /  Alb  3.8  /  TBili  0.4  /  DBili  x   /  AST  22  /  ALT  6   /  AlkPhos  65  11-18      PT/INR - ( 18 Nov 2024 00:39 )   PT: 11.70 sec;   INR: 0.99 ratio         PTT - ( 18 Nov 2024 00:39 )  PTT:29.9 sec                                       Urinalysis Basic - ( 18 Nov 2024 00:30 )    Color: x / Appearance: x / SG: x / pH: x  Gluc: 107 mg/dL / Ketone: x  / Bili: x / Urobili: x   Blood: x / Protein: x / Nitrite: x   Leuk Esterase: x / RBC: x / WBC x   Sq Epi: x / Non Sq Epi: x / Bacteria: x                                                  LIVER FUNCTIONS - ( 18 Nov 2024 00:30 )  Alb: 3.8 g/dL / Pro: 6.4 g/dL / ALK PHOS: 65 U/L / ALT: 6 U/L / AST: 22 U/L / GGT: x                                                                                               Mode: AC/ CMV (Assist Control/ Continuous Mandatory Ventilation)  RR (machine): 16  TV (machine): 500  FiO2: 100  PEEP: 8  ITime: 1  MAP: 11  PIP: 21                                      ABG - ( 18 Nov 2024 06:32 )  pH, Arterial: 7.35  pH, Blood: x     /  pCO2: 46    /  pO2: 207   / HCO3: 25    / Base Excess: -0.5  /  SaO2: 98.2                X-Rays reviewed                                                                                     ECHO      MEDICATIONS  (STANDING):  chlorhexidine 2% Cloths 1 Application(s) Topical <User Schedule>  enoxaparin Injectable 40 milliGRAM(s) SubCutaneous every 24 hours  fentaNYL   Infusion. 0.5 MICROgram(s)/kG/Hr (4.31 mL/Hr) IV Continuous <Continuous>  folic acid 1 milliGRAM(s) Oral daily  gabapentin 100 milliGRAM(s) Oral three times a day  hydroxychloroquine 400 milliGRAM(s) Oral daily  multivitamin 1 Tablet(s) Oral daily  norepinephrine Infusion 0.05 MICROgram(s)/kG/Min (8.08 mL/Hr) IV Continuous <Continuous>  pantoprazole    Tablet 40 milliGRAM(s) Oral every 12 hours  piperacillin/tazobactam IVPB.. 3.375 Gram(s) IV Intermittent every 8 hours  propofol Infusion 10 MICROgram(s)/kG/Min (5.17 mL/Hr) IV Continuous <Continuous>  propofol Injectable 50 milliGRAM(s) IV Push once  silver sulfADIAZINE 1% Cream 1 Application(s) Topical every 12 hours  thiamine 100 milliGRAM(s) Oral daily    MEDICATIONS  (PRN):        
HPI:   pt is a 68 y/o male with PMH of HTN, HLD, mild SLE (Lupus) ,GIB secondary to duodenal ulcer in 1/2024,  Multiple falls , Chronic ataxia and Visual Hallucinations, hx of recently diagnosed progressive supranuclear palsy for which he follows with a neurologist at [place],  presents to the ED due to cough and SOB for 2 days duration. per cousin belly the HCP who visited him eariler today at the Adult home, the pt has been complaining of cough and SOB for the past 2 days, he said that his chest feels congested then his cousin noticed that his breathing got worse and he started wheezing and having noisy breath sounds so asked the  nurse to get him to ED, pt at that time was denying any chest pain, he also had no fever, chills , GI or  symptoms, and no sick contacts. of note cousin mentions that he had visual hallucinations for the past week or so saying he sees a man in his closet ( this is not the first time the patient has visual hallucinations and is following with a neurologist at [place]).     After ED eval, patient was initially admitted to TELE for mx of Aspiration PNA and NSTEMI II, then after that he started getting tachypneic, increased WOB, nurses suctioned him and then they noticed he is blue, then he became pulseless and CPR was initiated, he was successfully intubated , pt got ROSC , post ROSC rythm was Vtach per ED so he was started on amiodarone drip. during code blue ED also started him on Epinephrine drip.     Vitals on admission prior to code blue :   /74 , HR 64 , T 98.4 , O2 95%RA    CBC and CMP unremarkable   EKG NSR  Trops 32>>29   Lactate negative    pt is admitted to MICU for acute hypoxic resp failure    Ep consulted for Vtach post ROSC in the ER. patient seen and examined, had artifact on telemetry with HR indicating 170 bpm. radial pulse checked and turned to be in the 60s  no evidence of any Vtach on telemetry      PAST MEDICAL & SURGICAL HISTORY  Vertigo    Hypertension    Lupus erythematosus        FAMILY HISTORY:  FAMILY HISTORY:  Family history of colon cancer in father        SOCIAL HISTORY:  []smoker  []Alcohol  []Drug    ALLERGIES:  iodine (Rash (Mild to Mod))      MEDICATIONS:  MEDICATIONS  (STANDING):  chlorhexidine 2% Cloths 1 Application(s) Topical <User Schedule>  enoxaparin Injectable 40 milliGRAM(s) SubCutaneous every 24 hours  fentaNYL   Infusion 0.5 MICROgram(s)/kG/Hr (4.31 mL/Hr) IV Continuous <Continuous>  folic acid 1 milliGRAM(s) Oral daily  gabapentin 100 milliGRAM(s) Oral three times a day  hydroxychloroquine 400 milliGRAM(s) Oral daily  methylPREDNISolone sodium succinate Injectable 40 milliGRAM(s) IV Push every 12 hours  multivitamin 1 Tablet(s) Oral daily  norepinephrine Infusion 0.05 MICROgram(s)/kG/Min (8.08 mL/Hr) IV Continuous <Continuous>  pantoprazole   Suspension 40 milliGRAM(s) Oral two times a day  piperacillin/tazobactam IVPB.. 3.375 Gram(s) IV Intermittent every 8 hours  polyethylene glycol 3350 17 Gram(s) Oral two times a day  propofol Infusion. 35 MICROgram(s)/kG/Min (18.1 mL/Hr) IV Continuous <Continuous>  senna 2 Tablet(s) Oral at bedtime  silver sulfADIAZINE 1% Cream 1 Application(s) Topical every 12 hours  thiamine 100 milliGRAM(s) Oral daily    MEDICATIONS  (PRN):      HOME MEDICATIONS:  Home Medications:  acetaminophen 325 mg oral tablet: 2 tab(s) orally every 6 hours as needed for pain or fever (29 Jan 2024 07:48)  fluticasone 50 mcg/inh nasal spray: 1 spray(s) in each nostril 2 times a day (18 Nov 2024 03:48)  folic acid 1 mg oral tablet: 1 tab(s) orally once a day (18 Jan 2024 12:17)  gabapentin 100 mg oral capsule: 1 cap(s) orally 3 times a day (every 8 hours) (29 Jan 2024 07:48)  hydroxychloroquine 200 mg oral tablet: 2 tab(s) orally once a day (16 Jan 2024 02:53)  lidocaine 4% topical film: Apply topically to affected area once a day apply to affected  area on right chest wall once daily, leave on for 12 hours, remove for 12 hours, wash hands after use ; avoid open skin - apply to intact skin only (29 Jan 2024 07:48)  losartan 50 mg oral tablet: 1 tab(s) orally once a day (18 Nov 2024 03:48)  Multiple Vitamins oral tablet: 1 tab(s) orally once a day (18 Jan 2024 12:17)  pantoprazole 40 mg oral delayed release tablet: 1 tab(s) orally 2 times a day ; take 1st tab in the morning 30 minutes before breakfast, and take the 2nd tab at bedtime (29 Jan 2024 07:48)  silver sulfADIAZINE 1% topical cream: Apply topically to affected area every 12 hours :  wash with soap and water, pat dry, apply silvadene to R chest and b/l  UE&#x27;s and b/l LE&#x27;s, cover with adaptic + DSD + kerlix (29 Jan 2024 07:55)  TAMSULOSIN HCL 0.4 MG CAPSULE: 1 cap(s) orally once a day (at bedtime) (29 Jan 2024 07:48)  thiamine 100 mg oral tablet: 1 tab(s) orally once a day (18 Jan 2024 12:17)      VITALS:   T(F): 98 (11-18 @ 08:00), Max: 99.6 (11-17 @ 23:45)  HR: 56 (11-18 @ 11:00) (48 - 93)  BP: 127/55 (11-18 @ 11:00) (107/55 - 218/91)  BP(mean): 84 (11-18 @ 11:00) (77 - 89)  RR: 22 (11-18 @ 11:00) (16 - 24)  SpO2: 98% (11-18 @ 11:00) (90% - 100%)    I&O's Summary    17 Nov 2024 07:01  -  18 Nov 2024 07:00  --------------------------------------------------------  IN: 30 mL / OUT: 0 mL / NET: 30 mL    18 Nov 2024 07:01  -  18 Nov 2024 12:12  --------------------------------------------------------  IN: 372.7 mL / OUT: 460 mL / NET: -87.4 mL        REVIEW OF SYSTEMS:  Could not be obtained as patient is intubated     PHYSICAL EXAM:  NEURO: patient is awake , alert and oriented  GEN: Not in acute distress  NECK: no thyroid enlargement, no JVD  LUNGS: decrease breath sounds bilaterally   CARDIOVASCULAR: S1/S2 present, RRR , no murmurs or rubs, no carotid bruits,  + PP bilaterally  ABD: Soft, non-tender, non-distended, +BS  EXT: No BAYRON  SKIN: Intact    LABS:                        10.2   21.08 )-----------( 215      ( 18 Nov 2024 10:10 )             30.9     11-18    139  |  103  |  17  ----------------------------<  211[H]  3.1[L]   |  28  |  0.8    Ca    8.9      18 Nov 2024 08:50  Phos  4.1     11-18  Mg     2.3     11-18    TPro  5.8[L]  /  Alb  3.6  /  TBili  0.5  /  DBili  x   /  AST  57[H]  /  ALT  27  /  AlkPhos  62  11-18    PT/INR - ( 18 Nov 2024 08:50 )   PT: 11.60 sec;   INR: 0.98 ratio         PTT - ( 18 Nov 2024 08:50 )  PTT:23.1 sec  Lactate, Blood: 1.9 mmol/L (11-18-24 @ 08:50)  Lactate, Blood: 0.9 mmol/L (11-18-24 @ 00:30)          Troponin trend:            RADIOLOGY:  -CXR:  -TTE:  -CCTA:  -STRESS TEST:  -CATHETERIZATION:    ECG: sinus bradycardia, PVC, PAC    TELEMETRY EVENTS: no events   
CC:  cough and SOB    HPI:   pt is a 68 y/o male with PMH of HTN, HLD, mild SLE (Lupus) ,GIB secondary to duodenal ulcer in 1/2024,  Multiple falls , Chronic ataxia and Visual Hallucinations, hx of recently diagnosed progressive supranuclear palsy for which he follows with a neurologist at [facility],  presents to the ED due to cough and SOB for 2 days duration. per cousin belly the HCP who visited him eariler today at the Adult home, the pt has been complaining of cough and SOB for the past 2 days, he said that his chest feels congested then his cousin noticed that his breathing got worse and he started wheezing and having noisy breath sounds so asked the  nurse to get him to ED, pt at that time was denying any chest pain, he also had no fever, chills , GI or  symptoms, and no sick contacts. of note cousin mentions that he had visual hallucinations for the past week or so saying he sees a man in his closet ( this is not the first time the patient has visual hallucinations and is following with a neurologist at [facility]).     After ED eval, patient was initially admitted to TELE for mx of Aspiration PNA and NSTEMI II, then after that he started getting tachypneic, increased WOB, nurses suctioned him and then they noticed he is blue, then he became pulseless and CPR was initiated, he was successfully intubated , pt got ROSC , post ROSC rythm was Vtach per ED so he was started on amiodarone drip. during code blue ED also started him on Epinephrine drip.     Vitals on admission prior to code blue :   /74 , HR 64 , T 98.4 , O2 95%RA    CBC and CMP unremarkable   EKG NSR  Trops 32>>29   Lactate negative    CXR with b/l PNA   was given 1 L LR bolus and 1g cefepime in ED    pt is admitted to MICU (18 Nov 2024 03:54)    PERTINENT PM/SXH:   Vertigo    Hypertension    H/O lupus nephritis    Lupus erythematosus        FAMILY HISTORY:  Family history of colon cancer in father      None pertinent    ITEMS NOT CHECKED ARE NOT PRESENT    SOCIAL HISTORY:   Significant other/partner[ ]  Children[ ]  Voodoo/Spirituality:  Substance hx:  [ ]   Tobacco hx:  [ ]   Alcohol hx: [ ]   Living Situation: [ ]Home  [ ]Long term care  [ ]Rehab [x ]Other  assisted living  Home Services: [ ] HHA [ ] Visting RN [ ] Hospice  Occupation:  Home Opioid hx:  [ ] Y [ ] N [x ] I-Stop Reference No:         ADVANCE DIRECTIVES:     [ ] Full Code [x ] DNR  MOLST  [ ]  Living Will  [ ]   DECISION MAKER(s):  [x ] Health Care Proxy(s)  [ ] Surrogate(s)  [ ] Guardian           Name(s): Phone Number(s):  dc Sanchez      BASELINE (I)ADL(s) (prior to admission):    [place]: [ ]Total  [ ] Moderate [ ]Dependent  Palliative Performance Status Version 2:         %    http://[email or web address]/files/[email or web address]_performance_scale_[email or web address].pdf    Allergies    iodine (Rash (Mild to Mod))    Intolerances    MEDICATIONS  (STANDING):  chlorhexidine 0.12% Liquid 15 milliLiter(s) Oral Mucosa every 12 hours  chlorhexidine 2% Cloths 1 Application(s) Topical <User Schedule>  enoxaparin Injectable 40 milliGRAM(s) SubCutaneous every 24 hours  fentaNYL   Infusion 0.5 MICROgram(s)/kG/Hr (4.31 mL/Hr) IV Continuous <Continuous>  folic acid 1 milliGRAM(s) Oral daily  gabapentin 100 milliGRAM(s) Oral three times a day  hydroxychloroquine 400 milliGRAM(s) Oral daily  levETIRAcetam 500 milliGRAM(s) Oral two times a day  methylPREDNISolone sodium succinate Injectable 40 milliGRAM(s) IV Push every 12 hours  multivitamin 1 Tablet(s) Oral daily  norepinephrine Infusion 0.05 MICROgram(s)/kG/Min (8.08 mL/Hr) IV Continuous <Continuous>  pantoprazole   Suspension 40 milliGRAM(s) Oral two times a day  piperacillin/tazobactam IVPB.. 3.375 Gram(s) IV Intermittent every 8 hours  polyethylene glycol 3350 17 Gram(s) Oral two times a day  propofol Infusion. 40 MICROgram(s)/kG/Min (20.7 mL/Hr) IV Continuous <Continuous>  senna 2 Tablet(s) Oral at bedtime  silver sulfADIAZINE 1% Cream 1 Application(s) Topical every 12 hours  thiamine 100 milliGRAM(s) Oral daily    MEDICATIONS  (PRN):  acetaminophen     Tablet .. 650 milliGRAM(s) Oral every 6 hours PRN Temp greater or equal to 38C (100.4F)    PRESENT SYMPTOMS: [x ]Unable to obtain due to poor mentation   Source if other than patient:  [ ]Family   [ ]Team     Pain: [ ]yes [ ]no  ALL PAIN ASSESSMENT COMPONENTS WERE ASKED ABOUT UNLESS OTHERWISE NOTED  QOL impact -   Location -                    Aggravating factors -  Quality -  Radiation -  Timing-  Severity (0-10 scale):  Minimal acceptable level (0-10 scale):     CPOT:  1  https://www.[email or web address]/getattachment/[email or web address]/Critical-Care-Pain-Observation-Tool-(CPOT)    PAIN AD Score:   http://geriatrictoolkit.[email or web address]/painad.pdf (press ctrl +  left click to view)    Dyspnea:                           [ ]None[ ]Mild [ ]Moderate [ ]Severe     Respiratory Distress Observation Scale (RDOS): 2  A score of 0 to 2 signifies little or no respiratory distress, 3 signifies mild distress, scores 4 to 6 indicate moderate distress, and scores greater than 7 signify severe distress  https://www.[email or web address]/sites/default/files/PDFS/464722-kpwfhuigdaj-fdbyhmpw-eefmmxizsvk-pxyug.pdf    Anxiety:                             [ ]None[ ]Mild [ ]Moderate [ ]Severe   Fatigue:                             [ ]None[ ]Mild [ ]Moderate [ ]Severe   Nausea:                             [ ]None[ ]Mild [ ]Moderate [ ]Severe   Loss of appetite:              [ ]None[ ]Mild [ ]Moderate [ ]Severe   Constipation:                    [ ]None[ ]Mild [ ]Moderate [ ]Severe    Other Symptoms:  [ ]All other review of systems negative     Palliative Performance Status Version 2:         10%    http://npcrc.org/files/news/palliative_performance_scale_ppsv2.pdf    PHYSICAL EXAM:  Vital Signs Last 24 Hrs  T(C): 38.2 (18 Nov 2024 12:00), Max: 38.2 (18 Nov 2024 12:00)  T(F): 100.8 (18 Nov 2024 12:00), Max: 100.8 (18 Nov 2024 12:00)  HR: 63 (18 Nov 2024 14:00) (48 - 93)  BP: 90/53 (18 Nov 2024 14:00) (89/51 - 218/91)  BP(mean): 68 (18 Nov 2024 14:00) (64 - 97)  RR: 22 (18 Nov 2024 14:00) (16 - 24)  SpO2: 95% (18 Nov 2024 14:00) (90% - 100%)    Parameters below as of 18 Nov 2024 08:00  Patient On (Oxygen Delivery Method): ventilator    O2 Concentration (%): 90 I&O's Summary    17 Nov 2024 07:01  -  18 Nov 2024 07:00  --------------------------------------------------------  IN: 30 mL / OUT: 0 mL / NET: 30 mL    18 Nov 2024 07:01  -  18 Nov 2024 17:02  --------------------------------------------------------  IN: 372.7 mL / OUT: 460 mL / NET: -87.4 mL        GENERAL:  [ ] No acute distress [ ]Lethargic  [x ]Unarousable  [ ]Verbal  [ ]Non-Verbal [ ]Cachexia    BEHAVIORAL/PSYCH:  [ ]Alert and Oriented x  [ ] Anxiety [ ] Delirium [ ] Agitation [x ] Calm   EYES: [ ] No scleral icterus [ ] Scleral icterus [ ] Closed  ENMT:  [ ]Dry mouth  [ x]No external oral lesions [ ] No external ear or nose lesions  CARDIOVASCULAR:  [ ]Regular [ ]Irregular [ ]Tachy [ ]Not Tachy  [ ]Eddy [ ] Edema [ ] No edema  PULMONARY:  [ ]Tachypnea  [ ]Audible excessive secretions [x ] No labored breathing [ ] labored breathing  GASTROINTESTINAL: [ ]Soft  [ ]Distended  [ ]Not distended [ ]Non tender [ ]Tender  MUSCULOSKELETAL: [ ]No clubbing [ ] clubbing  [ ] No cyanosis [ ] cyanosis  NEUROLOGIC: [ ]No focal deficits  [ ]Follows commands  [x ]Does not follow commands  [ ]Cognitive impairment  [ ]Dysphagia  [ ]Dysarthria  [ ]Paresis   SKIN: [ ] Jaundiced [ ] Non-jaundiced [ ]Rash [ ]No Rash [ ] Warm [ ] Dry  MISC/LINES: [ x] ET tube [ ] Trach [ ]NGT/OGT [ ]PEG [ ]Andrea    LABS: reviewed by me                        10.2   21.08 )-----------( 215      ( 18 Nov 2024 10:10 )             30.9   11-18    139  |  104  |  18  ----------------------------<  88  3.6   |  26  |  1.0    Ca    8.3[L]      18 Nov 2024 14:00  Phos  4.1     11-18  Mg     2.2     11-18    TPro  5.4[L]  /  Alb  3.4[L]  /  TBili  0.5  /  DBili  x   /  AST  48[H]  /  ALT  23  /  AlkPhos  52  11-18  PT/INR - ( 18 Nov 2024 08:50 )   PT: 11.60 sec;   INR: 0.98 ratio         PTT - ( 18 Nov 2024 08:50 )  PTT:23.1 sec    Urinalysis Basic - ( 18 Nov 2024 16:42 )    Color: Dark Yellow / Appearance: Turbid / SG: >1.030 / pH: x  Gluc: x / Ketone: Trace mg/dL  / Bili: Negative / Urobili: 1.0 mg/dL   Blood: x / Protein: 100 mg/dL / Nitrite: Negative   Leuk Esterase: Trace / RBC: x / WBC x   Sq Epi: x / Non Sq Epi: x / Bacteria: x      RADIOLOGY & ADDITIONAL STUDIES: reviewed by me    < from: CT Head No Cont (11.18.24 @ 15:34) >  IMPRESSION:  No acute intracranial pathology. No evidence of midline shift, mass   effect or intracranial hemorrhage.    < end of copied text >      EKG: reviewed by me    < from: 12 Lead ECG (01.15.24 @ 15:31) >  Ventricular Rate 104 BPM    Atrial Rate 104 BPM    P-R Interval 144 ms    QRS Duration 74 ms    Q-T Interval 374 ms    QTC Calculation(Bazett) 491 ms    P Axis 59 degrees    R Axis 19 degrees    T Axis 29 degrees    Diagnosis Line Sinus tachycardia  Low voltage QRS  Borderline EKG    < end of copied text >      PROTEIN CALORIE MALNUTRITION PRESENT: [ ]mild [ ]moderate [ ]severe [ ]underweight [ ]morbid obesity  https://www.andeal.org/vault/2440/web/files/ONC/Table_Clinical%20Characteristics%20to%20Document%20Malnutrition-White%20JV%20et%20al%202012.pdf    Height (cm): 177.8 (11-18-24 @ 09:08), 180.3 (01-19-24 @ 08:18)  Weight (kg): 109.1 (11-18-24 @ 08:59), 100.7 (01-19-24 @ 08:18), 99.8 (01-16-24 @ 15:38)  BMI (kg/m2): 34.5 (11-18-24 @ 09:08), 33.6 (11-18-24 @ 08:59), 31 (01-19-24 @ 08:18)  [ ]PPSV2 < or = to 30% [ ]significant weight loss  [ ]poor nutritional intake  [ ]anasarca      [ ]Artificial Nutrition      Palliative Care Spiritual/Emotional Screening Tool Question  Severity (0-4):                    OR                    [ x] Unable to determine. Will assess at later time if appropriate.  Score of 2 or greater indicates recommendation of Chaplaincy and/or SW referral  Chaplaincy Referral: [ ] Yes [ ] Refused [ ] Following     Caregiver Yoder:  [ ] Yes [ ] No    OR    [x ] Unable to determine. Will assess at later time if appropriate.  Social Work Referral [ ]  Patient and Family Centered Care Referral [ ]    Anticipatory Grief Present: [ ] Yes [ ] No    OR     [ x] Unable to determine. Will assess at later time if appropriate.  Social Work Referral [ ]  Patient and Family Centered Care Referral [ ]    Patient discussed with primary medical team MD  Palliative care education provided to patient and/or family

## 2024-11-18 NOTE — CONSULT NOTE ADULT - ASSESSMENT
IMPRESSION:    Acute hypoxemic respiratory failure   Possible aspiration   SP CPA   VTach   HO SLE   Suspected seizures   Shock on levophed   Post infectious HAAD ?    PLAN:    CNS: EEG.  VEEG.  Keep sedated for now.  Keppra load.  Neuro eval.  CTH     HEENT: Oral care.  ET Care     PULMONARY:  HOB @ 45 degrees.  Vent changes as follows: ARDS net MV settings.;  PEEP 10.  DTA.  Montor Airway pressures.  Wean O2 as tolerated.  Solumedrol 40 mg Q12     CARDIOVASCULAR:  trend enzymes.  ECHO.  EP eval.  Wean Levophed.  gentle hydration until tolerating feeding.  CPK     GI: GI prophylaxis.  OG Feeding.  Bowel regimen     RENAL:  Follow up lytes.  Correct as needed.  Andrea for retention     INFECTIOUS DISEASE: Follow up cultures.  Zosyn and Doxy.  Nasal MRSA,  RVP.  procal.      HEMATOLOGICAL:  DVT prophylaxis.  Monitor CBC     ENDOCRINE:  Follow up FS.  Insulin protocol if needed.  Cortisol level.      MUSCULOSKELETAL:  Off loading.  Bed rest      MICU          
68 y/o M PMH of HTN, HLD, mild SLE (Lupus) ,GIB secondary to duodenal ulcer in 1/2024,  Multiple falls , Chronic ataxia and Visual Hallucinations, hx of recently diagnosed progressive supranuclear palsy for which he follows with a neurologist at [place], presented to the ED due to cough and SOB for 2 days admitted to TELE for mx of Aspiration PNA and NSTEMI II, found to be tachypneic and went into PEA arrest w/ ROSC  s/p 3 epinephrine ivp, amiodarone gtt, and epinephrine gtt. Neurology was consulted for approval for vEEG for twitching movements     Impression   #H/O supranuclear palsy   #S/P PEA arrest   - L sided thumb twitching   - L sided facial, and   - hyperreflexia on LUE   - +ve pupillary reflexes b/l   - s/p 1 dose of Keppra 1000 mg     Recommendations   - f/u CT head   - Ok with vEEG   - Start Keppra 500 BID     Case discussed with attending Dr. Barrera 
ASSESSMENT  70 y/o male with PMH of HTN, HLD, mild SLE (Lupus) ,GIB secondary to duodenal ulcer in 1/2024,  Multiple falls , Chronic ataxia and Visual Hallucinations, hx of recently diagnosed progressive supranuclear palsy for which he follows with a neurologist at [facility],  presents to the ED due to cough and SOB for 2 days duration. per cousin belly the HCP who visited him eariler today at the Adult home, the pt has been complaining of cough and SOB for the past 2 days, he said that his chest feels congested then his cousin noticed that his breathing got worse and he started wheezing and having noisy breath sounds so asked the  nurse to get him to ED, pt at that time was denying any chest pain, he also had no fever, chills , GI or  symptoms, and no sick contacts. of note cousin mentions that he had visual hallucinations for the past week or so saying he sees a man in his closet ( this is not the first time the patient has visual hallucinations and is following with a neurologist at [facility]).   After ED eval, patient was initially admitted to [facility] for mx of Aspiration PNA and NSTEMI II, then after that he started getting tachypneic, increased WOB, nurses suctioned him and then they noticed he is blue, then he became pulseless and CPR was initiated, he was successfully intubated , pt got ROSC , post ROSC rythm was Vtach per ED so he was started on amiodarone drip. during code blue ED also started him on Epinephrine drip.         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 3.375 Gram(s) IV Intermittent every 8 hours  - f/u DTA, BCX  - f/u MRSA nares, D/C Vanc if (-) , - Vanc dosing AUC/JUNE per clinical pharmacist     If any questions, please send a message or call on [facility] Teams  Please continue to update ID with any pertinent new laboratory, radiographic findings, or change in clinical status  
 70 y/o male with PMH of HTN, HLD, mild SLE (Lupus) ,GIB secondary to duodenal ulcer in 1/2024,  Multiple falls , Chronic ataxia and Visual Hallucinations, hx of recently diagnosed progressive supranuclear palsy for which he follows with a neurologist at [place],  presents to the ED due to cough and SOB for 2 days duration. per cousin belly the HCP who visited him eariler today at the Adult home, the pt has been complaining of cough and SOB for the past 2 days, he said that his chest feels congested then his cousin noticed that his breathing got worse and he started wheezing and having noisy breath sounds so asked the  nurse to get him to ED, pt at that time was denying any chest pain, he also had no fever, chills , GI or  symptoms, and no sick contacts. of note cousin mentions that he had visual hallucinations for the past week or so saying he sees a man in his closet ( this is not the first time the patient has visual hallucinations and is following with a neurologist at [place]).     After ED eval, patient was initially admitted to TELE for mx of Aspiration PNA and NSTEMI II, then after that he started getting tachypneic, increased WOB, nurses suctioned him and then they noticed he is blue, then he became pulseless and CPR was initiated, he was successfully intubated , pt got ROSC , post ROSC rythm was Vtach per ED so he was started on amiodarone drip. during code blue ED also started him on Epinephrine drip.  CBC and CMP unremarkable   EKG NSR  Trops 32>>29   Lactate negative  pt is admitted to MICU for acute hypoxic resp failure    Ep consulted for cardiac arrest 2nd to acute hypoxic respiratory failure with Vtach post ROSC in the ER?.  patient seen and examined, had artifact on telemetry with HR indicating 170 bpm. radial pulse checked and turned to be in the 60s  no evidence of any Vtach on telemetry    # Impression:  - Cardiac arrest secondary to acute hypoxic respiratory failure  - Artifact on telemetry  - no evidence of Vtach on telemetry   - HTN, HLD    # Recs:  - not a candidate for any ischemic evaluation for now in view of sepsis and acute hypoxic respiratory failure  - will re evaluate after patient is extubated and off pressors  - no further work up needed at this time  - treat underlying infection and respiratory failure. control patient's shivering   - rest of care as per ICU team   
70 y/o male with PMH of HTN, HLD, mild SLE (Lupus) ,GIB secondary to duodenal ulcer in 1/2024,  Multiple falls , Chronic ataxia and Visual Hallucinations, hx of recently diagnosed progressive supranuclear palsy for which he follows with a neurologist at [place],  presents to the ED due to cough and SOB for 2 days duration. Patient with cardiac arrest after presentation with ROSC, now intubated.  Palliative care consulted for [facility].    Recommendations  -now DNR/trial of intubation  -New MOLST filled out and placed in chart  -ongoing medical management  -vent and pressors management per ICU  -will follow    Education about palliative care provided to patient/family.  See Recs below.    Please call [phone] with questions or concerns 24/7.   We will continue to follow.

## 2024-11-18 NOTE — CONSULT NOTE ADULT - CONVERSATION DETAILS
Spoke with patient's family outside MICU. Palliative care introduced. They were able to provide a medical history and hospital course. They noted the patient doesn't have other family and had a recent diagnosis of progressive supranuclear palsy. They are waiting on the findings of the EEG and other tests. We discussed code status and noted that if he went into cardiac arrest again and achieved ROSC, he'd have a grave prognosis and extremely poor QOL. He noted the patient would not want that and wants him to be DNR.  All questions answered.

## 2024-11-18 NOTE — PROGRESS NOTE ADULT - SUBJECTIVE AND OBJECTIVE BOX
SUBJECTIVE/OVERNIGHT EVENTS  Today is hospital day . This morning patient was seen and examined at bedside, resting comfortably in bed. No acute or major events overnight.    HOSPITAL COURSE  Day 1:   Day 2:   Day 3:     CODE STATUS:    FAMILY COMMUNICATION  Contact date:  Name of person contacted:  Relationship to patient:  Communication details:    MEDICATIONS  STANDING MEDICATIONS  chlorhexidine 2% Cloths 1 Application(s) Topical <User Schedule>  enoxaparin Injectable 40 milliGRAM(s) SubCutaneous every 24 hours  fentaNYL   Infusion 0.5 MICROgram(s)/kG/Hr IV Continuous <Continuous>  folic acid 1 milliGRAM(s) Oral daily  gabapentin 100 milliGRAM(s) Oral three times a day  hydroxychloroquine 400 milliGRAM(s) Oral daily  methylPREDNISolone sodium succinate Injectable 40 milliGRAM(s) IV Push every 12 hours  multivitamin 1 Tablet(s) Oral daily  norepinephrine Infusion 0.05 MICROgram(s)/kG/Min IV Continuous <Continuous>  pantoprazole   Suspension 40 milliGRAM(s) Oral two times a day  piperacillin/tazobactam IVPB.. 3.375 Gram(s) IV Intermittent every 8 hours  polyethylene glycol 3350 17 Gram(s) Oral two times a day  propofol Infusion. 35 MICROgram(s)/kG/Min IV Continuous <Continuous>  senna 2 Tablet(s) Oral at bedtime  silver sulfADIAZINE 1% Cream 1 Application(s) Topical every 12 hours  thiamine 100 milliGRAM(s) Oral daily    PRN MEDICATIONS    VITALS  T(F): 98 (11-18-24 @ 08:00), Max: 99.6 (11-17-24 @ 23:45)  HR: 56 (11-18-24 @ 11:00) (48 - 93)  BP: 127/55 (11-18-24 @ 11:00) (107/55 - 218/91)  RR: 22 (11-18-24 @ 11:00) (16 - 24)  SpO2: 98% (11-18-24 @ 11:00) (90% - 100%)    PHYSICAL EXAM  GENERAL: NAD, lying in bed comfortably  HEART: Regular rate and rhythm, no murmurs, rubs, or gallops  LUNGS: Unlabored respirations.  Clear to auscultation bilaterally, no crackles, wheezing, or rhonchi  ABDOMEN: Soft, nontender, nondistended, +BS  EXTREMITIES: No clubbing, cyanosis, or edema  NERVOUS SYSTEM:  sedated, unresponsive, corneal reflex intact, pinpoint pupils, gag intact, not responsive to chest rub, repeated jerking of arms every few seconds  SKIN: No rashes or lesions    LABS             10.2   21.08 )-----------( 215      ( 11-18-24 @ 10:10 )             30.9     139  |  103  |  17  -------------------------<  211   11-18-24 @ 08:50  3.1  |  28  |  0.8    Ca      8.9     11-18-24 @ 08:50  Phos   4.1     11-18-24 @ 08:50  Mg     2.3     11-18-24 @ 08:50    TPro  5.8  /  Alb  3.6  /  TBili  0.5  /  DBili  x   /  AST  57  /  ALT  27  /  AlkPhos  62  /  GGT  x     11-18-24 @ 08:50    PT/INR - ( 11-18-24 @ 08:50 )   PT: 11.60 sec;   INR: 0.98 ratio  PTT - ( 11-18-24 @ 08:50 )  PTT:23.1 sec    Troponin T, High Sensitivity Result: 29 ng/L (11-18-24 @ 02:42)  Troponin T, High Sensitivity Result: 32 ng/L (11-18-24 @ 00:30)    Urinalysis Basic - ( 18 Nov 2024 08:50 )    Color: x / Appearance: x / SG: x / pH: x  Gluc: 211 mg/dL / Ketone: x  / Bili: x / Urobili: x   Blood: x / Protein: x / Nitrite: x   Leuk Esterase: x / RBC: x / WBC x   Sq Epi: x / Non Sq Epi: x / Bacteria: x      ABG - ( 18 Nov 2024 06:32 )  pH, Arterial: 7.35  pH, Blood: x     /  pCO2: 46    /  pO2: 207   / HCO3: 25    / Base Excess: -0.5  /  SaO2: 98.2                IMAGING

## 2024-11-19 NOTE — PROGRESS NOTE ADULT - ATTENDING COMMENTS
In brief,   70 y/o man with multiple medical comorbidities as delineated above who was initially admitted for aspiration PNA and NSTEMI, later went into PEA arrest with subsequent ROSC. Neurology consulted for involuntary face and arm movements seen post-arrest. CT Head unremarkable. vEEG with burst suppression pattern and cluster of myoclonic seizures followed by a generalized myoclonic/clonic seizure that lasted for about 1 hour. On evaluation this morning, patient sedated on propofol (40mcg/kg/min), no response to deep sternal rub, pupils and corneals present b/l, VOR and gag absent, no withdrawal to pain in UEs, triple flexion response with upgoing toes in b/l LEs; myoclonic activity in R face and UEs (R more then L) witnessed during evaluation. Patient with postanoxic status epilepticus and myoclonus and with burst suppression pattern highly suggestive of poor prognosis.     Recommendations:  - start Versed gtt (10mg bolus followed by 0.1mg/kg/hr) and give additional 2g Keppra load and increase maintenance to 1500mg BID  - continue Propofol  - continue vEEG  - MRI Brain for prognostication purposes only once patient stable and not seizing  - Keep Mg > 2, K > 4  - seizure precautions  - above communicated to primary team

## 2024-11-19 NOTE — PROGRESS NOTE ADULT - ASSESSMENT
68 y/o M PMH of HTN, HLD, mild SLE (Lupus) ,GIB secondary to duodenal ulcer in 1/2024,  Multiple falls , Chronic ataxia and Visual Hallucinations, hx of recently diagnosed progressive supranuclear palsy for which he follows with a neurologist at [place], presented to the ED due to cough and SOB for 2 days admitted to [facility] for mx of Aspiration PNA and NSTEMI II, found to be tachypneic and went into PEA arrest w/ ROSC  s/p 3 epinephrine ivp, amiodarone gtt, and epinephrine gtt. Neurology was consulted for approval for vEEG for twitching movements. On 11/19 in the AM, pt was having myoclonic seizures followed by a generalized myoclonic/clonic seizure that lasted for about 1 hour.     Impression   #Myoclonic twitches   #Myoclonic seizures 11/19 am   #H/O supranuclear palsy   #S/P PEA arrest   -  +ve pupilary reflex b/l, +ve corneal reflex, +ve tripple flex, +ve babinski, -ve gag reflex   - myoclonic seizures followed by a generalized myoclonic/clonic seizure that lasted for about 1 hour on vEEG   - Burst suppression pattern on vEEG  - CTH 11/18 -ve for acute pathology   - Was on keppra 500 BID 11/18    Recommendations   - c/w midazzolam ggt   - c/w Keppra 1500 BID  - Get MRI brain when stable without seizures to assist with prognostication       Case discussed with attending Dr. Barrera

## 2024-11-19 NOTE — PROGRESS NOTE ADULT - SUBJECTIVE AND OBJECTIVE BOX
SUSAN AWAD 69y Male  MRN#: [id number]   Hospital Day: 1d    HPI:   pt is a 68 y/o male with PMH of HTN, HLD, mild SLE (Lupus) ,GIB secondary to duodenal ulcer in 1/2024,  Multiple falls , Chronic ataxia and Visual Hallucinations, hx of recently diagnosed progressive supranuclear palsy for which he follows with a neurologist at [place],  presents to the ED due to cough and SOB for 2 days duration. per cousin belly the HCP who visited him eariler today at the Adult home, the pt has been complaining of cough and SOB for the past 2 days, he said that his chest feels congested then his cousin noticed that his breathing got worse and he started wheezing and having noisy breath sounds so asked the  nurse to get him to ED, pt at that time was denying any chest pain, he also had no fever, chills , GI or  symptoms, and no sick contacts. of note cousin mentions that he had visual hallucinations for the past week or so saying he sees a man in his closet ( this is not the first time the patient has visual hallucinations and is following with a neurologist at [place]).     After ED eval, patient was initially admitted to [facility] for mx of Aspiration PNA and NSTEMI II, then after that he started getting tachypneic, increased WOB, nurses suctioned him and then they noticed he is blue, then he became pulseless and CPR was initiated, he was successfully intubated , pt got ROSC , post ROSC rythm was Vtach per ED so he was started on amiodarone drip. during code blue ED also started him on Epinephrine drip.     Vitals on admission prior to code blue :   /74 , HR 64 , T 98.4 , O2 95%RA    CBC and CMP unremarkable   EKG NSR  Trops 32>>29   Lactate negative    CXR with b/l PNA   was given 1 L LR bolus and 1g cefepime in ED    pt is admitted to MICU (18 Nov 2024 03:54)      SUBJECTIVE  Pt was seen and evaluated at bedside. Pt was having an episode of myoclonic seizures followed by a generalized myoclonic/clonic seizure that lasted for about 1 hour.    OBJECTIVE  PAST MEDICAL & SURGICAL HISTORY  Vertigo    Hypertension    Lupus erythematosus      ALLERGIES:  iodine (Rash (Mild to Mod))    MEDICATIONS:  STANDING MEDICATIONS  chlorhexidine 0.12% Liquid 15 milliLiter(s) Oral Mucosa every 12 hours  chlorhexidine 2% Cloths 1 Application(s) Topical <User Schedule>  doxycycline IVPB 100 milliGRAM(s) IV Intermittent every 12 hours  doxycycline IVPB      enoxaparin Injectable 40 milliGRAM(s) SubCutaneous every 24 hours  folic acid 1 milliGRAM(s) Oral daily  gabapentin 100 milliGRAM(s) Oral three times a day  hydroxychloroquine 400 milliGRAM(s) Oral daily  lactated ringers. 1000 milliLiter(s) IV Continuous <Continuous>  levETIRAcetam 1500 milliGRAM(s) Oral two times a day  midazolam Infusion. 0.05 mG/kG/Hr IV Continuous <Continuous>  multivitamin 1 Tablet(s) Oral daily  pantoprazole   Suspension 40 milliGRAM(s) Oral two times a day  piperacillin/tazobactam IVPB.. 3.375 Gram(s) IV Intermittent every 8 hours  polyethylene glycol 3350 17 Gram(s) Oral two times a day  propofol Infusion. 40 MICROgram(s)/kG/Min IV Continuous <Continuous>  propofol Injectable 50 milliGRAM(s) IV Push once  senna 2 Tablet(s) Oral at bedtime  silver sulfADIAZINE 1% Cream 1 Application(s) Topical every 12 hours  thiamine 100 milliGRAM(s) Oral daily    PRN MEDICATIONS  acetaminophen     Tablet .. 650 milliGRAM(s) Oral every 6 hours PRN      VITAL SIGNS: Last 24 Hours  T(C): 38 (19 Nov 2024 12:00), Max: 38.6 (19 Nov 2024 04:00)  T(F): 100.4 (19 Nov 2024 12:00), Max: 101.5 (19 Nov 2024 04:00)  HR: 54 (19 Nov 2024 12:00) (48 - 115)  BP: 124/58 (19 Nov 2024 12:00) (86/50 - 173/71)  BP(mean): 83 (19 Nov 2024 12:00) (63 - 115)  RR: 16 (19 Nov 2024 12:00) (16 - 51)  SpO2: 100% (19 Nov 2024 12:00) (92% - 100%)    LABS:                        9.8    12.96 )-----------( 215      ( 19 Nov 2024 06:10 )             30.3     11-19    141  |  106  |  17  ----------------------------<  95  3.5   |  23  |  1.1    Ca    8.3[L]      19 Nov 2024 06:10  Phos  2.9     11-19  Mg     2.1     11-19    TPro  5.4[L]  /  Alb  3.3[L]  /  TBili  0.5  /  DBili  x   /  AST  37  /  ALT  17  /  AlkPhos  59  11-19    PT/INR - ( 19 Nov 2024 06:10 )   PT: 13.20 sec;   INR: 1.12 ratio         PTT - ( 19 Nov 2024 06:10 )  PTT:28.4 sec  Urinalysis Basic - ( 19 Nov 2024 06:10 )    Color: x / Appearance: x / SG: x / pH: x  Gluc: 95 mg/dL / Ketone: x  / Bili: x / Urobili: x   Blood: x / Protein: x / Nitrite: x   Leuk Esterase: x / RBC: x / WBC x   Sq Epi: x / Non Sq Epi: x / Bacteria: x      ABG - ( 19 Nov 2024 09:53 )  pH, Arterial: 7.42  pH, Blood: x     /  pCO2: 40    /  pO2: 92    / HCO3: 26    / Base Excess: 1.3   /  SaO2: 96.9                  Culture - Sputum (collected 18 Nov 2024 10:10)  Source: Trach Asp Tracheal Aspirate  Gram Stain (18 Nov 2024 22:28):    Moderate polymorphonuclear leukocytes per low power field    No Squamous epithelial cells per low power field    Moderate Gram positive cocci in pairs per oil power field    Few Gram Positive Rods per oil power field    Culture - Blood (collected 18 Nov 2024 00:30)  Source: .Blood BLOOD  Preliminary Report (19 Nov 2024 06:01):    No growth at 24 hours    Culture - Blood (collected 18 Nov 2024 00:20)  Source: .Blood BLOOD  Preliminary Report (19 Nov 2024 06:01):    No growth at 24 hours              PHYSICAL EXAM:  GENERAL: Sedated   HEAD:  Intubated   EYES: +ve corneal reflex on neuro exam   PSYCH: Comatose   EXTREMITIES: B/L myclonic twitches intermittently   NEUROLOGY: Sedated +ve pupilary, corneal reflex, +ve tripple flex, +ve babinski, -ve gag reflex

## 2024-11-19 NOTE — PROGRESS NOTE ADULT - SUBJECTIVE AND OBJECTIVE BOX
Patient is a 69y old  Male who presents with a chief complaint of SOB (18 Nov 2024 17:01)        Over Night Events:  Events noted.  on VEEG.  on MV.  Sedated.            ROS:     All ROS are negative except HPI         PHYSICAL EXAM    ICU Vital Signs Last 24 Hrs  T(C): 38.6 (19 Nov 2024 04:00), Max: 38.6 (19 Nov 2024 04:00)  T(F): 101.5 (19 Nov 2024 04:00), Max: 101.5 (19 Nov 2024 04:00)  HR: 55 (19 Nov 2024 08:00) (48 - 115)  BP: 140/64 (19 Nov 2024 08:00) (82/43 - 173/71)  BP(mean): 92 (19 Nov 2024 08:00) (59 - 115)  ABP: --  ABP(mean): --  RR: 22 (19 Nov 2024 08:00) (17 - 51)  SpO2: 100% (19 Nov 2024 08:00) (92% - 100%)    O2 Parameters below as of 19 Nov 2024 08:00  Patient On (Oxygen Delivery Method): ventilator            CONSTITUTIONAL:  NAD    ENT:   Airway patent,   Mouth with normal mucosa.   ET     EYES:   Pupils equal,   Round and reactive to light.    CARDIAC:   Normal rate,   Regular rhythm.        RESPIRATORY:   No wheezing  Bilateral BS  Normal chest expansion  Not tachypneic,  No use of accessory muscles    GASTROINTESTINAL:  Abdomen soft,   Non-tender,   No guarding,   + BS    MUSCULOSKELETAL:   No clubbing, cyanosis    NEUROLOGICAL:   Sedated     SKIN:   Skin normal color for race,   Warm and dry  No evidence of rash.      11-18-24 @ 07:01  -  11-19-24 @ 07:00  --------------------------------------------------------  IN:    FentaNYL: 283.7 mL    IV PiggyBack: 350 mL    Norepinephrine: 412.9 mL    Propofol (Status Epilepticus): 115 mL    Propofol (Status Epilepticus): 496.4 mL  Total IN: 1658 mL    OUT:    Indwelling Catheter - Urethral (mL): 1510 mL  Total OUT: 1510 mL    Total NET: 148 mL      11-19-24 @ 07:01  -  11-19-24 @ 08:44  --------------------------------------------------------  IN:    Propofol (Status Epilepticus): 26.2 mL  Total IN: 26.2 mL    OUT:    Indwelling Catheter - Urethral (mL): 35 mL    Norepinephrine: 0 mL  Total OUT: 35 mL    Total NET: -8.8 mL          LABS:                            9.8    12.96 )-----------( 215      ( 19 Nov 2024 06:10 )             30.3                                               11-19    141  |  106  |  17  ----------------------------<  95  3.5   |  23  |  1.1    Ca    8.3[L]      19 Nov 2024 06:10  Phos  2.9     11-19  Mg     2.1     11-19    TPro  5.4[L]  /  Alb  3.3[L]  /  TBili  0.5  /  DBili  x   /  AST  37  /  ALT  17  /  AlkPhos  59  11-19      PT/INR - ( 19 Nov 2024 06:10 )   PT: 13.20 sec;   INR: 1.12 ratio         PTT - ( 19 Nov 2024 06:10 )  PTT:28.4 sec                                       Urinalysis Basic - ( 19 Nov 2024 06:10 )    Color: x / Appearance: x / SG: x / pH: x  Gluc: 95 mg/dL / Ketone: x  / Bili: x / Urobili: x   Blood: x / Protein: x / Nitrite: x   Leuk Esterase: x / RBC: x / WBC x   Sq Epi: x / Non Sq Epi: x / Bacteria: x                                                  LIVER FUNCTIONS - ( 19 Nov 2024 06:10 )  Alb: 3.3 g/dL / Pro: 5.4 g/dL / ALK PHOS: 59 U/L / ALT: 17 U/L / AST: 37 U/L / GGT: x                                                  Culture - Sputum (collected 18 Nov 2024 10:10)  Source: Trach Asp Tracheal Aspirate  Gram Stain (18 Nov 2024 22:28):    Moderate polymorphonuclear leukocytes per low power field    No Squamous epithelial cells per low power field    Moderate Gram positive cocci in pairs per oil power field    Few Gram Positive Rods per oil power field    Culture - Blood (collected 18 Nov 2024 00:30)  Source: .Blood BLOOD  Preliminary Report (19 Nov 2024 06:01):    No growth at 24 hours    Culture - Blood (collected 18 Nov 2024 00:20)  Source: .Blood BLOOD  Preliminary Report (19 Nov 2024 06:01):    No growth at 24 hours                                                   Mode: AC/ CMV (Assist Control/ Continuous Mandatory Ventilation)  RR (machine): 22  TV (machine): 450  FiO2: 40  PEEP: 10  ITime: 1  MAP: 15  PIP: 23                                      ABG - ( 19 Nov 2024 03:47 )  pH, Arterial: 7.46  pH, Blood: x     /  pCO2: 37    /  pO2: 148   / HCO3: 26    / Base Excess: 2.5   /  SaO2: 98.3                MEDICATIONS  (STANDING):  chlorhexidine 0.12% Liquid 15 milliLiter(s) Oral Mucosa every 12 hours  chlorhexidine 2% Cloths 1 Application(s) Topical <User Schedule>  doxycycline IVPB 100 milliGRAM(s) IV Intermittent every 12 hours  doxycycline IVPB      enoxaparin Injectable 40 milliGRAM(s) SubCutaneous every 24 hours  folic acid 1 milliGRAM(s) Oral daily  gabapentin 100 milliGRAM(s) Oral three times a day  hydroxychloroquine 400 milliGRAM(s) Oral daily  levETIRAcetam 1500 milliGRAM(s) Oral two times a day  methylPREDNISolone sodium succinate Injectable 40 milliGRAM(s) IV Push every 12 hours  midazolam Infusion. 0.05 mG/kG/Hr (5.46 mL/Hr) IV Continuous <Continuous>  multivitamin 1 Tablet(s) Oral daily  norepinephrine Infusion 0.05 MICROgram(s)/kG/Min (8.08 mL/Hr) IV Continuous <Continuous>  pantoprazole   Suspension 40 milliGRAM(s) Oral two times a day  piperacillin/tazobactam IVPB.. 3.375 Gram(s) IV Intermittent every 8 hours  polyethylene glycol 3350 17 Gram(s) Oral two times a day  propofol Infusion. 40 MICROgram(s)/kG/Min (20.7 mL/Hr) IV Continuous <Continuous>  propofol Injectable 50 milliGRAM(s) IV Push once  senna 2 Tablet(s) Oral at bedtime  silver sulfADIAZINE 1% Cream 1 Application(s) Topical every 12 hours  thiamine 100 milliGRAM(s) Oral daily    MEDICATIONS  (PRN):  acetaminophen     Tablet .. 650 milliGRAM(s) Oral every 6 hours PRN Temp greater or equal to 38C (100.4F)      New X-rays reviewed:                                                                                  ECHO    CXR interpreted by me:  AIDA ABEL OK

## 2024-11-19 NOTE — PROGRESS NOTE ADULT - ASSESSMENT
ASSESSMENT  70 y/o male with PMH of HTN, HLD, mild SLE (Lupus) ,GIB secondary to duodenal ulcer in 1/2024,  Multiple falls , Chronic ataxia and Visual Hallucinations, hx of recently diagnosed progressive supranuclear palsy for which he follows with a neurologist at [place],  presents to the ED due to cough and SOB for 2 days duration. per cousin belly the HCP who visited him eariler today at the Adult home, the pt has been complaining of cough and SOB for the past 2 days, he said that his chest feels congested then his cousin noticed that his breathing got worse and he started wheezing and having noisy breath sounds so asked the  nurse to get him to ED, pt at that time was denying any chest pain, he also had no fever, chills , GI or  symptoms, and no sick contacts. of note cousin mentions that he had visual hallucinations for the past week or so saying he sees a man in his closet ( this is not the first time the patient has visual hallucinations and is following with a neurologist at [place]).   After ED eval, patient was initially admitted to [facility] for mx of Aspiration PNA and NSTEMI II, then after that he started getting tachypneic, increased WOB, nurses suctioned him and then they noticed he is blue, then he became pulseless and CPR was initiated, he was successfully intubated , pt got ROSC , post ROSC rythm was Vtach per ED so he was started on amiodarone drip. during code blue ED also started him on Epinephrine drip.       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 3.375 Gram(s) IV Intermittent every 8 hours & Doxy 100mg q12h IV   - f/u DTA    If any questions, please send a message or call on [employer]  Please continue to update ID with any pertinent new laboratory, radiographic findings, or change in clinical status

## 2024-11-19 NOTE — PROGRESS NOTE ADULT - ASSESSMENT
IMPRESSION:    Acute hypoxemic respiratory failure   Possible aspiration   SP CPA   VTach   HO SLE   Seizures   Shock now off Levophed   Post infectious HAAD ?    PLAN:    CNS: Continue VEEG.  Keep sedated for now.  Continue Keppra.  Neuro eval noted.  CTH noted      HEENT: Oral care.  ET Care     PULMONARY:  HOB @ 45 degrees.  Vent changes as follows: ARDS net MV settings.;  PEEP 8.  RR 16.  DTA noted.  Monitor Airway pressures.  Wean O2 as tolerated.  Solumedrol 40 mg Q24     CARDIOVASCULAR:  trend enzymes.  ECHO noted.  EP eval.  Gentle hydration until tolerating feeding.  CPK     GI: GI prophylaxis.  OG Feeding.  Bowel regimen     RENAL:  Follow up lytes.  Correct as needed.  Andrea for retention     INFECTIOUS DISEASE: Follow up cultures.  Zosyn and Doxy.  Nasal MRSA neg,  RVP neg.  procal noted.      HEMATOLOGICAL:  DVT prophylaxis.  Monitor CBC     ENDOCRINE:  Follow up FS.  Insulin protocol if needed.  Cortisol level noted.      MUSCULOSKELETAL:  Off loading.  Bed rest      MICU

## 2024-11-19 NOTE — PROGRESS NOTE ADULT - ASSESSMENT
pt is a 70 y/o male with PMH of HTN, HLD, mild SLE (Lupus) ,GIB secondary to duodenal ulcer in 1/2024,  Multiple falls , Chronic ataxia and Visual Hallucinations, hx of recently diagnosed progressive supranuclear palsy for which he follows with a neurologist at [place],  presents to the ED due to cough and SOB for 2 days duration. per cousin belly the HCP who visited him eariler today at the Adult home, the pt has been complaining of cough and SOB for the past 2 days, he said that his chest feels congested then his cousin noticed that his breathing got worse and he started wheezing and having noisy breath sounds so asked the  nurse to get him to ED, pt at that time was denying any chest pain, he also had no fever, chills , GI or  symptoms, and no sick contacts. of note cousin mentions that he had visual hallucinations for the past week or so saying he sees a man in his closet ( this is not the first time the patient has visual hallucinations and is following with a neurologist at [place]).     After ED eval, patient was initially admitted to [facility] for mx of Aspiration PNA and NSTEMI II, then after that he started getting tachypneic, increased WOB, nurses suctioned him and then they noticed he is blue, then he became pulseless and CPR was initiated, he was successfully intubated , pt got ROSC , post ROSC rythm was Vtach per ED so he was started on amiodarone drip. during code blue ED, discontinued it d/t bradycardia. Started him on Epinephrine drip.     Vitals on admission prior to code blue :   /74 , HR 64 , T 98.4 , O2 95%RA  CXR with b/l PNA   was given 1 L LR bolus and 1g cefepime in ED    pt is admitted to MICU. Neuro w/u done in ICU, EEG showed myclonic seizures, CTH neg. Pt's sedation and antieptileptics increased.    #Acute Hypoxic respiratory failure s/p intubation   #Possible Aspiration/Multifocal PNA   CXR unchanged - b/l hilar opacities, small RLL effusion (my read)  WBC downtrending 12->20->12  Urine cloudy in briggs bag, UA contaminated  Infectious w/u neg: MRSA, RVP, Procal 0.44  CT chest - b/l lower lobe consolidations  Plan:  - c/w cefepime + doxy  - ID following  - Tylenol PRN for fevers    #Cardiac arrest, ~3min down time, s/p 1 shock for Vtach after ROSC  #NSTEMI type II  #Bradycardia   s/p amio ggt and epi drip started in ED. Amio dc'd d/t bradycardia  Bedside US - not volume depleted  EP - f/u for ischemic w/u when stabilized  Cortisol 12, ->400  Plan:  - switched solumedrol 40 BID to QD  - off pressors, levophed    #Myoclonic seizures  EEG findings: majority of the generalized spikes are associated with head jerks or bilateral proximal myoclonic jerks. Around 3 am patient had an event characterized by cluster of myoclonic seizures followed by a generalized myoclonic/clonic seizure that lasted for about 1 hour. After that EEG returned to the baseline as described above.  CTH - no acute path  Plan:  - increased keppra dosing to 1500 BID  - increased sedation: added versed,     #HO Lupus  #HO HTN  #HO GIB secondary to duodenal ulcer 1/2024   #Multiple falls , Chronic ataxia   #HO progressive supranuclear palsy and Visual Hallucinations  - on home meds: hydroxychloroquine, thiamine  - held BP meds d/t shock    #Lines: R IGNACIO 11/18, chester 11/18  #Family: Cousin Jose L is next of kin. Updated today 11/18  #DVT prophylaxis: Lovenox  #GI prophylaxis: PPI suspension  #Diet: tube feeds, bowel regimen  #Activity: bedrest, IAT  #Code status: Full Code  #Disposition: ICU    Pendings:  1. Neuro w/u  2. Infectious w/u  3. f/u cortisol levels  pt is a 70 y/o male with PMH of HTN, HLD, mild SLE (Lupus) ,GIB secondary to duodenal ulcer in 1/2024,  Multiple falls , Chronic ataxia and Visual Hallucinations, hx of recently diagnosed progressive supranuclear palsy for which he follows with a neurologist at [place],  presents to the ED due to cough and SOB for 2 days duration. per cousin belly the HCP who visited him eariler today at the Adult home, the pt has been complaining of cough and SOB for the past 2 days, he said that his chest feels congested then his cousin noticed that his breathing got worse and he started wheezing and having noisy breath sounds so asked the  nurse to get him to ED, pt at that time was denying any chest pain, he also had no fever, chills , GI or  symptoms, and no sick contacts. of note cousin mentions that he had visual hallucinations for the past week or so saying he sees a man in his closet ( this is not the first time the patient has visual hallucinations and is following with a neurologist at [place]).     After ED eval, patient was initially admitted to [facility] for mx of Aspiration PNA and NSTEMI II, then after that he started getting tachypneic, increased WOB, nurses suctioned him and then they noticed he is blue, then he became pulseless and CPR was initiated, he was successfully intubated , pt got ROSC , post ROSC rythm was Vtach per ED so he was started on amiodarone drip. during code blue ED, discontinued it d/t bradycardia. Started him on Epinephrine drip.     Vitals on admission prior to code blue :   /74 , HR 64 , T 98.4 , O2 95%RA  CXR with b/l PNA   was given 1 L LR bolus and 1g cefepime in ED    pt is admitted to MICU. Neuro w/u done in ICU, EEG showed myclonic seizures, CTH neg. Pt's sedation and antieptileptics increased.    #Acute Hypoxic respiratory failure s/p intubation   #Possible Aspiration/Multifocal PNA   CXR unchanged - b/l hilar opacities, small RLL effusion (my read)  CT chest - b/l lower lobe consolidations  WBC downtrending 12->20->12  Urine cloudy in briggs bag, UA contaminated  Infectious w/u neg: MRSA, RVP, Procal 0.44  101 F overnight  Plan:  - c/w cefepime + doxy  - ID following  - Tylenol PRN for fevers    #Cardiac arrest, ~3min down time, s/p 1 shock for Vtach after ROSC  #NSTEMI type II  #Bradycardia   s/p amio ggt. Amio dc'd d/t bradycardia  Bedside US - not volume depleted  EP - f/u for ischemic w/u when stabilized  Cortisol 12, ->400  Plan:  - switched solumedrol 40 BID to QD  - off pressors    #Myoclonic seizures  EEG findings: majority of the generalized spikes are associated with head jerks or bilateral proximal myoclonic jerks. Around 3 am patient had an event characterized by cluster of myoclonic seizures followed by a generalized myoclonic/clonic seizure that lasted for about 1 hour. After that EEG returned to the baseline as described above.  CTH - no acute path  Plan:  - increased keppra dosing to 1500 BID  - increased sedation: added versed  - burst suppression pattern  - vent settings changed to prevent alkalosis (rate 22->16)  - f/u neuro recs for MRI brain when stable without seizures     #HO Lupus  #HO HTN  #HO GIB secondary to duodenal ulcer 1/2024   #Multiple falls , Chronic ataxia   #HO progressive supranuclear palsy and Visual Hallucinations  - on home meds: hydroxychloroquine, thiamine  - held BP meds d/t shock    #Lines: R IJ 11/18, briggs 11/18  #Family: Cousin Jw is HCP. Updated today at bedside 11/19  #DVT prophylaxis: Lovenox  #GI prophylaxis: PPI suspension  #Diet: tube feeds, bowel regimen  #Activity: bedrest, IAT  #Code status: DNR/Intubate  #Disposition: ICU    Pendings:  1. Monitor for seizure activity  2. MRI brain when stable  3. Trend CPK

## 2024-11-19 NOTE — PROGRESS NOTE ADULT - SUBJECTIVE AND OBJECTIVE BOX
SUSAN AWAD  69y, Male  Allergy: iodine (Rash (Mild to Mod))      LOS  1d    CHIEF COMPLAINT: SOB (19 Nov 2024 08:43)      INTERVAL EVENTS/HPI  - T(F): , Max: 101.5 (11-19-24 @ 04:00) fever, facial myoclonus  - WBC Count: 12.96 (11-19-24 @ 06:10) downtrending   WBC Count: 21.08 (11-18-24 @ 10:10)  - Creatinine: 1.1 (11-19-24 @ 06:10)  Creatinine: 1.0 (11-18-24 @ 14:00)     -   -   -     ROS  cannot obtain secondary to patient's sedation and/or mental status    VITALS:  T(F): 101.5, Max: 101.5 (11-19-24 @ 04:00)  HR: 58  BP: 142/62  RR: 22Vital Signs Last 24 Hrs  T(C): 38.6 (19 Nov 2024 04:00), Max: 38.6 (19 Nov 2024 04:00)  T(F): 101.5 (19 Nov 2024 04:00), Max: 101.5 (19 Nov 2024 04:00)  HR: 58 (19 Nov 2024 10:00) (48 - 115)  BP: 142/62 (19 Nov 2024 10:00) (82/43 - 173/71)  BP(mean): 89 (19 Nov 2024 10:00) (59 - 115)  RR: 22 (19 Nov 2024 10:00) (19 - 51)  SpO2: 99% (19 Nov 2024 10:00) (92% - 100%)    Parameters below as of 19 Nov 2024 08:00  Patient On (Oxygen Delivery Method): ventilator        PHYSICAL EXAM:  General: intubated  HEENT: NCAT facial twitching/myoclonus  Neck: supple  CV: RRR  Lungs: symmetric chest expansion, decreased BS at bases  Abd: Soft  Extr: wwp  Skin: no rash  Neuro: sedated  Lines: no phlebitis     FH: Non-contributory  Social Hx: Non-contributory    TESTS & MEASUREMENTS:                        9.8    12.96 )-----------( 215      ( 19 Nov 2024 06:10 )             30.3     11-19    141  |  106  |  17  ----------------------------<  95  3.5   |  23  |  1.1    Ca    8.3[L]      19 Nov 2024 06:10  Phos  2.9     11-19  Mg     2.1     11-19    TPro  5.4[L]  /  Alb  3.3[L]  /  TBili  0.5  /  DBili  x   /  AST  37  /  ALT  17  /  AlkPhos  59  11-19      LIVER FUNCTIONS - ( 19 Nov 2024 06:10 )  Alb: 3.3 g/dL / Pro: 5.4 g/dL / ALK PHOS: 59 U/L / ALT: 17 U/L / AST: 37 U/L / GGT: x           Urinalysis Basic - ( 19 Nov 2024 06:10 )    Color: x / Appearance: x / SG: x / pH: x  Gluc: 95 mg/dL / Ketone: x  / Bili: x / Urobili: x   Blood: x / Protein: x / Nitrite: x   Leuk Esterase: x / RBC: x / WBC x   Sq Epi: x / Non Sq Epi: x / Bacteria: x        Culture - Sputum (collected 11-18-24 @ 10:10)  Source: Trach Asp Tracheal Aspirate  Gram Stain (11-18-24 @ 22:28):    Moderate polymorphonuclear leukocytes per low power field    No Squamous epithelial cells per low power field    Moderate Gram positive cocci in pairs per oil power field    Few Gram Positive Rods per oil power field    Culture - Blood (collected 11-18-24 @ 00:30)  Source: .Blood BLOOD  Preliminary Report (11-19-24 @ 06:01):    No growth at 24 hours    Culture - Blood (collected 11-18-24 @ 00:20)  Source: .Blood BLOOD  Preliminary Report (11-19-24 @ 06:01):    No growth at 24 hours        Lactate, Blood: 1.9 mmol/L (11-18-24 @ 08:50)  Blood Gas Venous - Lactate: 1.2 mmol/L (11-18-24 @ 00:51)  Lactate, Blood: 0.9 mmol/L (11-18-24 @ 00:30)      INFECTIOUS DISEASES TESTING  Procalcitonin: 0.44 (11-18-24 @ 10:20)  MRSA PCR Result.: Negative (11-18-24 @ 10:10)  Rapid RVP Result: NotDetec (11-18-24 @ 01:20)  COVID-19 PCR: NotDetec (02-05-24 @ 06:50)  COVID-19 PCR: NotDetec (02-04-24 @ 07:06)  COVID-19 PCR: Detected (01-28-24 @ 06:30)      INFLAMMATORY MARKERS      RADIOLOGY & ADDITIONAL TESTS:  I have personally reviewed the last available Chest xray  CXR      CT  CT Chest No Cont:   ACC: [id number] EXAM:  CT CHEST   ORDERED BY: ROSANNE COLLINS     PROCEDURE DATE:  11/18/2024          INTERPRETATION:  Indication::  Shortness of breath    Technique: Non contrast CT scan of the thorax was performed from lung   apices to adrenal glands.  Sagittal and coronal reformatted images were generated.    Comparison: CT chest-January 15, 2024      Findings:    Tubes/Lines/Devices : Satisfactory position.  Mediastinum/hilum: .No adenopathy or mass.  Chest Wall/ Breasts: Unremarkable   Heart/Great Vessels:No pericardial effusions. Great vessels are normal   in caliber. Aortic calcifications  Abdomen: Streak artifact from patient's arms obscures upper abdomen. No   gross abnormalities.  Bones and soft tissues: Degenerative changes cervical and thoracic spine  Lungs, Pleura, and Airways: Patent central airways.  Bilateral lower lobe consolidations with volume loss, left lower lobe   (602/311).. No pneumothorax or parenchymal mass.      Pulmonary nodules:  No suspicious pulmonary nodules are identified.  IMPRESSION:  Support devices, in satisfactory position.  Bilateral lower lobe consolidations with volume loss left lower lobe   (602/311).  No pneumothorax or parenchymal mass.    No suspicious pulmonary nodules are identified.            --- End of Report ---            HOLDEN PATEL MD; Attending Radiologist  This document has been electronically signed. Nov 19 2024  9:33AM (11-18-24 @ 15:39)      CARDIOLOGY TESTING      MEDICATIONS  chlorhexidine 0.12% Liquid 15  chlorhexidine 2% Cloths 1  doxycycline IVPB 100  doxycycline IVPB   enoxaparin Injectable 40  folic acid 1  gabapentin 100  hydroxychloroquine 400  lactated ringers. 1000  levETIRAcetam 1500  midazolam Infusion. 0.05  multivitamin 1  pantoprazole   Suspension 40  piperacillin/tazobactam IVPB.. 3.375  polyethylene glycol 3350 17  propofol Infusion. 40  propofol Injectable 50  senna 2  silver sulfADIAZINE 1% Cream 1  thiamine 100      WEIGHT  Weight (kg): 109.1 (11-18-24 @ 08:59)  Creatinine: 1.1 mg/dL (11-19-24 @ 06:10)  Creatinine: 1.0 mg/dL (11-18-24 @ 14:00)      ANTIBIOTICS:  doxycycline IVPB 100 milliGRAM(s) IV Intermittent every 12 hours  doxycycline IVPB      hydroxychloroquine 400 milliGRAM(s) Oral daily  piperacillin/tazobactam IVPB.. 3.375 Gram(s) IV Intermittent every 8 hours      All available historical records have been reviewed

## 2024-11-19 NOTE — PROGRESS NOTE ADULT - SUBJECTIVE AND OBJECTIVE BOX
SUBJECTIVE/OVERNIGHT EVENTS  Today is hospital day 1d. This morning patient was seen and examined at bedside, resting comfortably in bed. Seizures overnight. 101F overnight    HOSPITAL COURSE  Day 1:   Day 2:   Day 3:     CODE STATUS:    FAMILY COMMUNICATION  Contact date:  Name of person contacted:  Relationship to patient:  Communication details:    MEDICATIONS  STANDING MEDICATIONS  chlorhexidine 0.12% Liquid 15 milliLiter(s) Oral Mucosa every 12 hours  chlorhexidine 2% Cloths 1 Application(s) Topical <User Schedule>  doxycycline IVPB 100 milliGRAM(s) IV Intermittent every 12 hours  doxycycline IVPB      enoxaparin Injectable 40 milliGRAM(s) SubCutaneous every 24 hours  folic acid 1 milliGRAM(s) Oral daily  gabapentin 100 milliGRAM(s) Oral three times a day  hydroxychloroquine 400 milliGRAM(s) Oral daily  lactated ringers. 1000 milliLiter(s) IV Continuous <Continuous>  levETIRAcetam 1500 milliGRAM(s) Oral two times a day  midazolam Infusion. 0.05 mG/kG/Hr IV Continuous <Continuous>  multivitamin 1 Tablet(s) Oral daily  pantoprazole   Suspension 40 milliGRAM(s) Oral two times a day  piperacillin/tazobactam IVPB.. 3.375 Gram(s) IV Intermittent every 8 hours  polyethylene glycol 3350 17 Gram(s) Oral two times a day  propofol Infusion. 40 MICROgram(s)/kG/Min IV Continuous <Continuous>  propofol Injectable 50 milliGRAM(s) IV Push once  senna 2 Tablet(s) Oral at bedtime  silver sulfADIAZINE 1% Cream 1 Application(s) Topical every 12 hours  thiamine 100 milliGRAM(s) Oral daily    PRN MEDICATIONS  acetaminophen     Tablet .. 650 milliGRAM(s) Oral every 6 hours PRN    VITALS  T(F): 100.4 (11-19-24 @ 12:00), Max: 101.5 (11-19-24 @ 04:00)  HR: 54 (11-19-24 @ 12:00) (48 - 115)  BP: 124/58 (11-19-24 @ 12:00) (86/50 - 173/71)  RR: 16 (11-19-24 @ 12:00) (16 - 51)  SpO2: 100% (11-19-24 @ 12:00) (92% - 100%)  POCT Blood Glucose.: 115 mg/dL (11-19-24 @ 11:54)  POCT Blood Glucose.: 98 mg/dL (11-19-24 @ 06:11)    PHYSICAL EXAM  GENERAL: NAD, lying in bed comfortably, intubated  HEART: Regular rate and rhythm, no murmurs, rubs, or gallops  LUNGS: Unlabored respirations.  Clear to auscultation bilaterally, no crackles, wheezing, or rhonchi  ABDOMEN: Soft, nontender, nondistended, +BS  EXTREMITIES: No clubbing, cyanosis, or edema  NERVOUS SYSTEM:  sedated, pupillary reflex present, not responsive to sternal rub  SKIN: No rashes or lesions    LABS             9.8    12.96 )-----------( 215      ( 11-19-24 @ 06:10 )             30.3     141  |  106  |  17  -------------------------<  95   11-19-24 @ 06:10  3.5  |  23  |  1.1    Ca      8.3     11-19-24 @ 06:10  Phos   2.9     11-19-24 @ 06:10  Mg     2.1     11-19-24 @ 06:10    TPro  5.4  /  Alb  3.3  /  TBili  0.5  /  DBili  x   /  AST  37  /  ALT  17  /  AlkPhos  59  /  GGT  x     11-19-24 @ 06:10    PT/INR - ( 11-19-24 @ 06:10 )   PT: 13.20 sec[H];   INR: 1.12 ratio  PTT - ( 11-19-24 @ 06:10 )  PTT:28.4 sec    Pro-Brain Natriuretic Peptide: 569 pg/mL (11-19-24 @ 06:10)  Troponin T, High Sensitivity Result: 100 ng/L (11-19-24 @ 06:10)  Troponin T, High Sensitivity Result: 109 ng/L (11-18-24 @ 14:00)    Urinalysis Basic - ( 19 Nov 2024 06:10 )    Color: x / Appearance: x / SG: x / pH: x  Gluc: 95 mg/dL / Ketone: x  / Bili: x / Urobili: x   Blood: x / Protein: x / Nitrite: x   Leuk Esterase: x / RBC: x / WBC x   Sq Epi: x / Non Sq Epi: x / Bacteria: x      ABG - ( 19 Nov 2024 09:53 )  pH, Arterial: 7.42  pH, Blood: x     /  pCO2: 40    /  pO2: 92    / HCO3: 26    / Base Excess: 1.3   /  SaO2: 96.9                Culture - Sputum (collected 18 Nov 2024 10:10)  Source: Trach Asp Tracheal Aspirate  Gram Stain (18 Nov 2024 22:28):    Moderate polymorphonuclear leukocytes per low power field    No Squamous epithelial cells per low power field    Moderate Gram positive cocci in pairs per oil power field    Few Gram Positive Rods per oil power field    Culture - Blood (collected 18 Nov 2024 00:30)  Source: .Blood BLOOD  Preliminary Report (19 Nov 2024 06:01):    No growth at 24 hours    Culture - Blood (collected 18 Nov 2024 00:20)  Source: .Blood BLOOD  Preliminary Report (19 Nov 2024 06:01):    No growth at 24 hours      IMAGING

## 2024-11-20 NOTE — DIETITIAN INITIAL EVALUATION ADULT - PERTINENT LABORATORY DATA
11-20    138  |  105  |  23[H]  ----------------------------<  123[H]  3.3[L]   |  25  |  1.0    Ca    8.0[L]      20 Nov 2024 04:40  Phos  2.9     11-19  Mg     2.2     11-20    TPro  5.1[L]  /  Alb  3.1[L]  /  TBili  0.3  /  DBili  x   /  AST  30  /  ALT  12  /  AlkPhos  52  11-20  POCT Blood Glucose.: 165 mg/dL (11-20-24 @ 06:18)

## 2024-11-20 NOTE — DIETITIAN INITIAL EVALUATION ADULT - ADD RECOMMEND
1. Recommend to adjust TF regimen to meet estimated needs. Pt also on propofol (high fat content). Recommend: Vital HP via OGT, continuous 18hr feeds, total volume: 990 mL, start rate at 25 mL/hr and increase as tolerated until goal rate of 55 mL/hr is met. ADD no carb prosource once daily -- provides 60 kcal, 15g pro total. TF regimen + propofol + no carb prosource to provide -- 1742 kcal, 102g pro, 827 mL free water from formula + additional water flushes of 50 mL q4hrs -- team to adjust water flushes prn. TF regimen meets >85 - 105% of estimated needs.     High risk f/u

## 2024-11-20 NOTE — PROGRESS NOTE ADULT - ASSESSMENT
ASSESSMENT  68 y/o male with PMH of HTN, HLD, mild SLE (Lupus) ,GIB secondary to duodenal ulcer in 1/2024,  Multiple falls , Chronic ataxia and Visual Hallucinations, hx of recently diagnosed progressive supranuclear palsy for which he follows with a neurologist at [place],  presents to the ED due to cough and SOB for 2 days duration. per cousin belly the HCP who visited him eariler today at the Adult home, the pt has been complaining of cough and SOB for the past 2 days, he said that his chest feels congested then his cousin noticed that his breathing got worse and he started wheezing and having noisy breath sounds so asked the  nurse to get him to ED, pt at that time was denying any chest pain, he also had no fever, chills , GI or  symptoms, and no sick contacts. of note cousin mentions that he had visual hallucinations for the past week or so saying he sees a man in his closet ( this is not the first time the patient has visual hallucinations and is following with a neurologist at [place]).   After ED eval, patient was initially admitted to [facility] for mx of Aspiration PNA and NSTEMI II, then after that he started getting tachypneic, increased WOB, nurses suctioned him and then they noticed he is blue, then he became pulseless and CPR was initiated, he was successfully intubated , pt got ROSC , post ROSC rythm was Vtach per ED so he was started on amiodarone drip. during code blue ED also started him on Epinephrine drip.       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 3.375 Gram(s) IV Intermittent every 8 hours & Doxy 100mg q12h IV , empiric 7 days end 11/24  - Grave prognosis, GOC    If any questions, please send a message or call on [employer] Teams  Please continue to update ID with any pertinent new laboratory, radiographic findings, or change in clinical status

## 2024-11-20 NOTE — PROGRESS NOTE ADULT - SUBJECTIVE AND OBJECTIVE BOX
RITESH SUSAN  69y, Male  Allergy: iodine (Rash (Mild to Mod))      LOS  2d    CHIEF COMPLAINT: SOB (20 Nov 2024 11:12)      INTERVAL EVENTS/HPI  - T(F): , Max: 100.4 (11-19-24 @ 12:00), off pressors  - WBC Count: 11.29 (11-20-24 @ 04:40)  WBC Count: 12.96 (11-19-24 @ 06:10)     - Creatinine: 1.0 (11-20-24 @ 04:40)  Creatinine: 1.1 (11-19-24 @ 06:10)     -   -   -     ROS  cannot obtain secondary to patient's sedation and/or mental status    VITALS:  T(F): 96.7, Max: 100.4 (11-19-24 @ 12:00)  HR: 40  BP: 100/54  RR: 16Vital Signs Last 24 Hrs  T(C): 35.9 (20 Nov 2024 08:00), Max: 38 (19 Nov 2024 12:00)  T(F): 96.7 (20 Nov 2024 08:00), Max: 100.4 (19 Nov 2024 12:00)  HR: 40 (20 Nov 2024 08:00) (40 - 54)  BP: 100/54 (20 Nov 2024 08:00) (94/48 - 164/74)  BP(mean): 75 (20 Nov 2024 08:00) (69 - 106)  RR: 16 (20 Nov 2024 08:00) (16 - 21)  SpO2: 98% (20 Nov 2024 08:00) (98% - 100%)    Parameters below as of 20 Nov 2024 08:00  Patient On (Oxygen Delivery Method): ventilator        PHYSICAL EXAM:  ***    FH: Non-contributory  Social Hx: Non-contributory    TESTS & MEASUREMENTS:                        8.5    11.29 )-----------( 185      ( 20 Nov 2024 04:40 )             26.5     11-20    138  |  105  |  23[H]  ----------------------------<  123[H]  3.3[L]   |  25  |  1.0    Ca    8.0[L]      20 Nov 2024 04:40  Phos  2.9     11-19  Mg     2.2     11-20    TPro  5.1[L]  /  Alb  3.1[L]  /  TBili  0.3  /  DBili  x   /  AST  30  /  ALT  12  /  AlkPhos  52  11-20      LIVER FUNCTIONS - ( 20 Nov 2024 04:40 )  Alb: 3.1 g/dL / Pro: 5.1 g/dL / ALK PHOS: 52 U/L / ALT: 12 U/L / AST: 30 U/L / GGT: x           Urinalysis Basic - ( 20 Nov 2024 04:40 )    Color: x / Appearance: x / SG: x / pH: x  Gluc: 123 mg/dL / Ketone: x  / Bili: x / Urobili: x   Blood: x / Protein: x / Nitrite: x   Leuk Esterase: x / RBC: x / WBC x   Sq Epi: x / Non Sq Epi: x / Bacteria: x        Culture - Urine (collected 11-18-24 @ 14:00)  Source: Catheterized Catheterized  Final Report (11-20-24 @ 07:28):    No growth    Culture - Sputum (collected 11-18-24 @ 10:10)  Source: Trach Asp Tracheal Aspirate  Gram Stain (11-18-24 @ 22:28):    Moderate polymorphonuclear leukocytes per low power field    No Squamous epithelial cells per low power field    Moderate Gram positive cocci in pairs per oil power field    Few Gram Positive Rods per oil power field  Preliminary Report (11-19-24 @ 15:34):    Commensal domonique consistent with body site    Culture - Blood (collected 11-18-24 @ 00:30)  Source: .Blood BLOOD  Preliminary Report (11-20-24 @ 06:01):    No growth at 48 Hours    Culture - Blood (collected 11-18-24 @ 00:20)  Source: .Blood BLOOD  Preliminary Report (11-20-24 @ 06:01):    No growth at 48 Hours        Lactate, Blood: 1.9 mmol/L (11-18-24 @ 08:50)  Blood Gas Venous - Lactate: 1.2 mmol/L (11-18-24 @ 00:51)  Lactate, Blood: 0.9 mmol/L (11-18-24 @ 00:30)      INFECTIOUS DISEASES TESTING  Procalcitonin: 0.75 (11-19-24 @ 06:10)  Procalcitonin: 0.44 (11-18-24 @ 10:20)  MRSA PCR Result.: Negative (11-18-24 @ 10:10)  Rapid RVP Result: NotDetec (11-18-24 @ 01:20)  COVID-19 PCR: NotDetec (02-05-24 @ 06:50)  COVID-19 PCR: NotDetec (02-04-24 @ 07:06)  COVID-19 PCR: Detected (01-28-24 @ 06:30)      INFLAMMATORY MARKERS      RADIOLOGY & ADDITIONAL TESTS:  I have personally reviewed the last available Chest xray  CXR      CT  CT Chest No Cont:   ACC: [id number] EXAM:  CT CHEST   ORDERED BY: ROSANNE COLLINS     PROCEDURE DATE:  11/18/2024          INTERPRETATION:  Indication::  Shortness of breath    Technique: Non contrast CT scan of the thorax was performed from lung   apices to adrenal glands.  Sagittal and coronal reformatted images were generated.    Comparison: CT chest-January 15, 2024      Findings:    Tubes/Lines/Devices : Satisfactory position.  Mediastinum/hilum: .No adenopathy or mass.  Chest Wall/ Breasts: Unremarkable   Heart/Great Vessels:No pericardial effusions. Great vessels are normal   in caliber. Aortic calcifications  Abdomen: Streak artifact from patient's arms obscures upper abdomen. No   gross abnormalities.  Bones and soft tissues: Degenerative changes cervical and thoracic spine  Lungs, Pleura, and Airways: Patent central airways.  Bilateral lower lobe consolidations with volume loss, left lower lobe   (602/311).. No pneumothorax or parenchymal mass.      Pulmonary nodules:  No suspicious pulmonary nodules are identified.  IMPRESSION:  Support devices, in satisfactory position.  Bilateral lower lobe consolidations with volume loss left lower lobe   (602/311).  No pneumothorax or parenchymal mass.    No suspicious pulmonary nodules are identified.            --- End of Report ---            HOLDEN PATEL MD; Attending Radiologist  This document has been electronically signed. Nov 19 2024  9:33AM (11-18-24 @ 15:39)      CARDIOLOGY TESTING  12 Lead ECG:   Ventricular Rate 87 BPM    QRS Duration 92 ms    Q-T Interval 460 ms    QTC Calculation(Bazett) 553 ms    R Axis 52 degrees    T Axis 21 degrees    Diagnosis Line Atrial fibrillation with premature ventricular or aberrantly conducted  complexes  Anteroseptal infarct , age undetermined  Marked ST abnormality, possible inferolateral subendocardial injury  Prolonged QT  Abnormal ECG    Confirmed by Bridger Rosenberg ([id number]) on 11/19/2024 7:05:55 PM (11-18-24 @ 03:16)      MEDICATIONS  chlorhexidine 0.12% Liquid 15  chlorhexidine 2% Cloths 1  doxycycline IVPB 100  doxycycline IVPB   enoxaparin Injectable 40  folic acid 1  gabapentin 100  hydroxychloroquine 400  levETIRAcetam 1500  midazolam Infusion. 0.05  multivitamin 1  pantoprazole   Suspension 40  piperacillin/tazobactam IVPB.. 3.375  polyethylene glycol 3350 17  potassium chloride   Powder 40  propofol Infusion. 40  propofol Injectable 50  senna 2  silver sulfADIAZINE 1% Cream 1  thiamine 100      WEIGHT  Weight (kg): 109.1 (11-18-24 @ 08:59)  Creatinine: 1.0 mg/dL (11-20-24 @ 04:40)      ANTIBIOTICS:  doxycycline IVPB 100 milliGRAM(s) IV Intermittent every 12 hours  doxycycline IVPB      hydroxychloroquine 400 milliGRAM(s) Oral daily  piperacillin/tazobactam IVPB.. 3.375 Gram(s) IV Intermittent every 8 hours      All available historical records have been reviewed   RITESH SUSAN  69y, Male  Allergy: iodine (Rash (Mild to Mod))      LOS  2d    CHIEF COMPLAINT: SOB (20 Nov 2024 11:12)      INTERVAL EVENTS/HPI  - T(F): , Max: 100.4 (11-19-24 @ 12:00), off pressors  - WBC Count: 11.29 (11-20-24 @ 04:40)  WBC Count: 12.96 (11-19-24 @ 06:10)  - Creatinine: 1.0 (11-20-24 @ 04:40)  Creatinine: 1.1 (11-19-24 @ 06:10)     -   -   -     ROS  cannot obtain secondary to patient's sedation and/or mental status    VITALS:  T(F): 96.7, Max: 100.4 (11-19-24 @ 12:00)  HR: 40  BP: 100/54  RR: 16Vital Signs Last 24 Hrs  T(C): 35.9 (20 Nov 2024 08:00), Max: 38 (19 Nov 2024 12:00)  T(F): 96.7 (20 Nov 2024 08:00), Max: 100.4 (19 Nov 2024 12:00)  HR: 40 (20 Nov 2024 08:00) (40 - 54)  BP: 100/54 (20 Nov 2024 08:00) (94/48 - 164/74)  BP(mean): 75 (20 Nov 2024 08:00) (69 - 106)  RR: 16 (20 Nov 2024 08:00) (16 - 21)  SpO2: 98% (20 Nov 2024 08:00) (98% - 100%)    Parameters below as of 20 Nov 2024 08:00  Patient On (Oxygen Delivery Method): ventilator        PHYSICAL EXAM:  General: intubated  HEENT: NCAT  Neck: supple  CV: RRR  Lungs: symmetric chest expansion, decreased BS at bases  Abd: Soft  Extr: wwp edema  Skin: no rash  Neuro: sedated  Lines: no phlebitis     FH: Non-contributory  Social Hx: Non-contributory    TESTS & MEASUREMENTS:                        8.5    11.29 )-----------( 185      ( 20 Nov 2024 04:40 )             26.5     11-20    138  |  105  |  23[H]  ----------------------------<  123[H]  3.3[L]   |  25  |  1.0    Ca    8.0[L]      20 Nov 2024 04:40  Phos  2.9     11-19  Mg     2.2     11-20    TPro  5.1[L]  /  Alb  3.1[L]  /  TBili  0.3  /  DBili  x   /  AST  30  /  ALT  12  /  AlkPhos  52  11-20      LIVER FUNCTIONS - ( 20 Nov 2024 04:40 )  Alb: 3.1 g/dL / Pro: 5.1 g/dL / ALK PHOS: 52 U/L / ALT: 12 U/L / AST: 30 U/L / GGT: x           Urinalysis Basic - ( 20 Nov 2024 04:40 )    Color: x / Appearance: x / SG: x / pH: x  Gluc: 123 mg/dL / Ketone: x  / Bili: x / Urobili: x   Blood: x / Protein: x / Nitrite: x   Leuk Esterase: x / RBC: x / WBC x   Sq Epi: x / Non Sq Epi: x / Bacteria: x        Culture - Urine (collected 11-18-24 @ 14:00)  Source: Catheterized Catheterized  Final Report (11-20-24 @ 07:28):    No growth    Culture - Sputum (collected 11-18-24 @ 10:10)  Source: Trach Asp Tracheal Aspirate  Gram Stain (11-18-24 @ 22:28):    Moderate polymorphonuclear leukocytes per low power field    No Squamous epithelial cells per low power field    Moderate Gram positive cocci in pairs per oil power field    Few Gram Positive Rods per oil power field  Preliminary Report (11-19-24 @ 15:34):    Commensal domonique consistent with body site    Culture - Blood (collected 11-18-24 @ 00:30)  Source: .Blood BLOOD  Preliminary Report (11-20-24 @ 06:01):    No growth at 48 Hours    Culture - Blood (collected 11-18-24 @ 00:20)  Source: .Blood BLOOD  Preliminary Report (11-20-24 @ 06:01):    No growth at 48 Hours        Lactate, Blood: 1.9 mmol/L (11-18-24 @ 08:50)  Blood Gas Venous - Lactate: 1.2 mmol/L (11-18-24 @ 00:51)  Lactate, Blood: 0.9 mmol/L (11-18-24 @ 00:30)      INFECTIOUS DISEASES TESTING  Procalcitonin: 0.75 (11-19-24 @ 06:10)  Procalcitonin: 0.44 (11-18-24 @ 10:20)  MRSA PCR Result.: Negative (11-18-24 @ 10:10)  Rapid RVP Result: NotDetec (11-18-24 @ 01:20)  COVID-19 PCR: NotDetec (02-05-24 @ 06:50)  COVID-19 PCR: NotDetec (02-04-24 @ 07:06)  COVID-19 PCR: Detected (01-28-24 @ 06:30)      INFLAMMATORY MARKERS      RADIOLOGY & ADDITIONAL TESTS:  I have personally reviewed the last available Chest xray  CXR      CT  CT Chest No Cont:   ACC: [id number] EXAM:  CT CHEST   ORDERED BY: ROSANNE COLLINS     PROCEDURE DATE:  11/18/2024          INTERPRETATION:  Indication::  Shortness of breath    Technique: Non contrast CT scan of the thorax was performed from lung   apices to adrenal glands.  Sagittal and coronal reformatted images were generated.    Comparison: CT chest-January 15, 2024      Findings:    Tubes/Lines/Devices : Satisfactory position.  Mediastinum/hilum: .No adenopathy or mass.  Chest Wall/ Breasts: Unremarkable   Heart/Great Vessels:No pericardial effusions. Great vessels are normal   in caliber. Aortic calcifications  Abdomen: Streak artifact from patient's arms obscures upper abdomen. No   gross abnormalities.  Bones and soft tissues: Degenerative changes cervical and thoracic spine  Lungs, Pleura, and Airways: Patent central airways.  Bilateral lower lobe consolidations with volume loss, left lower lobe   (602/311).. No pneumothorax or parenchymal mass.      Pulmonary nodules:  No suspicious pulmonary nodules are identified.  IMPRESSION:  Support devices, in satisfactory position.  Bilateral lower lobe consolidations with volume loss left lower lobe   (602/311).  No pneumothorax or parenchymal mass.    No suspicious pulmonary nodules are identified.            --- End of Report ---            HOLDEN PATEL MD; Attending Radiologist  This document has been electronically signed. Nov 19 2024  9:33AM (11-18-24 @ 15:39)      CARDIOLOGY TESTING  12 Lead ECG:   Ventricular Rate 87 BPM    QRS Duration 92 ms    Q-T Interval 460 ms    QTC Calculation(Bazett) 553 ms    R Axis 52 degrees    T Axis 21 degrees    Diagnosis Line Atrial fibrillation with premature ventricular or aberrantly conducted  complexes  Anteroseptal infarct , age undetermined  Marked ST abnormality, possible inferolateral subendocardial injury  Prolonged QT  Abnormal ECG    Confirmed by Bridger Rosenberg ([id number]) on 11/19/2024 7:05:55 PM (11-18-24 @ 03:16)      MEDICATIONS  chlorhexidine 0.12% Liquid 15  chlorhexidine 2% Cloths 1  doxycycline IVPB 100  doxycycline IVPB   enoxaparin Injectable 40  folic acid 1  gabapentin 100  hydroxychloroquine 400  levETIRAcetam 1500  midazolam Infusion. 0.05  multivitamin 1  pantoprazole   Suspension 40  piperacillin/tazobactam IVPB.. 3.375  polyethylene glycol 3350 17  potassium chloride   Powder 40  propofol Infusion. 40  propofol Injectable 50  senna 2  silver sulfADIAZINE 1% Cream 1  thiamine 100      WEIGHT  Weight (kg): 109.1 (11-18-24 @ 08:59)  Creatinine: 1.0 mg/dL (11-20-24 @ 04:40)      ANTIBIOTICS:  doxycycline IVPB 100 milliGRAM(s) IV Intermittent every 12 hours  doxycycline IVPB      hydroxychloroquine 400 milliGRAM(s) Oral daily  piperacillin/tazobactam IVPB.. 3.375 Gram(s) IV Intermittent every 8 hours      All available historical records have been reviewed

## 2024-11-20 NOTE — DIETITIAN INITIAL EVALUATION ADULT - OTHER CALCULATIONS
Using ABW and IBW: ENERGY: PSE 1716.80 kcal/day vs. 2596-6232 kcal/day (11-14 kcal/kg); PROTEIN: 113-151 g/day (1.5-2 g/kg IBW 75.5 KG); FLUID: 1mL/kcal -- with consideration for age, BMI, intubated on propofol;  Ve 6.8, Tmax 35.9 C, propofol 26.2 mL/hr -- 691.68 kcal additional

## 2024-11-20 NOTE — PROGRESS NOTE ADULT - ASSESSMENT
IMPRESSION:    Acute hypoxemic respiratory failure   Possible aspiration   SP CPA   VTach   HO SLE   Seizures   Shock now off Levophed   Post infectious HAAD ?    PLAN:    CNS: Continue VEEG.  Keep sedated for now.  Start Adjusting Versed if VEEG negative.  Continue Keppra.  Neuro eval noted.  CTH noted      HEENT: Oral care.  ET Care     PULMONARY:  HOB @ 45 degrees.  Vent changes as follows: ARDS net MV settings.;  PEEP 8.  RR 16.  DTA noted.  Monitor Airway pressures.  Wean O2 as tolerated.  DC Solumedrol.      CARDIOVASCULAR:   ECHO noted.  EP eval.  DC IVF.  trend CPK     GI: GI prophylaxis.  OG Feeding.  Bowel regimen     RENAL:  Follow up lytes.  Correct as needed.  Andrea for retention     INFECTIOUS DISEASE: Follow up cultures.  Zosyn and Doxy.  Nasal MRSA neg,  RVP neg.  procal noted.      HEMATOLOGICAL:  DVT prophylaxis.  Monitor CBC     ENDOCRINE:  Follow up FS.  Insulin protocol if needed.  Cortisol level noted.      MUSCULOSKELETAL:  Off loading.  Bed rest      MICU

## 2024-11-20 NOTE — DIETITIAN INITIAL EVALUATION ADULT - OTHER INFO
Pertinent Medical Information: Per H&P, pt is a 68 y/o male with PMH of HTN, HLD, mild SLE (Lupus) ,GIB secondary to duodenal ulcer in 1/2024,  Multiple falls , Chronic ataxia and Visual Hallucinations, hx of recently diagnosed progressive supranuclear palsy for which he follows with a neurologist at [place],  presents to the ED due to cough and SOB for 2 days duration. Ve 6.8, Tmax 35.9 C, propofol 26.2 mL/hr -- 691.68 kcal additional    Weight hx: UBW unknown. Current dosing weight is 109.1 KG. Previous admission weight was 100.7 KG on 1/19/24 per HIE. 7.6% unintentional weight gain in over 10 months compared to current dosing weight. Pt does not meet weight criteria for malnutrition at this time.

## 2024-11-20 NOTE — PATIENT PROFILE ADULT - FUNCTIONAL ASSESSMENT - DAILY ACTIVITY SCORE.
6 Partial Purse String (Intermediate) Text: Given the location of the defect and the characteristics of the surrounding skin an intermediate purse string closure was deemed most appropriate.  Undermining was performed circumfirentially around the surgical defect.  A purse string suture was then placed and tightened. Wound tension only allowed a partial closure of the circular defect.

## 2024-11-20 NOTE — PHARMACOTHERAPY INTERVENTION NOTE - INTERVENTION TYPE RECOOMEND
Therapy Recommended - Additional therapy
Timing/Frequency of Administration Recommended
Duration of Therapy Recommended
Therapy Recommended - Alternative treatment
Timing/Frequency of Administration Recommended

## 2024-11-20 NOTE — PROGRESS NOTE ADULT - SUBJECTIVE AND OBJECTIVE BOX
NEUROLOGY CONSULT    HPI:   pt is a 70 y/o male with PMH of HTN, HLD, mild SLE (Lupus) ,GIB secondary to duodenal ulcer in 1/2024,  Multiple falls , Chronic ataxia and Visual Hallucinations, hx of recently diagnosed progressive supranuclear palsy for which he follows with a neurologist at [place],  presents to the ED due to cough and SOB for 2 days duration. per cousin belly the HCP who visited him eariler today at the Adult home, the pt has been complaining of cough and SOB for the past 2 days, he said that his chest feels congested then his cousin noticed that his breathing got worse and he started wheezing and having noisy breath sounds so asked the  nurse to get him to ED, pt at that time was denying any chest pain, he also had no fever, chills , GI or  symptoms, and no sick contacts. of note cousin mentions that he had visual hallucinations for the past week or so saying he sees a man in his closet ( this is not the first time the patient has visual hallucinations and is following with a neurologist at [place]).     After ED eval, patient was initially admitted to TELE for mx of Aspiration PNA and NSTEMI II, then after that he started getting tachypneic, increased WOB, nurses suctioned him and then they noticed he is blue, then he became pulseless and CPR was initiated, he was successfully intubated , pt got ROSC , post ROSC rythm was Vtach per ED so he was started on amiodarone drip. during code blue ED also started him on Epinephrine drip.     Vitals on admission prior to code blue :   /74 , HR 64 , T 98.4 , O2 95%RA    CBC and CMP unremarkable   EKG NSR  Trops 32>>29   Lactate negative    CXR with b/l PNA   was given 1 L LR bolus and 1g cefepime in ED    pt is admitted to MICU (18 Nov 2024 03:54)      mRankin score 0 at baseline  0 No symptoms at all  1 No significant disability despite symptoms; able to carry out all usual duties and activities without assistance  2 Slight disability; unable to carry out all previous activities, but able to look after own affairs  3 Moderate disability; requiring some help, but able to walk without assistance  4 Moderately severe disability; unable to walk without assistance and unable to attend to own bodily needs without assistance  5 Severe disability; bedridden, incontinent and requiring constant nursing care and attention  6 Dead     MEDICATIONS  Home Medications:  acetaminophen 325 mg oral tablet: 2 tab(s) orally every 6 hours as needed for pain or fever (29 Jan 2024 07:48)  fluticasone 50 mcg/inh nasal spray: 1 spray(s) in each nostril 2 times a day (18 Nov 2024 03:48)  folic acid 1 mg oral tablet: 1 tab(s) orally once a day (18 Jan 2024 12:17)  gabapentin 100 mg oral capsule: 1 cap(s) orally 3 times a day (every 8 hours) (29 Jan 2024 07:48)  hydroxychloroquine 200 mg oral tablet: 2 tab(s) orally once a day (16 Jan 2024 02:53)  lidocaine 4% topical film: Apply topically to affected area once a day apply to affected  area on right chest wall once daily, leave on for 12 hours, remove for 12 hours, wash hands after use ; avoid open skin - apply to intact skin only (29 Jan 2024 07:48)  losartan 50 mg oral tablet: 1 tab(s) orally once a day (18 Nov 2024 03:48)  Multiple Vitamins oral tablet: 1 tab(s) orally once a day (18 Jan 2024 12:17)  pantoprazole 40 mg oral delayed release tablet: 1 tab(s) orally 2 times a day ; take 1st tab in the morning 30 minutes before breakfast, and take the 2nd tab at bedtime (29 Jan 2024 07:48)  silver sulfADIAZINE 1% topical cream: Apply topically to affected area every 12 hours :  wash with soap and water, pat dry, apply silvadene to R chest and b/l  UE&#x27;s and b/l LE&#x27;s, cover with adaptic + DSD + kerlix (29 Jan 2024 07:55)  TAMSULOSIN HCL 0.4 MG CAPSULE: 1 cap(s) orally once a day (at bedtime) (29 Jan 2024 07:48)  thiamine 100 mg oral tablet: 1 tab(s) orally once a day (18 Jan 2024 12:17)    MEDICATIONS  (STANDING):  chlorhexidine 0.12% Liquid 15 milliLiter(s) Oral Mucosa every 12 hours  chlorhexidine 2% Cloths 1 Application(s) Topical <User Schedule>  doxycycline IVPB 100 milliGRAM(s) IV Intermittent every 12 hours  doxycycline IVPB      enoxaparin Injectable 40 milliGRAM(s) SubCutaneous every 24 hours  folic acid 1 milliGRAM(s) Oral daily  gabapentin 100 milliGRAM(s) Oral three times a day  hydroxychloroquine 400 milliGRAM(s) Oral daily  levETIRAcetam 1500 milliGRAM(s) Oral two times a day  midazolam Infusion. 0.05 mG/kG/Hr (5.46 mL/Hr) IV Continuous <Continuous>  multivitamin 1 Tablet(s) Oral daily  pantoprazole   Suspension 40 milliGRAM(s) Oral two times a day  piperacillin/tazobactam IVPB.. 3.375 Gram(s) IV Intermittent every 8 hours  polyethylene glycol 3350 17 Gram(s) Oral two times a day  propofol Infusion. 40 MICROgram(s)/kG/Min (26.2 mL/Hr) IV Continuous <Continuous>  propofol Injectable 50 milliGRAM(s) IV Push once  senna 2 Tablet(s) Oral at bedtime  silver sulfADIAZINE 1% Cream 1 Application(s) Topical every 12 hours  thiamine 100 milliGRAM(s) Oral daily  valproate sodium   IVPB 2000 milliGRAM(s) IV Intermittent once  valproate sodium   IVPB 750 milliGRAM(s) IV Intermittent every 12 hours    MEDICATIONS  (PRN):  acetaminophen     Tablet .. 650 milliGRAM(s) Oral every 6 hours PRN Temp greater or equal to 38C (100.4F)      FAMILY HISTORY:  Family history of colon cancer in father      SOCIAL HISTORY: negative for tobacco, alcohol, or ilicit drug use.    Allergies    iodine (Rash (Mild to Mod))    Intolerances        REVIEW OF SYSTEMS: No ROS because pt is sedated       PHYSICAL EXAM:  GENERAL: Sedated   HEAD:  Intubated   EYES: +ve corneal reflex on neuro exam   PSYCH: Comatose   EXTREMITIES: B/L myclonic twitches intermittently   NEUROLOGY: Sedated +ve pupilary, corneal reflex, +ve tripple flex, +ve babinski, -ve gag reflex     LABS:                        8.5    11.29 )-----------( 185      ( 20 Nov 2024 04:40 )             26.5     11-20    138  |  105  |  23[H]  ----------------------------<  123[H]  3.3[L]   |  25  |  1.0    Ca    8.0[L]      20 Nov 2024 04:40  Phos  2.9     11-19  Mg     2.2     11-20    TPro  5.1[L]  /  Alb  3.1[L]  /  TBili  0.3  /  DBili  x   /  AST  30  /  ALT  12  /  AlkPhos  52  11-20    Hemoglobin A1C:   Vitamin B12   PT/INR - ( 19 Nov 2024 06:10 )   PT: 13.20 sec;   INR: 1.12 ratio         PTT - ( 19 Nov 2024 06:10 )  PTT:28.4 sec  CAPILLARY BLOOD GLUCOSE      POCT Blood Glucose.: 165 mg/dL (20 Nov 2024 06:18)      Urinalysis Basic - ( 20 Nov 2024 04:40 )    Color: x / Appearance: x / SG: x / pH: x  Gluc: 123 mg/dL / Ketone: x  / Bili: x / Urobili: x   Blood: x / Protein: x / Nitrite: x   Leuk Esterase: x / RBC: x / WBC x   Sq Epi: x / Non Sq Epi: x / Bacteria: x        ABG - ( 20 Nov 2024 03:32 )  pH, Arterial: 7.42  pH, Blood: x     /  pCO2: 40    /  pO2: 107   / HCO3: 26    / Base Excess: 1.3   /  SaO2: 98.5                Microbiology:    Culture - Urine (collected 18 Nov 2024 14:00)  Source: Catheterized Catheterized  Final Report (20 Nov 2024 07:28):    No growth    Culture - Sputum (collected 18 Nov 2024 10:10)  Source: Trach Asp Tracheal Aspirate  Gram Stain (18 Nov 2024 22:28):    Moderate polymorphonuclear leukocytes per low power field    No Squamous epithelial cells per low power field    Moderate Gram positive cocci in pairs per oil power field    Few Gram Positive Rods per oil power field  Preliminary Report (19 Nov 2024 15:34):    Commensal domonique consistent with body site    Culture - Blood (collected 18 Nov 2024 00:30)  Source: .Blood BLOOD  Preliminary Report (20 Nov 2024 06:01):    No growth at 48 Hours    Culture - Blood (collected 18 Nov 2024 00:20)  Source: .Blood BLOOD  Preliminary Report (20 Nov 2024 06:01):    No growth at 48 Hours        RADIOLOGY, EKG AND ADDITIONAL TESTS: Reviewed.

## 2024-11-20 NOTE — PROGRESS NOTE ADULT - ASSESSMENT
pt is a 70 y/o male with PMH of HTN, HLD, mild SLE (Lupus) ,GIB secondary to duodenal ulcer in 1/2024,  Multiple falls , Chronic ataxia and Visual Hallucinations, hx of recently diagnosed progressive supranuclear palsy for which he follows with a neurologist at [place],  presents to the ED due to cough and SOB for 2 days duration. per cousin belly the HCP who visited him eariler today at the Adult home, the pt has been complaining of cough and SOB for the past 2 days, he said that his chest feels congested then his cousin noticed that his breathing got worse and he started wheezing and having noisy breath sounds so asked the  nurse to get him to ED, pt at that time was denying any chest pain, he also had no fever, chills , GI or  symptoms, and no sick contacts. of note cousin mentions that he had visual hallucinations for the past week or so saying he sees a man in his closet ( this is not the first time the patient has visual hallucinations and is following with a neurologist at [place]).     After ED eval, patient was initially admitted to [facility] for mx of Aspiration PNA and NSTEMI II, then after that he started getting tachypneic, increased WOB, nurses suctioned him and then they noticed he is blue, then he became pulseless and CPR was initiated, he was successfully intubated , pt got ROSC , post ROSC rythm was Vtach per ED so he was started on amiodarone drip. during code blue ED, discontinued it d/t bradycardia. Started him on Epinephrine drip.     Vitals on admission prior to code blue :   /74 , HR 64 , T 98.4 , O2 95%RA  CXR with b/l PNA   was given 1 L LR bolus and 1g cefepime in ED    pt is admitted to MICU. Neuro w/u done in ICU, EEG showed myclonic seizures, CTH neg. Pt's sedation and antieptileptics increased.    #Acute Hypoxic respiratory failure s/p intubation   #Possible Aspiration/Multifocal PNA   CXR unchanged - b/l hilar opacities, small RLL effusion (my read)  CT chest - b/l lower lobe consolidations  WBC downtrending 12->20->12->11  Infectious w/u neg: MRSA, RVP, Procal 0.44, UCx neg  101 F overnight  Plan:  - c/w cefepime + doxy  - ID following  - Tylenol PRN for fevers    #Cardiac arrest, ~3min down time, s/p 1 shock for Vtach after ROSC  #NSTEMI type II  #Bradycardia   s/p amio ggt. Amio dc'd d/t bradycardia  Bedside US - not volume depleted  EP - f/u for ischemic w/u when stabilized  Cortisol 12, ->400  Plan:  - switched solumedrol 40 BID to QD  - off pressors    #Myoclonic seizures s/p CPA  Day 1 EEG findings: majority of the generalized spikes are associated with head jerks or bilateral proximal myoclonic jerks. Around 3 am patient had an event characterized by cluster of myoclonic seizures followed by a generalized myoclonic/clonic seizure that lasted for about 1 hour. After that EEG returned to the baseline as described above.  CTH - no acute path  Day 2 EEG findings: 2 discreet myoclonic generalized seizures yesterday at 8:30 am and 6:30 pm characterized by clusters of myoclonic activity.  Plan:  - added Valproate  - c/w keppra 1500 BID  - c/w versed and propofol  - vent settings to prevent alkalosis (rate 16)  - f/u neuro recs for MRI brain when stable without seizures     #HO Lupus  #HO HTN  #HO GIB secondary to duodenal ulcer 1/2024   #Multiple falls , Chronic ataxia   #HO progressive supranuclear palsy and Visual Hallucinations  - on home meds: hydroxychloroquine, thiamine  - held BP meds d/t shock    #Lines: R IGNACIO 11/18, briggs 11/18  #Family: Cousin Jw is HCP. Updated today at bedside 11/19  #DVT prophylaxis: Lovenox  #GI prophylaxis: PPI suspension  #Diet: tube feeds, bowel regimen  #Activity: bedrest, IAT  #Code status: DNR/Intubate  #Disposition: ICU    Pendings:  1. Monitor for seizure activity  2. MRI brain when stable  3. Trend CPK

## 2024-11-20 NOTE — PROGRESS NOTE ADULT - SUBJECTIVE AND OBJECTIVE BOX
SUBJECTIVE/OVERNIGHT EVENTS  Today is hospital day 2d. This morning patient was seen and examined at bedside, resting comfortably in bed. No acute or major events overnight.    HOSPITAL COURSE  Day 1:   Day 2:   Day 3:     CODE STATUS:    FAMILY COMMUNICATION  Contact date:  Name of person contacted:  Relationship to patient:  Communication details:    MEDICATIONS  STANDING MEDICATIONS  chlorhexidine 0.12% Liquid 15 milliLiter(s) Oral Mucosa every 12 hours  chlorhexidine 2% Cloths 1 Application(s) Topical <User Schedule>  doxycycline IVPB 100 milliGRAM(s) IV Intermittent every 12 hours  doxycycline IVPB      enoxaparin Injectable 40 milliGRAM(s) SubCutaneous every 24 hours  folic acid 1 milliGRAM(s) Oral daily  gabapentin 100 milliGRAM(s) Oral three times a day  hydroxychloroquine 400 milliGRAM(s) Oral daily  levETIRAcetam 1500 milliGRAM(s) Oral two times a day  midazolam Infusion. 0.05 mG/kG/Hr IV Continuous <Continuous>  multivitamin 1 Tablet(s) Oral daily  pantoprazole   Suspension 40 milliGRAM(s) Oral two times a day  piperacillin/tazobactam IVPB.. 3.375 Gram(s) IV Intermittent every 8 hours  polyethylene glycol 3350 17 Gram(s) Oral two times a day  potassium chloride   Powder 40 milliEquivalent(s) Oral once  propofol Infusion. 40 MICROgram(s)/kG/Min IV Continuous <Continuous>  propofol Injectable 50 milliGRAM(s) IV Push once  senna 2 Tablet(s) Oral at bedtime  silver sulfADIAZINE 1% Cream 1 Application(s) Topical every 12 hours  thiamine 100 milliGRAM(s) Oral daily    PRN MEDICATIONS  acetaminophen     Tablet .. 650 milliGRAM(s) Oral every 6 hours PRN    VITALS  T(F): 96.7 (11-20-24 @ 08:00), Max: 100.4 (11-19-24 @ 12:00)  HR: 40 (11-20-24 @ 08:00) (40 - 54)  BP: 100/54 (11-20-24 @ 08:00) (94/48 - 164/74)  RR: 16 (11-20-24 @ 08:00) (16 - 21)  SpO2: 98% (11-20-24 @ 08:00) (98% - 100%)  POCT Blood Glucose.: 165 mg/dL (11-20-24 @ 06:18)  POCT Blood Glucose.: 115 mg/dL (11-19-24 @ 11:54)    PHYSICAL EXAM  GENERAL: NAD, lying in bed comfortably  HEAD:  Atraumatic, normocephalic  EYES: EOMI, PERRLA, conjunctiva and sclera clear  ENT: Moist mucous membranes  NECK: Supple, no JVD  HEART: Regular rate and rhythm, no murmurs, rubs, or gallops  LUNGS: Unlabored respirations.  Clear to auscultation bilaterally, no crackles, wheezing, or rhonchi  ABDOMEN: Soft, nontender, nondistended, +BS  EXTREMITIES: 2+ peripheral pulses bilaterally. No clubbing, cyanosis, or edema  NERVOUS SYSTEM:  A&Ox3, no focal deficits   SKIN: No rashes or lesions    LABS             8.5    11.29 )-----------( 185      ( 11-20-24 @ 04:40 )             26.5     138  |  105  |  23  -------------------------<  123   11-20-24 @ 04:40  3.3  |  25  |  1.0    Ca      8.0     11-20-24 @ 04:40  Phos   2.9     11-19-24 @ 06:10  Mg     2.2     11-20-24 @ 04:40    TPro  5.1  /  Alb  3.1  /  TBili  0.3  /  DBili  x   /  AST  30  /  ALT  12  /  AlkPhos  52  /  GGT  x     11-20-24 @ 04:40    PT/INR - ( 11-19-24 @ 06:10 )   PT: 13.20 sec[H];   INR: 1.12 ratio  PTT - ( 11-19-24 @ 06:10 )  PTT:28.4 sec    Pro-Brain Natriuretic Peptide: 569 pg/mL (11-19-24 @ 06:10)  Troponin T, High Sensitivity Result: 100 ng/L (11-19-24 @ 06:10)  Troponin T, High Sensitivity Result: 109 ng/L (11-18-24 @ 14:00)    Urinalysis Basic - ( 20 Nov 2024 04:40 )    Color: x / Appearance: x / SG: x / pH: x  Gluc: 123 mg/dL / Ketone: x  / Bili: x / Urobili: x   Blood: x / Protein: x / Nitrite: x   Leuk Esterase: x / RBC: x / WBC x   Sq Epi: x / Non Sq Epi: x / Bacteria: x      ABG - ( 20 Nov 2024 03:32 )  pH, Arterial: 7.42  pH, Blood: x     /  pCO2: 40    /  pO2: 107   / HCO3: 26    / Base Excess: 1.3   /  SaO2: 98.5                Culture - Urine (collected 18 Nov 2024 14:00)  Source: Catheterized Catheterized  Final Report (20 Nov 2024 07:28):    No growth    Culture - Sputum (collected 18 Nov 2024 10:10)  Source: Trach Asp Tracheal Aspirate  Gram Stain (18 Nov 2024 22:28):    Moderate polymorphonuclear leukocytes per low power field    No Squamous epithelial cells per low power field    Moderate Gram positive cocci in pairs per oil power field    Few Gram Positive Rods per oil power field  Preliminary Report (19 Nov 2024 15:34):    Commensal domonique consistent with body site    Culture - Blood (collected 18 Nov 2024 00:30)  Source: .Blood BLOOD  Preliminary Report (20 Nov 2024 06:01):    No growth at 48 Hours    Culture - Blood (collected 18 Nov 2024 00:20)  Source: .Blood BLOOD  Preliminary Report (20 Nov 2024 06:01):    No growth at 48 Hours      IMAGING SUBJECTIVE/OVERNIGHT EVENTS  Today is hospital day 2d. This morning patient was seen and examined at bedside, resting comfortably in bed. No acute or major events overnight.    HOSPITAL COURSE  Day 1:   Day 2:   Day 3:     CODE STATUS:    FAMILY COMMUNICATION  Contact date:  Name of person contacted:  Relationship to patient:  Communication details:    MEDICATIONS  STANDING MEDICATIONS  chlorhexidine 0.12% Liquid 15 milliLiter(s) Oral Mucosa every 12 hours  chlorhexidine 2% Cloths 1 Application(s) Topical <User Schedule>  doxycycline IVPB 100 milliGRAM(s) IV Intermittent every 12 hours  doxycycline IVPB      enoxaparin Injectable 40 milliGRAM(s) SubCutaneous every 24 hours  folic acid 1 milliGRAM(s) Oral daily  gabapentin 100 milliGRAM(s) Oral three times a day  hydroxychloroquine 400 milliGRAM(s) Oral daily  levETIRAcetam 1500 milliGRAM(s) Oral two times a day  midazolam Infusion. 0.05 mG/kG/Hr IV Continuous <Continuous>  multivitamin 1 Tablet(s) Oral daily  pantoprazole   Suspension 40 milliGRAM(s) Oral two times a day  piperacillin/tazobactam IVPB.. 3.375 Gram(s) IV Intermittent every 8 hours  polyethylene glycol 3350 17 Gram(s) Oral two times a day  potassium chloride   Powder 40 milliEquivalent(s) Oral once  propofol Infusion. 40 MICROgram(s)/kG/Min IV Continuous <Continuous>  propofol Injectable 50 milliGRAM(s) IV Push once  senna 2 Tablet(s) Oral at bedtime  silver sulfADIAZINE 1% Cream 1 Application(s) Topical every 12 hours  thiamine 100 milliGRAM(s) Oral daily    PRN MEDICATIONS  acetaminophen     Tablet .. 650 milliGRAM(s) Oral every 6 hours PRN    VITALS  T(F): 96.7 (11-20-24 @ 08:00), Max: 100.4 (11-19-24 @ 12:00)  HR: 40 (11-20-24 @ 08:00) (40 - 54)  BP: 100/54 (11-20-24 @ 08:00) (94/48 - 164/74)  RR: 16 (11-20-24 @ 08:00) (16 - 21)  SpO2: 98% (11-20-24 @ 08:00) (98% - 100%)  POCT Blood Glucose.: 165 mg/dL (11-20-24 @ 06:18)  POCT Blood Glucose.: 115 mg/dL (11-19-24 @ 11:54)    PHYSICAL EXAM  GENERAL: NAD, lying in bed comfortably  HEART: Regular rate and rhythm, no murmurs, rubs, or gallops  LUNGS: Unlabored respirations.  Clear to auscultation bilaterally, no crackles, wheezing, or rhonchi  ABDOMEN: Soft, nontender, nondistended, +BS  EXTREMITIES: 2+ peripheral pulses bilaterally. No clubbing, cyanosis, or edema  NERVOUS SYSTEM:  Sedated  SKIN: No rashes or lesions    LABS             8.5    11.29 )-----------( 185      ( 11-20-24 @ 04:40 )             26.5     138  |  105  |  23  -------------------------<  123   11-20-24 @ 04:40  3.3  |  25  |  1.0    Ca      8.0     11-20-24 @ 04:40  Phos   2.9     11-19-24 @ 06:10  Mg     2.2     11-20-24 @ 04:40    TPro  5.1  /  Alb  3.1  /  TBili  0.3  /  DBili  x   /  AST  30  /  ALT  12  /  AlkPhos  52  /  GGT  x     11-20-24 @ 04:40    PT/INR - ( 11-19-24 @ 06:10 )   PT: 13.20 sec[H];   INR: 1.12 ratio  PTT - ( 11-19-24 @ 06:10 )  PTT:28.4 sec    Pro-Brain Natriuretic Peptide: 569 pg/mL (11-19-24 @ 06:10)  Troponin T, High Sensitivity Result: 100 ng/L (11-19-24 @ 06:10)  Troponin T, High Sensitivity Result: 109 ng/L (11-18-24 @ 14:00)    Urinalysis Basic - ( 20 Nov 2024 04:40 )    Color: x / Appearance: x / SG: x / pH: x  Gluc: 123 mg/dL / Ketone: x  / Bili: x / Urobili: x   Blood: x / Protein: x / Nitrite: x   Leuk Esterase: x / RBC: x / WBC x   Sq Epi: x / Non Sq Epi: x / Bacteria: x      ABG - ( 20 Nov 2024 03:32 )  pH, Arterial: 7.42  pH, Blood: x     /  pCO2: 40    /  pO2: 107   / HCO3: 26    / Base Excess: 1.3   /  SaO2: 98.5                Culture - Urine (collected 18 Nov 2024 14:00)  Source: Catheterized Catheterized  Final Report (20 Nov 2024 07:28):    No growth    Culture - Sputum (collected 18 Nov 2024 10:10)  Source: Trach Asp Tracheal Aspirate  Gram Stain (18 Nov 2024 22:28):    Moderate polymorphonuclear leukocytes per low power field    No Squamous epithelial cells per low power field    Moderate Gram positive cocci in pairs per oil power field    Few Gram Positive Rods per oil power field  Preliminary Report (19 Nov 2024 15:34):    Commensal domonique consistent with body site    Culture - Blood (collected 18 Nov 2024 00:30)  Source: .Blood BLOOD  Preliminary Report (20 Nov 2024 06:01):    No growth at 48 Hours    Culture - Blood (collected 18 Nov 2024 00:20)  Source: .Blood BLOOD  Preliminary Report (20 Nov 2024 06:01):    No growth at 48 Hours      IMAGING

## 2024-11-20 NOTE — PROGRESS NOTE ADULT - CRITICAL CARE ATTENDING COMMENT
The patient's injury acutely impairs one or more vital organ systems, and there is a high probability of imminent or life threatening deterioration in the patient’s condition. The care of this patient requires complex medical decision making in the field of Infectious Diseases and extensive interpretation of laboratory, microbiological, and radiographic data   Discussed with Dr Rogers The patient's injury acutely impairs one or more vital organ systems, and there is a high probability of imminent or life threatening deterioration in the patient’s condition. The care of this patient requires complex medical decision making in the field of Infectious Diseases and extensive interpretation of laboratory, microbiological, and radiographic data

## 2024-11-20 NOTE — DIETITIAN INITIAL EVALUATION ADULT - NS FNS DIET ORDER
Diet, NPO with Tube Feed:   Tube Feeding Modality: Orogastric  Jevity 1.2 Michael (JEVITY1.2RTH)  Total Volume for 24 Hours (mL): 1200  Continuous  Starting Tube Feed Rate {mL per Hour}: 20  Increase Tube Feed Rate by (mL): 10     Every 4 hours  Until Goal Tube Feed Rate (mL per Hour): 50  Tube Feed Duration (in Hours): 24  Tube Feed Start Time: 13:00  Free Water Flush  Free Water Flush Instructions:  300mL FHF every 4 hours (11-18-24 @ 12:31) [Active]

## 2024-11-20 NOTE — PROGRESS NOTE ADULT - ATTENDING COMMENTS
In brief,   68 y/o man with multiple medical comorbidities as delineated above who was initially admitted for aspiration PNA and NSTEMI, later went into PEA arrest with subsequent ROSC. Neurology consulted for involuntary face and arm movements seen post-arrest. CT Head unremarkable. Initial vEEG with burst suppression pattern and cluster of myoclonic seizures followed by a generalized myoclonic/clonic seizure that lasted for about 1 hour.  Patient with postanoxic status epilepticus and myoclonus and with burst suppression pattern highly suggestive of poor prognosis.     vEEG with suppressed background with superimposed generalized spikes and two generalized myoclonic seizures yesterday (8:30am and 6:30pm). Exam unchanged apart from no evidence of myoclonus during evaluation (remains on same versed and propofol gtt's)    Recommendations:  - Depakote 2g load followed by 750mg q12h, obtain trough levels in am  - continue Keppra 1500mg BID  - continue Propofol and Versed at same rate for now  - continue vEEG  - MRI Brain for prognostication purposes only once patient stable and not seizing  - Keep Mg > 2, K > 4  - seizure precautions  - above communicated to primary team .

## 2024-11-20 NOTE — DIETITIAN INITIAL EVALUATION ADULT - PERTINENT MEDS FT
MEDICATIONS  (STANDING):  chlorhexidine 0.12% Liquid 15 milliLiter(s) Oral Mucosa every 12 hours  chlorhexidine 2% Cloths 1 Application(s) Topical <User Schedule>  doxycycline IVPB 100 milliGRAM(s) IV Intermittent every 12 hours  doxycycline IVPB      enoxaparin Injectable 40 milliGRAM(s) SubCutaneous every 24 hours  folic acid 1 milliGRAM(s) Oral daily  gabapentin 100 milliGRAM(s) Oral three times a day  hydroxychloroquine 400 milliGRAM(s) Oral daily  levETIRAcetam 1500 milliGRAM(s) Oral two times a day  midazolam Infusion. 0.05 mG/kG/Hr (5.46 mL/Hr) IV Continuous <Continuous>  multivitamin 1 Tablet(s) Oral daily  pantoprazole   Suspension 40 milliGRAM(s) Oral two times a day  piperacillin/tazobactam IVPB.. 3.375 Gram(s) IV Intermittent every 8 hours  polyethylene glycol 3350 17 Gram(s) Oral two times a day  propofol Infusion. 40 MICROgram(s)/kG/Min (26.2 mL/Hr) IV Continuous <Continuous>  propofol Injectable 50 milliGRAM(s) IV Push once  senna 2 Tablet(s) Oral at bedtime  silver sulfADIAZINE 1% Cream 1 Application(s) Topical every 12 hours  thiamine 100 milliGRAM(s) Oral daily    MEDICATIONS  (PRN):  acetaminophen     Tablet .. 650 milliGRAM(s) Oral every 6 hours PRN Temp greater or equal to 38C (100.4F)

## 2024-11-20 NOTE — PROGRESS NOTE ADULT - ASSESSMENT
68 y/o M PMH of HTN, HLD, mild SLE (Lupus) ,GIB secondary to duodenal ulcer in 1/2024,  Multiple falls , Chronic ataxia and Visual Hallucinations, hx of recently diagnosed progressive supranuclear palsy for which he follows with a neurologist at [place], presented to the ED due to cough and SOB for 2 days admitted to [facility] for mx of Aspiration PNA and NSTEMI II, found to be tachypneic and went into PEA arrest w/ ROSC  s/p 3 epinephrine ivp, amiodarone gtt, and epinephrine gtt. Neurology was consulted for approval for vEEG for twitching movements. On 11/19 in the AM, pt was having myoclonic seizures followed by a generalized myoclonic/clonic seizure that lasted for about 1 hour.     Impression   #Myoclonic twitches   #Myoclonic seizures 11/19 am   #H/O supranuclear palsy   #S/P PEA arrest   -  +ve pupilary reflex b/l, +ve corneal reflex, +ve tripple flex, +ve babinski, -ve gag reflex   - myoclonic seizures followed by a generalized myoclonic/clonic seizure that lasted for about 1 hour on vEEG 11/19  - Burst suppression pattern on vEEG  - CTH 11/18 -ve for acute pathology   - occasionally generalized spikes are associated with head jerks on vEEG 11/20     Recommendations   - c/w midazzolam ggt   - c/w Keppra 1500 BID  - Give a Depakote 2g loading dose, followed by 750 mg BID    - Get MRI brain when stable without seizures to assist with prognostication       Case discussed with attending Dr. Barrera

## 2024-11-20 NOTE — DIETITIAN INITIAL EVALUATION ADULT - NSFNSGIIOFT_GEN_A_CORE
Last BM not documented; GI: WDL per flow sheet     11-19-24 @ 07:01  -  11-20-24 @ 07:00  --------------------------------------------------------  OUT:  Total OUT: 0 mL    Total NET: 510 mL

## 2024-11-20 NOTE — PROGRESS NOTE ADULT - SUBJECTIVE AND OBJECTIVE BOX
Patient is a 69y old  Male who presents with a chief complaint of SOB (19 Nov 2024 14:25)        Over Night Events:  Remains critically ill on MV.  no overt seizure.  On VEEG.  Off pressors.          ROS:     All ROS are negative except HPI         PHYSICAL EXAM    ICU Vital Signs Last 24 Hrs  T(C): 35.9 (20 Nov 2024 04:00), Max: 38 (19 Nov 2024 12:00)  T(F): 96.6 (20 Nov 2024 04:00), Max: 100.4 (19 Nov 2024 12:00)  HR: 41 (20 Nov 2024 07:47) (41 - 61)  BP: 113/58 (20 Nov 2024 07:00) (94/48 - 164/74)  BP(mean): 81 (20 Nov 2024 07:00) (69 - 106)  ABP: --  ABP(mean): --  RR: 16 (20 Nov 2024 07:00) (16 - 22)  SpO2: 98% (20 Nov 2024 07:47) (98% - 100%)    O2 Parameters below as of 20 Nov 2024 04:00  Patient On (Oxygen Delivery Method): ventilator    O2 Concentration (%): 40        CONSTITUTIONAL:  NAD    ENT:   Airway patent,   Mouth with normal mucosa.   ET     EYES:   Pupils equal,   Round and reactive to light.    CARDIAC:   Normal rate,   Regular rhythm.      RESPIRATORY:   No wheezing  Bilateral BS  Normal chest expansion  Not tachypneic,  No use of accessory muscles    GASTROINTESTINAL:  Abdomen soft,   Non-tender,   No guarding,   + BS    MUSCULOSKELETAL:   Range of motion is not limited,  No clubbing, cyanosis    NEUROLOGICAL:   Sedated     SKIN:   Skin normal color for race,   Warm and dry  No evidence of rash.      11-19-24 @ 07:01  -  11-20-24 @ 07:00  --------------------------------------------------------  IN:    Free Water: 1200 mL    IV PiggyBack: 100 mL    Jevity 1.2: 510 mL    Lactated Ringers: 1150 mL    Midazolam (Status Epilepticus): 126.5 mL    Propofol (Status Epilepticus): 26.2 mL    Propofol (Status Epilepticus): 26.2 mL    Propofol (Status Epilepticus): 26.2 mL    Propofol (Status Epilepticus): 524 mL  Total IN: 3689.1 mL    OUT:    Indwelling Catheter - Urethral (mL): 1165 mL    Norepinephrine: 0 mL  Total OUT: 1165 mL    Total NET: 2524.1 mL          LABS:                            8.5    11.29 )-----------( 185      ( 20 Nov 2024 04:40 )             26.5                                               11-20    138  |  105  |  23[H]  ----------------------------<  123[H]  3.3[L]   |  25  |  1.0    Ca    8.0[L]      20 Nov 2024 04:40  Phos  2.9     11-19  Mg     2.2     11-20    TPro  5.1[L]  /  Alb  3.1[L]  /  TBili  0.3  /  DBili  x   /  AST  30  /  ALT  12  /  AlkPhos  52  11-20      PT/INR - ( 19 Nov 2024 06:10 )   PT: 13.20 sec;   INR: 1.12 ratio         PTT - ( 19 Nov 2024 06:10 )  PTT:28.4 sec                                       Urinalysis Basic - ( 20 Nov 2024 04:40 )    Color: x / Appearance: x / SG: x / pH: x  Gluc: 123 mg/dL / Ketone: x  / Bili: x / Urobili: x   Blood: x / Protein: x / Nitrite: x   Leuk Esterase: x / RBC: x / WBC x   Sq Epi: x / Non Sq Epi: x / Bacteria: x                                                  LIVER FUNCTIONS - ( 20 Nov 2024 04:40 )  Alb: 3.1 g/dL / Pro: 5.1 g/dL / ALK PHOS: 52 U/L / ALT: 12 U/L / AST: 30 U/L / GGT: x                                                  Culture - Urine (collected 18 Nov 2024 14:00)  Source: Catheterized Catheterized  Final Report (20 Nov 2024 07:28):    No growth    Culture - Sputum (collected 18 Nov 2024 10:10)  Source: Trach Asp Tracheal Aspirate  Gram Stain (18 Nov 2024 22:28):    Moderate polymorphonuclear leukocytes per low power field    No Squamous epithelial cells per low power field    Moderate Gram positive cocci in pairs per oil power field    Few Gram Positive Rods per oil power field  Preliminary Report (19 Nov 2024 15:34):    Commensal domonique consistent with body site    Culture - Blood (collected 18 Nov 2024 00:30)  Source: .Blood BLOOD  Preliminary Report (20 Nov 2024 06:01):    No growth at 48 Hours    Culture - Blood (collected 18 Nov 2024 00:20)  Source: .Blood BLOOD  Preliminary Report (20 Nov 2024 06:01):    No growth at 48 Hours                                                   Mode: AC/ CMV (Assist Control/ Continuous Mandatory Ventilation)  RR (machine): 16  TV (machine): 460  FiO2: 40  PEEP: 8  ITime: 0.8  MAP: 13  PIP: 25                                      ABG - ( 20 Nov 2024 03:32 )  pH, Arterial: 7.42  pH, Blood: x     /  pCO2: 40    /  pO2: 107   / HCO3: 26    / Base Excess: 1.3   /  SaO2: 98.5  Lac 0.7              MEDICATIONS  (STANDING):  chlorhexidine 0.12% Liquid 15 milliLiter(s) Oral Mucosa every 12 hours  chlorhexidine 2% Cloths 1 Application(s) Topical <User Schedule>  doxycycline IVPB 100 milliGRAM(s) IV Intermittent every 12 hours  doxycycline IVPB      enoxaparin Injectable 40 milliGRAM(s) SubCutaneous every 24 hours  folic acid 1 milliGRAM(s) Oral daily  gabapentin 100 milliGRAM(s) Oral three times a day  hydroxychloroquine 400 milliGRAM(s) Oral daily  lactated ringers. 1000 milliLiter(s) (50 mL/Hr) IV Continuous <Continuous>  levETIRAcetam 1500 milliGRAM(s) Oral two times a day  methylPREDNISolone sodium succinate Injectable 40 milliGRAM(s) IV Push daily  midazolam Infusion. 0.05 mG/kG/Hr (5.46 mL/Hr) IV Continuous <Continuous>  multivitamin 1 Tablet(s) Oral daily  pantoprazole   Suspension 40 milliGRAM(s) Oral two times a day  piperacillin/tazobactam IVPB.. 3.375 Gram(s) IV Intermittent every 8 hours  polyethylene glycol 3350 17 Gram(s) Oral two times a day  propofol Infusion. 40 MICROgram(s)/kG/Min (26.2 mL/Hr) IV Continuous <Continuous>  propofol Injectable 50 milliGRAM(s) IV Push once  senna 2 Tablet(s) Oral at bedtime  silver sulfADIAZINE 1% Cream 1 Application(s) Topical every 12 hours  thiamine 100 milliGRAM(s) Oral daily    MEDICATIONS  (PRN):  acetaminophen     Tablet .. 650 milliGRAM(s) Oral every 6 hours PRN Temp greater or equal to 38C (100.4F)      New X-rays reviewed:                                                                                  ECHO

## 2024-11-20 NOTE — DIETITIAN INITIAL EVALUATION ADULT - NAME AND PHONE
Nella [phone] or TEAMS    Nutrition Interventions: TF regimen; Nutrition Monitoring: Diet order, weights, labs, NFPF, body composition, BM and tolerance to TF regimen

## 2024-11-21 NOTE — PROGRESS NOTE ADULT - ATTENDING COMMENTS
In brief,   70 y/o man with multiple medical comorbidities as delineated above who was initially admitted for aspiration PNA and NSTEMI, later went into PEA arrest with subsequent ROSC. Neurology consulted for involuntary face and arm movements seen post-arrest. CT Head unremarkable. Initial vEEG with burst suppression pattern and cluster of myoclonic seizures followed by a generalized myoclonic/clonic seizure that lasted for about 1 hour.  Patient with postanoxic status epilepticus and myoclonus and with burst suppression pattern highly suggestive of poor prognosis.     vEEG remains with mostly suppressed background with frequent to abundant low amplitude spikes but no seizures. Exam unchanged.     Recommendations:  - continue Depakote 750mg q12h, obtain trough level in am  - continue Keppra 1500mg BID  - can wean off either Propofol or Versed gtt (may consider Propofol given persistent bradycardia)  - continue vEEG  - MRI Brain for prognostication purposes only once patient stable and not seizing  - Keep Mg > 2, K > 4  - seizure precautions  - rest as above

## 2024-11-21 NOTE — PROGRESS NOTE ADULT - ASSESSMENT
pt is a 68 y/o male with PMH of HTN, HLD, mild SLE (Lupus) ,GIB secondary to duodenal ulcer in 1/2024,  Multiple falls , Chronic ataxia and Visual Hallucinations, hx of recently diagnosed progressive supranuclear palsy for which he follows with a neurologist at [place],  presents to the ED due to cough and SOB for 2 days duration, and visual hallucinations for past week.    After ED eval, patient was initially admitted to [facility] for mx of Aspiration PNA and NSTEMI II, then after that he started getting tachypneic, increased WOB, nurses suctioned him and then they noticed he is blue, then he became pulseless and CPR was initiated, he was successfully intubated , pt got ROSC , post ROSC rythm was Vtach per ED so he was started on amiodarone drip. during code blue ED, discontinued it d/t bradycardia. Started him on Epinephrine drip. Pt switch to levophed. EEG reporting showed episodes of generalized myoclonic seizures. Pt was put in Keppra + Depakote and Propofol + Versed. Latest EEG reports no seizures. Attempting to wean versed today. Pt has been bradycardic for the past couple days (~40), atropine PRN ready if needed.    #Acute Hypoxic respiratory failure s/p intubation   #Possible Aspiration/Multifocal PNA   CXR unchanged - b/l hilar opacities, small RLL effusion (my read)  CT chest - b/l lower lobe consolidations  WBC downtrending 12->20->12->11  Infectious w/u neg: MRSA, RVP, Procal 0.44, UCx neg  101 F overnight  Plan:  - c/w cefepime + doxy -> to end 11/24  - ID following  - Tylenol PRN for fevers    #Cardiac arrest, ~3min down time, s/p 1 shock for Vtach after ROSC  #NSTEMI type II  #Bradycardia   s/p amio ggt. Amio dc'd d/t bradycardia  Bedside US - not volume depleted  EP - f/u for ischemic w/u when stabilized  Cortisol 12, ->400->100  Back on pressors overnight  Plan:  - off solumedrol  - levophed -> dopamine 2 (to increase HR)    #Myoclonic seizures s/p CPA  Day 1 EEG findings: majority of the generalized spikes are associated with head jerks or bilateral proximal myoclonic jerks. Around 3 am patient had an event characterized by cluster of myoclonic seizures followed by a generalized myoclonic/clonic seizure that lasted for about 1 hour. After that EEG returned to the baseline as described above.  CTH - no acute path  Day 2 EEG findings: 2 discreet myoclonic generalized seizures yesterday at 8:30 am and 6:30 pm characterized by clusters of myoclonic activity.  Plan:  - c/w keppra 1500 BID and depakote 750 q12  - c/w propofol  - wean versed by 0.01 every few hours  - vent settings to prevent alkalosis (rate 14)  - f/u neuro recs for MRI brain when stable without seizures     #HO Lupus  #HO HTN  #HO GIB secondary to duodenal ulcer 1/2024   #Multiple falls , Chronic ataxia   #HO progressive supranuclear palsy and Visual Hallucinations  - on home meds: hydroxychloroquine, thiamine  - held BP meds d/t shock    #Lines: R IGNACIO 11/18, briggs 11/18  #Family: Cousin Jw is HCP. Updated today at bedside with another cousin 11/21  #DVT prophylaxis: Lovenox  #GI prophylaxis: PPI suspension  #Diet: tube feeds, bowel regimen  #Activity: bedrest, IAT  #Code status: DNR/Intubate  #Disposition: ICU    Pendings:  1. wean versed  2. monitor seizure activity  3. trend CPK  4. MRI brain when stable

## 2024-11-21 NOTE — PROGRESS NOTE ADULT - SUBJECTIVE AND OBJECTIVE BOX
SUBJECTIVE/OVERNIGHT EVENTS  Today is hospital day 3d. This morning patient was seen and examined at bedside, resting comfortably in bed. Bradycardic 30s-40s overnight. Back on pressors overnight.    HOSPITAL COURSE  Day 1:   Day 2:   Day 3:     CODE STATUS:    FAMILY COMMUNICATION  Contact date:  Name of person contacted:  Relationship to patient:  Communication details:    MEDICATIONS  STANDING MEDICATIONS  chlorhexidine 0.12% Liquid 15 milliLiter(s) Oral Mucosa every 12 hours  chlorhexidine 2% Cloths 1 Application(s) Topical <User Schedule>  DOPamine Infusion 2 MICROgram(s)/kG/Min IV Continuous <Continuous>  doxycycline IVPB 100 milliGRAM(s) IV Intermittent every 12 hours  doxycycline IVPB      enoxaparin Injectable 40 milliGRAM(s) SubCutaneous every 24 hours  folic acid 1 milliGRAM(s) Oral daily  furosemide   Injectable 40 milliGRAM(s) IV Push daily  gabapentin 100 milliGRAM(s) Oral three times a day  hydroxychloroquine 400 milliGRAM(s) Oral daily  levETIRAcetam 1500 milliGRAM(s) Oral two times a day  midazolam Infusion. 0.04 mG/kG/Hr IV Continuous <Continuous>  multivitamin 1 Tablet(s) Oral daily  pantoprazole   Suspension 40 milliGRAM(s) Oral two times a day  piperacillin/tazobactam IVPB.. 3.375 Gram(s) IV Intermittent every 8 hours  polyethylene glycol 3350 17 Gram(s) Oral two times a day  propofol Infusion. 40 MICROgram(s)/kG/Min IV Continuous <Continuous>  senna 2 Tablet(s) Oral at bedtime  silver sulfADIAZINE 1% Cream 1 Application(s) Topical every 12 hours  thiamine 100 milliGRAM(s) Oral daily  valproate sodium   IVPB 750 milliGRAM(s) IV Intermittent every 12 hours    PRN MEDICATIONS  acetaminophen     Tablet .. 650 milliGRAM(s) Oral every 6 hours PRN  atropine Injectable 1 milliGRAM(s) IntraMuscular once PRN    VITALS  T(F): 97.5 (11-21-24 @ 12:00), Max: 98.2 (11-21-24 @ 04:00)  HR: 45 (11-21-24 @ 14:42) (32 - 48)  BP: 92/47 (11-21-24 @ 14:15) (88/47 - 174/79)  RR: 17 (11-21-24 @ 14:15) (16 - 24)  SpO2: 96% (11-21-24 @ 14:42) (96% - 100%)    PHYSICAL EXAM  GENERAL: NAD, lying in bed comfortably, intubated  HEART: Regular rate and rhythm, no murmurs, rubs, or gallops  LUNGS: Unlabored respirations.  Clear to auscultation bilaterally, no crackles, wheezing, or rhonchi  ABDOMEN: Soft, nontender, nondistended, +BS  EXTREMITIES: b/l pitting edema  NERVOUS SYSTEM:  sedated, positive pupils b/l  SKIN: No rashes or lesions    LABS             8.5    11.15 )-----------( 209      ( 11-21-24 @ 04:25 )             26.5     137  |  104  |  22  -------------------------<  122   11-21-24 @ 04:25  4.1  |  25  |  0.9    Ca      7.8     11-21-24 @ 04:25  Mg     2.3     11-21-24 @ 04:25    TPro  5.3  /  Alb  3.1  /  TBili  0.2  /  DBili  x   /  AST  26  /  ALT  11  /  AlkPhos  50  /  GGT  x     11-21-24 @ 04:25      Pro-Brain Natriuretic Peptide: 569 pg/mL (11-19-24 @ 06:10)  Troponin T, High Sensitivity Result: 100 ng/L (11-19-24 @ 06:10)    Urinalysis Basic - ( 21 Nov 2024 04:25 )    Color: x / Appearance: x / SG: x / pH: x  Gluc: 122 mg/dL / Ketone: x  / Bili: x / Urobili: x   Blood: x / Protein: x / Nitrite: x   Leuk Esterase: x / RBC: x / WBC x   Sq Epi: x / Non Sq Epi: x / Bacteria: x      ABG - ( 21 Nov 2024 03:24 )  pH, Arterial: 7.43  pH, Blood: x     /  pCO2: 38    /  pO2: 114   / HCO3: 25    / Base Excess: 0.9   /  SaO2: 97.9                IMAGING

## 2024-11-21 NOTE — PROGRESS NOTE ADULT - ASSESSMENT
ASSESSMENT  70 y/o male with PMH of HTN, HLD, mild SLE (Lupus) ,GIB secondary to duodenal ulcer in 1/2024,  Multiple falls , Chronic ataxia and Visual Hallucinations, hx of recently diagnosed progressive supranuclear palsy for which he follows with a neurologist at [place],  presents to the ED due to cough and SOB for 2 days duration. per cousin belly the HCP who visited him eariler today at the Adult home, the pt has been complaining of cough and SOB for the past 2 days, he said that his chest feels congested then his cousin noticed that his breathing got worse and he started wheezing and having noisy breath sounds so asked the  nurse to get him to ED, pt at that time was denying any chest pain, he also had no fever, chills , GI or  symptoms, and no sick contacts. of note cousin mentions that he had visual hallucinations for the past week or so saying he sees a man in his closet ( this is not the first time the patient has visual hallucinations and is following with a neurologist at [place]).   After ED eval, patient was initially admitted to [facility] for mx of Aspiration PNA and NSTEMI II, then after that he started getting tachypneic, increased WOB, nurses suctioned him and then they noticed he is blue, then he became pulseless and CPR was initiated, he was successfully intubated , pt got ROSC , post ROSC rythm was Vtach per ED so he was started on amiodarone drip. during code blue ED also started him on Epinephrine drip.       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 3.375 Gram(s) IV Intermittent every 8 hours & Doxy 100mg q12h IV , empiric 7 days end 11/24  - Grave prognosis, GOC    If any questions, please send a message or call on [employer]  Please continue to update ID with any pertinent new laboratory, radiographic findings, or change in clinical status

## 2024-11-21 NOTE — PROGRESS NOTE ADULT - ASSESSMENT
70 y/o M PMH of HTN, HLD, mild SLE (Lupus) ,GIB secondary to duodenal ulcer in 1/2024,  Multiple falls , Chronic ataxia and Visual Hallucinations, hx of recently diagnosed progressive supranuclear palsy for which he follows with a neurologist at [place], presented to the ED due to cough and SOB for 2 days admitted to [facility] for mx of Aspiration PNA and NSTEMI II, found to be tachypneic and went into PEA arrest w/ ROSC  s/p 3 epinephrine ivp, amiodarone gtt, and epinephrine gtt. Neurology was consulted for approval for vEEG for twitching movements. On 11/19 in the AM, pt was having myoclonic seizures followed by a generalized myoclonic/clonic seizure that lasted for about 1 hour.     Impression   #Myoclonic twitches   #Myoclonic seizures 11/19 am   #H/O supranuclear palsy   #S/P PEA arrest   -  +ve pupilary reflex b/l, +ve corneal reflex, +ve tripple flex, +ve babinski, -ve gag reflex   - myoclonic seizures followed by a generalized myoclonic/clonic seizure that lasted for about 1 hour on vEEG 11/19  - Burst suppression pattern on vEEG  - CTH 11/18 -ve for acute pathology   - occasionally generalized spikes are associated with head jerks on vEEG 11/20     Recommendations   - c/w midazzolam ggt   - c/w Keppra 1500 BID  - Give a Depakote 2g loading dose, followed by 750 mg BID    - Get MRI brain when stable without seizures to assist with prognostication     INCOMPLETE NOTE       70 y/o M PMH of HTN, HLD, mild SLE (Lupus) ,GIB secondary to duodenal ulcer in 1/2024,  Multiple falls , Chronic ataxia and Visual Hallucinations, hx of recently diagnosed progressive supranuclear palsy for which he follows with a neurologist at [place], presented to the ED due to cough and SOB for 2 days admitted to [facility] for mx of Aspiration PNA and NSTEMI II, found to be tachypneic and went into PEA arrest w/ ROSC  s/p 3 epinephrine ivp, amiodarone gtt, and epinephrine gtt. Neurology was consulted for approval for vEEG for twitching movements. On 11/19 in the AM, pt was having myoclonic seizures followed by a generalized myoclonic/clonic seizure that lasted for about 1 hour.     Impression   #Myoclonic twitches   #Myoclonic seizures 11/19 am   #H/O supranuclear palsy   #S/P PEA arrest   -  +ve pupilary reflex b/l, +ve corneal reflex, +ve tripple flex, +ve babinski, -ve gag reflex   - myoclonic seizures followed by a generalized myoclonic/clonic seizure that lasted for about 1 hour on vEEG 11/19  - Burst suppression pattern on vEEG  - CTH 11/18 -ve for acute pathology   - occasionally generalized spikes are associated with head jerks on vEEG 11/20     Recommendations   - c/w midazzolam ggt   - c/w Keppra 1500 BID  - Get MRI brain when stable without seizures to assist with prognostication     INCOMPLETE NOTE       70 y/o M PMH of HTN, HLD, mild SLE (Lupus) ,GIB secondary to duodenal ulcer in 1/2024,  Multiple falls , Chronic ataxia and Visual Hallucinations, hx of recently diagnosed progressive supranuclear palsy for which he follows with a neurologist at [place], presented to the ED due to cough and SOB for 2 days admitted to [facility] for mx of Aspiration PNA and NSTEMI II, found to be tachypneic and went into PEA arrest w/ ROSC  s/p 3 epinephrine ivp, amiodarone gtt, and epinephrine gtt. Neurology was consulted for approval for vEEG for twitching movements. On 11/19 in the AM, pt was having myoclonic seizures followed by a generalized myoclonic/clonic seizure that lasted for about 1 hour.     Impression   #Myoclonic twitches   #Myoclonic seizures 11/19 am   #H/O supranuclear palsy   #S/P PEA arrest   -  +ve pupilary reflex b/l, +ve corneal reflex, +ve tripple flex, +ve babinski, -ve gag reflex   - myoclonic seizures followed by a generalized myoclonic/clonic seizure that lasted for about 1 hour on vEEG 11/19  - Burst suppression pattern on vEEG  - CTH 11/18 -ve for acute pathology   - occasionally generalized spikes are associated with head jerks on vEEG 11/20   - No seizure activity on vEEG 11/21    Recommendations   - DC Midazolam ggt   - c/w Keppra 1500 BID  - c/w Depakote 750 q12   - Get a Depakote trough   - Get MRI brain when stable without seizures to assist with prognostication     Case discussed w/ Dr. Barrera

## 2024-11-21 NOTE — PROGRESS NOTE ADULT - SUBJECTIVE AND OBJECTIVE BOX
Patient is a 69y old  Male who presents with a chief complaint of SOB (20 Nov 2024 13:15)        Over Night Events:  Remains critically ill on MV.  Sedated.  on VEEG.  on Levophed.          ROS:     All ROS are negative except HPI         PHYSICAL EXAM    ICU Vital Signs Last 24 Hrs  T(C): 36.6 (21 Nov 2024 08:00), Max: 36.8 (21 Nov 2024 04:00)  T(F): 97.8 (21 Nov 2024 08:00), Max: 98.2 (21 Nov 2024 04:00)  HR: 41 (21 Nov 2024 08:00) (32 - 48)  BP: 112/56 (21 Nov 2024 08:00) (88/47 - 174/79)  BP(mean): 81 (21 Nov 2024 08:00) (65 - 113)  ABP: --  ABP(mean): --  RR: 19 (21 Nov 2024 08:00) (16 - 32)  SpO2: 99% (21 Nov 2024 08:00) (97% - 100%)    O2 Parameters below as of 21 Nov 2024 08:00  Patient On (Oxygen Delivery Method): ventilator    O2 Concentration (%): 40        CONSTITUTIONAL:  Ill appearing in NAD    ENT:   Airway patent,   Mouth with normal mucosa.   ET     EYES:   Pupils equal,   Round and reactive to light.    CARDIAC:   Normal rate,   Regular rhythm.      RESPIRATORY:   No wheezing  Bilateral BS  Normal chest expansion  Not tachypneic,  No use of accessory muscles    GASTROINTESTINAL:  Abdomen soft,   Non-tender,   No guarding,   + BS    MUSCULOSKELETAL:   Range of motion is not limited,  No clubbing, cyanosis    NEUROLOGICAL:   Sedated     SKIN:   Skin normal color for race,   No evidence of rash.        11-20-24 @ 07:01  -  11-21-24 @ 07:00  --------------------------------------------------------  IN:    Free Water: 1500 mL    IV PiggyBack: 350 mL    Jevity 1.2: 1000 mL    Lactated Ringers: 50 mL    Midazolam (Status Epilepticus): 132 mL    Norepinephrine: 10 mL    Propofol (Status Epilepticus): 628.8 mL  Total IN: 3670.8 mL    OUT:    Indwelling Catheter - Urethral (mL): 1890 mL  Total OUT: 1890 mL    Total NET: 1780.8 mL      11-21-24 @ 07:01  -  11-21-24 @ 09:14  --------------------------------------------------------  IN:    Jevity 1.2: 100 mL    Midazolam (Status Epilepticus): 11 mL    Norepinephrine: 2 mL    Propofol (Status Epilepticus): 52.4 mL  Total IN: 165.4 mL    OUT:    Indwelling Catheter - Urethral (mL): 50 mL  Total OUT: 50 mL    Total NET: 115.4 mL          LABS:                            8.5    11.15 )-----------( 209      ( 21 Nov 2024 04:25 )             26.5                                               11-21    137  |  104  |  22[H]  ----------------------------<  122[H]  4.1   |  25  |  0.9    Ca    7.8[L]      21 Nov 2024 04:25  Mg     2.3     11-21    TPro  5.3[L]  /  Alb  3.1[L]  /  TBili  0.2  /  DBili  x   /  AST  26  /  ALT  11  /  AlkPhos  50  11-21                                             Urinalysis Basic - ( 21 Nov 2024 04:25 )    Color: x / Appearance: x / SG: x / pH: x  Gluc: 122 mg/dL / Ketone: x  / Bili: x / Urobili: x   Blood: x / Protein: x / Nitrite: x   Leuk Esterase: x / RBC: x / WBC x   Sq Epi: x / Non Sq Epi: x / Bacteria: x                                                  LIVER FUNCTIONS - ( 21 Nov 2024 04:25 )  Alb: 3.1 g/dL / Pro: 5.3 g/dL / ALK PHOS: 50 U/L / ALT: 11 U/L / AST: 26 U/L / GGT: x                                                  Culture - Urine (collected 18 Nov 2024 14:00)  Source: Catheterized Catheterized  Final Report (20 Nov 2024 07:28):    No growth    Culture - Sputum (collected 18 Nov 2024 10:10)  Source: Trach Asp Tracheal Aspirate  Gram Stain (18 Nov 2024 22:28):    Moderate polymorphonuclear leukocytes per low power field    No Squamous epithelial cells per low power field    Moderate Gram positive cocci in pairs per oil power field    Few Gram Positive Rods per oil power field  Final Report (20 Nov 2024 17:47):    Commensal domonique consistent with body site                                                   Mode: AC/ CMV (Assist Control/ Continuous Mandatory Ventilation)  RR (machine): 16  TV (machine): 460  FiO2: 40  PEEP: 8  ITime: 1  MAP: 12  PIP: 22                                      ABG - ( 21 Nov 2024 03:24 )  pH, Arterial: 7.43  pH, Blood: x     /  pCO2: 38    /  pO2: 114   / HCO3: 25    / Base Excess: 0.9   /  SaO2: 97.9                MEDICATIONS  (STANDING):  chlorhexidine 0.12% Liquid 15 milliLiter(s) Oral Mucosa every 12 hours  chlorhexidine 2% Cloths 1 Application(s) Topical <User Schedule>  doxycycline IVPB 100 milliGRAM(s) IV Intermittent every 12 hours  doxycycline IVPB      enoxaparin Injectable 40 milliGRAM(s) SubCutaneous every 24 hours  folic acid 1 milliGRAM(s) Oral daily  gabapentin 100 milliGRAM(s) Oral three times a day  hydroxychloroquine 400 milliGRAM(s) Oral daily  levETIRAcetam 1500 milliGRAM(s) Oral two times a day  midazolam Infusion. 0.05 mG/kG/Hr (5.46 mL/Hr) IV Continuous <Continuous>  multivitamin 1 Tablet(s) Oral daily  norepinephrine Infusion 0.05 MICROgram(s)/kG/Min (5.11 mL/Hr) IV Continuous <Continuous>  pantoprazole   Suspension 40 milliGRAM(s) Oral two times a day  piperacillin/tazobactam IVPB.. 3.375 Gram(s) IV Intermittent every 8 hours  polyethylene glycol 3350 17 Gram(s) Oral two times a day  propofol Infusion. 40 MICROgram(s)/kG/Min (26.2 mL/Hr) IV Continuous <Continuous>  propofol Injectable 50 milliGRAM(s) IV Push once  senna 2 Tablet(s) Oral at bedtime  silver sulfADIAZINE 1% Cream 1 Application(s) Topical every 12 hours  thiamine 100 milliGRAM(s) Oral daily  valproate sodium   IVPB 750 milliGRAM(s) IV Intermittent every 12 hours    MEDICATIONS  (PRN):  acetaminophen     Tablet .. 650 milliGRAM(s) Oral every 6 hours PRN Temp greater or equal to 38C (100.4F)  atropine Injectable 1 milliGRAM(s) IntraMuscular once PRN HR<30      New X-rays reviewed:                                                                                  ECHO

## 2024-11-21 NOTE — PROGRESS NOTE ADULT - ASSESSMENT
IMPRESSION:    Acute hypoxemic respiratory failure   Possible aspiration   SP CPA   VTach   HO SLE   Seizures   Shock now off Levophed   Post infectious HAAD ?    PLAN:    CNS: Continue VEEG.  Keep sedated for now.  Start Adjusting Versed if VEEG negative.  Continue Keppra and Depakote.  Neuro eval noted.  CTH noted      HEENT: Oral care.  ET Care     PULMONARY:  HOB @ 45 degrees.  Vent changes as follows: ARDS net MV settings.;  PEEP 8.  RR 14.  .  Monitor Airway pressures.  Wean O2 as tolerated.      CARDIOVASCULAR:   ECHO noted.  EP eval.  DC IVF.  trend CPK.  Dopamine     GI: GI prophylaxis.  OG Feeding.  Bowel regimen     RENAL:  Follow up lytes.  Correct as needed.  Andrea for retention     INFECTIOUS DISEASE: Follow up cultures.  Finish Zosyn and Doxy course.  End date 11-24.  Nasal MRSA neg,  RVP neg.  procal noted.      HEMATOLOGICAL:  DVT prophylaxis.  Monitor CBC     ENDOCRINE:  Follow up FS.  Insulin protocol if needed.  Cortisol level noted.  TSH     MUSCULOSKELETAL:  Off loading.  Bed rest      MICU

## 2024-11-21 NOTE — PROGRESS NOTE ADULT - SUBJECTIVE AND OBJECTIVE BOX
SUSAN AWAD  69y, Male  Allergy: iodine (Rash (Mild to Mod))      LOS  3d    CHIEF COMPLAINT: SOB (21 Nov 2024 15:00)      INTERVAL EVENTS/HPI  - T(F): , Max: 98.2 (11-21-24 @ 04:00) Afebrile   - WBC Count: 11.15 (11-21-24 @ 04:25)  WBC Count: 11.29 (11-20-24 @ 04:40)     - Creatinine: 0.9 (11-21-24 @ 04:25)  Creatinine: 1.0 (11-20-24 @ 04:40)     -   -   -     ROS  cannot obtain secondary to patient's sedation and/or mental status    VITALS:  T(F): 97.5, Max: 98.2 (11-21-24 @ 04:00)  HR: 45  BP: 124/59  RR: 20Vital Signs Last 24 Hrs  T(C): 36.4 (21 Nov 2024 12:00), Max: 36.8 (21 Nov 2024 04:00)  T(F): 97.5 (21 Nov 2024 12:00), Max: 98.2 (21 Nov 2024 04:00)  HR: 45 (21 Nov 2024 15:00) (32 - 48)  BP: 124/59 (21 Nov 2024 15:00) (88/47 - 174/79)  BP(mean): 85 (21 Nov 2024 15:00) (65 - 113)  RR: 20 (21 Nov 2024 15:00) (16 - 24)  SpO2: 97% (21 Nov 2024 15:00) (96% - 100%)    Parameters below as of 21 Nov 2024 14:00  Patient On (Oxygen Delivery Method): ventilator    O2 Concentration (%): 40    PHYSICAL EXAM:  General: intubated  HEENT: NCAT  Neck: supple  CV: RRR  Lungs: symmetric chest expansion, decreased BS at bases  Abd: Soft  Extr: wwp  Skin: no rash  Neuro: sedated  Lines: no phlebitis     FH: Non-contributory  Social Hx: Non-contributory    TESTS & MEASUREMENTS:                        8.5    11.15 )-----------( 209      ( 21 Nov 2024 04:25 )             26.5     11-21    137  |  104  |  22[H]  ----------------------------<  122[H]  4.1   |  25  |  0.9    Ca    7.8[L]      21 Nov 2024 04:25  Mg     2.3     11-21    TPro  5.3[L]  /  Alb  3.1[L]  /  TBili  0.2  /  DBili  x   /  AST  26  /  ALT  11  /  AlkPhos  50  11-21      LIVER FUNCTIONS - ( 21 Nov 2024 04:25 )  Alb: 3.1 g/dL / Pro: 5.3 g/dL / ALK PHOS: 50 U/L / ALT: 11 U/L / AST: 26 U/L / GGT: x           Urinalysis Basic - ( 21 Nov 2024 04:25 )    Color: x / Appearance: x / SG: x / pH: x  Gluc: 122 mg/dL / Ketone: x  / Bili: x / Urobili: x   Blood: x / Protein: x / Nitrite: x   Leuk Esterase: x / RBC: x / WBC x   Sq Epi: x / Non Sq Epi: x / Bacteria: x        Culture - Urine (collected 11-18-24 @ 14:00)  Source: Catheterized Catheterized  Final Report (11-20-24 @ 07:28):    No growth    Culture - Sputum (collected 11-18-24 @ 10:10)  Source: Trach Asp Tracheal Aspirate  Gram Stain (11-18-24 @ 22:28):    Moderate polymorphonuclear leukocytes per low power field    No Squamous epithelial cells per low power field    Moderate Gram positive cocci in pairs per oil power field    Few Gram Positive Rods per oil power field  Final Report (11-20-24 @ 17:47):    Commensal domonique consistent with body site    Culture - Blood (collected 11-18-24 @ 00:30)  Source: .Blood BLOOD  Preliminary Report (11-21-24 @ 06:01):    No growth at 72 Hours    Culture - Blood (collected 11-18-24 @ 00:20)  Source: .Blood BLOOD  Preliminary Report (11-21-24 @ 06:01):    No growth at 72 Hours        Lactate, Blood: 1.9 mmol/L (11-18-24 @ 08:50)  Blood Gas Venous - Lactate: 1.2 mmol/L (11-18-24 @ 00:51)  Lactate, Blood: 0.9 mmol/L (11-18-24 @ 00:30)      INFECTIOUS DISEASES TESTING  Procalcitonin: 0.75 (11-19-24 @ 06:10)  Procalcitonin: 0.44 (11-18-24 @ 10:20)  MRSA PCR Result.: Negative (11-18-24 @ 10:10)  Rapid RVP Result: NotDetec (11-18-24 @ 01:20)  COVID-19 PCR: NotDetec (02-05-24 @ 06:50)  COVID-19 PCR: NotDetec (02-04-24 @ 07:06)  COVID-19 PCR: Detected (01-28-24 @ 06:30)      INFLAMMATORY MARKERS      RADIOLOGY & ADDITIONAL TESTS:  I have personally reviewed the last available Chest xray  CXR      CT  CT Chest No Cont:   ACC: [id number] EXAM:  CT CHEST   ORDERED BY: ROSANNE COLLINS     PROCEDURE DATE:  11/18/2024          INTERPRETATION:  Indication::  Shortness of breath    Technique: Non contrast CT scan of the thorax was performed from lung   apices to adrenal glands.  Sagittal and coronal reformatted images were generated.    Comparison: CT chest-January 15, 2024      Findings:    Tubes/Lines/Devices : Satisfactory position.  Mediastinum/hilum: .No adenopathy or mass.  Chest Wall/ Breasts: Unremarkable   Heart/Great Vessels:No pericardial effusions. Great vessels are normal   in caliber. Aortic calcifications  Abdomen: Streak artifact from patient's arms obscures upper abdomen. No   gross abnormalities.  Bones and soft tissues: Degenerative changes cervical and thoracic spine  Lungs, Pleura, and Airways: Patent central airways.  Bilateral lower lobe consolidations with volume loss, left lower lobe   (602/311).. No pneumothorax or parenchymal mass.      Pulmonary nodules:  No suspicious pulmonary nodules are identified.  IMPRESSION:  Support devices, in satisfactory position.  Bilateral lower lobe consolidations with volume loss left lower lobe   (602/311).  No pneumothorax or parenchymal mass.    No suspicious pulmonary nodules are identified.            --- End of Report ---            HOLDEN PATEL MD; Attending Radiologist  This document has been electronically signed. Nov 19 2024  9:33AM (11-18-24 @ 15:39)      CARDIOLOGY TESTING  12 Lead ECG:   Ventricular Rate 87 BPM    QRS Duration 92 ms    Q-T Interval 460 ms    QTC Calculation(Bazett) 553 ms    R Axis 52 degrees    T Axis 21 degrees    Diagnosis Line Atrial fibrillation with premature ventricular or aberrantly conducted  complexes  Anteroseptal infarct , age undetermined  Marked ST abnormality, possible inferolateral subendocardial injury  Prolonged QT  Abnormal ECG    Confirmed by Bridger Rosenberg ([id number]) on 11/19/2024 7:05:55 PM (11-18-24 @ 03:16)      MEDICATIONS  chlorhexidine 0.12% Liquid 15  chlorhexidine 2% Cloths 1  DOPamine Infusion 2  doxycycline IVPB 100  doxycycline IVPB   enoxaparin Injectable 40  folic acid 1  furosemide   Injectable 40  gabapentin 100  hydroxychloroquine 400  levETIRAcetam 1500  midazolam Infusion. 0.04  multivitamin 1  pantoprazole   Suspension 40  piperacillin/tazobactam IVPB.. 3.375  polyethylene glycol 3350 17  propofol Infusion. 40  senna 2  silver sulfADIAZINE 1% Cream 1  thiamine 100  valproate sodium   IVPB 750      WEIGHT  Weight (kg): 109.1 (11-18-24 @ 08:59)  Creatinine: 0.9 mg/dL (11-21-24 @ 04:25)      ANTIBIOTICS:  doxycycline IVPB 100 milliGRAM(s) IV Intermittent every 12 hours  doxycycline IVPB      hydroxychloroquine 400 milliGRAM(s) Oral daily  piperacillin/tazobactam IVPB.. 3.375 Gram(s) IV Intermittent every 8 hours      All available historical records have been reviewed

## 2024-11-22 NOTE — PROGRESS NOTE ADULT - ASSESSMENT
70 y/o male with PMH of HTN, HLD, mild SLE (Lupus) ,GIB secondary to duodenal ulcer in 1/2024,  Multiple falls , Chronic ataxia and Visual Hallucinations, hx of recently diagnosed progressive supranuclear palsy for which he follows with a neurologist at [place],  presents to the ED due to cough and SOB for 2 days duration. Patient with cardiac arrest after presentation with ROSC, now intubated.  Palliative care consulted for GOC.    Patient with seizure activity over last 3 days    Recommendations  -DNR/trial of intubation  -ongoing medical management  -vent and pressors management per ICU  -management of propofol and sedation for seizures per neuro  -follow up neuro; f/u up MRI; f/u EEG  -will follow for GOC    Education about palliative care provided to patient/family.  See Recs below.    Please call [phone] with questions or concerns 24/7.   We will continue to follow.

## 2024-11-22 NOTE — PROGRESS NOTE ADULT - SUBJECTIVE AND OBJECTIVE BOX
HPI:   pt is a 70 y/o male with PMH of HTN, HLD, mild SLE (Lupus) ,GIB secondary to duodenal ulcer in 1/2024,  Multiple falls , Chronic ataxia and Visual Hallucinations, hx of recently diagnosed progressive supranuclear palsy for which he follows with a neurologist at [place],  presents to the ED due to cough and SOB for 2 days duration. per cousin belly the HCP who visited him eariler today at the Adult home, the pt has been complaining of cough and SOB for the past 2 days, he said that his chest feels congested then his cousin noticed that his breathing got worse and he started wheezing and having noisy breath sounds so asked the  nurse to get him to ED, pt at that time was denying any chest pain, he also had no fever, chills , GI or  symptoms, and no sick contacts. of note cousin mentions that he had visual hallucinations for the past week or so saying he sees a man in his closet ( this is not the first time the patient has visual hallucinations and is following with a neurologist at [place]).     Interval history  -Patient seen at bedside with cousins  -He remains intubated/sedated and unable to participate in exam     ADVANCE DIRECTIVES:     [ ] Full Code [x ] DNR  MOLST  [ ]  Living Will  [ ]   DECISION MAKER(s):  [x ] Health Care Proxy(s)  [ ] Surrogate(s)  [ ] Guardian           Name(s): Phone Number(s):  dc Sanchez      BASELINE (I)ADL(s) (prior to admission):    [place]: [ ]Total  [ ] Moderate [ ]Dependent  Palliative Performance Status Version 2:         %    http://npcrc.org/files/news/palliative_performance_scale_ppsv2.pdf    Allergies    iodine (Rash (Mild to Mod))    Intolerances    MEDICATIONS  (STANDING):  chlorhexidine 0.12% Liquid 15 milliLiter(s) Oral Mucosa every 12 hours  chlorhexidine 2% Cloths 1 Application(s) Topical <User Schedule>  doxycycline IVPB 100 milliGRAM(s) IV Intermittent every 12 hours  doxycycline IVPB      enoxaparin Injectable 40 milliGRAM(s) SubCutaneous every 24 hours  folic acid 1 milliGRAM(s) Oral daily  furosemide   Injectable 40 milliGRAM(s) IV Push daily  gabapentin 100 milliGRAM(s) Oral three times a day  hydroxychloroquine 400 milliGRAM(s) Oral daily  levETIRAcetam 1500 milliGRAM(s) Oral two times a day  midazolam Infusion. 0.01 mG/kG/Hr (1.09 mL/Hr) IV Continuous <Continuous>  multivitamin 1 Tablet(s) Oral daily  pantoprazole   Suspension 40 milliGRAM(s) Oral two times a day  piperacillin/tazobactam IVPB.. 3.375 Gram(s) IV Intermittent every 8 hours  polyethylene glycol 3350 17 Gram(s) Oral two times a day  propofol Infusion. 40 MICROgram(s)/kG/Min (26.2 mL/Hr) IV Continuous <Continuous>  senna 2 Tablet(s) Oral at bedtime  silver sulfADIAZINE 1% Cream 1 Application(s) Topical every 12 hours  thiamine 100 milliGRAM(s) Oral daily  valproate sodium   IVPB 750 milliGRAM(s) IV Intermittent every 8 hours    PRESENT SYMPTOMS: [x ]Unable to obtain due to poor mentation   Source if other than patient:  [ ]Family   [ ]Team     Pain: [ ]yes [ ]no  ALL PAIN ASSESSMENT COMPONENTS WERE ASKED ABOUT UNLESS OTHERWISE NOTED  QOL impact -   Location -                    Aggravating factors -  Quality -  Radiation -  Timing-  Severity (0-10 scale):  Minimal acceptable level (0-10 scale):     CPOT:  1  https://www.sccm.org/getattachment/ibs40b20-5n4k-6h6j-5i6j-5305k3010a7r/Critical-Care-Pain-Observation-Tool-(CPOT)    PAIN AD Score:   http://geriatrictoolkit.[facility]/cog/painad.pdf (press ctrl +  left click to view)    Dyspnea:                           [ ]None[ ]Mild [ ]Moderate [ ]Severe     Respiratory Distress Observation Scale (RDOS): 2  A score of 0 to 2 signifies little or no respiratory distress, 3 signifies mild distress, scores 4 to 6 indicate moderate distress, and scores greater than 7 signify severe distress  https://www.[facility].ca/sites/default/files/PDFS/038296-rysnlhgefev-psobuumj-kpnbxzflbiw-oykev.pdf    Anxiety:                             [ ]None[ ]Mild [ ]Moderate [ ]Severe   Fatigue:                             [ ]None[ ]Mild [ ]Moderate [ ]Severe   Nausea:                             [ ]None[ ]Mild [ ]Moderate [ ]Severe   Loss of appetite:              [ ]None[ ]Mild [ ]Moderate [ ]Severe   Constipation:                    [ ]None[ ]Mild [ ]Moderate [ ]Severe    Other Symptoms:  [ ]All other review of systems negative     Palliative Performance Status Version 2:         10%    http://[facility].org/files/news/palliative_performance_scale_ppsv2.pdf    PHYSICAL EXAM:  Vital Signs Last 24 Hrs  T(C): 38.2 (22 Nov 2024 20:00), Max: 38.2 (22 Nov 2024 20:00)  T(F): 100.8 (22 Nov 2024 20:00), Max: 100.8 (22 Nov 2024 20:00)  HR: 62 (22 Nov 2024 21:00) (42 - 62)  BP: 159/70 (22 Nov 2024 21:00) (108/54 - 165/74)  BP(mean): 100 (22 Nov 2024 21:00) (76 - 106)  RR: 27 (22 Nov 2024 21:00) (18 - 27)  SpO2: 99% (22 Nov 2024 21:00) (95% - 100%)    Parameters below as of 22 Nov 2024 21:00  Patient On (Oxygen Delivery Method): ventilator    O2 Concentration (%): 40      GENERAL:  [ ] No acute distress [ ]Lethargic  [x ]Unarousable  [ ]Verbal  [ ]Non-Verbal [ ]Cachexia    BEHAVIORAL/PSYCH:  [ ]Alert and Oriented x  [ ] Anxiety [ ] Delirium [ ] Agitation [x ] Calm   EYES: [ ] No scleral icterus [ ] Scleral icterus [ ] Closed  ENMT:  [ ]Dry mouth  [ x]No external oral lesions [ ] No external ear or nose lesions  CARDIOVASCULAR:  [ ]Regular [ ]Irregular [ ]Tachy [ ]Not Tachy  [ ]Eddy [ ] Edema [ ] No edema  PULMONARY:  [ ]Tachypnea  [ ]Audible excessive secretions [x ] No labored breathing [ ] labored breathing  GASTROINTESTINAL: [ ]Soft  [ ]Distended  [ ]Not distended [ ]Non tender [ ]Tender  MUSCULOSKELETAL: [ ]No clubbing [ ] clubbing  [ ] No cyanosis [ ] cyanosis  NEUROLOGIC: [ ]No focal deficits  [ ]Follows commands  [x ]Does not follow commands  [ ]Cognitive impairment  [ ]Dysphagia  [ ]Dysarthria  [ ]Paresis   SKIN: [ ] Jaundiced [ ] Non-jaundiced [ ]Rash [ ]No Rash [ ] Warm [ ] Dry  MISC/LINES: [ x] ET tube [ ] Trach [ ]NGT/OGT [ ]PEG [ ]Andrea    LABS: reviewed by me                                   9.5    11.34 )-----------( 210      ( 22 Nov 2024 05:20 )             29.9       11-22    143  |  107  |  18  ----------------------------<  97  3.8   |  27  |  0.8    Ca    8.2[L]      22 Nov 2024 05:20  Mg     2.2     11-22    TPro  5.7[L]  /  Alb  3.1[L]  /  TBili  0.2  /  DBili  x   /  AST  25  /  ALT  10  /  AlkPhos  53  11-22          ABG - ( 22 Nov 2024 03:09 )  pH, Arterial: 7.47  pH, Blood: x     /  pCO2: 38    /  pO2: 77    / HCO3: 28    / Base Excess: 3.9   /  SaO2: 96.6                Urinalysis Basic - ( 22 Nov 2024 05:20 )    Color: x / Appearance: x / SG: x / pH: x  Gluc: 97 mg/dL / Ketone: x  / Bili: x / Urobili: x   Blood: x / Protein: x / Nitrite: x   Leuk Esterase: x / RBC: x / WBC x   Sq Epi: x / Non Sq Epi: x / Bacteria: x                  CAPILLARY BLOOD GLUCOSE                  RADIOLOGY & ADDITIONAL STUDIES: reviewed by me    < from: Xray Chest 1 View- PORTABLE-Routine (11.22.24 @ 04:44) >  IMPRESSION:    Bibasilar opacities, decreased.    < end of copied text >      EKG: reviewed by me    < from: 12 Lead ECG (11.21.24 @ 16:33) >    Ventricular Rate 37 BPM    Atrial Rate 37 BPM    P-R Interval 158 ms    QRS Duration 96 ms    Q-T Interval 444 ms    QTC Calculation(Bazett) 348 ms    P Axis 47 degrees    R Axis 30 degrees    T Axis 53 degrees    Diagnosis Line Marked sinus bradycardia  Low voltage QRS  Abnormal ECG    < end of copied text >        PROTEIN CALORIE MALNUTRITION PRESENT: [ ]mild [ ]moderate [ ]severe [ ]underweight [ ]morbid obesity  https://www.andeal.org/vault/2440/web/files/ONC/Table_Clinical%20Characteristics%20to%20Document%20Malnutrition-White%20JV%20et%20al%202012.pdf    Height (cm): 177.8 (11-18-24 @ 09:08), 180.3 (01-19-24 @ 08:18)  Weight (kg): 109.1 (11-18-24 @ 08:59), 100.7 (01-19-24 @ 08:18), 99.8 (01-16-24 @ 15:38)  BMI (kg/m2): 34.5 (11-18-24 @ 09:08), 33.6 (11-18-24 @ 08:59), 31 (01-19-24 @ 08:18)  [ ]PPSV2 < or = to 30% [ ]significant weight loss  [ ]poor nutritional intake  [ ]anasarca      [ ]Artificial Nutrition      Palliative Care Spiritual/Emotional Screening Tool Question  Severity (0-4):                    OR                    [ x] Unable to determine. Will assess at later time if appropriate.  Score of 2 or greater indicates recommendation of Chaplaincy and/or SW referral  Chaplaincy Referral: [ ] Yes [ ] Refused [ ] Following     Caregiver [place]:  [ ] Yes [ ] No    OR    [x ] Unable to determine. Will assess at later time if appropriate.  Social Work Referral [ ]  Patient and Family Centered Care Referral [ ]    Anticipatory Grief Present: [ ] Yes [ ] No    OR     [ x] Unable to determine. Will assess at later time if appropriate.  Social Work Referral [ ]  Patient and Family Centered Care Referral [ ]    Patient discussed with primary medical team MD  Palliative care education provided to patient and/or family

## 2024-11-22 NOTE — PROGRESS NOTE ADULT - ASSESSMENT
68 y/o M PMH of HTN, HLD, mild SLE (Lupus) ,GIB secondary to duodenal ulcer in 1/2024,  Multiple falls , Chronic ataxia and Visual Hallucinations, hx of recently diagnosed progressive supranuclear palsy for which he follows with a neurologist at [place], presented to the ED due to cough and SOB for 2 days admitted to [facility] for mx of Aspiration PNA and NSTEMI II, found to be tachypneic and went into PEA arrest w/ ROSC  s/p 3 epinephrine ivp, amiodarone gtt, and epinephrine gtt. Neurology was consulted for approval for vEEG for twitching movements. On 11/19 in the AM, pt was having myoclonic seizures followed by a generalized myoclonic/clonic seizure that lasted for about 1 hour. 11/20 vEEg showed occasionally generalized spikes. 11/21 vEEG showed no seizure activity.    Impression   #Myoclonic twitches   #Myoclonic seizures 11/19 am   #H/O supranuclear palsy   #S/P PEA arrest   -  +ve pupilary reflex b/l, +ve corneal reflex, +ve tripple flex, +ve babinski, -ve gag reflex   - myoclonic seizures followed by a generalized myoclonic/clonic seizure that lasted for about 1 hour on vEEG 11/19  - Burst suppression pattern on vEEG  - CTH 11/18 -ve for acute pathology   - occasionally generalized spikes are associated with head jerks on vEEG 11/20   - No seizure activity on vEEG 11/21    Recommendations   - Wean off Midazolam ggt   - c/w Keppra 1500 BID  - Increase Depakote to 750 q8  - Get MRI brain when stable without seizures to assist with prognostication          68 y/o M PMH of HTN, HLD, mild SLE (Lupus) ,GIB secondary to duodenal ulcer in 1/2024,  Multiple falls , Chronic ataxia and Visual Hallucinations, hx of recently diagnosed progressive supranuclear palsy for which he follows with a neurologist at [place], presented to the ED due to cough and SOB for 2 days admitted to [facility] for mx of Aspiration PNA and NSTEMI II, found to be tachypneic and went into PEA arrest w/ ROSC  s/p 3 epinephrine ivp, amiodarone gtt, and epinephrine gtt. Neurology was consulted for approval for vEEG for twitching movements. On 11/19 in the AM, pt was having myoclonic seizures followed by a generalized myoclonic/clonic seizure that lasted for about 1 hour. 11/20 vEEg showed occasionally generalized spikes. 11/21 vEEG showed no seizure activity.    Impression   #Myoclonic twitches   #Myoclonic seizures 11/19 am   #H/O supranuclear palsy   #S/P PEA arrest   -  +ve pupilary reflex b/l, +ve corneal reflex, +ve tripple flex, +ve babinski, -ve gag reflex   - myoclonic seizures followed by a generalized myoclonic/clonic seizure that lasted for about 1 hour on vEEG 11/19  - Burst suppression pattern on vEEG  - CTH 11/18 -ve for acute pathology   - occasionally generalized spikes are associated with head jerks on vEEG 11/20   - No seizure activity on vEEG 11/21    Recommendations   - Wean off Sedation  - c/w Keppra 1500 BID  - Increase Depakote to 750 q8  - Consider PRN benzodiazepine for seizure episodes   - Get MRI brain when stable without seizures to assist with prognostication

## 2024-11-22 NOTE — PROGRESS NOTE ADULT - SUBJECTIVE AND OBJECTIVE BOX
SUSAN AWAD  69y, Male  Allergy: iodine (Rash (Mild to Mod))      LOS  4d    CHIEF COMPLAINT: SOB (22 Nov 2024 13:25)      INTERVAL EVENTS/HPI  - T(F): , Max: 99.9 (11-22-24 @ 04:00)  - WBC Count: 11.34 (11-22-24 @ 05:20)  WBC Count: 11.15 (11-21-24 @ 04:25)     - Creatinine: 0.8 (11-22-24 @ 05:20)  Creatinine: 0.9 (11-21-24 @ 04:25)     -   -   -     ROS  cannot obtain secondary to patient's sedation and/or mental status    VITALS:  T(F): 99.1, Max: 99.9 (11-22-24 @ 04:00)  HR: 52  BP: 157/67  RR: 21Vital Signs Last 24 Hrs  T(C): 37.3 (22 Nov 2024 08:00), Max: 37.7 (22 Nov 2024 04:00)  T(F): 99.1 (22 Nov 2024 08:00), Max: 99.9 (22 Nov 2024 04:00)  HR: 52 (22 Nov 2024 10:15) (37 - 56)  BP: 157/67 (22 Nov 2024 10:15) (102/51 - 192/77)  BP(mean): 96 (22 Nov 2024 10:15) (74 - 120)  RR: 21 (22 Nov 2024 10:15) (15 - 26)  SpO2: 99% (22 Nov 2024 10:15) (95% - 99%)    Parameters below as of 22 Nov 2024 10:00  Patient On (Oxygen Delivery Method): ventilator    O2 Concentration (%): 40    PHYSICAL EXAM:  General: intubated  HEENT: NCAT  Neck: supple  CV: RRR  Lungs: symmetric chest expansion, decreased BS at bases  Abd: Soft  Extr: wwp  Skin: no rash  Neuro: sedated  Lines: no phlebitis     FH: Non-contributory  Social Hx: Non-contributory    TESTS & MEASUREMENTS:                        9.5    11.34 )-----------( 210      ( 22 Nov 2024 05:20 )             29.9     11-22    143  |  107  |  18  ----------------------------<  97  3.8   |  27  |  0.8    Ca    8.2[L]      22 Nov 2024 05:20  Mg     2.2     11-22    TPro  5.7[L]  /  Alb  3.1[L]  /  TBili  0.2  /  DBili  x   /  AST  25  /  ALT  10  /  AlkPhos  53  11-22      LIVER FUNCTIONS - ( 22 Nov 2024 05:20 )  Alb: 3.1 g/dL / Pro: 5.7 g/dL / ALK PHOS: 53 U/L / ALT: 10 U/L / AST: 25 U/L / GGT: x           Urinalysis Basic - ( 22 Nov 2024 05:20 )    Color: x / Appearance: x / SG: x / pH: x  Gluc: 97 mg/dL / Ketone: x  / Bili: x / Urobili: x   Blood: x / Protein: x / Nitrite: x   Leuk Esterase: x / RBC: x / WBC x   Sq Epi: x / Non Sq Epi: x / Bacteria: x        Culture - Urine (collected 11-18-24 @ 14:00)  Source: Catheterized Catheterized  Final Report (11-20-24 @ 07:28):    No growth    Culture - Sputum (collected 11-18-24 @ 10:10)  Source: Trach Asp Tracheal Aspirate  Gram Stain (11-18-24 @ 22:28):    Moderate polymorphonuclear leukocytes per low power field    No Squamous epithelial cells per low power field    Moderate Gram positive cocci in pairs per oil power field    Few Gram Positive Rods per oil power field  Final Report (11-20-24 @ 17:47):    Commensal domonique consistent with body site    Culture - Blood (collected 11-18-24 @ 00:30)  Source: .Blood BLOOD  Preliminary Report (11-22-24 @ 06:00):    No growth at 4 days    Culture - Blood (collected 11-18-24 @ 00:20)  Source: .Blood BLOOD  Preliminary Report (11-22-24 @ 06:00):    No growth at 4 days        Lactate, Blood: 1.9 mmol/L (11-18-24 @ 08:50)  Blood Gas Venous - Lactate: 1.2 mmol/L (11-18-24 @ 00:51)  Lactate, Blood: 0.9 mmol/L (11-18-24 @ 00:30)      INFECTIOUS DISEASES TESTING  Procalcitonin: 0.75 (11-19-24 @ 06:10)  Procalcitonin: 0.44 (11-18-24 @ 10:20)  MRSA PCR Result.: Negative (11-18-24 @ 10:10)  Rapid RVP Result: NotDetec (11-18-24 @ 01:20)  COVID-19 PCR: NotDetec (02-05-24 @ 06:50)  COVID-19 PCR: NotDetec (02-04-24 @ 07:06)  COVID-19 PCR: Detected (01-28-24 @ 06:30)      INFLAMMATORY MARKERS      RADIOLOGY & ADDITIONAL TESTS:  I have personally reviewed the last available Chest xray  CXR      CT      CARDIOLOGY TESTING  12 Lead ECG:   Ventricular Rate 37 BPM    Atrial Rate 37 BPM    P-R Interval 158 ms    QRS Duration 96 ms    Q-T Interval 444 ms    QTC Calculation(Bazett) 348 ms    P Axis 47 degrees    R Axis 30 degrees    T Axis 53 degrees    Diagnosis Line Marked sinus bradycardia  Low voltage QRS  Abnormal ECG    Confirmed by MUNIRA AGUIRRE MD ([id number]) on 11/22/2024 7:13:48 AM (11-21-24 @ 16:33)  12 Lead ECG:   Ventricular Rate 61 BPM    Atrial Rate 61 BPM    P-R Interval 122 ms    QRS Duration 92 ms    Q-T Interval 440 ms    QTC Calculation(Bazett) 442 ms    P Axis 35 degrees    R Axis 6 degrees    T Axis 34 degrees    Diagnosis Line Normal sinus rhythm  Possible Septal infarct , age undetermined  Abnormal ECG    Confirmed by CHA ALDRICH, [facility] ([id number]) on 11/21/2024 11:47:24 PM (11-18-24 @ 11:58)      MEDICATIONS  chlorhexidine 0.12% Liquid 15  chlorhexidine 2% Cloths 1  doxycycline IVPB 100  doxycycline IVPB   enoxaparin Injectable 40  folic acid 1  furosemide   Injectable 40  gabapentin 100  hydroxychloroquine 400  levETIRAcetam 1500  midazolam Infusion. 0.01  multivitamin 1  pantoprazole   Suspension 40  piperacillin/tazobactam IVPB.. 3.375  polyethylene glycol 3350 17  propofol Infusion. 40  senna 2  silver sulfADIAZINE 1% Cream 1  thiamine 100  valproate sodium   IVPB 750      WEIGHT  Weight (kg): 109.1 (11-18-24 @ 08:59)  Creatinine: 0.8 mg/dL (11-22-24 @ 05:20)      ANTIBIOTICS:  doxycycline IVPB 100 milliGRAM(s) IV Intermittent every 12 hours  doxycycline IVPB      hydroxychloroquine 400 milliGRAM(s) Oral daily  piperacillin/tazobactam IVPB.. 3.375 Gram(s) IV Intermittent every 8 hours      All available historical records have been reviewed

## 2024-11-22 NOTE — PROGRESS NOTE ADULT - ASSESSMENT
IMPRESSION:    Acute hypoxemic respiratory failure   Possible aspiration   Seizures - status  SP CPA   VTach   HO SLE   Shock now off Levophed   Post infectious HAAD ?    PLAN:    CNS: Continue VEEG.  Keep sedated for now.  Adjusting Versed if VEEG negative.  Continue Keppra and Depakote.  Neuro eval noted.  CTH noted      HEENT: Oral care.  ET Care     PULMONARY:  HOB @ 45 degrees.  Vent changes as follows: ARDS net MV settings.;  PEEP 8.  RR 14.  .  Monitor Airway pressures.  Wean O2 as tolerated.      CARDIOVASCULAR:   ECHO noted.  EP eval.  Off Dopamine now     GI: GI prophylaxis.  OG Feeding.  Bowel regimen     RENAL:  Follow up lytes.  Correct as needed.  Andrea for retention     INFECTIOUS DISEASE: Follow up cultures.  Finish Zosyn and Doxy course.  End date 11-24.  Nasal MRSA neg,  RVP neg.  procal noted.      HEMATOLOGICAL:  DVT prophylaxis.  Monitor CBC     ENDOCRINE:  Follow up FS.  Insulin protocol if needed.  Cortisol level noted.  TSH nl.    MUSCULOSKELETAL:  Off loading.  Bed rest      MICU IMPRESSION:    Acute hypoxemic respiratory failure   Possible aspiration   Seizures - status  SP CPA   VTach   HO SLE   Shock now off Levophed   Post infectious HAAD treated     PLAN:    CNS: Continue VEEG.  Keep sedated for now.  Adjusting Versed if VEEG negative.  Continue Keppra and Depakote.  Neuro eval noted.  CTH noted      HEENT: Oral care.  ET Care     PULMONARY:  HOB @ 45 degrees.  Vent changes as follows: ARDS net MV settings.;  PEEP 8.  RR 14.  .  Monitor Airway pressures.  Wean O2 as tolerated.      CARDIOVASCULAR:   ECHO noted.  EP eval.  Off Dopamine now     GI: GI prophylaxis.  OG Feeding.  Bowel regimen     RENAL:  Follow up lytes.  Correct as needed.  Andrea for retention     INFECTIOUS DISEASE: Follow up cultures.  Finish Zosyn and Doxy course.  End date 11-24.  Nasal MRSA neg,  RVP neg.  procal noted.      HEMATOLOGICAL:  DVT prophylaxis.  Monitor CBC     ENDOCRINE:  Follow up FS.  Insulin protocol if needed.  Cortisol level noted.  TSH nl.    MUSCULOSKELETAL:  Off loading.  Bed rest      MICU

## 2024-11-22 NOTE — PROGRESS NOTE ADULT - ASSESSMENT
pt is a 68 y/o male with PMH of HTN, HLD, mild SLE (Lupus) ,GIB secondary to duodenal ulcer in 1/2024,  Multiple falls , Chronic ataxia and Visual Hallucinations, hx of recently diagnosed progressive supranuclear palsy for which he follows with a neurologist at [place],  presents to the ED due to cough and SOB for 2 days duration, and visual hallucinations for past week.    After ED eval, patient was initially admitted to [facility] for mx of Aspiration PNA and NSTEMI II, then after that he started getting tachypneic, increased WOB, nurses suctioned him, pt then coded, ROSC acheived after 3 minutes. Post ROSC rhythm was Vtach per ED so he was started on amiodarone drip, later was discontinued it d/t bradycardia. EEG x2 days reporting showed episodes of generalized myoclonic seizures. Pt was put in Keppra + Depakote and Propofol + Versed. Latest EEG reports no seizures. Weaning off versed. Bradycardia has been improving (yesterday 30-40, now 50bpm).    #Acute Hypoxic respiratory failure s/p intubation   #Possible Aspiration/Multifocal PNA   CXR unchanged - b/l hilar opacities, small RLL effusion (my read)  CT chest - b/l lower lobe consolidations  WBC downtrending 12->20->12->11  Infectious w/u neg: MRSA, RVP, Procal 0.44, UCx neg  Plan:  - c/w cefepime + doxy -> to end 11/24  - ID following  - Tylenol PRN for fevers    #Cardiac arrest, ~3min down time, s/p 1 shock for Vtach after ROSC  #NSTEMI type II  #Bradycardia   s/p amio ggt. Amio dc'd d/t bradycardia  Bedside US - not volume depleted  EP - f/u for ischemic w/u when stabilized  Cortisol 12, ->400->100->~90  Plan:  - off solumedrol  - off pressors  - bradycardia improving as versed titrated off - monitor HR    #Myoclonic seizures s/p CPA  Day 1 EEG findings: majority of the generalized spikes are associated with head jerks or bilateral proximal myoclonic jerks. Around 3 am patient had an event characterized by cluster of myoclonic seizures followed by a generalized myoclonic/clonic seizure that lasted for about 1 hour. After that EEG returned to the baseline as described above.  CTH - no acute path  Day 2 EEG findings: 2 discreet myoclonic generalized seizures yesterday at 8:30 am and 6:30 pm characterized by clusters of myoclonic activity.  Plan:  - c/w keppra 1500 BID  - depakote levels subtherapeutic, increased to q8h  - f/u AM depakote blood levels  - c/w propofol  - wean versed by 0.01 every few hours  - vent settings to prevent alkalosis (rate 14)  - f/u neuro recs for MRI brain when stable without seizures     #HO Lupus  #HO HTN  #HO GIB secondary to duodenal ulcer 1/2024   #Multiple falls , Chronic ataxia   #HO progressive supranuclear palsy and Visual Hallucinations  - on home meds: hydroxychloroquine, thiamine  - held BP meds d/t shock    #Lines: R IJ 11/18, briggs 11/18  #Family: Cousin Jw is HCP. Updated today at bedside with another cousin 11/22  #DVT prophylaxis: Lovenox  #GI prophylaxis: PPI suspension  #Diet: tube feeds, bowel regimen  #Activity: bedrest, IAT  #Code status: DNR/Intubate  #Disposition: ICU    Pendings:  1. wean versed  2. monitor seizure activity  3. Depakote levels  4. MRI brain when stable pt is a 68 y/o male with PMH of HTN, HLD, mild SLE (Lupus) ,GIB secondary to duodenal ulcer in 1/2024,  Multiple falls , Chronic ataxia and Visual Hallucinations, hx of recently diagnosed progressive supranuclear palsy for which he follows with a neurologist at [place],  presents to the ED due to cough and SOB for 2 days duration, and visual hallucinations for past week.    After ED eval, patient was initially admitted to [facility] for mx of Aspiration PNA and NSTEMI II, then after that he started getting tachypneic, increased WOB, nurses suctioned him, pt then coded, ROSC acheived after 3 minutes. Post ROSC rhythm was Vtach per ED so he was started on amiodarone drip, later was discontinued it d/t bradycardia. EEG x2 days reporting showed episodes of generalized myoclonic seizures. Pt was put in Keppra + Depakote and Propofol + Versed. Latest EEG reports no seizures. Weaning off versed. Bradycardia has been improving (yesterday 30-40, now 50bpm).    #Acute Hypoxic respiratory failure s/p intubation   #Possible Aspiration/Multifocal PNA   CXR unchanged - b/l hilar opacities, small RLL effusion (my read)  CT chest - b/l lower lobe consolidations  WBC downtrending 12->20->12->11  Infectious w/u neg: MRSA, RVP, Procal 0.44, UCx neg  Plan:  - c/w cefepime + doxy -> to end 11/24  - ID following  - Tylenol PRN for fevers    #Cardiac arrest, ~3min down time, s/p 1 shock for Vtach after ROSC  #NSTEMI type II  #Bradycardia   s/p amio ggt. Amio dc'd d/t bradycardia  Bedside US - not volume depleted  EP - f/u for ischemic w/u when stabilized  Cortisol 12, ->400->100->~90  Plan:  - off solumedrol  - off pressors  - bradycardia improving as versed titrated off - monitor HR    #Myoclonic seizures s/p CPA  Day 1 EEG findings: majority of the generalized spikes are associated with head jerks or bilateral proximal myoclonic jerks. Around 3 am patient had an event characterized by cluster of myoclonic seizures followed by a generalized myoclonic/clonic seizure that lasted for about 1 hour. After that EEG returned to the baseline as described above.  CTH - no acute path  Day 2 EEG findings: 2 discreet myoclonic generalized seizures yesterday at 8:30 am and 6:30 pm characterized by clusters of myoclonic activity.  Plan:  - c/w keppra 1500 BID  - depakote levels subtherapeutic, increased to q8h  - f/u AM depakote blood levels  - c/w propofol  - wean versed by 0.01 every few hours  - vent settings to prevent alkalosis (rate 14)  - f/u neuro recs for MRI brain when stable without seizures     #HO Lupus  #HO HTN  #HO GIB secondary to duodenal ulcer 1/2024   #Multiple falls , Chronic ataxia   #HO progressive supranuclear palsy and Visual Hallucinations  - on home meds: hydroxychloroquine, thiamine  - held BP meds d/t shock    #Lines: R IJ 11/18, briggs 11/18  #Family: Cousin Jw is HCP. Updated today at bedside with another cousin 11/22  #DVT prophylaxis: Lovenox  #GI prophylaxis: PPI suspension  #Diet: tube feeds, bowel regimen  #Activity: bedrest, IAT  #Code status: DNR/Intubate  #Disposition: ICU    Pendings:  1. wean off versed  2. monitor seizure activity  3. Depakote levels  4. Lipid panel q72h while on propofol - next 11/24  4. MRI brain when stable

## 2024-11-22 NOTE — PROGRESS NOTE ADULT - SUBJECTIVE AND OBJECTIVE BOX
SUSAN AWAD 69y Male  MRN#: [id number]   Hospital Day: 4d    HPI:   pt is a 68 y/o male with PMH of HTN, HLD, mild SLE (Lupus) ,GIB secondary to duodenal ulcer in 1/2024,  Multiple falls , Chronic ataxia and Visual Hallucinations, hx of recently diagnosed progressive supranuclear palsy for which he follows with a neurologist at [place],  presents to the ED due to cough and SOB for 2 days duration. per cousin belly the HCP who visited him eariler today at the Adult home, the pt has been complaining of cough and SOB for the past 2 days, he said that his chest feels congested then his cousin noticed that his breathing got worse and he started wheezing and having noisy breath sounds so asked the  nurse to get him to ED, pt at that time was denying any chest pain, he also had no fever, chills , GI or  symptoms, and no sick contacts. of note cousin mentions that he had visual hallucinations for the past week or so saying he sees a man in his closet ( this is not the first time the patient has visual hallucinations and is following with a neurologist at [place]).     After ED eval, patient was initially admitted to [facility] for mx of Aspiration PNA and NSTEMI II, then after that he started getting tachypneic, increased WOB, nurses suctioned him and then they noticed he is blue, then he became pulseless and CPR was initiated, he was successfully intubated , pt got ROSC , post ROSC rythm was Vtach per ED so he was started on amiodarone drip. during code blue ED also started him on Epinephrine drip.     Vitals on admission prior to code blue :   /74 , HR 64 , T 98.4 , O2 95%RA    CBC and CMP unremarkable   EKG NSR  Trops 32>>29   Lactate negative    CXR with b/l PNA   was given 1 L LR bolus and 1g cefepime in ED    pt is admitted to MICU (18 Nov 2024 03:54)      SUBJECTIVE      OBJECTIVE  PAST MEDICAL & SURGICAL HISTORY  Vertigo    Hypertension    Lupus erythematosus      ALLERGIES:  iodine (Rash (Mild to Mod))    MEDICATIONS:  STANDING MEDICATIONS  chlorhexidine 0.12% Liquid 15 milliLiter(s) Oral Mucosa every 12 hours  chlorhexidine 2% Cloths 1 Application(s) Topical <User Schedule>  DOPamine Infusion 2 MICROgram(s)/kG/Min IV Continuous <Continuous>  doxycycline IVPB 100 milliGRAM(s) IV Intermittent every 12 hours  doxycycline IVPB      enoxaparin Injectable 40 milliGRAM(s) SubCutaneous every 24 hours  folic acid 1 milliGRAM(s) Oral daily  furosemide   Injectable 40 milliGRAM(s) IV Push daily  gabapentin 100 milliGRAM(s) Oral three times a day  hydroxychloroquine 400 milliGRAM(s) Oral daily  levETIRAcetam 1500 milliGRAM(s) Oral two times a day  midazolam Infusion. 0.02 mG/kG/Hr IV Continuous <Continuous>  multivitamin 1 Tablet(s) Oral daily  pantoprazole   Suspension 40 milliGRAM(s) Oral two times a day  piperacillin/tazobactam IVPB.. 3.375 Gram(s) IV Intermittent every 8 hours  polyethylene glycol 3350 17 Gram(s) Oral two times a day  propofol Infusion. 40 MICROgram(s)/kG/Min IV Continuous <Continuous>  senna 2 Tablet(s) Oral at bedtime  silver sulfADIAZINE 1% Cream 1 Application(s) Topical every 12 hours  thiamine 100 milliGRAM(s) Oral daily  valproate sodium   IVPB 750 milliGRAM(s) IV Intermittent every 12 hours    PRN MEDICATIONS  acetaminophen     Tablet .. 650 milliGRAM(s) Oral every 6 hours PRN  atropine Injectable 1 milliGRAM(s) IntraMuscular once PRN      VITAL SIGNS: Last 24 Hours  T(C): 37.3 (22 Nov 2024 08:00), Max: 37.7 (22 Nov 2024 04:00)  T(F): 99.1 (22 Nov 2024 08:00), Max: 99.9 (22 Nov 2024 04:00)  HR: 46 (22 Nov 2024 08:00) (37 - 56)  BP: 111/54 (22 Nov 2024 08:00) (92/47 - 192/77)  BP(mean): 78 (22 Nov 2024 08:00) (67 - 120)  RR: 19 (22 Nov 2024 08:00) (15 - 26)  SpO2: 99% (22 Nov 2024 08:00) (95% - 99%)    LABS:                        9.5    11.34 )-----------( 210      ( 22 Nov 2024 05:20 )             29.9     11-22    143  |  107  |  18  ----------------------------<  97  3.8   |  27  |  0.8    Ca    8.2[L]      22 Nov 2024 05:20  Mg     2.2     11-22    TPro  5.7[L]  /  Alb  3.1[L]  /  TBili  0.2  /  DBili  x   /  AST  25  /  ALT  10  /  AlkPhos  53  11-22      Urinalysis Basic - ( 22 Nov 2024 05:20 )    Color: x / Appearance: x / SG: x / pH: x  Gluc: 97 mg/dL / Ketone: x  / Bili: x / Urobili: x   Blood: x / Protein: x / Nitrite: x   Leuk Esterase: x / RBC: x / WBC x   Sq Epi: x / Non Sq Epi: x / Bacteria: x      ABG - ( 22 Nov 2024 03:09 )  pH, Arterial: 7.47  pH, Blood: x     /  pCO2: 38    /  pO2: 77    / HCO3: 28    / Base Excess: 3.9   /  SaO2: 96.6                            PHYSICAL EXAM:  GENERAL: Sedated   HEAD:  Intubated   EYES: +ve corneal reflex on neuro exam   PSYCH: Comatose   EXTREMITIES: B/L myclonic twitches intermittently   NEUROLOGY: Sedated +ve pupilary, corneal reflex, +ve tripple flex, +ve babinski, -ve gag reflex

## 2024-11-22 NOTE — PROGRESS NOTE ADULT - ATTENDING COMMENTS
IMPRESSION:    Acute hypoxemic respiratory failure   Possible aspiration   Seizures - status  SP CPA   VTach   HO SLE   Shock now off Levophed   Post infectious HAAD treated     Plan as outlined above

## 2024-11-22 NOTE — PROGRESS NOTE ADULT - SUBJECTIVE AND OBJECTIVE BOX
Patient is a 69y old  Male who presents with a chief complaint of SOB (22 Nov 2024 10:04)      Over Night Events:  Remains on VEEG.  On Versed and Prop.        ROS:     All ROS are negative except HPI         PHYSICAL EXAM    ICU Vital Signs Last 24 Hrs  T(C): 37.3 (22 Nov 2024 08:00), Max: 37.7 (22 Nov 2024 04:00)  T(F): 99.1 (22 Nov 2024 08:00), Max: 99.9 (22 Nov 2024 04:00)  HR: 52 (22 Nov 2024 10:15) (37 - 56)  BP: 157/67 (22 Nov 2024 10:15) (92/47 - 192/77)  BP(mean): 96 (22 Nov 2024 10:15) (67 - 120)  ABP: --  ABP(mean): --  RR: 21 (22 Nov 2024 10:15) (15 - 26)  SpO2: 99% (22 Nov 2024 10:15) (95% - 99%)    O2 Parameters below as of 22 Nov 2024 10:00  Patient On (Oxygen Delivery Method): ventilator    O2 Concentration (%): 40        CONSTITUTIONAL:  Ill appearing    ENT:   ETT    EYES:   Pupils equal,   Round and reactive to light.    CARDIAC:   Normal rate,   Regular rhythm.    No edema    RESPIRATORY:   Bilateral mechanical BS  Normal chest expansion  Not tachypneic,  No use of accessory muscles    GASTROINTESTINAL:  Abdomen soft,   Non-tender    MUSCULOSKELETAL:   Not moving extremities  No clubbing, cyanosis    NEUROLOGICAL:   Sedated    SKIN:   Skin normal color for race,   Warm and dry and intact        11-21-24 @ 07:01  -  11-22-24 @ 07:00  --------------------------------------------------------  IN:    DOPamine Infusion: 13 mL    DOPamine Infusion: 8 mL    DOPamine Infusion: 79.5 mL    Enteral Tube Flush: 200 mL    Free Water: 1500 mL    IV PiggyBack: 550 mL    Jevity 1.2: 900 mL    Midazolam (Status Epilepticus): 38.5 mL    Midazolam (Status Epilepticus): 17.6 mL    Midazolam (Status Epilepticus): 6.6 mL    Norepinephrine: 4 mL    Propofol (Status Epilepticus): 627.6 mL  Total IN: 3944.8 mL    OUT:    Indwelling Catheter - Urethral (mL): 4720 mL  Total OUT: 4720 mL    Total NET: -775.2 mL      11-22-24 @ 07:01  -  11-22-24 @ 10:38  --------------------------------------------------------  IN:    Propofol (Status Epilepticus): 78.3 mL  Total IN: 78.3 mL    OUT:    DOPamine Infusion: 0 mL  Total OUT: 0 mL    Total NET: 78.3 mL          LABS:                            9.5    11.34 )-----------( 210      ( 22 Nov 2024 05:20 )             29.9                                               11-22    143  |  107  |  18  ----------------------------<  97  3.8   |  27  |  0.8    Ca    8.2[L]      22 Nov 2024 05:20  Mg     2.2     11-22    TPro  5.7[L]  /  Alb  3.1[L]  /  TBili  0.2  /  DBili  x   /  AST  25  /  ALT  10  /  AlkPhos  53  11-22                                             Urinalysis Basic - ( 22 Nov 2024 05:20 )    Color: x / Appearance: x / SG: x / pH: x  Gluc: 97 mg/dL / Ketone: x  / Bili: x / Urobili: x   Blood: x / Protein: x / Nitrite: x   Leuk Esterase: x / RBC: x / WBC x   Sq Epi: x / Non Sq Epi: x / Bacteria: x                                                  LIVER FUNCTIONS - ( 22 Nov 2024 05:20 )  Alb: 3.1 g/dL / Pro: 5.7 g/dL / ALK PHOS: 53 U/L / ALT: 10 U/L / AST: 25 U/L / GGT: x                                                                                               Mode: AC/ CMV (Assist Control/ Continuous Mandatory Ventilation)  RR (machine): 14  TV (machine): 440  FiO2: 40  PEEP: 8  ITime: 1  MAP: 12  PIP: 21                                      ABG - ( 22 Nov 2024 03:09 )  pH, Arterial: 7.47  pH, Blood: x     /  pCO2: 38    /  pO2: 77    / HCO3: 28    / Base Excess: 3.9   /  SaO2: 96.6                MEDICATIONS  (STANDING):  chlorhexidine 0.12% Liquid 15 milliLiter(s) Oral Mucosa every 12 hours  chlorhexidine 2% Cloths 1 Application(s) Topical <User Schedule>  DOPamine Infusion 2 MICROgram(s)/kG/Min (8.18 mL/Hr) IV Continuous <Continuous>  doxycycline IVPB 100 milliGRAM(s) IV Intermittent every 12 hours  doxycycline IVPB      enoxaparin Injectable 40 milliGRAM(s) SubCutaneous every 24 hours  folic acid 1 milliGRAM(s) Oral daily  furosemide   Injectable 40 milliGRAM(s) IV Push daily  gabapentin 100 milliGRAM(s) Oral three times a day  hydroxychloroquine 400 milliGRAM(s) Oral daily  levETIRAcetam 1500 milliGRAM(s) Oral two times a day  midazolam Infusion. 0.02 mG/kG/Hr (2.18 mL/Hr) IV Continuous <Continuous>  multivitamin 1 Tablet(s) Oral daily  pantoprazole   Suspension 40 milliGRAM(s) Oral two times a day  piperacillin/tazobactam IVPB.. 3.375 Gram(s) IV Intermittent every 8 hours  polyethylene glycol 3350 17 Gram(s) Oral two times a day  propofol Infusion. 40 MICROgram(s)/kG/Min (26.2 mL/Hr) IV Continuous <Continuous>  senna 2 Tablet(s) Oral at bedtime  silver sulfADIAZINE 1% Cream 1 Application(s) Topical every 12 hours  thiamine 100 milliGRAM(s) Oral daily  valproate sodium   IVPB 750 milliGRAM(s) IV Intermittent every 12 hours    MEDICATIONS  (PRN):  acetaminophen     Tablet .. 650 milliGRAM(s) Oral every 6 hours PRN Temp greater or equal to 38C (100.4F)  atropine Injectable 1 milliGRAM(s) IntraMuscular once PRN HR<30      New X-rays reviewed:                                                                                  ECHO    CXR interpreted by me:       Patient is a 69y old  Male who presents with a chief complaint of SOB (22 Nov 2024 10:04)      Over Night Events:  Remains critically ill on MV.  Remains on VEEG.  On Versed and Prop.        ROS:     All ROS are negative except HPI         PHYSICAL EXAM    ICU Vital Signs Last 24 Hrs  T(C): 37.3 (22 Nov 2024 08:00), Max: 37.7 (22 Nov 2024 04:00)  T(F): 99.1 (22 Nov 2024 08:00), Max: 99.9 (22 Nov 2024 04:00)  HR: 52 (22 Nov 2024 10:15) (37 - 56)  BP: 157/67 (22 Nov 2024 10:15) (92/47 - 192/77)  BP(mean): 96 (22 Nov 2024 10:15) (67 - 120)  ABP: --  ABP(mean): --  RR: 21 (22 Nov 2024 10:15) (15 - 26)  SpO2: 99% (22 Nov 2024 10:15) (95% - 99%)    O2 Parameters below as of 22 Nov 2024 10:00  Patient On (Oxygen Delivery Method): ventilator    O2 Concentration (%): 40        CONSTITUTIONAL:  Ill appearing    ENT:   ETT    EYES:   Pupils equal,   Round and reactive to light.    CARDIAC:   Normal rate,   Regular rhythm.    No edema    RESPIRATORY:   Bilateral mechanical BS  Normal chest expansion  Not tachypneic,  No use of accessory muscles    GASTROINTESTINAL:  Abdomen soft,   Non-tender    MUSCULOSKELETAL:   Not moving extremities  No clubbing, cyanosis    NEUROLOGICAL:   Sedated    SKIN:   Skin normal color for race,   Warm and dry and intact        11-21-24 @ 07:01  -  11-22-24 @ 07:00  --------------------------------------------------------  IN:    DOPamine Infusion: 13 mL    DOPamine Infusion: 8 mL    DOPamine Infusion: 79.5 mL    Enteral Tube Flush: 200 mL    Free Water: 1500 mL    IV PiggyBack: 550 mL    Jevity 1.2: 900 mL    Midazolam (Status Epilepticus): 38.5 mL    Midazolam (Status Epilepticus): 17.6 mL    Midazolam (Status Epilepticus): 6.6 mL    Norepinephrine: 4 mL    Propofol (Status Epilepticus): 627.6 mL  Total IN: 3944.8 mL    OUT:    Indwelling Catheter - Urethral (mL): 4720 mL  Total OUT: 4720 mL    Total NET: -775.2 mL      11-22-24 @ 07:01  -  11-22-24 @ 10:38  --------------------------------------------------------  IN:    Propofol (Status Epilepticus): 78.3 mL  Total IN: 78.3 mL    OUT:    DOPamine Infusion: 0 mL  Total OUT: 0 mL    Total NET: 78.3 mL          LABS:                            9.5    11.34 )-----------( 210      ( 22 Nov 2024 05:20 )             29.9                                               11-22    143  |  107  |  18  ----------------------------<  97  3.8   |  27  |  0.8    Ca    8.2[L]      22 Nov 2024 05:20  Mg     2.2     11-22    TPro  5.7[L]  /  Alb  3.1[L]  /  TBili  0.2  /  DBili  x   /  AST  25  /  ALT  10  /  AlkPhos  53  11-22                                             Urinalysis Basic - ( 22 Nov 2024 05:20 )    Color: x / Appearance: x / SG: x / pH: x  Gluc: 97 mg/dL / Ketone: x  / Bili: x / Urobili: x   Blood: x / Protein: x / Nitrite: x   Leuk Esterase: x / RBC: x / WBC x   Sq Epi: x / Non Sq Epi: x / Bacteria: x                                                  LIVER FUNCTIONS - ( 22 Nov 2024 05:20 )  Alb: 3.1 g/dL / Pro: 5.7 g/dL / ALK PHOS: 53 U/L / ALT: 10 U/L / AST: 25 U/L / GGT: x                                                                                               Mode: AC/ CMV (Assist Control/ Continuous Mandatory Ventilation)  RR (machine): 14  TV (machine): 440  FiO2: 40  PEEP: 8  ITime: 1  MAP: 12  PIP: 21                                      ABG - ( 22 Nov 2024 03:09 )  pH, Arterial: 7.47  pH, Blood: x     /  pCO2: 38    /  pO2: 77    / HCO3: 28    / Base Excess: 3.9   /  SaO2: 96.6                MEDICATIONS  (STANDING):  chlorhexidine 0.12% Liquid 15 milliLiter(s) Oral Mucosa every 12 hours  chlorhexidine 2% Cloths 1 Application(s) Topical <User Schedule>  DOPamine Infusion 2 MICROgram(s)/kG/Min (8.18 mL/Hr) IV Continuous <Continuous>  doxycycline IVPB 100 milliGRAM(s) IV Intermittent every 12 hours  doxycycline IVPB      enoxaparin Injectable 40 milliGRAM(s) SubCutaneous every 24 hours  folic acid 1 milliGRAM(s) Oral daily  furosemide   Injectable 40 milliGRAM(s) IV Push daily  gabapentin 100 milliGRAM(s) Oral three times a day  hydroxychloroquine 400 milliGRAM(s) Oral daily  levETIRAcetam 1500 milliGRAM(s) Oral two times a day  midazolam Infusion. 0.02 mG/kG/Hr (2.18 mL/Hr) IV Continuous <Continuous>  multivitamin 1 Tablet(s) Oral daily  pantoprazole   Suspension 40 milliGRAM(s) Oral two times a day  piperacillin/tazobactam IVPB.. 3.375 Gram(s) IV Intermittent every 8 hours  polyethylene glycol 3350 17 Gram(s) Oral two times a day  propofol Infusion. 40 MICROgram(s)/kG/Min (26.2 mL/Hr) IV Continuous <Continuous>  senna 2 Tablet(s) Oral at bedtime  silver sulfADIAZINE 1% Cream 1 Application(s) Topical every 12 hours  thiamine 100 milliGRAM(s) Oral daily  valproate sodium   IVPB 750 milliGRAM(s) IV Intermittent every 12 hours    MEDICATIONS  (PRN):  acetaminophen     Tablet .. 650 milliGRAM(s) Oral every 6 hours PRN Temp greater or equal to 38C (100.4F)  atropine Injectable 1 milliGRAM(s) IntraMuscular once PRN HR<30      New X-rays reviewed:                                                                                  ECHO

## 2024-11-22 NOTE — PROGRESS NOTE ADULT - ASSESSMENT
ASSESSMENT  70 y/o male with PMH of HTN, HLD, mild SLE (Lupus) ,GIB secondary to duodenal ulcer in 1/2024,  Multiple falls , Chronic ataxia and Visual Hallucinations, hx of recently diagnosed progressive supranuclear palsy for which he follows with a neurologist at [place],  presents to the ED due to cough and SOB for 2 days duration. per cousin belly the HCP who visited him eariler today at the Adult home, the pt has been complaining of cough and SOB for the past 2 days, he said that his chest feels congested then his cousin noticed that his breathing got worse and he started wheezing and having noisy breath sounds so asked the  nurse to get him to ED, pt at that time was denying any chest pain, he also had no fever, chills , GI or  symptoms, and no sick contacts. of note cousin mentions that he had visual hallucinations for the past week or so saying he sees a man in his closet ( this is not the first time the patient has visual hallucinations and is following with a neurologist at [place]).   After ED eval, patient was initially admitted to [facility] for mx of Aspiration PNA and NSTEMI II, then after that he started getting tachypneic, increased WOB, nurses suctioned him and then they noticed he is blue, then he became pulseless and CPR was initiated, he was successfully intubated , pt got ROSC , post ROSC rythm was Vtach per ED so he was started on amiodarone drip. during code blue ED also started him on Epinephrine drip.       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 3.375 Gram(s) IV Intermittent every 8 hours & Doxy 100mg q12h IV , empiric 7 days end 11/24  - Grave prognosis, GOC    If any questions, please send a message or call on [facility] Teams  Please continue to update ID with any pertinent new laboratory, radiographic findings, or change in clinical status

## 2024-11-22 NOTE — PROGRESS NOTE ADULT - SUBJECTIVE AND OBJECTIVE BOX
SUBJECTIVE/OVERNIGHT EVENTS  Today is hospital day 4d. This morning patient was seen and examined at bedside, resting comfortably in bed. Pt twitching overnight, versed was increased. EEG report negative for seizures, can wean versed.    HOSPITAL COURSE  Day 1:   Day 2:   Day 3:     CODE STATUS:    FAMILY COMMUNICATION  Contact date:  Name of person contacted:  Relationship to patient:  Communication details:    MEDICATIONS  STANDING MEDICATIONS  chlorhexidine 0.12% Liquid 15 milliLiter(s) Oral Mucosa every 12 hours  chlorhexidine 2% Cloths 1 Application(s) Topical <User Schedule>  doxycycline IVPB 100 milliGRAM(s) IV Intermittent every 12 hours  doxycycline IVPB      enoxaparin Injectable 40 milliGRAM(s) SubCutaneous every 24 hours  folic acid 1 milliGRAM(s) Oral daily  furosemide   Injectable 40 milliGRAM(s) IV Push daily  gabapentin 100 milliGRAM(s) Oral three times a day  hydroxychloroquine 400 milliGRAM(s) Oral daily  levETIRAcetam 1500 milliGRAM(s) Oral two times a day  midazolam Infusion. 0.01 mG/kG/Hr IV Continuous <Continuous>  multivitamin 1 Tablet(s) Oral daily  pantoprazole   Suspension 40 milliGRAM(s) Oral two times a day  piperacillin/tazobactam IVPB.. 3.375 Gram(s) IV Intermittent every 8 hours  polyethylene glycol 3350 17 Gram(s) Oral two times a day  propofol Infusion. 40 MICROgram(s)/kG/Min IV Continuous <Continuous>  senna 2 Tablet(s) Oral at bedtime  silver sulfADIAZINE 1% Cream 1 Application(s) Topical every 12 hours  thiamine 100 milliGRAM(s) Oral daily  valproate sodium   IVPB 750 milliGRAM(s) IV Intermittent every 8 hours    PRN MEDICATIONS  acetaminophen     Tablet .. 650 milliGRAM(s) Oral every 6 hours PRN  atropine Injectable 1 milliGRAM(s) IntraMuscular once PRN    VITALS  T(F): 99.1 (11-22-24 @ 08:00), Max: 99.9 (11-22-24 @ 04:00)  HR: 52 (11-22-24 @ 10:15) (37 - 56)  BP: 157/67 (11-22-24 @ 10:15) (92/47 - 192/77)  RR: 21 (11-22-24 @ 10:15) (15 - 26)  SpO2: 99% (11-22-24 @ 10:15) (95% - 99%)    PHYSICAL EXAM  GENERAL: NAD, lying in bed comfortably  HEART: Regular rate and rhythm, no murmurs, rubs, or gallops  LUNGS: Unlabored respirations.  Clear to auscultation bilaterally, no crackles, wheezing, or rhonchi  ABDOMEN: Soft, nontender, nondistended, +BS  EXTREMITIES: Mild LE edema  NERVOUS SYSTEM: sedated, positive pupils, positive corneal, moves extremities  SKIN: No rashes or lesions    LABS             9.5    11.34 )-----------( 210      ( 11-22-24 @ 05:20 )             29.9     143  |  107  |  18  -------------------------<  97   11-22-24 @ 05:20  3.8  |  27  |  0.8    Ca      8.2     11-22-24 @ 05:20  Mg     2.2     11-22-24 @ 05:20    TPro  5.7  /  Alb  3.1  /  TBili  0.2  /  DBili  x   /  AST  25  /  ALT  10  /  AlkPhos  53  /  GGT  x     11-22-24 @ 05:20        Urinalysis Basic - ( 22 Nov 2024 05:20 )    Color: x / Appearance: x / SG: x / pH: x  Gluc: 97 mg/dL / Ketone: x  / Bili: x / Urobili: x   Blood: x / Protein: x / Nitrite: x   Leuk Esterase: x / RBC: x / WBC x   Sq Epi: x / Non Sq Epi: x / Bacteria: x      ABG - ( 22 Nov 2024 03:09 )  pH, Arterial: 7.47  pH, Blood: x     /  pCO2: 38    /  pO2: 77    / HCO3: 28    / Base Excess: 3.9   /  SaO2: 96.6                IMAGING

## 2024-11-23 NOTE — PROGRESS NOTE ADULT - ASSESSMENT
Impression:  68 y/o M PMH of HTN, HLD, mild SLE (Lupus) ,GIB secondary to duodenal ulcer in 1/2024,  Multiple falls , Chronic ataxia and Visual Hallucinations, hx of recently diagnosed progressive supranuclear palsy for which he follows with a neurologist at [place], presented to the ED due to cough and SOB for 2 days admitted to [facility] for mx of Aspiration PNA and NSTEMI II, found to be tachypneic and went into PEA arrest w/ ROSC  s/p 3 epinephrine ivp, amiodarone gtt, and epinephrine gtt. Neurology was consulted for approval for vEEG for twitching movements. On 11/19 in the AM, pt was having myoclonic seizures followed by a generalized myoclonic/clonic seizure that lasted for about 1 hour. 11/20 vEEg showed occasionally generalized spikes. 11/21 vEEG showed no seizure activity. 11/22 results pending.       Suggestion:  - Wean off Sedation  - c/w Keppra 1500 BID  - c/w Depakote to 750 q8  - Consider PRN benzodiazepine for seizure episodes   - Get MRI brain when stable without seizures to assist with prognostication

## 2024-11-23 NOTE — PHARMACOTHERAPY INTERVENTION NOTE - COMMENTS
Consistent with ID note, recommended to adjust piperacillin/tazobactam order end date to 11/24 since this is the last day of therapy.    Lul Gustafson, PharmD, [facility]  Clinical Pharmacy Specialist, Infectious Diseases  Tele-Antimicrobial Stewardship Program (Tele-ASP)  Tele-ASP Phone: [phone]  
K 3.3 KCl 20meq IV x1, d/w team, will order KLor 40meq via OG x1 @ 10:00
levetiracetam 2000mg IVP x1-recommended changing to IVPB
d/w med team-change fentanyl drip titration to CPOT<3  -change propofol titration RASS to for status epilepticus, no titration  -change pantoprazole tab to suspension via og 
Consistent with ID note, recommended to adjust doxycycline order end date to 11/24 since this is the last day of therapy.    Lul Gustafson, PharmD, [facility]  Clinical Pharmacy Specialist, Infectious Diseases  Tele-Antimicrobial Stewardship Program (Tele-ASP)  Tele-ASP Phone: [phone]  
taper solumedrol 40mg IV to q24h, pt received dose earlier, recommended adjusting start 11/20
updated profile -add height 175 cm
recommended adding bowel regimen w/ senna & Miralax while on fentanyl drip

## 2024-11-23 NOTE — PROGRESS NOTE ADULT - SUBJECTIVE AND OBJECTIVE BOX
Neurology Progress Note    Interval History:     pt is a 68 y/o male with PMH of HTN, HLD, mild SLE (Lupus) ,GIB secondary to duodenal ulcer in 1/2024,  Multiple falls , Chronic ataxia and Visual Hallucinations, hx of recently diagnosed progressive supranuclear palsy for which he follows with a neurologist at [place],  presents to the ED due to cough and SOB for 2 days duration. per cousin belly the HCP who visited him eariler today at the Adult home, the pt has been complaining of cough and SOB for the past 2 days, he said that his chest feels congested then his cousin noticed that his breathing got worse and he started wheezing and having noisy breath sounds so asked the  nurse to get him to ED, pt at that time was denying any chest pain, he also had no fever, chills , GI or  symptoms, and no sick contacts. of note cousin mentions that he had visual hallucinations for the past week or so saying he sees a man in his closet ( this is not the first time the patient has visual hallucinations and is following with a neurologist at [place]).     After ED eval, patient was initially admitted to [facility] for mx of Aspiration PNA and NSTEMI II, then after that he started getting tachypneic, increased WOB, nurses suctioned him and then they noticed he is blue, then he became pulseless and CPR was initiated, he was successfully intubated , pt got ROSC , post ROSC rythm was Vtach per ED so he was started on amiodarone drip. during code blue ED also started him on Epinephrine drip.     Vitals on admission prior to code blue :   /74 , HR 64 , T 98.4 , O2 95%RA    CBC and CMP unremarkable   EKG NSR  Trops 32>>29   Lactate negative    CXR with b/l PNA   was given 1 L LR bolus and 1g cefepime in ED    pt is admitted to MICU (18 Nov 2024 03:54)        Vital Signs Last 24 Hrs  T(C): 37.8 (23 Nov 2024 04:00), Max: 38.2 (22 Nov 2024 20:00)  T(F): 100 (23 Nov 2024 04:00), Max: 100.8 (22 Nov 2024 20:00)  HR: 44 (23 Nov 2024 07:25) (44 - 62)  BP: 114/56 (23 Nov 2024 07:00) (111/53 - 169/70)  BP(mean): 81 (23 Nov 2024 07:00) (76 - 106)  RR: 20 (23 Nov 2024 07:00) (18 - 27)  SpO2: 98% (23 Nov 2024 07:25) (95% - 100%)    Parameters below as of 23 Nov 2024 04:00  Patient On (Oxygen Delivery Method): ventilator    O2 Concentration (%): 40    Neurological Exam:   Mechanically vented, sedated on propofol  Not following commands  No gaze  + gag reflex + corneal reflex  Minimal movement to all four extremities to noxious stimuli    Medications:  MEDICATIONS  (STANDING):  chlorhexidine 0.12% Liquid 15 milliLiter(s) Oral Mucosa every 12 hours  chlorhexidine 2% Cloths 1 Application(s) Topical <User Schedule>  doxycycline IVPB 100 milliGRAM(s) IV Intermittent every 12 hours  doxycycline IVPB      enoxaparin Injectable 40 milliGRAM(s) SubCutaneous every 24 hours  folic acid 1 milliGRAM(s) Oral daily  furosemide   Injectable 40 milliGRAM(s) IV Push daily  gabapentin 100 milliGRAM(s) Oral three times a day  hydroxychloroquine 400 milliGRAM(s) Oral daily  levETIRAcetam 1500 milliGRAM(s) Oral two times a day  multivitamin 1 Tablet(s) Oral daily  pantoprazole   Suspension 40 milliGRAM(s) Oral two times a day  piperacillin/tazobactam IVPB.. 3.375 Gram(s) IV Intermittent every 8 hours  polyethylene glycol 3350 17 Gram(s) Oral two times a day  propofol Infusion. 40 MICROgram(s)/kG/Min (26.2 mL/Hr) IV Continuous <Continuous>  senna 2 Tablet(s) Oral at bedtime  silver sulfADIAZINE 1% Cream 1 Application(s) Topical every 12 hours  thiamine 100 milliGRAM(s) Oral daily  valproate sodium   IVPB 750 milliGRAM(s) IV Intermittent every 8 hours      Labs:  CBC Full  -  ( 23 Nov 2024 05:50 )  WBC Count : 10.88 K/uL  RBC Count : 2.99 M/uL  Hemoglobin : 8.8 g/dL  Hematocrit : 27.5 %  Platelet Count - Automated : 216 K/uL  Mean Cell Volume : 92.0 fL  Mean Cell Hemoglobin : 29.4 pg  Mean Cell Hemoglobin Concentration : 32.0 g/dL  Auto Neutrophil # : 7.10 K/uL  Auto Lymphocyte # : 2.47 K/uL  Auto Monocyte # : 0.86 K/uL  Auto Eosinophil # : 0.28 K/uL  Auto Basophil # : 0.05 K/uL  Auto Neutrophil % : 65.2 %  Auto Lymphocyte % : 22.7 %  Auto Monocyte % : 7.9 %  Auto Eosinophil % : 2.6 %  Auto Basophil % : 0.5 %    11-23    144  |  108  |  15  ----------------------------<  93  3.8   |  27  |  0.7    Ca    7.9[L]      23 Nov 2024 05:50  Mg     2.1     11-23    TPro  5.3[L]  /  Alb  3.0[L]  /  TBili  0.2  /  DBili  x   /  AST  26  /  ALT  9   /  AlkPhos  52  11-23    LIVER FUNCTIONS - ( 23 Nov 2024 05:50 )  Alb: 3.0 g/dL / Pro: 5.3 g/dL / ALK PHOS: 52 U/L / ALT: 9 U/L / AST: 26 U/L / GGT: x             Urinalysis Basic - ( 23 Nov 2024 05:50 )    Color: x / Appearance: x / SG: x / pH: x  Gluc: 93 mg/dL / Ketone: x  / Bili: x / Urobili: x   Blood: x / Protein: x / Nitrite: x   Leuk Esterase: x / RBC: x / WBC x   Sq Epi: x / Non Sq Epi: x / Bacteria: x

## 2024-11-23 NOTE — PROGRESS NOTE ADULT - SUBJECTIVE AND OBJECTIVE BOX
SUSAN AWAD 69y Male  MRN#: [id number]   Hospital Day: 5d    HPI:   pt is a 68 y/o male with PMH of HTN, HLD, mild SLE (Lupus) ,GIB secondary to duodenal ulcer in 1/2024,  Multiple falls , Chronic ataxia and Visual Hallucinations, hx of recently diagnosed progressive supranuclear palsy for which he follows with a neurologist at [place],  presents to the ED due to cough and SOB for 2 days duration. per cousin belly the HCP who visited him eariler today at the Adult home, the pt has been complaining of cough and SOB for the past 2 days, he said that his chest feels congested then his cousin noticed that his breathing got worse and he started wheezing and having noisy breath sounds so asked the  nurse to get him to ED, pt at that time was denying any chest pain, he also had no fever, chills , GI or  symptoms, and no sick contacts. of note cousin mentions that he had visual hallucinations for the past week or so saying he sees a man in his closet ( this is not the first time the patient has visual hallucinations and is following with a neurologist at [place]).     After ED eval, patient was initially admitted to TELE for mx of Aspiration PNA and NSTEMI II, then after that he started getting tachypneic, increased WOB, nurses suctioned him and then they noticed he is blue, then he became pulseless and CPR was initiated, he was successfully intubated , pt got ROSC , post ROSC rythm was Vtach per ED so he was started on amiodarone drip. during code blue ED also started him on Epinephrine drip.     Vitals on admission prior to code blue :   /74 , HR 64 , T 98.4 , O2 95%RA    CBC and CMP unremarkable   EKG NSR  Trops 32>>29   Lactate negative    CXR with b/l PNA   was given 1 L LR bolus and 1g cefepime in ED    pt is admitted to MICU (18 Nov 2024 03:54)      SUBJECTIVE  Patient is a 69y old Male who presents with a chief complaint of SOB (22 Nov 2024 14:42)  Currently admitted to medicine with the primary diagnosis of Pneumonia, aspiration      INTERVAL HPI AND OVERNIGHT EVENTS:  Patient was examined and seen at bedside.    OBJECTIVE  PAST MEDICAL & SURGICAL HISTORY  Vertigo    Hypertension    Lupus erythematosus      ALLERGIES:  iodine (Rash (Mild to Mod))    MEDICATIONS:  STANDING MEDICATIONS  chlorhexidine 0.12% Liquid 15 milliLiter(s) Oral Mucosa every 12 hours  chlorhexidine 2% Cloths 1 Application(s) Topical <User Schedule>  doxycycline IVPB 100 milliGRAM(s) IV Intermittent every 12 hours  doxycycline IVPB      enoxaparin Injectable 40 milliGRAM(s) SubCutaneous every 24 hours  folic acid 1 milliGRAM(s) Oral daily  furosemide   Injectable 40 milliGRAM(s) IV Push daily  gabapentin 100 milliGRAM(s) Oral three times a day  hydroxychloroquine 400 milliGRAM(s) Oral daily  levETIRAcetam 1500 milliGRAM(s) Oral two times a day  multivitamin 1 Tablet(s) Oral daily  pantoprazole   Suspension 40 milliGRAM(s) Oral two times a day  piperacillin/tazobactam IVPB.. 3.375 Gram(s) IV Intermittent every 8 hours  polyethylene glycol 3350 17 Gram(s) Oral two times a day  propofol Infusion. 40 MICROgram(s)/kG/Min IV Continuous <Continuous>  senna 2 Tablet(s) Oral at bedtime  silver sulfADIAZINE 1% Cream 1 Application(s) Topical every 12 hours  thiamine 100 milliGRAM(s) Oral daily  valproate sodium   IVPB 750 milliGRAM(s) IV Intermittent every 8 hours    PRN MEDICATIONS  acetaminophen     Tablet .. 650 milliGRAM(s) Oral every 6 hours PRN  atropine Injectable 1 milliGRAM(s) IntraMuscular once PRN  LORazepam   Injectable 2 milliGRAM(s) IV Push every 4 hours PRN      VITAL SIGNS: Last 24 Hours  T(C): 37.8 (23 Nov 2024 04:00), Max: 38.2 (22 Nov 2024 20:00)  T(F): 100 (23 Nov 2024 04:00), Max: 100.8 (22 Nov 2024 20:00)  HR: 47 (23 Nov 2024 04:00) (45 - 62)  BP: 119/59 (23 Nov 2024 04:00) (108/54 - 165/74)  BP(mean): 85 (23 Nov 2024 04:00) (76 - 106)  RR: 21 (23 Nov 2024 04:00) (18 - 27)  SpO2: 97% (23 Nov 2024 04:00) (95% - 100%)    LABS:                        9.5    11.34 )-----------( 210      ( 22 Nov 2024 05:20 )             29.9     11-22    143  |  107  |  18  ----------------------------<  97  3.8   |  27  |  0.8    Ca    8.2[L]      22 Nov 2024 05:20  Mg     2.2     11-22    TPro  5.7[L]  /  Alb  3.1[L]  /  TBili  0.2  /  DBili  x   /  AST  25  /  ALT  10  /  AlkPhos  53  11-22      Urinalysis Basic - ( 22 Nov 2024 05:20 )    Color: x / Appearance: x / SG: x / pH: x  Gluc: 97 mg/dL / Ketone: x  / Bili: x / Urobili: x   Blood: x / Protein: x / Nitrite: x   Leuk Esterase: x / RBC: x / WBC x   Sq Epi: x / Non Sq Epi: x / Bacteria: x      ABG - ( 22 Nov 2024 03:09 )  pH, Arterial: 7.47  pH, Blood: x     /  pCO2: 38    /  pO2: 77    / HCO3: 28    / Base Excess: 3.9   /  SaO2: 96.6              PHYSICAL EXAM:  CONSTITUTIONAL: No acute distress, well-developed, well-groomed, AAOx3  HEART: Regular rate and rhythm, no murmurs, rubs, or gallops  LUNGS: Unlabored respirations.  Clear to auscultation bilaterally, no crackles, wheezing, or rhonchi  ABDOMEN: Soft, nontender, nondistended, +BS  EXTREMITIES: Mild LE edema  NERVOUS SYSTEM: sedated, +pupils, +corneal, moves extremities  SKIN: No rashes or lesions    ASSESSMENT & PLAN  pt is a 68 y/o male with PMH of HTN, HLD, mild SLE (Lupus) ,GIB secondary to duodenal ulcer in 1/2024,  Multiple falls , Chronic ataxia and Visual Hallucinations, hx of recently diagnosed progressive supranuclear palsy for which he follows with a neurologist at [place],  presents to the ED due to cough and SOB for 2 days duration, and visual hallucinations for past week.    After ED eval, patient was initially admitted to TELE for mx of Aspiration PNA and NSTEMI II, then after that he started getting tachypneic, increased WOB, nurses suctioned him, pt then coded, ROSC acheived after 3 minutes. Post ROSC rhythm was Vtach per ED so he was started on amiodarone drip, later was discontinued it d/t bradycardia. EEG x2 days reporting showed episodes of generalized myoclonic seizures. Pt was put in Keppra + Depakote and Propofol + Versed. Latest EEG reports no seizures. Weaning off versed. Bradycardia has been improving (yesterday 30-40, now 50bpm).    #Acute Hypoxic respiratory failure s/p intubation   #Possible Aspiration/Multifocal PNA   CXR unchanged - b/l hilar opacities, small RLL effusion (my read)  CT chest - b/l lower lobe consolidations  WBC downtrending 12->20->12->11  Infectious w/u neg: MRSA, RVP, Procal 0.44, UCx neg  Plan:  - c/w cefepime + doxy -> to end 11/24  - ID following  - Tylenol PRN for fevers    #Cardiac arrest, ~3min down time, s/p 1 shock for Vtach after ROSC  #NSTEMI type II  #Bradycardia   s/p amio ggt. Amio dc'd d/t bradycardia  Bedside US - not volume depleted  EP - f/u for ischemic w/u when stabilized  Cortisol 12, ->400->100->~90  Plan:  - off solumedrol  - off pressors  - bradycardia improving as versed titrated off - monitor HR    #Myoclonic seizures s/p CPA  Day 1 EEG findings: majority of the generalized spikes are associated with head jerks or bilateral proximal myoclonic jerks. Around 3 am patient had an event characterized by cluster of myoclonic seizures followed by a generalized myoclonic/clonic seizure that lasted for about 1 hour. After that EEG returned to the baseline as described above.  CTH - no acute path  Day 2 EEG findings: 2 discreet myoclonic generalized seizures yesterday at 8:30 am and 6:30 pm characterized by clusters of myoclonic activity.  Plan:  - c/w keppra 1500 BID  - depakote levels subtherapeutic, increased to q8h  - f/u AM depakote blood levels  - c/w propofol  - wean versed by 0.01 every few hours  - vent settings to prevent alkalosis (rate 14)  - f/u neuro recs for MRI brain when stable without seizures     #HO Lupus  #HO HTN  #HO GIB secondary to duodenal ulcer 1/2024   #Multiple falls , Chronic ataxia   #HO progressive supranuclear palsy and Visual Hallucinations  - on home meds: hydroxychloroquine, thiamine  - held BP meds d/t shock    #Lines: R IJ 11/18, briggs 11/18  #Family: Cousin Jw is HCP. Updated today at bedside with another cousin 11/22  #DVT prophylaxis: Lovenox  #GI prophylaxis: PPI suspension  #Diet: tube feeds, bowel regimen  #Activity: bedrest, IAT  #Code status: DNR/Intubate  #Disposition: ICU

## 2024-11-23 NOTE — PROGRESS NOTE ADULT - NS ATTEND AMEND GEN_ALL_CORE FT
In brief,   68 y/o man with multiple medical comorbidities as delineated above who was initially admitted for aspiration PNA and NSTEMI, later went into PEA arrest with subsequent ROSC. Neurology consulted for involuntary face and arm movements seen post-arrest. CT Head unremarkable. Initial vEEG with burst suppression pattern and cluster of myoclonic seizures followed by a generalized myoclonic/clonic seizure that lasted for about 1 hour.  Patient with postanoxic status epilepticus and myoclonus and with burst suppression pattern highly suggestive of poor prognosis.     Exam unchanged today (on Propofol 40mcg/kg/hr)  vEEG reviewed:   -GPDs, maximum bifrontal, frequency up to 1 hz  -Triphasic waves, generalized, frequency up to 1 hz  -LRDA, intermittent, left frontal region  -Continuous slowing, generalized, severe.   - NO seizures    Recommendations:  - continue Depakote 750mg q8h, obtain trough level in am  - continue Keppra 1500mg BID  - wean off Propofol  - continue vEEG  - MRI Brain for prognostication purposes only once patient stable and not seizing  - Keep Mg > 2, K > 4  - seizure precautions  - rest as above .

## 2024-11-23 NOTE — PROGRESS NOTE ADULT - ASSESSMENT
IMPRESSION:    Acute hypoxemic respiratory failure   Possible aspiration   Seizures - status  SP CPA   VTach   HO SLE   Shock now off Levophed   Post infectious HAAD treated     PLAN:    CNS: FU VEEG.  Continue Keppra and Depakote.  Neuro eval noted.  MRI once stable  MRI.       HEENT: Oral care.  ET Care     PULMONARY:  HOB @ 45 degrees.  Vent changes as follows: ARDS net MV settings.;  PEEP 8.  RR 14.  .  .  Monitor Airway pressures.  Wean O2 as tolerated.      CARDIOVASCULAR:   ECHO noted.  EP eval.  Off Dopamine now     GI: GI prophylaxis.  OG Feeding.  Bowel regimen     RENAL:  Follow up lytes.  Correct as needed.  Andrea for retention     INFECTIOUS DISEASE: Follow up cultures.  Finish Zosyn and Doxy course.  End date 11-24.  Nasal MRSA neg,  RVP neg.  procal noted.      HEMATOLOGICAL:  DVT prophylaxis.  Monitor CBC     ENDOCRINE:  Follow up FS.  Insulin protocol if needed.  Cortisol level noted.  TSH nl.    MUSCULOSKELETAL:  Off loading.  Bed rest      MICU

## 2024-11-23 NOTE — PROGRESS NOTE ADULT - SUBJECTIVE AND OBJECTIVE BOX
Patient is a 69y old  Male who presents with a chief complaint of SOB (23 Nov 2024 07:58)        Over Night Events:  remains critically ill on MV.  Off Versed drip.  on Propofol.  Off pressors.          ROS:     All ROS are negative except HPI         PHYSICAL EXAM    ICU Vital Signs Last 24 Hrs  T(C): 37.8 (23 Nov 2024 04:00), Max: 38.2 (22 Nov 2024 20:00)  T(F): 100 (23 Nov 2024 04:00), Max: 100.8 (22 Nov 2024 20:00)  HR: 44 (23 Nov 2024 07:25) (44 - 62)  BP: 114/56 (23 Nov 2024 07:00) (111/53 - 169/70)  BP(mean): 81 (23 Nov 2024 07:00) (76 - 106)  ABP: --  ABP(mean): --  RR: 20 (23 Nov 2024 07:00) (18 - 27)  SpO2: 98% (23 Nov 2024 07:25) (95% - 100%)    O2 Parameters below as of 23 Nov 2024 04:00  Patient On (Oxygen Delivery Method): ventilator    O2 Concentration (%): 40        CONSTITUTIONAL:   NAD    ENT:   Airway patent,   Mouth with normal mucosa.   ET     EYES:   Pupils equal,   Round and reactive to light.    CARDIAC:   Normal rate,   Regular rhythm.    edema    RESPIRATORY:   No wheezing  Bilateral BS  Normal chest expansion  Not tachypneic,  No use of accessory muscles    GASTROINTESTINAL:  Abdomen soft,   Non-tender,   No guarding,   + BS    MUSCULOSKELETAL:   Range of motion is not limited,  No clubbing, cyanosis    NEUROLOGICAL:   Sedated     SKIN:   Skin normal color for race,   Warm and dry   No evidence of rash.        11-22-24 @ 07:01  -  11-23-24 @ 07:00  --------------------------------------------------------  IN:    Enteral Tube Flush: 230 mL    Free Water: 900 mL    IV PiggyBack: 400 mL    Jevity 1.2: 550 mL    Midazolam (Status Epilepticus): 13.2 mL    Midazolam (Status Epilepticus): 12.1 mL    Peptamen A.F.: 170 mL    Propofol (Status Epilepticus): 628.2 mL  Total IN: 2903.5 mL    OUT:    DOPamine Infusion: 0 mL    Indwelling Catheter - Urethral (mL): 2985 mL  Total OUT: 2985 mL    Total NET: -81.5 mL          LABS:                            8.8    10.88 )-----------( 216      ( 23 Nov 2024 05:50 )             27.5                                               11-23    144  |  108  |  15  ----------------------------<  93  3.8   |  27  |  0.7    Ca    7.9[L]      23 Nov 2024 05:50  Mg     2.1     11-23    TPro  5.3[L]  /  Alb  3.0[L]  /  TBili  0.2  /  DBili  x   /  AST  26  /  ALT  9   /  AlkPhos  52  11-23                                             Urinalysis Basic - ( 23 Nov 2024 05:50 )    Color: x / Appearance: x / SG: x / pH: x  Gluc: 93 mg/dL / Ketone: x  / Bili: x / Urobili: x   Blood: x / Protein: x / Nitrite: x   Leuk Esterase: x / RBC: x / WBC x   Sq Epi: x / Non Sq Epi: x / Bacteria: x                                                  LIVER FUNCTIONS - ( 23 Nov 2024 05:50 )  Alb: 3.0 g/dL / Pro: 5.3 g/dL / ALK PHOS: 52 U/L / ALT: 9 U/L / AST: 26 U/L / GGT: x                                                                                               Mode: AC/ CMV (Assist Control/ Continuous Mandatory Ventilation)  RR (machine): 14  TV (machine): 440  FiO2: 40  PEEP: 8  MAP: 11  PIP: 18                                      ABG - ( 23 Nov 2024 04:09 )  pH, Arterial: 7.47  pH, Blood: x     /  pCO2: 39    /  pO2: 87    / HCO3: 28    / Base Excess: 4.4   /  SaO2: 98.1                MEDICATIONS  (STANDING):  chlorhexidine 0.12% Liquid 15 milliLiter(s) Oral Mucosa every 12 hours  chlorhexidine 2% Cloths 1 Application(s) Topical <User Schedule>  doxycycline IVPB 100 milliGRAM(s) IV Intermittent every 12 hours  doxycycline IVPB      enoxaparin Injectable 40 milliGRAM(s) SubCutaneous every 24 hours  folic acid 1 milliGRAM(s) Oral daily  furosemide   Injectable 40 milliGRAM(s) IV Push daily  gabapentin 100 milliGRAM(s) Oral three times a day  hydroxychloroquine 400 milliGRAM(s) Oral daily  levETIRAcetam 1500 milliGRAM(s) Oral two times a day  multivitamin 1 Tablet(s) Oral daily  pantoprazole   Suspension 40 milliGRAM(s) Oral two times a day  piperacillin/tazobactam IVPB.. 3.375 Gram(s) IV Intermittent every 8 hours  polyethylene glycol 3350 17 Gram(s) Oral two times a day  propofol Infusion. 40 MICROgram(s)/kG/Min (26.2 mL/Hr) IV Continuous <Continuous>  senna 2 Tablet(s) Oral at bedtime  silver sulfADIAZINE 1% Cream 1 Application(s) Topical every 12 hours  thiamine 100 milliGRAM(s) Oral daily  valproate sodium   IVPB 750 milliGRAM(s) IV Intermittent every 8 hours    MEDICATIONS  (PRN):  acetaminophen     Tablet .. 650 milliGRAM(s) Oral every 6 hours PRN Temp greater or equal to 38C (100.4F)  atropine Injectable 1 milliGRAM(s) IntraMuscular once PRN HR<30  LORazepam   Injectable 2 milliGRAM(s) IV Push every 4 hours PRN seizure-like activity (i.e. twitching)      New X-rays reviewed:                                                                                  ECHO

## 2024-11-24 NOTE — PROGRESS NOTE ADULT - ASSESSMENT
Impression:  68 y/o M PMH of HTN, HLD, mild SLE (Lupus) ,GIB secondary to duodenal ulcer in 1/2024,  Multiple falls , Chronic ataxia and Visual Hallucinations, hx of recently diagnosed progressive supranuclear palsy for which he follows with a neurologist at [place], presented to the ED due to cough and SOB for 2 days admitted to [facility] for mx of Aspiration PNA and NSTEMI II, found to be tachypneic and went into PEA arrest w/ ROSC  s/p 3 epinephrine ivp, amiodarone gtt, and epinephrine gtt. Neurology was consulted for approval for vEEG for twitching movements. On 11/19 in the AM, pt was having myoclonic seizures followed by a generalized myoclonic/clonic seizure that lasted for about 1 hour. 11/24 revealed GPDs, maximum bifrontal, triphasic waves, generalized, continuous slowing, generalized, severe. Today slightly more awake.     Suggestion:  - Wean off Sedation  - c/w Keppra 1500 BID  - c/w Depakote to 750 q8  - Consider PRN benzodiazepine for seizure episodes   - Get MRI brain when stable without seizures to assist with prognostication

## 2024-11-24 NOTE — PROGRESS NOTE ADULT - ASSESSMENT
IMPRESSION:    Acute hypoxemic respiratory failure   Possible aspiration   Seizures - status  SP CPA   VTach   HO SLE   Shock now off Levophed   Post infectious HAAD treated     PLAN:    CNS: FU VEEG.  Continue Keppra and Depakote.  Neuro eval noted.  MRI.  Wean propofol     HEENT: Oral care.  ET Care     PULMONARY:  HOB @ 45 degrees.  Vent changes as follows: ARDS net MV settings.;  PEEP 8.  RR 14.  .  Monitor Airway pressures.  Wean O2 as tolerated.      CARDIOVASCULAR:   ECHO noted.  EP eval.   Dopamine if needed     GI: GI prophylaxis.  OG Feeding.  Bowel regimen     RENAL:  Follow up lytes.  Correct as needed.  Andrea for retention     INFECTIOUS DISEASE: Follow up cultures.  Finish Zosyn and Doxy course.  End date 11-24.  Nasal MRSA neg,  RVP neg.  procal noted.      HEMATOLOGICAL:  DVT prophylaxis.  Monitor CBC     ENDOCRINE:  Follow up FS.  Insulin protocol if needed.  Cortisol level noted.  TSH nl.    MUSCULOSKELETAL:  Off loading.  Bed rest      MICU

## 2024-11-24 NOTE — PROGRESS NOTE ADULT - SUBJECTIVE AND OBJECTIVE BOX
Neurology Progress Note    Interval History:     pt is a 70 y/o male with PMH of HTN, HLD, mild SLE (Lupus) ,GIB secondary to duodenal ulcer in 1/2024,  Multiple falls , Chronic ataxia and Visual Hallucinations, hx of recently diagnosed progressive supranuclear palsy for which he follows with a neurologist at [place],  presents to the ED due to cough and SOB for 2 days duration. per cousin belly the HCP who visited him eariler today at the Adult home, the pt has been complaining of cough and SOB for the past 2 days, he said that his chest feels congested then his cousin noticed that his breathing got worse and he started wheezing and having noisy breath sounds so asked the  nurse to get him to ED, pt at that time was denying any chest pain, he also had no fever, chills , GI or  symptoms, and no sick contacts. of note cousin mentions that he had visual hallucinations for the past week or so saying he sees a man in his closet ( this is not the first time the patient has visual hallucinations and is following with a neurologist at [place]).     After ED eval, patient was initially admitted to TELE for mx of Aspiration PNA and NSTEMI II, then after that he started getting tachypneic, increased WOB, nurses suctioned him and then they noticed he is blue, then he became pulseless and CPR was initiated, he was successfully intubated , pt got ROSC , post ROSC rythm was Vtach per ED so he was started on amiodarone drip. during code blue ED also started him on Epinephrine drip.     Vitals on admission prior to code blue :   /74 , HR 64 , T 98.4 , O2 95%RA    CBC and CMP unremarkable   EKG NSR  Trops 32>>29   Lactate negative    CXR with b/l PNA   was given 1 L LR bolus and 1g cefepime in ED    pt is admitted to MICU (18 Nov 2024 03:54)        Vital Signs Last 24 Hrs  T(C): 36.5 (24 Nov 2024 06:00), Max: 38.6 (23 Nov 2024 21:00)  T(F): 97.7 (24 Nov 2024 06:00), Max: 101.6 (23 Nov 2024 21:00)  HR: 50 (24 Nov 2024 07:20) (35 - 70)  BP: 125/58 (24 Nov 2024 07:00) (90/53 - 167/74)  BP(mean): 84 (24 Nov 2024 07:00) (69 - 106)  RR: 23 (24 Nov 2024 07:00) (18 - 27)  SpO2: 100% (24 Nov 2024 07:20) (94% - 100%)    Parameters below as of 24 Nov 2024 04:00  Patient On (Oxygen Delivery Method): ventilator    O2 Concentration (%): 40    Neurological Exam:   Mechanically vented, sedated on propofol  Not following commands  No gaze  + gag reflex + corneal reflex  Today more movement in bilateral UE  Minimal movement to lower extremities to noxious stimuli    Medications:  MEDICATIONS  (STANDING):  chlorhexidine 0.12% Liquid 15 milliLiter(s) Oral Mucosa every 12 hours  chlorhexidine 2% Cloths 1 Application(s) Topical <User Schedule>  doxycycline IVPB 100 milliGRAM(s) IV Intermittent every 12 hours  enoxaparin Injectable 40 milliGRAM(s) SubCutaneous every 24 hours  folic acid 1 milliGRAM(s) Oral daily  furosemide   Injectable 40 milliGRAM(s) IV Push daily  gabapentin 100 milliGRAM(s) Oral three times a day  hydroxychloroquine 400 milliGRAM(s) Oral daily  levETIRAcetam 1500 milliGRAM(s) Oral two times a day  multivitamin 1 Tablet(s) Oral daily  pantoprazole   Suspension 40 milliGRAM(s) Oral two times a day  piperacillin/tazobactam IVPB.. 3.375 Gram(s) IV Intermittent every 8 hours  polyethylene glycol 3350 17 Gram(s) Oral two times a day  propofol Infusion. 40 MICROgram(s)/kG/Min (26.2 mL/Hr) IV Continuous <Continuous>  senna 2 Tablet(s) Oral at bedtime  silver sulfADIAZINE 1% Cream 1 Application(s) Topical every 12 hours  thiamine 100 milliGRAM(s) Oral daily  valproate sodium   IVPB 750 milliGRAM(s) IV Intermittent every 8 hours      Labs:  CBC Full  -  ( 24 Nov 2024 04:30 )  WBC Count : 13.89 K/uL  RBC Count : 3.17 M/uL  Hemoglobin : 9.3 g/dL  Hematocrit : 29.5 %  Platelet Count - Automated : 215 K/uL  Mean Cell Volume : 93.1 fL  Mean Cell Hemoglobin : 29.3 pg  Mean Cell Hemoglobin Concentration : 31.5 g/dL  Auto Neutrophil # : 9.03 K/uL  Auto Lymphocyte # : 2.81 K/uL  Auto Monocyte # : 1.14 K/uL  Auto Eosinophil # : 0.52 K/uL  Auto Basophil # : 0.08 K/uL  Auto Neutrophil % : 65.1 %  Auto Lymphocyte % : 20.2 %  Auto Monocyte % : 8.2 %  Auto Eosinophil % : 3.7 %  Auto Basophil % : 0.6 %    11-24    141  |  104  |  21[H]  ----------------------------<  119[H]  3.3[L]   |  29  |  0.7    Ca    8.2[L]      24 Nov 2024 04:30  Mg     2.4     11-24    TPro  5.4[L]  /  Alb  3.0[L]  /  TBili  0.3  /  DBili  x   /  AST  22  /  ALT  7   /  AlkPhos  55  11-24    LIVER FUNCTIONS - ( 24 Nov 2024 04:30 )  Alb: 3.0 g/dL / Pro: 5.4 g/dL / ALK PHOS: 55 U/L / ALT: 7 U/L / AST: 22 U/L / GGT: x             Urinalysis Basic - ( 24 Nov 2024 04:30 )    Color: x / Appearance: x / SG: x / pH: x  Gluc: 119 mg/dL / Ketone: x  / Bili: x / Urobili: x   Blood: x / Protein: x / Nitrite: x   Leuk Esterase: x / RBC: x / WBC x   Sq Epi: x / Non Sq Epi: x / Bacteria: x    EEG SUMMARY/CLASSIFICATION    Abnormal EEG in an encephalopathic patient.  -GPDs, maximum bifrontal, frequency up to 1 hz  -Triphasic waves, generalized, frequency up to 1 hz  -Continuous slowing, generalized, severe    _____________________________________________________________  EEG IMPRESSION/CLINICAL CORRELATE    Abnormal EEG study.  1. There is a risk for seizures with generalized onset.  2. Severe diffuse cerebral dysfunction, which may have a metabolic component.  3. No seizures were recorded.

## 2024-11-24 NOTE — PROGRESS NOTE ADULT - NS ATTEND AMEND GEN_ALL_CORE FT
In brief,   68 y/o man with multiple medical comorbidities as delineated above who was initially admitted for aspiration PNA and NSTEMI, later went into PEA arrest with subsequent ROSC. Neurology consulted for involuntary face and arm movements seen post-arrest. CT Head unremarkable. Initial vEEG with burst suppression pattern and cluster of myoclonic seizures followed by a generalized myoclonic/clonic seizure that lasted for about 1 hour.  Patient with postanoxic status epilepticus and myoclonus and with burst suppression pattern highly suggestive of poor prognosis.     Exam unchanged today (remains on Propofol 40mcg/kg/hr) with no significant change on vEEG as well: continues to have GPDs and triphasic waves with generalized slowing and no seizures.     Recommendations:  - wean off propofol  - continue Depakote 750mg q8h, obtain trough level in am  - continue Keppra 1500mg BID  - continue vEEG  - MRI Brain for prognostication purposes only once patient stable and not seizing  - Keep Mg > 2, K > 4  - seizure precautions  - rest as above .

## 2024-11-24 NOTE — PROGRESS NOTE ADULT - SUBJECTIVE AND OBJECTIVE BOX
Patient is a 69y old  Male who presents with a chief complaint of SOB (24 Nov 2024 08:43)        Over Night Events:  remains critically ill on MV.  Off pressors.          ROS:     All ROS are negative except HPI         PHYSICAL EXAM    ICU Vital Signs Last 24 Hrs  T(C): 37.2 (24 Nov 2024 08:00), Max: 38.6 (23 Nov 2024 21:00)  T(F): 98.9 (24 Nov 2024 08:00), Max: 101.6 (23 Nov 2024 21:00)  HR: 50 (24 Nov 2024 08:00) (35 - 70)  BP: 156/74 (24 Nov 2024 08:00) (90/53 - 167/74)  BP(mean): 107 (24 Nov 2024 08:00) (69 - 107)  ABP: --  ABP(mean): --  RR: 18 (24 Nov 2024 08:00) (18 - 27)  SpO2: 100% (24 Nov 2024 08:00) (94% - 100%)    O2 Parameters below as of 24 Nov 2024 08:00  Patient On (Oxygen Delivery Method): ventilator            CONSTITUTIONAL:  Ill appearing in NAD    ENT:   Airway patent,   Mouth with normal mucosa.   ET     EYES:   Pupils equal,   Round and reactive to light.    CARDIAC:   Normal rate,   Regular rhythm.      RESPIRATORY:   No wheezing  Bilateral BS  Normal chest expansion  Not tachypneic,  No use of accessory muscles    GASTROINTESTINAL:  Abdomen soft,   Non-tender,   No guarding,   + BS    MUSCULOSKELETAL:   Range of motion is not limited,  No clubbing, cyanosis    NEUROLOGICAL:   Sedated     SKIN:   Skin normal color for race,   Warm and dry  No evidence of rash.        11-23-24 @ 07:01  -  11-24-24 @ 07:00  --------------------------------------------------------  IN:    Enteral Tube Flush: 100 mL    Free Water: 150 mL    IV PiggyBack: 300 mL    Peptamen A.F.: 1005 mL    Propofol (Status Epilepticus): 628.8 mL  Total IN: 2183.8 mL    OUT:    Indwelling Catheter - Urethral (mL): 2590 mL  Total OUT: 2590 mL    Total NET: -406.2 mL      11-24-24 @ 07:01  -  11-24-24 @ 09:31  --------------------------------------------------------  IN:    Propofol (Status Epilepticus): 52.4 mL  Total IN: 52.4 mL    OUT:    Indwelling Catheter - Urethral (mL): 350 mL    Peptamen A.F.: 0 mL  Total OUT: 350 mL    Total NET: -297.6 mL          LABS:                            9.3    13.89 )-----------( 215      ( 24 Nov 2024 04:30 )             29.5                                               11-24    141  |  104  |  21[H]  ----------------------------<  119[H]  3.3[L]   |  29  |  0.7    Ca    8.2[L]      24 Nov 2024 04:30  Mg     2.4     11-24    TPro  5.4[L]  /  Alb  3.0[L]  /  TBili  0.3  /  DBili  x   /  AST  22  /  ALT  7   /  AlkPhos  55  11-24                                             Urinalysis Basic - ( 24 Nov 2024 04:30 )    Color: x / Appearance: x / SG: x / pH: x  Gluc: 119 mg/dL / Ketone: x  / Bili: x / Urobili: x   Blood: x / Protein: x / Nitrite: x   Leuk Esterase: x / RBC: x / WBC x   Sq Epi: x / Non Sq Epi: x / Bacteria: x                                                  LIVER FUNCTIONS - ( 24 Nov 2024 04:30 )  Alb: 3.0 g/dL / Pro: 5.4 g/dL / ALK PHOS: 55 U/L / ALT: 7 U/L / AST: 22 U/L / GGT: x                                                                                               Mode: AC/ CMV (Assist Control/ Continuous Mandatory Ventilation)  RR (machine): 14  TV (machine): 420  FiO2: 40  PEEP: 8  MAP: 12  PIP: 18                                      ABG - ( 24 Nov 2024 03:26 )  pH, Arterial: 7.42  pH, Blood: x     /  pCO2: 46    /  pO2: 85    / HCO3: 30    / Base Excess: 4.7   /  SaO2: 97.2                MEDICATIONS  (STANDING):  chlorhexidine 0.12% Liquid 15 milliLiter(s) Oral Mucosa every 12 hours  chlorhexidine 2% Cloths 1 Application(s) Topical <User Schedule>  doxycycline IVPB 100 milliGRAM(s) IV Intermittent every 12 hours  enoxaparin Injectable 40 milliGRAM(s) SubCutaneous every 24 hours  folic acid 1 milliGRAM(s) Oral daily  furosemide   Injectable 40 milliGRAM(s) IV Push daily  gabapentin 100 milliGRAM(s) Oral three times a day  hydroxychloroquine 400 milliGRAM(s) Oral daily  levETIRAcetam 1500 milliGRAM(s) Oral two times a day  multivitamin 1 Tablet(s) Oral daily  pantoprazole   Suspension 40 milliGRAM(s) Oral two times a day  piperacillin/tazobactam IVPB.. 3.375 Gram(s) IV Intermittent every 8 hours  polyethylene glycol 3350 17 Gram(s) Oral two times a day  propofol Infusion. 40 MICROgram(s)/kG/Min (26.2 mL/Hr) IV Continuous <Continuous>  senna 2 Tablet(s) Oral at bedtime  silver sulfADIAZINE 1% Cream 1 Application(s) Topical every 12 hours  thiamine 100 milliGRAM(s) Oral daily  valproate sodium   IVPB 750 milliGRAM(s) IV Intermittent every 8 hours    MEDICATIONS  (PRN):  acetaminophen     Tablet .. 650 milliGRAM(s) Oral every 6 hours PRN Temp greater or equal to 38C (100.4F)  atropine Injectable 1 milliGRAM(s) IntraMuscular once PRN HR<30  LORazepam   Injectable 2 milliGRAM(s) IV Push every 4 hours PRN seizure-like activity (i.e. twitching)      New X-rays reviewed:                                                                                  ECHO

## 2024-11-24 NOTE — CHART NOTE - NSCHARTNOTEFT_GEN_A_CORE
Pt bradycardic to 35 on tele. EKG in sinus with nonspecific intraventricular block. MAP 60-65. Dopamine restarted. Atropine at the bedside PRN HR <30 Pt bradycardic to 35 on tele. EKG in sinus with nonspecific intraventricular block. MAP 60-65. Temp 93F rectal, warming blanket ordered. Atropine at the bedside PRN HR <30 Pt bradycardic to 35 on tele. EKG in sinus with nonspecific intraventricular block. MAP 60-65. Repeat temp 93F rectal, warming blanket ordered Pt bradycardic to 35 on tele. EKG in sinus with nonspecific intraventricular block. MAP 60-65. Dopamine briefly restarted >> repeat temp 93F rectal, warming blanket ordered, wean dopamine gtt Pt bradycardic to Hr 31-35 on tele. EKG in sinus with nonspecific intraventricular block. MAP 60-65. Dopamine briefly restarted >> repeat temp 93F rectal, warming blanket ordered, wean dopamine gtt

## 2024-11-24 NOTE — PROGRESS NOTE ADULT - SUBJECTIVE AND OBJECTIVE BOX
SUBJECTIVE/OVERNIGHT EVENTS  Today is hospital day 6d. This morning patient was seen and examined at bedside, resting comfortably in bed. No acute or major events overnight.    HOSPITAL COURSE  Day 1:   Day 2:   Day 3:     CODE STATUS:    FAMILY COMMUNICATION  Contact date:  Name of person contacted:  Relationship to patient:  Communication details:    MEDICATIONS  STANDING MEDICATIONS  chlorhexidine 0.12% Liquid 15 milliLiter(s) Oral Mucosa every 12 hours  chlorhexidine 2% Cloths 1 Application(s) Topical <User Schedule>  enoxaparin Injectable 40 milliGRAM(s) SubCutaneous every 24 hours  folic acid 1 milliGRAM(s) Oral daily  furosemide   Injectable 40 milliGRAM(s) IV Push daily  gabapentin 100 milliGRAM(s) Oral three times a day  hydroxychloroquine 400 milliGRAM(s) Oral daily  levETIRAcetam 1500 milliGRAM(s) Oral two times a day  multivitamin 1 Tablet(s) Oral daily  pantoprazole   Suspension 40 milliGRAM(s) Oral two times a day  piperacillin/tazobactam IVPB.. 3.375 Gram(s) IV Intermittent every 8 hours  polyethylene glycol 3350 17 Gram(s) Oral two times a day  propofol Infusion. 20 MICROgram(s)/kG/Min IV Continuous <Continuous>  senna 2 Tablet(s) Oral at bedtime  silver sulfADIAZINE 1% Cream 1 Application(s) Topical every 12 hours  thiamine 100 milliGRAM(s) Oral daily  valproate sodium   IVPB 1000 milliGRAM(s) IV Intermittent every 8 hours    PRN MEDICATIONS  acetaminophen     Tablet .. 650 milliGRAM(s) Oral every 6 hours PRN  atropine Injectable 1 milliGRAM(s) IntraMuscular once PRN  LORazepam   Injectable 2 milliGRAM(s) IV Push every 4 hours PRN    VITALS  T(F): 101 (11-24-24 @ 16:00), Max: 101.6 (11-23-24 @ 21:00)  HR: 62 (11-24-24 @ 19:00) (35 - 74)  BP: 153/67 (11-24-24 @ 19:00) (90/53 - 167/74)  RR: 23 (11-24-24 @ 19:00) (17 - 35)  SpO2: 98% (11-24-24 @ 19:00) (95% - 100%)    PHYSICAL EXAM  GENERAL: NAD, lying in bed comfortably  HEART: Regular rate and rhythm, no murmurs, rubs, or gallops  LUNGS: Unlabored respirations.  Clear to auscultation bilaterally, no crackles, wheezing, or rhonchi  ABDOMEN: Soft, nontender, nondistended, +BS  EXTREMITIES: b/l LE edema  NERVOUS SYSTEM:  sedated, (+) corneal, (+) pupil  SKIN: No rashes or lesions    LABS             9.3    13.89 )-----------( 215      ( 11-24-24 @ 04:30 )             29.5     141  |  104  |  21  -------------------------<  119   11-24-24 @ 04:30  3.3  |  29  |  0.7    Ca      8.2     11-24-24 @ 04:30  Mg     2.4     11-24-24 @ 04:30    TPro  5.4  /  Alb  3.0  /  TBili  0.3  /  DBili  x   /  AST  22  /  ALT  7   /  AlkPhos  55  /  GGT  x     11-24-24 @ 04:30        Urinalysis Basic - ( 24 Nov 2024 04:30 )    Color: x / Appearance: x / SG: x / pH: x  Gluc: 119 mg/dL / Ketone: x  / Bili: x / Urobili: x   Blood: x / Protein: x / Nitrite: x   Leuk Esterase: x / RBC: x / WBC x   Sq Epi: x / Non Sq Epi: x / Bacteria: x      ABG - ( 24 Nov 2024 03:26 )  pH, Arterial: 7.42  pH, Blood: x     /  pCO2: 46    /  pO2: 85    / HCO3: 30    / Base Excess: 4.7   /  SaO2: 97.2                IMAGING

## 2024-11-24 NOTE — PROGRESS NOTE ADULT - ASSESSMENT
pt is a 68 y/o male with PMH of HTN, HLD, mild SLE (Lupus) ,GIB secondary to duodenal ulcer in 1/2024,  Multiple falls , Chronic ataxia and Visual Hallucinations, hx of recently diagnosed progressive supranuclear palsy for which he follows with a neurologist at [facility],  presents to the ED due to cough and SOB for 2 days duration, and visual hallucinations for past week.    After ED eval, patient was initially admitted to [facility] for mx of Aspiration PNA and NSTEMI II, then after that he started getting tachypneic, increased WOB, nurses suctioned him, pt then coded, ROSC acheived after 3 minutes. Post ROSC rhythm was Vtach per ED so he was started on amiodarone drip, later was discontinued it d/t bradycardia. EEG x2 days reporting showed episodes of generalized myoclonic seizures. Pt was put in Keppra + Depakote and Propofol + Versed. Latest EEG reports no seizures. Weaning off versed. Bradycardia has been improving (yesterday 30-40, now 50bpm).    #Acute Hypoxic respiratory failure s/p intubation   #Possible Aspiration/Multifocal PNA   CXR unchanged - b/l hilar opacities, small RLL effusion (my read)  CT chest - b/l lower lobe consolidations  WBC downtrending 12->20->12->11  Infectious w/u neg: MRSA, RVP, Procal 0.44, UCx neg  Plan:  - c/w cefepime + doxy -> to end 11/24  - ID following  - Tylenol PRN for fevers    #Cardiac arrest, ~3min down time, s/p 1 shock for Vtach after ROSC  #NSTEMI type II  #Bradycardia   s/p amio ggt. Amio dc'd d/t bradycardia  Bedside US - not volume depleted  EP - f/u for ischemic w/u when stabilized  Cortisol 12, ->400->100->~90  Plan:  - off solumedrol  - off pressors  - bradycardia improving as versed titrated off - monitor HR    #Myoclonic seizures s/p CPA  Day 1 EEG findings: majority of the generalized spikes are associated with head jerks or bilateral proximal myoclonic jerks. Around 3 am patient had an event characterized by cluster of myoclonic seizures followed by a generalized myoclonic/clonic seizure that lasted for about 1 hour. After that EEG returned to the baseline as described above.  CTH - no acute path  Day 2 EEG findings: 2 discreet myoclonic generalized seizures yesterday at 8:30 am and 6:30 pm characterized by clusters of myoclonic activity.  Plan:  - c/w keppra 1500 BID  - depakote levels subtherapeutic, increased to 1g q8h  - f/u AM depakote blood levels  - weaned propofol to 20  - ativan PRN  - vent settings to prevent alkalosis (rate 14)  - MRI brain    #HO Lupus  #HO HTN  #HO GIB secondary to duodenal ulcer 1/2024   #Multiple falls , Chronic ataxia   #HO progressive supranuclear palsy and Visual Hallucinations  - on home meds: hydroxychloroquine, thiamine  - held BP meds d/t shock    #Lines: R IGNACIO 11/18, briggs 11/18  #Family: Cousin Jw is HCP. Updated today at bedside with another cousin 11/22  #DVT prophylaxis: Lovenox  #GI prophylaxis: PPI suspension  #Diet: tube feeds, bowel regimen  #Activity: bedrest, IAT  #Code status: DNR/Intubate  #Disposition: ICU    Pending:  - valproate blood levels,  - propofol wean  - MRI brain

## 2024-11-24 NOTE — CHART NOTE - NSCHARTNOTEFT_GEN_A_CORE
Neurology:  Patient with subtherapeutic VPA level    Valproic Acid Level, Serum: 27.0 ug/mL (11.24.24 @ 11:21)     Discussed with Dr. Barrera.   Increase Depakote to 1000 mg IV Q8 hours  Follow previous recommendations  Discussed with MICU

## 2024-11-25 NOTE — PROGRESS NOTE ADULT - SUBJECTIVE AND OBJECTIVE BOX
Progress Note     Interval Events:  Patient febrile overnight. No new seizures, started on Cefepime, awaiting cultures. Depakote level low, increased to 1000mg TID yesterday, but levels low today as well.    HPI:  68 y/o M PMH of HTN, HLD, mild SLE (Lupus) ,GIB secondary to duodenal ulcer in 1/2024,  Multiple falls , Chronic ataxia and Visual Hallucinations, hx of recently diagnosed progressive supranuclear palsy for which he follows with a neurologist at [facility], presented to the ED due to cough and SOB for 2 days admitted to [facility] for mx of Aspiration PNA and NSTEMI II, found to be tachypneic and went into PEA arrest w/ ROSC  s/p 3 epinephrine ivp, amiodarone gtt, and epinephrine gtt. Neurology was consulted for approval for vEEG for twitching movements. On 11/19 in the AM, pt was having myoclonic seizures followed by a generalized myoclonic/clonic seizure that lasted for about 1 hour. 11/24 revealed GPDs, maximum bifrontal, triphasic waves, generalized, continuous slowing, generalized, severe. Started on AED's, vEEG yesterday showing abundant diffusely expressed periodic discharges with triphasic morphology.      MEDICATIONS  Home Medications:  acetaminophen 325 mg oral tablet: 2 tab(s) orally every 6 hours as needed for pain or fever (29 Jan 2024 07:48)  fluticasone 50 mcg/inh nasal spray: 1 spray(s) in each nostril 2 times a day (18 Nov 2024 03:48)  folic acid 1 mg oral tablet: 1 tab(s) orally once a day (18 Jan 2024 12:17)  gabapentin 100 mg oral capsule: 1 cap(s) orally 3 times a day (every 8 hours) (29 Jan 2024 07:48)  hydroxychloroquine 200 mg oral tablet: 2 tab(s) orally once a day (16 Jan 2024 02:53)  lidocaine 4% topical film: Apply topically to affected area once a day apply to affected  area on right chest wall once daily, leave on for 12 hours, remove for 12 hours, wash hands after use ; avoid open skin - apply to intact skin only (29 Jan 2024 07:48)  losartan 50 mg oral tablet: 1 tab(s) orally once a day (18 Nov 2024 03:48)  Multiple Vitamins oral tablet: 1 tab(s) orally once a day (18 Jan 2024 12:17)  pantoprazole 40 mg oral delayed release tablet: 1 tab(s) orally 2 times a day ; take 1st tab in the morning 30 minutes before breakfast, and take the 2nd tab at bedtime (29 Jan 2024 07:48)  silver sulfADIAZINE 1% topical cream: Apply topically to affected area every 12 hours :  wash with soap and water, pat dry, apply silvadene to R chest and b/l  UE&#x27;s and b/l LE&#x27;s, cover with adaptic + DSD + kerlix (29 Jan 2024 07:55)  TAMSULOSIN HCL 0.4 MG CAPSULE: 1 cap(s) orally once a day (at bedtime) (29 Jan 2024 07:48)  thiamine 100 mg oral tablet: 1 tab(s) orally once a day (18 Jan 2024 12:17)    MEDICATIONS  (STANDING):  cefepime   IVPB      cefepime   IVPB 2000 milliGRAM(s) IV Intermittent every 8 hours  chlorhexidine 0.12% Liquid 15 milliLiter(s) Oral Mucosa every 12 hours  chlorhexidine 2% Cloths 1 Application(s) Topical <User Schedule>  enoxaparin Injectable 40 milliGRAM(s) SubCutaneous every 24 hours  folic acid 1 milliGRAM(s) Oral daily  furosemide   Injectable 40 milliGRAM(s) IV Push daily  gabapentin 100 milliGRAM(s) Oral three times a day  hydroxychloroquine 400 milliGRAM(s) Oral daily  levETIRAcetam 1500 milliGRAM(s) Oral two times a day  losartan 50 milliGRAM(s) Oral daily  multivitamin 1 Tablet(s) Oral daily  pantoprazole   Suspension 40 milliGRAM(s) Oral two times a day  polyethylene glycol 3350 17 Gram(s) Oral two times a day  propofol Infusion. 10 MICROgram(s)/kG/Min (6.55 mL/Hr) IV Continuous <Continuous>  senna 2 Tablet(s) Oral at bedtime  silver sulfADIAZINE 1% Cream 1 Application(s) Topical every 12 hours  thiamine 100 milliGRAM(s) Oral daily  valproate sodium   IVPB 1000 milliGRAM(s) IV Intermittent every 8 hours  vancomycin  IVPB 1750 milliGRAM(s) IV Intermittent every 12 hours    MEDICATIONS  (PRN):  acetaminophen     Tablet .. 650 milliGRAM(s) Oral every 6 hours PRN Temp greater or equal to 38C (100.4F)  atropine Injectable 1 milliGRAM(s) IntraMuscular once PRN HR<30  LORazepam   Injectable 2 milliGRAM(s) IV Push every 4 hours PRN seizure-like activity (i.e. twitching)      FAMILY HISTORY:  Family history of colon cancer in father      SOCIAL HISTORY: negative for tobacco, alcohol, or ilicit drug use.    Allergies    iodine (Rash (Mild to Mod))    Intolerances        GEN: NAD, pleasant, cooperative    NEURO:   - Mechanically vented, sedated on propofol. Not following commands  - Pupils reactive bilaterally. No gaze preference.   - Occasional myoclonic twitches of R lower face. Tremulousness on the RUE >L (appears to be shivering, no epileptiform EEG correlate).  - Withdraws to pain from all extremities.   - (+) gag reflex,  (+) corneal reflex      LABS:                        9.9    15.92 )-----------( 259      ( 25 Nov 2024 04:27 )             31.5     11-25    141  |  103  |  26[H]  ----------------------------<  92  3.7   |  30  |  0.8    Ca    8.2[L]      25 Nov 2024 04:27  Phos  3.2     11-25  Mg     2.3     11-25    TPro  6.0  /  Alb  3.3[L]  /  TBili  0.3  /  DBili  x   /  AST  24  /  ALT  8   /  AlkPhos  61  11-25    Hemoglobin A1C:   Vitamin B12     CAPILLARY BLOOD GLUCOSE          Urinalysis Basic - ( 25 Nov 2024 04:27 )    Color: x / Appearance: x / SG: x / pH: x  Gluc: 92 mg/dL / Ketone: x  / Bili: x / Urobili: x   Blood: x / Protein: x / Nitrite: x   Leuk Esterase: x / RBC: x / WBC x   Sq Epi: x / Non Sq Epi: x / Bacteria: x        ABG - ( 25 Nov 2024 09:24 )  pH, Arterial: 7.47  pH, Blood: x     /  pCO2: 42    /  pO2: 88    / HCO3: 31    / Base Excess: 6.3   /  SaO2: 97.6                Microbiology:      RADIOLOGY, EKG AND ADDITIONAL TESTS: Reviewed.

## 2024-11-25 NOTE — PROGRESS NOTE ADULT - NS ATTEST RISK PROBLEM GEN_ALL_CORE FT
- Patient has an illness which poses a threat to life or bodily function without treatment  - I independently interpreted the most recent  CXR L lung opacities   - I discussed my recommendations with the primary team housestaff  Dr Rogers

## 2024-11-25 NOTE — PROGRESS NOTE ADULT - SUBJECTIVE AND OBJECTIVE BOX
Over Night Events: events noted, remains critically ill, still intubated, ventilated, on propofol 20, AED levels noted    PHYSICAL EXAM    ICU Vital Signs Last 24 Hrs  T(C): 38 (25 Nov 2024 05:45), Max: 38.3 (24 Nov 2024 16:00)  T(F): 100.4 (25 Nov 2024 05:45), Max: 101 (24 Nov 2024 16:00)  HR: 69 (25 Nov 2024 06:00) (50 - 84)  BP: 158/101 (25 Nov 2024 05:45) (133/62 - 190/78)  BP(mean): 122 (25 Nov 2024 05:45) (88 - 137)  RR: 27 (25 Nov 2024 06:00) (16 - 35)  SpO2: 97% (25 Nov 2024 06:00) (95% - 100%)    O2 Parameters below as of 24 Nov 2024 12:00  Patient On (Oxygen Delivery Method): ventilator            General: ill looking  ETT  Lungs: Bilateral rhonchi  Cardiovascular: COOPER 2.6  Abdomen: Soft, Positive BS  Extremities: No clubbing   not following commands      11-24-24 @ 07:01  -  11-25-24 @ 07:00  --------------------------------------------------------  IN:    Enteral Tube Flush: 100 mL    Free Water: 150 mL    IV PiggyBack: 500 mL    Peptamen A.F.: 1045 mL    Propofol (Status Epilepticus): 52.4 mL    Propofol (Status Epilepticus): 91.7 mL    Propofol (Status Epilepticus): 196.5 mL  Total IN: 2135.6 mL    OUT:    Indwelling Catheter - Urethral (mL): 1490 mL  Total OUT: 1490 mL    Total NET: 645.6 mL          LABS:                          9.9    15.92 )-----------( 259      ( 25 Nov 2024 04:27 )             31.5                                               11-25    141  |  103  |  26[H]  ----------------------------<  92  3.7   |  30  |  0.8    Ca    8.2[L]      25 Nov 2024 04:27  Phos  3.2     11-25  Mg     2.3     11-25    TPro  6.0  /  Alb  3.3[L]  /  TBili  0.3  /  DBili  x   /  AST  24  /  ALT  8   /  AlkPhos  61  11-25                                             Urinalysis Basic - ( 25 Nov 2024 04:27 )    Color: x / Appearance: x / SG: x / pH: x  Gluc: 92 mg/dL / Ketone: x  / Bili: x / Urobili: x   Blood: x / Protein: x / Nitrite: x   Leuk Esterase: x / RBC: x / WBC x   Sq Epi: x / Non Sq Epi: x / Bacteria: x                                                  LIVER FUNCTIONS - ( 25 Nov 2024 04:27 )  Alb: 3.3 g/dL / Pro: 6.0 g/dL / ALK PHOS: 61 U/L / ALT: 8 U/L / AST: 24 U/L / GGT: x                                                                                               Mode: AC/ CMV (Assist Control/ Continuous Mandatory Ventilation)  RR (machine): 14  TV (machine): 420  FiO2: 40  PEEP: 8  MAP: 11  PIP: 16                                      ABG - ( 25 Nov 2024 03:30 )  pH, Arterial: 7.54  pH, Blood: x     /  pCO2: 33    /  pO2: 129   / HCO3: 28    / Base Excess: 5.6   /  SaO2: 98.8                MEDICATIONS  (STANDING):  chlorhexidine 0.12% Liquid 15 milliLiter(s) Oral Mucosa every 12 hours  chlorhexidine 2% Cloths 1 Application(s) Topical <User Schedule>  enoxaparin Injectable 40 milliGRAM(s) SubCutaneous every 24 hours  folic acid 1 milliGRAM(s) Oral daily  furosemide   Injectable 40 milliGRAM(s) IV Push daily  gabapentin 100 milliGRAM(s) Oral three times a day  hydroxychloroquine 400 milliGRAM(s) Oral daily  levETIRAcetam 1500 milliGRAM(s) Oral two times a day  multivitamin 1 Tablet(s) Oral daily  pantoprazole   Suspension 40 milliGRAM(s) Oral two times a day  polyethylene glycol 3350 17 Gram(s) Oral two times a day  propofol Infusion. 20 MICROgram(s)/kG/Min (13.1 mL/Hr) IV Continuous <Continuous>  senna 2 Tablet(s) Oral at bedtime  silver sulfADIAZINE 1% Cream 1 Application(s) Topical every 12 hours  thiamine 100 milliGRAM(s) Oral daily  valproate sodium   IVPB 1000 milliGRAM(s) IV Intermittent every 8 hours    MEDICATIONS  (PRN):  acetaminophen     Tablet .. 650 milliGRAM(s) Oral every 6 hours PRN Temp greater or equal to 38C (100.4F)  atropine Injectable 1 milliGRAM(s) IntraMuscular once PRN HR<30  LORazepam   Injectable 2 milliGRAM(s) IV Push every 4 hours PRN seizure-like activity (i.e. twitching)

## 2024-11-25 NOTE — PROGRESS NOTE ADULT - ASSESSMENT
68 y/o M PMH of HTN, HLD, mild SLE (Lupus) ,GIB secondary to duodenal ulcer in 1/2024,  Multiple falls , recently diagnosed with PSP admitted for aspiration PNA, went into PEA arrest w/ ROSC while inpatient. Found having myoclonic seizures followed by a generalized myoclonic/clonic seizures. Patient seizure free since escalation of AED's. Suspected anoxic brain injury as seizure etiology (CTH obtain post arrest but likely too early to see changes).     Recommendations:  - c/w vEEG  - Switch Cefepime for alternative ABx due to its seizure threshold lowering risk  - c/w Keppra 1500mg BID  - c/w Depakote 1000mg q8h  - Collect Depakote trough tomorrow morning  - Rest of management per primary

## 2024-11-25 NOTE — PROGRESS NOTE ADULT - ASSESSMENT
ASSESSMENT  70 y/o male with PMH of HTN, HLD, mild SLE (Lupus) ,GIB secondary to duodenal ulcer in 1/2024,  Multiple falls , Chronic ataxia and Visual Hallucinations, hx of recently diagnosed progressive supranuclear palsy for which he follows with a neurologist at [place],  presents to the ED due to cough and SOB for 2 days duration. per cousin belly the HCP who visited him eariler today at the Adult home, the pt has been complaining of cough and SOB for the past 2 days, he said that his chest feels congested then his cousin noticed that his breathing got worse and he started wheezing and having noisy breath sounds so asked the  nurse to get him to ED, pt at that time was denying any chest pain, he also had no fever, chills , GI or  symptoms, and no sick contacts. of note cousin mentions that he had visual hallucinations for the past week or so saying he sees a man in his closet ( this is not the first time the patient has visual hallucinations and is following with a neurologist at [place]).   After ED eval, patient was initially admitted to [facility] for mx of Aspiration PNA and NSTEMI II, then after that he started getting tachypneic, increased WOB, nurses suctioned him and then they noticed he is blue, then he became pulseless and CPR was initiated, he was successfully intubated , pt got ROSC , post ROSC rythm was Vtach per ED so he was started on amiodarone drip. during code blue ED also started him on Epinephrine drip.       IMPRESSION  #Fever    CXR Right basilar opacity/discoid atelectasis, increased. Redemonstration left basilar opacity..    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      Height (cm): 177.8 (11-18-24 @ 09:08)  Weight (kg): 109.1 (11-18-24 @ 08:59)    RECOMMENDATIONS  - Bradycardic and hypertensive with fever, consider CTH   - Please send BCX, none sent prior to restarting antibiotics   - MRSA nares  - Send DTA  - Send Influenza/RSV/COVID19 PCR negative   - All lines are new, briggs 11/18  - Vanc dosing AUC/JUNE per clinical pharmacist   - Cefepime 2g q8h IV   - s/p zosyn empiric 7 days ended 11/24  - Grave prognosis, GOC    If any questions, please send a message or call on [employer] Teams  Please continue to update ID with any pertinent new laboratory, radiographic findings, or change in clinical status   ASSESSMENT  70 y/o male with PMH of HTN, HLD, mild SLE (Lupus) ,GIB secondary to duodenal ulcer in 1/2024,  Multiple falls , Chronic ataxia and Visual Hallucinations, hx of recently diagnosed progressive supranuclear palsy for which he follows with a neurologist at [place],  presents to the ED due to cough and SOB for 2 days duration. per cousin belly the HCP who visited him eariler today at the Adult home, the pt has been complaining of cough and SOB for the past 2 days, he said that his chest feels congested then his cousin noticed that his breathing got worse and he started wheezing and having noisy breath sounds so asked the  nurse to get him to ED, pt at that time was denying any chest pain, he also had no fever, chills , GI or  symptoms, and no sick contacts. of note cousin mentions that he had visual hallucinations for the past week or so saying he sees a man in his closet ( this is not the first time the patient has visual hallucinations and is following with a neurologist at [place]).   After ED eval, patient was initially admitted to [facility] for mx of Aspiration PNA and NSTEMI II, then after that he started getting tachypneic, increased WOB, nurses suctioned him and then they noticed he is blue, then he became pulseless and CPR was initiated, he was successfully intubated , pt got ROSC , post ROSC rythm was Vtach per ED so he was started on amiodarone drip. during code blue ED also started him on Epinephrine drip.       IMPRESSION  #Fever    CXR Right basilar opacity/discoid atelectasis, increased. Redemonstration left basilar opacity..    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      Height (cm): 177.8 (11-18-24 @ 09:08)  Weight (kg): 109.1 (11-18-24 @ 08:59)    RECOMMENDATIONS  - Bradycardic and hypertensive with fever, consider CTH   - Please send BCX, none sent prior to restarting antibiotics   - MRSA nares  - Send DTA  - Send Influenza/RSV/COVID19 PCR negative   - All lines are new, briggs 11/18  - Vanc dosing AUC/JUNE per clinical pharmacist   - Monitor RUE   - Cefepime 2g q8h IV   - s/p zosyn empiric 7 days ended 11/24  - Grave prognosis, GOC    If any questions, please send a message or call on [employer] Teams  Please continue to update ID with any pertinent new laboratory, radiographic findings, or change in clinical status

## 2024-11-25 NOTE — PROGRESS NOTE ADULT - SUBJECTIVE AND OBJECTIVE BOX
SUSAN AWAD  69y, Male  Allergy: iodine (Rash (Mild to Mod))      LOS  7d    CHIEF COMPLAINT: SOB (25 Nov 2024 08:19)      INTERVAL EVENTS/HPI  - T(F): , Max: 101 (11-24-24 @ 16:00) febrile, rising WBC  - WBC Count: 15.92 (11-25-24 @ 04:27)  WBC Count: 13.89 (11-24-24 @ 04:30)     - Creatinine: 0.8 (11-25-24 @ 04:27)  Creatinine: 0.7 (11-24-24 @ 22:20)     -   -   -     ROS  cannot obtain secondary to patient's sedation and/or mental status    VITALS:  T(F): 100.4, Max: 101 (11-24-24 @ 16:00)  HR: 69  BP: 158/101  RR: 27Vital Signs Last 24 Hrs  T(C): 38 (25 Nov 2024 05:45), Max: 38.3 (24 Nov 2024 16:00)  T(F): 100.4 (25 Nov 2024 05:45), Max: 101 (24 Nov 2024 16:00)  HR: 69 (25 Nov 2024 06:00) (50 - 84)  BP: 158/101 (25 Nov 2024 05:45) (133/62 - 190/78)  BP(mean): 122 (25 Nov 2024 05:45) (88 - 137)  RR: 27 (25 Nov 2024 06:00) (16 - 35)  SpO2: 97% (25 Nov 2024 06:00) (95% - 100%)    Parameters below as of 24 Nov 2024 12:00  Patient On (Oxygen Delivery Method): ventilator        PHYSICAL EXAM:  ***    FH: Non-contributory  Social Hx: Non-contributory    TESTS & MEASUREMENTS:                        9.9    15.92 )-----------( 259      ( 25 Nov 2024 04:27 )             31.5     11-25    141  |  103  |  26[H]  ----------------------------<  92  3.7   |  30  |  0.8    Ca    8.2[L]      25 Nov 2024 04:27  Phos  3.2     11-25  Mg     2.3     11-25    TPro  6.0  /  Alb  3.3[L]  /  TBili  0.3  /  DBili  x   /  AST  24  /  ALT  8   /  AlkPhos  61  11-25      LIVER FUNCTIONS - ( 25 Nov 2024 04:27 )  Alb: 3.3 g/dL / Pro: 6.0 g/dL / ALK PHOS: 61 U/L / ALT: 8 U/L / AST: 24 U/L / GGT: x           Urinalysis Basic - ( 25 Nov 2024 04:27 )    Color: x / Appearance: x / SG: x / pH: x  Gluc: 92 mg/dL / Ketone: x  / Bili: x / Urobili: x   Blood: x / Protein: x / Nitrite: x   Leuk Esterase: x / RBC: x / WBC x   Sq Epi: x / Non Sq Epi: x / Bacteria: x        Culture - Urine (collected 11-18-24 @ 14:00)  Source: Catheterized Catheterized  Final Report (11-20-24 @ 07:28):    No growth    Culture - Sputum (collected 11-18-24 @ 10:10)  Source: Trach Asp Tracheal Aspirate  Gram Stain (11-18-24 @ 22:28):    Moderate polymorphonuclear leukocytes per low power field    No Squamous epithelial cells per low power field    Moderate Gram positive cocci in pairs per oil power field    Few Gram Positive Rods per oil power field  Final Report (11-20-24 @ 17:47):    Commensal domonique consistent with body site    Culture - Blood (collected 11-18-24 @ 00:30)  Source: .Blood BLOOD  Final Report (11-23-24 @ 06:01):    No growth at 5 days    Culture - Blood (collected 11-18-24 @ 00:20)  Source: .Blood BLOOD  Final Report (11-23-24 @ 06:01):    No growth at 5 days            INFECTIOUS DISEASES TESTING  Procalcitonin: 0.75 (11-19-24 @ 06:10)  Procalcitonin: 0.44 (11-18-24 @ 10:20)  MRSA PCR Result.: Negative (11-18-24 @ 10:10)  Rapid RVP Result: NotDetec (11-18-24 @ 01:20)  COVID-19 PCR: NotDetec (02-05-24 @ 06:50)  COVID-19 PCR: NotDetec (02-04-24 @ 07:06)  COVID-19 PCR: Detected (01-28-24 @ 06:30)      INFLAMMATORY MARKERS      RADIOLOGY & ADDITIONAL TESTS:  I have personally reviewed the last available Chest xray  CXR  Xray Chest 1 View AP/PA:   ACC: [id number] EXAM:  XR CHEST 1 VIEW   ORDERED BY: ANA ARGUETA     PROCEDURE DATE:  11/25/2024          INTERPRETATION:  CLINICAL HISTORY: Follow-up    COMPARISON: November 24, 2024 time 5:32 AM    TECHNIQUE: Portable frontal chest radiograph.    FINDINGS:    Support devices: Stable positioning    Cardiac/mediastinum/hilum: Heart size within normal limits, thoracic   aortic calcification.    Lung parenchyma/Pleura: Bibasilar opacities/right discoid atelectasis.   Left pleural thickening adjacentleft rib old fractures.    Skeleton/soft tissues: Stable.      IMPRESSION:    Right basilar opacity/discoid atelectasis, increased. Redemonstration   left basilar opacity..    --- End of Report ---            GRAYSON CASTRO MD; Attending Radiologist  This document has been electronically signed. Nov 25 2024  6:24AM (11-25-24 @ 05:54)      CT      CARDIOLOGY TESTING  12 Lead ECG:   Ventricular Rate 37 BPM    Atrial Rate 37 BPM    P-R Interval 158 ms    QRS Duration 96 ms    Q-T Interval 444 ms    QTC Calculation(Bazett) 348 ms    P Axis 47 degrees    R Axis 30 degrees    T Axis 53 degrees    Diagnosis Line Marked sinus bradycardia  Low voltage QRS  Abnormal ECG    Confirmed by MUNIRA AGUIRRE MD ([id number]) on 11/22/2024 7:13:48 AM (11-21-24 @ 16:33)  12 Lead ECG:   Ventricular Rate 61 BPM    Atrial Rate 61 BPM    P-R Interval 122 ms    QRS Duration 92 ms    Q-T Interval 440 ms    QTC Calculation(Bazett) 442 ms    P Axis 35 degrees    R Axis 6 degrees    T Axis 34 degrees    Diagnosis Line Normal sinus rhythm  Possible Septal infarct , age undetermined  Abnormal ECG    Confirmed by KITTY EUBANKS MD [id number]) on 11/21/2024 11:47:24 PM (11-18-24 @ 11:58)      MEDICATIONS  cefepime   IVPB   chlorhexidine 0.12% Liquid 15  chlorhexidine 2% Cloths 1  enoxaparin Injectable 40  folic acid 1  furosemide   Injectable 40  gabapentin 100  hydroxychloroquine 400  levETIRAcetam 1500  multivitamin 1  pantoprazole   Suspension 40  polyethylene glycol 3350 17  propofol Infusion. 20  senna 2  silver sulfADIAZINE 1% Cream 1  thiamine 100  valproate sodium   IVPB 1000  vancomycin  IVPB 1750      WEIGHT  Weight (kg): 109.1 (11-18-24 @ 08:59)  Creatinine: 0.8 mg/dL (11-25-24 @ 04:27)  Creatinine: 0.7 mg/dL (11-24-24 @ 22:20)      ANTIBIOTICS:  cefepime   IVPB      hydroxychloroquine 400 milliGRAM(s) Oral daily  vancomycin  IVPB 1750 milliGRAM(s) IV Intermittent every 12 hours      All available historical records have been reviewed   RITESH SUSAN  69y, Male  Allergy: iodine (Rash (Mild to Mod))      LOS  7d    CHIEF COMPLAINT: SOB (25 Nov 2024 08:19)      INTERVAL EVENTS/HPI  - T(F): , Max: 101 (11-24-24 @ 16:00) febrile, rising WBC  - WBC Count: 15.92 (11-25-24 @ 04:27)  WBC Count: 13.89 (11-24-24 @ 04:30)     - Creatinine: 0.8 (11-25-24 @ 04:27)  Creatinine: 0.7 (11-24-24 @ 22:20)     -   -   -     ROS  cannot obtain secondary to patient's sedation and/or mental status    VITALS:  T(F): 100.4, Max: 101 (11-24-24 @ 16:00)  HR: 69  BP: 158/101  RR: 27Vital Signs Last 24 Hrs  T(C): 38 (25 Nov 2024 05:45), Max: 38.3 (24 Nov 2024 16:00)  T(F): 100.4 (25 Nov 2024 05:45), Max: 101 (24 Nov 2024 16:00)  HR: 69 (25 Nov 2024 06:00) (50 - 84)  BP: 158/101 (25 Nov 2024 05:45) (133/62 - 190/78)  BP(mean): 122 (25 Nov 2024 05:45) (88 - 137)  RR: 27 (25 Nov 2024 06:00) (16 - 35)  SpO2: 97% (25 Nov 2024 06:00) (95% - 100%)    Parameters below as of 24 Nov 2024 12:00  Patient On (Oxygen Delivery Method): ventilator        PHYSICAL EXAM:  General: intubated  HEENT: NCAT  Neck: supple  CV: RRR  Lungs: symmetric chest expansion, decreased BS at bases  Abd: Soft  Extr: wwp  Skin: RUE edema erythema , suspect phlebitis   Neuro: sedated  Lines: no phlebitis     FH: Non-contributory  Social Hx: Non-contributory    TESTS & MEASUREMENTS:                        9.9    15.92 )-----------( 259      ( 25 Nov 2024 04:27 )             31.5     11-25    141  |  103  |  26[H]  ----------------------------<  92  3.7   |  30  |  0.8    Ca    8.2[L]      25 Nov 2024 04:27  Phos  3.2     11-25  Mg     2.3     11-25    TPro  6.0  /  Alb  3.3[L]  /  TBili  0.3  /  DBili  x   /  AST  24  /  ALT  8   /  AlkPhos  61  11-25      LIVER FUNCTIONS - ( 25 Nov 2024 04:27 )  Alb: 3.3 g/dL / Pro: 6.0 g/dL / ALK PHOS: 61 U/L / ALT: 8 U/L / AST: 24 U/L / GGT: x           Urinalysis Basic - ( 25 Nov 2024 04:27 )    Color: x / Appearance: x / SG: x / pH: x  Gluc: 92 mg/dL / Ketone: x  / Bili: x / Urobili: x   Blood: x / Protein: x / Nitrite: x   Leuk Esterase: x / RBC: x / WBC x   Sq Epi: x / Non Sq Epi: x / Bacteria: x        Culture - Urine (collected 11-18-24 @ 14:00)  Source: Catheterized Catheterized  Final Report (11-20-24 @ 07:28):    No growth    Culture - Sputum (collected 11-18-24 @ 10:10)  Source: Trach Asp Tracheal Aspirate  Gram Stain (11-18-24 @ 22:28):    Moderate polymorphonuclear leukocytes per low power field    No Squamous epithelial cells per low power field    Moderate Gram positive cocci in pairs per oil power field    Few Gram Positive Rods per oil power field  Final Report (11-20-24 @ 17:47):    Commensal domonique consistent with body site    Culture - Blood (collected 11-18-24 @ 00:30)  Source: .Blood BLOOD  Final Report (11-23-24 @ 06:01):    No growth at 5 days    Culture - Blood (collected 11-18-24 @ 00:20)  Source: .Blood BLOOD  Final Report (11-23-24 @ 06:01):    No growth at 5 days            INFECTIOUS DISEASES TESTING  Procalcitonin: 0.75 (11-19-24 @ 06:10)  Procalcitonin: 0.44 (11-18-24 @ 10:20)  MRSA PCR Result.: Negative (11-18-24 @ 10:10)  Rapid RVP Result: NotDetec (11-18-24 @ 01:20)  COVID-19 PCR: NotDetec (02-05-24 @ 06:50)  COVID-19 PCR: NotDetec (02-04-24 @ 07:06)  COVID-19 PCR: Detected (01-28-24 @ 06:30)      INFLAMMATORY MARKERS      RADIOLOGY & ADDITIONAL TESTS:  I have personally reviewed the last available Chest xray  CXR  Xray Chest 1 View AP/PA:   ACC: [id number] EXAM:  XR CHEST 1 VIEW   ORDERED BY: ANA ARGUETA     PROCEDURE DATE:  11/25/2024          INTERPRETATION:  CLINICAL HISTORY: Follow-up    COMPARISON: November 24, 2024 time 5:32 AM    TECHNIQUE: Portable frontal chest radiograph.    FINDINGS:    Support devices: Stable positioning    Cardiac/mediastinum/hilum: Heart size within normal limits, thoracic   aortic calcification.    Lung parenchyma/Pleura: Bibasilar opacities/right discoid atelectasis.   Left pleural thickening adjacentleft rib old fractures.    Skeleton/soft tissues: Stable.      IMPRESSION:    Right basilar opacity/discoid atelectasis, increased. Redemonstration   left basilar opacity..    --- End of Report ---            GRAYSON CASTRO MD; Attending Radiologist  This document has been electronically signed. Nov 25 2024  6:24AM (11-25-24 @ 05:54)      CT      CARDIOLOGY TESTING  12 Lead ECG:   Ventricular Rate 37 BPM    Atrial Rate 37 BPM    P-R Interval 158 ms    QRS Duration 96 ms    Q-T Interval 444 ms    QTC Calculation(Bazett) 348 ms    P Axis 47 degrees    R Axis 30 degrees    T Axis 53 degrees    Diagnosis Line Marked sinus bradycardia  Low voltage QRS  Abnormal ECG    Confirmed by MUNIRA AGUIRRE MD ([id number]) on 11/22/2024 7:13:48 AM (11-21-24 @ 16:33)  12 Lead ECG:   Ventricular Rate 61 BPM    Atrial Rate 61 BPM    P-R Interval 122 ms    QRS Duration 92 ms    Q-T Interval 440 ms    QTC Calculation(Bazett) 442 ms    P Axis 35 degrees    R Axis 6 degrees    T Axis 34 degrees    Diagnosis Line Normal sinus rhythm  Possible Septal infarct , age undetermined  Abnormal ECG    Confirmed by CAMILO EUBANKS MDFIM ([id number]) on 11/21/2024 11:47:24 PM (11-18-24 @ 11:58)      MEDICATIONS  cefepime   IVPB   chlorhexidine 0.12% Liquid 15  chlorhexidine 2% Cloths 1  enoxaparin Injectable 40  folic acid 1  furosemide   Injectable 40  gabapentin 100  hydroxychloroquine 400  levETIRAcetam 1500  multivitamin 1  pantoprazole   Suspension 40  polyethylene glycol 3350 17  propofol Infusion. 20  senna 2  silver sulfADIAZINE 1% Cream 1  thiamine 100  valproate sodium   IVPB 1000  vancomycin  IVPB 1750      WEIGHT  Weight (kg): 109.1 (11-18-24 @ 08:59)  Creatinine: 0.8 mg/dL (11-25-24 @ 04:27)  Creatinine: 0.7 mg/dL (11-24-24 @ 22:20)      ANTIBIOTICS:  cefepime   IVPB      hydroxychloroquine 400 milliGRAM(s) Oral daily  vancomycin  IVPB 1750 milliGRAM(s) IV Intermittent every 12 hours      All available historical records have been reviewed

## 2024-11-25 NOTE — PROGRESS NOTE ADULT - ASSESSMENT
pt is a 70 y/o male with PMH of HTN, HLD, mild SLE (Lupus) ,GIB secondary to duodenal ulcer in 1/2024,  Multiple falls , Chronic ataxia and Visual Hallucinations, hx of recently diagnosed progressive supranuclear palsy for which he follows with a neurologist at [place],  presents to the ED due to cough and SOB for 2 days duration, and visual hallucinations for past week.    After ED eval, patient was initially admitted to [facility] for mx of Aspiration PNA and NSTEMI II, then after that he started getting tachypneic, increased WOB, nurses suctioned him, pt then coded, ROSC acheived after 3 minutes. Post ROSC rhythm was Vtach per ED so he was started on amiodarone drip, later was discontinued it d/t bradycardia. EEG x2 days reporting showed episodes of generalized myoclonic seizures. Pt was put in Keppra + Depakote and Propofol + Versed. Latest EEG reports no seizures. Weaning off versed. Bradycardia has been improving (yesterday 30-40, now 50bpm).      #Acute Hypoxic respiratory failure s/p intubation   #Possible Aspiration/Multifocal PNA   CXR unchanged - b/l hilar opacities, small RLL effusion (my read)  CT chest - b/l lower lobe consolidations  WBC downtrending 12->20->12->11  Infectious w/u neg: MRSA, RVP, Procal 0.44, UCx neg  Plan:  - c/w cefepime + doxy -> to end 11/24  - ID following  - Tylenol PRN for fevers    #Cardiac arrest, ~3min down time, s/p 1 shock for Vtach after ROSC  #NSTEMI type II  #Bradycardia - resolved  s/p amio ggt. Amio dc'd d/t bradycardia  Bedside US - not volume depleted  EP - f/u for ischemic w/u when stabilized  Cortisol 12, ->400->100->~90  Plan:  - off solumedrol  - off pressors  - off versed  - bradycardia improved off versed    #Myoclonic seizures s/p CPA  Day 1 EEG findings: majority of the generalized spikes are associated with head jerks or bilateral proximal myoclonic jerks. Around 3 am patient had an event characterized by cluster of myoclonic seizures followed by a generalized myoclonic/clonic seizure that lasted for about 1 hour. After that EEG returned to the baseline as described above.  CTH - no acute path  EEG neg for seizures x3 days, show epileptic potential  Plan:  - c/w keppra 1500 BID  - depakote levels subtherapeutic, increased to 1g q8h  - f/u AM depakote blood levels  - weaned propofol to 10  - ativan PRN  - vent settings to prevent alkalosis (rate 14)  - MRI brain    #HO Lupus  #HO HTN  #HO GIB secondary to duodenal ulcer 1/2024   #Multiple falls , Chronic ataxia   #HO progressive supranuclear palsy and Visual Hallucinations  - on home meds: hydroxychloroquine, thiamine  - held BP meds d/t shock    #Lines: R IJ 11/18, briggs 11/18  #Family: Cousin Jw is HCP. Updated today at bedside 11/25  #DVT prophylaxis: Lovenox  #GI prophylaxis: PPI suspension  #Diet: tube feeds, bowel regimen  #Activity: bedrest, IAT  #Code status: DNR/Intubate  #Disposition: ICU    Pending:  - valproate blood levels,  - propofol wean  - MRI brain - family does not know if pt has any implants pt is a 68 y/o male with PMH of HTN, HLD, mild SLE (Lupus) ,GIB secondary to duodenal ulcer in 1/2024,  Multiple falls , Chronic ataxia and Visual Hallucinations, hx of recently diagnosed progressive supranuclear palsy for which he follows with a neurologist at [place],  presents to the ED due to cough and SOB for 2 days duration, and visual hallucinations for past week.    After ED eval, patient was initially admitted to [facility] for mx of Aspiration PNA and NSTEMI II, then after that he started getting tachypneic, increased WOB, nurses suctioned him, pt then coded, ROSC acheived after 3 minutes. Post ROSC rhythm was Vtach per ED so he was started on amiodarone drip, later was discontinued it d/t bradycardia. EEG x2 days reporting showed episodes of generalized myoclonic seizures. Pt was put in Keppra + Depakote and Propofol + Versed. Latest EEG reports no seizures. Weaning off versed. Bradycardia has been improving (yesterday 30-40, now 50bpm).    #Possible RUE Cellulitis  WBC uptrending to 16  Plan:  Repeat MRSA  Vancomycin + cefepime  ID following    #Acute Hypoxic respiratory failure s/p intubation   #Possible Aspiration/Multifocal PNA   CXR unchanged - b/l hilar opacities, small RLL effusion (my read)  CT chest - b/l lower lobe consolidations  WBC downtrending 12->20->12->11  Infectious w/u neg: MRSA, RVP, Procal 0.44, UCx neg  Plan:  - c/w cefepime + doxy -> to end 11/24  - ID following  - Tylenol PRN for fevers    #Cardiac arrest, ~3min down time, s/p 1 shock for Vtach after ROSC  #NSTEMI type II  #Bradycardia - resolved  s/p amio ggt. Amio dc'd d/t bradycardia  Bedside US - not volume depleted  EP - f/u for ischemic w/u when stabilized  Cortisol 12, ->400->100->~90  Plan:  - off solumedrol  - off pressors  - off versed  - bradycardia improved off versed    #Myoclonic seizures s/p CPA  Day 1 EEG findings: majority of the generalized spikes are associated with head jerks or bilateral proximal myoclonic jerks. Around 3 am patient had an event characterized by cluster of myoclonic seizures followed by a generalized myoclonic/clonic seizure that lasted for about 1 hour. After that EEG returned to the baseline as described above.  CTH - no acute path  EEG neg for seizures x3 days, show epileptic potential  Plan:  - c/w keppra 1500 BID  - depakote levels subtherapeutic, increased to 1g q8h  - f/u AM depakote blood levels  - weaned propofol to 10  - ativan PRN  - vent settings to prevent alkalosis (rate 14)  - MRI brain  - NSE levels    #HO Lupus  #HO HTN  #HO GIB secondary to duodenal ulcer 1/2024   #Multiple falls , Chronic ataxia   #HO progressive supranuclear palsy and Visual Hallucinations  - on home meds: hydroxychloroquine, thiamine  - held BP meds d/t shock    #Lines: R IGNACIO 11/18, briggs 11/18  #Family: Cousin Jw is HCP. Updated today at bedside 11/25  #DVT prophylaxis: Lovenox  #GI prophylaxis: PPI suspension  #Diet: tube feeds, bowel regimen  #Activity: bedrest, IAT  #Code status: DNR/Intubate  #Disposition: ICU    Pending:  - daily valproate blood levels  - propofol wean  - MRI brain - family does not know if pt has any implants  - monitor infectious markers, cellulitis improvement

## 2024-11-25 NOTE — PROGRESS NOTE ADULT - ASSESSMENT
IMPRESSION:    Acute hypoxemic respiratory failure remains intubated  Possible aspiration   Seizures - status  SP CPA   VTach   HO SLE   Shock now off Levophed   Post infectious HAAD treated     PLAN:    CNS: FU VEEG.  Continue Keppra and Depakote.  Neuro eval noted.  MRI.  Wean propofol , NSE    HEENT: Oral care.  ET Care     PULMONARY:  HOB @ 45 degrees.  Vent changes as follows: ARDS net MV settings.;  PEEP 8.  RR 14.  .  Monitor Airway pressures.  Wean O2 as tolerated.      CARDIOVASCULAR:   ECHO noted. LASIX    GI: GI prophylaxis.  OG Feeding.  Bowel regimen     RENAL:  Follow up lytes.  Correct as needed.  Andrea for retention     INFECTIOUS DISEASE: Follow up cultures. Cefepime, vanco, ID fup    HEMATOLOGICAL:  DVT prophylaxis.  Monitor CBC     ENDOCRINE:  Follow up FS.  Insulin protocol if needed.  Cortisol level noted.  TSH nl.    MUSCULOSKELETAL:  Off loading.  Bed rest      MICU

## 2024-11-25 NOTE — PROGRESS NOTE ADULT - SUBJECTIVE AND OBJECTIVE BOX
SUBJECTIVE/OVERNIGHT EVENTS  Today is hospital day 7d. This morning patient was seen and examined at bedside, resting comfortably in bed. No acute or major events overnight.    HOSPITAL COURSE  Day 1:   Day 2:   Day 3:     CODE STATUS:    FAMILY COMMUNICATION  Contact date:  Name of person contacted:  Relationship to patient:  Communication details:    MEDICATIONS  STANDING MEDICATIONS  cefepime   IVPB      cefepime   IVPB 2000 milliGRAM(s) IV Intermittent once  cefepime   IVPB 2000 milliGRAM(s) IV Intermittent every 8 hours  chlorhexidine 0.12% Liquid 15 milliLiter(s) Oral Mucosa every 12 hours  chlorhexidine 2% Cloths 1 Application(s) Topical <User Schedule>  enoxaparin Injectable 40 milliGRAM(s) SubCutaneous every 24 hours  folic acid 1 milliGRAM(s) Oral daily  furosemide   Injectable 40 milliGRAM(s) IV Push daily  gabapentin 100 milliGRAM(s) Oral three times a day  hydroxychloroquine 400 milliGRAM(s) Oral daily  levETIRAcetam 1500 milliGRAM(s) Oral two times a day  losartan 50 milliGRAM(s) Oral daily  multivitamin 1 Tablet(s) Oral daily  pantoprazole   Suspension 40 milliGRAM(s) Oral two times a day  polyethylene glycol 3350 17 Gram(s) Oral two times a day  propofol Infusion. 20 MICROgram(s)/kG/Min IV Continuous <Continuous>  senna 2 Tablet(s) Oral at bedtime  silver sulfADIAZINE 1% Cream 1 Application(s) Topical every 12 hours  thiamine 100 milliGRAM(s) Oral daily  valproate sodium   IVPB 1000 milliGRAM(s) IV Intermittent every 8 hours  vancomycin  IVPB 1750 milliGRAM(s) IV Intermittent every 12 hours    PRN MEDICATIONS  acetaminophen     Tablet .. 650 milliGRAM(s) Oral every 6 hours PRN  atropine Injectable 1 milliGRAM(s) IntraMuscular once PRN  LORazepam   Injectable 2 milliGRAM(s) IV Push every 4 hours PRN    VITALS  T(F): 99.4 (11-25-24 @ 08:00), Max: 101 (11-24-24 @ 16:00)  HR: 69 (11-25-24 @ 10:21) (51 - 124)  BP: 157/69 (11-25-24 @ 09:00) (133/62 - 190/78)  RR: 20 (11-25-24 @ 09:00) (16 - 35)  SpO2: 99% (11-25-24 @ 10:21) (95% - 100%)    PHYSICAL EXAM  GENERAL: NAD, lying in bed comfortably  HEART: Regular rate and rhythm, no murmurs, rubs, or gallops  LUNGS: Unlabored respirations.  Clear to auscultation bilaterally, no crackles, wheezing, or rhonchi  ABDOMEN: Soft, nontender, nondistended, +BS  EXTREMITIES: 2+ peripheral pulses bilaterally. No clubbing, cyanosis, or edema  NERVOUS SYSTEM:  A&Ox3, no focal deficits   SKIN: No rashes or lesions    LABS             9.9    15.92 )-----------( 259      ( 11-25-24 @ 04:27 )             31.5     141  |  103  |  26  -------------------------<  92   11-25-24 @ 04:27  3.7  |  30  |  0.8    Ca      8.2     11-25-24 @ 04:27  Phos   3.2     11-25-24 @ 04:27  Mg     2.3     11-25-24 @ 04:27    TPro  6.0  /  Alb  3.3  /  TBili  0.3  /  DBili  x   /  AST  24  /  ALT  8   /  AlkPhos  61  /  GGT  x     11-25-24 @ 04:27        Urinalysis Basic - ( 25 Nov 2024 04:27 )    Color: x / Appearance: x / SG: x / pH: x  Gluc: 92 mg/dL / Ketone: x  / Bili: x / Urobili: x   Blood: x / Protein: x / Nitrite: x   Leuk Esterase: x / RBC: x / WBC x   Sq Epi: x / Non Sq Epi: x / Bacteria: x      ABG - ( 25 Nov 2024 09:24 )  pH, Arterial: 7.47  pH, Blood: x     /  pCO2: 42    /  pO2: 88    / HCO3: 31    / Base Excess: 6.3   /  SaO2: 97.6                IMAGING SUBJECTIVE/OVERNIGHT EVENTS  Today is hospital day 7d. This morning patient was seen and examined at bedside, resting comfortably in bed. Fever overnight, on cooling blanket now.    HOSPITAL COURSE  Day 1:   Day 2:   Day 3:     CODE STATUS:    FAMILY COMMUNICATION  Contact date:  Name of person contacted:  Relationship to patient:  Communication details:    MEDICATIONS  STANDING MEDICATIONS  cefepime   IVPB      cefepime   IVPB 2000 milliGRAM(s) IV Intermittent once  cefepime   IVPB 2000 milliGRAM(s) IV Intermittent every 8 hours  chlorhexidine 0.12% Liquid 15 milliLiter(s) Oral Mucosa every 12 hours  chlorhexidine 2% Cloths 1 Application(s) Topical <User Schedule>  enoxaparin Injectable 40 milliGRAM(s) SubCutaneous every 24 hours  folic acid 1 milliGRAM(s) Oral daily  furosemide   Injectable 40 milliGRAM(s) IV Push daily  gabapentin 100 milliGRAM(s) Oral three times a day  hydroxychloroquine 400 milliGRAM(s) Oral daily  levETIRAcetam 1500 milliGRAM(s) Oral two times a day  losartan 50 milliGRAM(s) Oral daily  multivitamin 1 Tablet(s) Oral daily  pantoprazole   Suspension 40 milliGRAM(s) Oral two times a day  polyethylene glycol 3350 17 Gram(s) Oral two times a day  propofol Infusion. 20 MICROgram(s)/kG/Min IV Continuous <Continuous>  senna 2 Tablet(s) Oral at bedtime  silver sulfADIAZINE 1% Cream 1 Application(s) Topical every 12 hours  thiamine 100 milliGRAM(s) Oral daily  valproate sodium   IVPB 1000 milliGRAM(s) IV Intermittent every 8 hours  vancomycin  IVPB 1750 milliGRAM(s) IV Intermittent every 12 hours    PRN MEDICATIONS  acetaminophen     Tablet .. 650 milliGRAM(s) Oral every 6 hours PRN  atropine Injectable 1 milliGRAM(s) IntraMuscular once PRN  LORazepam   Injectable 2 milliGRAM(s) IV Push every 4 hours PRN    VITALS  T(F): 99.4 (11-25-24 @ 08:00), Max: 101 (11-24-24 @ 16:00)  HR: 69 (11-25-24 @ 10:21) (51 - 124)  BP: 157/69 (11-25-24 @ 09:00) (133/62 - 190/78)  RR: 20 (11-25-24 @ 09:00) (16 - 35)  SpO2: 99% (11-25-24 @ 10:21) (95% - 100%)    PHYSICAL EXAM  GENERAL: NAD, lying in bed comfortably  HEART: Regular rate and rhythm, no murmurs, rubs, or gallops  LUNGS: Unlabored respirations.  Clear to auscultation bilaterally, no crackles, wheezing, or rhonchi  ABDOMEN: Soft, nontender, nondistended, +BS  EXTREMITIES: Anasarca  NERVOUS SYSTEM:  Sedated, withdraws to pain, + corneal, + pupils  SKIN: No rashes or lesions    LABS             9.9    15.92 )-----------( 259      ( 11-25-24 @ 04:27 )             31.5     141  |  103  |  26  -------------------------<  92   11-25-24 @ 04:27  3.7  |  30  |  0.8    Ca      8.2     11-25-24 @ 04:27  Phos   3.2     11-25-24 @ 04:27  Mg     2.3     11-25-24 @ 04:27    TPro  6.0  /  Alb  3.3  /  TBili  0.3  /  DBili  x   /  AST  24  /  ALT  8   /  AlkPhos  61  /  GGT  x     11-25-24 @ 04:27        Urinalysis Basic - ( 25 Nov 2024 04:27 )    Color: x / Appearance: x / SG: x / pH: x  Gluc: 92 mg/dL / Ketone: x  / Bili: x / Urobili: x   Blood: x / Protein: x / Nitrite: x   Leuk Esterase: x / RBC: x / WBC x   Sq Epi: x / Non Sq Epi: x / Bacteria: x      ABG - ( 25 Nov 2024 09:24 )  pH, Arterial: 7.47  pH, Blood: x     /  pCO2: 42    /  pO2: 88    / HCO3: 31    / Base Excess: 6.3   /  SaO2: 97.6                IMAGING

## 2024-11-26 NOTE — CHART NOTE - NSCHARTNOTEFT_GEN_A_CORE
Registered Dietitian Follow-Up     Patient Profile Reviewed                           Yes [x]   No []     Nutrition History Previously Obtained        Yes []  No [x]       Pertinent Subjective Information: Pt tolerating current TF regimen per staff.      Pertinent Medical Information:   Per MD note on :  #Acute Hypoxic respiratory failure s/p intubation (remains intubated)  #Possible Aspiration/Multifocal PNA   #Cardiac arrest, ~3min down time, s/p 1 shock for Vtach after ROSC  #NSTEMI type II  #Bradycardia - resolved  - off pressors     Diet order: Diet, NPO with Tube Feed:   Tube Feeding Modality: Orogastric  Vital High Protein (VITALHP)  Total Volume for 24 Hours (mL): 990  Continuous  Starting Tube Feed Rate {mL per Hour}: 25  Until Goal Tube Feed Rate (mL per Hour): 55  Tube Feed Duration (in Hours): 18  Tube Feed Start Time: 00:00  Free Water Flush Instructions:  50 mL q4hrs -- team to adjust water flushes prn; Please flush 30 mL pre/post no carb prosource  No Carb Prosource (1pkg = 15gms Protein)     Qty per Day:  1 (24 @ 12:43) [Active]    Anthropometrics:  Weight: 109.1 KG  Height: 177.8 cm   BMI: 34.5  IBW: 75.5 KG    Daily Weight in k.7 (), Weight in k.4 (-25), Weight in k (-24), Weight in k.5 (-23), Weight in k.6 (-22), Weight in k.2 (-21), Weight in k (-20)     MEDICATIONS  (STANDING):  cefepime   IVPB      cefepime   IVPB 2000 milliGRAM(s) IV Intermittent every 8 hours  chlorhexidine 0.12% Liquid 15 milliLiter(s) Oral Mucosa every 12 hours  chlorhexidine 2% Cloths 1 Application(s) Topical <User Schedule>  enoxaparin Injectable 40 milliGRAM(s) SubCutaneous every 24 hours  folic acid 1 milliGRAM(s) Oral daily  gabapentin 100 milliGRAM(s) Oral three times a day  hydroxychloroquine 400 milliGRAM(s) Oral daily  levETIRAcetam 1500 milliGRAM(s) Oral two times a day  losartan 50 milliGRAM(s) Oral daily  multivitamin 1 Tablet(s) Oral daily  pantoprazole   Suspension 40 milliGRAM(s) Oral two times a day  polyethylene glycol 3350 17 Gram(s) Oral two times a day  propofol Infusion. 10 MICROgram(s)/kG/Min (6.55 mL/Hr) IV Continuous <Continuous>  senna 2 Tablet(s) Oral at bedtime  silver sulfADIAZINE 1% Cream 1 Application(s) Topical every 12 hours  thiamine 100 milliGRAM(s) Oral daily  valproate sodium   IVPB 1000 milliGRAM(s) IV Intermittent every 8 hours  vancomycin  IVPB 1750 milliGRAM(s) IV Intermittent every 12 hours    MEDICATIONS  (PRN):  acetaminophen     Tablet .. 650 milliGRAM(s) Oral every 6 hours PRN Temp greater or equal to 38C (100.4F)  atropine Injectable 1 milliGRAM(s) IntraMuscular once PRN HR<30  LORazepam   Injectable 2 milliGRAM(s) IV Push every 4 hours PRN seizure-like activity (i.e. twitching)    Pertinent Labs:  @ 04:38: Na 139, BUN 28[H], Cr 0.7, [H], K+ 4.1, Phos 3.1, Mg 2.4, Alk Phos 63, ALT/SGPT 7, AST/SGOT 35, HbA1c --    Finger Sticks:    Physical Findings:  - Appearance: intubated/sedated  - GI function: last BM on   - Tubes: OGT  - Oral/Mouth cavity: NPO w/ OGT  - Skin: no pressure injuries noted   - Edema: generalized edema 2+     Nutrition Requirements:  Weight Used: 109.1 KG Using ABW and IBW-- with consideration for age, BMI, intubated on propofol;  Ve 8.6, Tmax 37.6 C, propofol 13.1 mL/hr -- 345.84 kcal additional     Estimated Energy Needs    Continue []  Adjust [x]  ENERGY: PSE 1976.50 kcal/day vs. 3052-4846 kcal/day (11-14 kcal/kg)     Estimated Protein Needs    Continue [x]  Adjust []  PROTEIN: 113-151 g/day (1.5-2 g/kg IBW 75.5 KG)     Estimated Fluid Needs        Continue [x]  Adjust []  FLUID: 1mL/kcal      Nutrient Intake: Current TF regimen provides -- 1050 kcal, 102g pro, 827 mL free water from formula + additional water flushes of 50 mL q4hrs + propofol kcals (345.84 kcal) + no carb prosource once daily (60 kcal, 15g pro)     [] Previous Nutrition Diagnosis: Inadequate Protein Energy Intake              [] Ongoing          [x] Resolved     Nutrition Education: Deferred at this time     Goal/Expected Outcome: Pt to meet >85 - 105% of estimated needs via OGT within 3-5 days     Indicator/Monitoring: Diet order, weights, labs, NFPF, body composition, BM and tolerance to TF regimen    Recommendation:  1. Recommend to increase TF regimen to meet estimated needs. Pt on propofol (high fat content). Recommend: Vital HP via OGT, continuous 18hr feeds, total volume: 1260 mL, start rate at 55 mL/hr and increase as tolerated until goal rate of 70 mL/hr is met. ADD no carb prosource once daily -- provides 60 kcal, 15g pro total.     TF regimen + propofol (345.84 kcal) + no carb prosource to provide -- 1666 kcal, 125g pro, 1053 mL free water from formula + additional water flushes of 50 mL q4hrs -- team to adjust water flushes prn. TF regimen meets >85 - 105% of estimated needs.     High risk f/u    RD to remain available: Nella Pradhan [phone] or TEAMS

## 2024-11-26 NOTE — PROGRESS NOTE ADULT - SUBJECTIVE AND OBJECTIVE BOX
SUSAN AWAD  69y, Male  Allergy: iodine (Rash (Mild to Mod))      LOS  8d    CHIEF COMPLAINT: SOB (26 Nov 2024 14:29)      INTERVAL EVENTS/HPI  - T(F): , Max: 101.5 (11-25-24 @ 20:00) Fever  - WBC Count: 19.50 (11-26-24 @ 04:38)  WBC Count: 15.92 (11-25-24 @ 04:27)     - Creatinine: 0.7 (11-26-24 @ 04:38)  Creatinine: 0.8 (11-25-24 @ 04:27)     -   -   -     ROS  cannot obtain secondary to patient's sedation and/or mental status    VITALS:  T(F): 99.1, Max: 101.5 (11-25-24 @ 20:00)  HR: 68  BP: 145/66  RR: 18Vital Signs Last 24 Hrs  T(C): 37.3 (26 Nov 2024 12:00), Max: 38.6 (25 Nov 2024 20:00)  T(F): 99.1 (26 Nov 2024 12:00), Max: 101.5 (25 Nov 2024 20:00)  HR: 68 (26 Nov 2024 15:05) (63 - 153)  BP: 145/66 (26 Nov 2024 13:00) (107/53 - 177/127)  BP(mean): 95 (26 Nov 2024 13:00) (73 - 142)  RR: 18 (26 Nov 2024 14:00) (17 - 29)  SpO2: 97% (26 Nov 2024 15:05) (97% - 100%)    Parameters below as of 26 Nov 2024 12:00  Patient On (Oxygen Delivery Method): ventilator        PHYSICAL EXAM:  General: intubated  HEENT: NCAT EEG  Neck: supple  CV: RRR  Lungs: symmetric chest expansion, decreased BS at bases  Abd: Soft  Extr: wwp  Skin: no rash RUE phlebitis/ edema/ erythema  Neuro: sedated  Lines: no phlebitis   FH: Non-contributory  Social Hx: Non-contributory    TESTS & MEASUREMENTS:                        10.5   19.50 )-----------( 263      ( 26 Nov 2024 04:38 )             33.4     11-26    139  |  103  |  28[H]  ----------------------------<  104[H]  4.1   |  20  |  0.7    Ca    8.4      26 Nov 2024 04:38  Phos  3.1     11-26  Mg     2.4     11-26    TPro  5.9[L]  /  Alb  3.1[L]  /  TBili  0.3  /  DBili  x   /  AST  35  /  ALT  7   /  AlkPhos  63  11-26      LIVER FUNCTIONS - ( 26 Nov 2024 04:38 )  Alb: 3.1 g/dL / Pro: 5.9 g/dL / ALK PHOS: 63 U/L / ALT: 7 U/L / AST: 35 U/L / GGT: x           Urinalysis Basic - ( 26 Nov 2024 04:38 )    Color: x / Appearance: x / SG: x / pH: x  Gluc: 104 mg/dL / Ketone: x  / Bili: x / Urobili: x   Blood: x / Protein: x / Nitrite: x   Leuk Esterase: x / RBC: x / WBC x   Sq Epi: x / Non Sq Epi: x / Bacteria: x        Culture - Urine (collected 11-18-24 @ 14:00)  Source: Catheterized Catheterized  Final Report (11-20-24 @ 07:28):    No growth    Culture - Sputum (collected 11-18-24 @ 10:10)  Source: Trach Asp Tracheal Aspirate  Gram Stain (11-18-24 @ 22:28):    Moderate polymorphonuclear leukocytes per low power field    No Squamous epithelial cells per low power field    Moderate Gram positive cocci in pairs per oil power field    Few Gram Positive Rods per oil power field  Final Report (11-20-24 @ 17:47):    Commensal domonique consistent with body site    Culture - Blood (collected 11-18-24 @ 00:30)  Source: .Blood BLOOD  Final Report (11-23-24 @ 06:01):    No growth at 5 days    Culture - Blood (collected 11-18-24 @ 00:20)  Source: .Blood BLOOD  Final Report (11-23-24 @ 06:01):    No growth at 5 days            INFECTIOUS DISEASES TESTING  MRSA PCR Result.: Negative (11-25-24 @ 14:28)  Procalcitonin: 0.10 (11-25-24 @ 11:26)  Procalcitonin: 0.75 (11-19-24 @ 06:10)  Procalcitonin: 0.44 (11-18-24 @ 10:20)  MRSA PCR Result.: Negative (11-18-24 @ 10:10)  Rapid RVP Result: NotDetec (11-18-24 @ 01:20)  COVID-19 PCR: NotDetec (02-05-24 @ 06:50)  COVID-19 PCR: NotDetec (02-04-24 @ 07:06)  COVID-19 PCR: Detected (01-28-24 @ 06:30)      INFLAMMATORY MARKERS      RADIOLOGY & ADDITIONAL TESTS:  I have personally reviewed the last available Chest xray  CXR  Xray Chest 2 Views PA/Lat:   ACC: [id number] EXAM:  XR CHEST PA LAT 2V   ORDERED BY: ANA ARGUETA     PROCEDURE DATE:  11/25/2024          INTERPRETATION:  CLINICAL HISTORY:    COMPARISON: MRI.    TECHNIQUE: Earlier the same day    FINDINGS:    Limited lateral view    Support devices: Nasogastric tube extending below diaphragm. Tip not   well-visualized. Endotracheal tube in satisfactory position. Overlying   telemetry wires.    Cardiac/mediastinum/hilum: Stable.    Lung parenchyma/Pleura: Improved aeration of the right lung base.   Persistent left pleural thickening/chronic left rib fractures.    Skeleton/soft tissues: Stable      IMPRESSION:    Improved aeration of the right lung base. Lines and tubes as described.    --- End of Report ---            LALITA HINOJOSA MD; Attending Radiologist  This document has been electronically signed. Nov 25 2024  2:34PM (11-25-24 @ 14:30)      CT      CARDIOLOGY TESTING  12 Lead ECG:   Ventricular Rate 37 BPM    Atrial Rate 37 BPM    P-R Interval 158 ms    QRS Duration 96 ms    Q-T Interval 444 ms    QTC Calculation(Bazett) 348 ms    P Axis 47 degrees    R Axis 30 degrees    T Axis 53 degrees    Diagnosis Line Marked sinus bradycardia  Low voltage QRS  Abnormal ECG    Confirmed by MUNIRA AGUIRRE MD ([id number]) on 11/22/2024 7:13:48 AM (11-21-24 @ 16:33)  12 Lead ECG:   Ventricular Rate 61 BPM    Atrial Rate 61 BPM    P-R Interval 122 ms    QRS Duration 92 ms    Q-T Interval 440 ms    QTC Calculation(Bazett) 442 ms    P Axis 35 degrees    R Axis 6 degrees    T Axis 34 degrees    Diagnosis Line Normal sinus rhythm  Possible Septal infarct , age undetermined  Abnormal ECG    Confirmed by KITTY EUBANKS MD ([id number]) on 11/21/2024 11:47:24 PM (11-18-24 @ 11:58)      MEDICATIONS  cefepime   IVPB   cefepime   IVPB 2000  chlorhexidine 0.12% Liquid 15  chlorhexidine 2% Cloths 1  enoxaparin Injectable 40  folic acid 1  gabapentin 100  hydroxychloroquine 400  levETIRAcetam 1500  losartan 50  multivitamin 1  pantoprazole   Suspension 40  polyethylene glycol 3350 17  propofol Infusion. 10  senna 2  silver sulfADIAZINE 1% Cream 1  thiamine 100  valproate sodium   IVPB 1000  vancomycin  IVPB 1750      WEIGHT  Weight (kg): 109.1 (11-18-24 @ 08:59)  Creatinine: 0.7 mg/dL (11-26-24 @ 04:38)      ANTIBIOTICS:  cefepime   IVPB      cefepime   IVPB 2000 milliGRAM(s) IV Intermittent every 8 hours  hydroxychloroquine 400 milliGRAM(s) Oral daily  vancomycin  IVPB 1750 milliGRAM(s) IV Intermittent every 12 hours      All available historical records have been reviewed

## 2024-11-26 NOTE — PROGRESS NOTE ADULT - SUBJECTIVE AND OBJECTIVE BOX
SUBJECTIVE/OVERNIGHT EVENTS  Today is hospital day 8d. This morning patient was seen and examined at bedside, resting comfortably in bed. Afib RVR overnight, recieved metoprolol. Still fevers 38C. On propofol 20.    HOSPITAL COURSE  Day 1:   Day 2:   Day 3:     CODE STATUS:    FAMILY COMMUNICATION  Contact date:  Name of person contacted:  Relationship to patient:  Communication details:    MEDICATIONS  STANDING MEDICATIONS  cefepime   IVPB      cefepime   IVPB 2000 milliGRAM(s) IV Intermittent every 8 hours  chlorhexidine 0.12% Liquid 15 milliLiter(s) Oral Mucosa every 12 hours  chlorhexidine 2% Cloths 1 Application(s) Topical <User Schedule>  enoxaparin Injectable 40 milliGRAM(s) SubCutaneous every 24 hours  folic acid 1 milliGRAM(s) Oral daily  gabapentin 100 milliGRAM(s) Oral three times a day  hydroxychloroquine 400 milliGRAM(s) Oral daily  levETIRAcetam 1500 milliGRAM(s) Oral two times a day  losartan 50 milliGRAM(s) Oral daily  multivitamin 1 Tablet(s) Oral daily  pantoprazole   Suspension 40 milliGRAM(s) Oral two times a day  polyethylene glycol 3350 17 Gram(s) Oral two times a day  propofol Infusion. 10 MICROgram(s)/kG/Min IV Continuous <Continuous>  senna 2 Tablet(s) Oral at bedtime  silver sulfADIAZINE 1% Cream 1 Application(s) Topical every 12 hours  thiamine 100 milliGRAM(s) Oral daily  valproate sodium   IVPB 1000 milliGRAM(s) IV Intermittent every 8 hours  vancomycin  IVPB 1750 milliGRAM(s) IV Intermittent every 12 hours    PRN MEDICATIONS  acetaminophen     Tablet .. 650 milliGRAM(s) Oral every 6 hours PRN  atropine Injectable 1 milliGRAM(s) IntraMuscular once PRN  LORazepam   Injectable 2 milliGRAM(s) IV Push every 4 hours PRN    VITALS  T(F): 99.6 (11-26-24 @ 08:00), Max: 101.5 (11-25-24 @ 20:00)  HR: 70 (11-26-24 @ 09:00) (64 - 153)  BP: 127/60 (11-26-24 @ 09:00) (107/53 - 197/85)  RR: 22 (11-26-24 @ 09:00) (18 - 33)  SpO2: 100% (11-26-24 @ 09:00) (98% - 100%)    PHYSICAL EXAM  GENERAL: NAD, lying in bed comfortably  HEART: Regular rate and rhythm, no murmurs, rubs, or gallops  LUNGS: Unlabored respirations.  Clear to auscultation bilaterally, no crackles, wheezing, or rhonchi  ABDOMEN: Soft, nontender, nondistended, +BS  EXTREMITIES: 2+ peripheral pulses bilaterally. No clubbing, cyanosis, or edema  NERVOUS SYSTEM:  Sedated, + pupils  SKIN: No rashes or lesions    LABS             10.5   19.50 )-----------( 263      ( 11-26-24 @ 04:38 )             33.4     139  |  103  |  28  -------------------------<  104   11-26-24 @ 04:38  4.1  |  20  |  0.7    Ca      8.4     11-26-24 @ 04:38  Phos   3.1     11-26-24 @ 04:38  Mg     2.4     11-26-24 @ 04:38    TPro  5.9  /  Alb  3.1  /  TBili  0.3  /  DBili  x   /  AST  35  /  ALT  7   /  AlkPhos  63  /  GGT  x     11-26-24 @ 04:38        Urinalysis Basic - ( 26 Nov 2024 04:38 )    Color: x / Appearance: x / SG: x / pH: x  Gluc: 104 mg/dL / Ketone: x  / Bili: x / Urobili: x   Blood: x / Protein: x / Nitrite: x   Leuk Esterase: x / RBC: x / WBC x   Sq Epi: x / Non Sq Epi: x / Bacteria: x      ABG - ( 26 Nov 2024 03:48 )  pH, Arterial: 7.51  pH, Blood: x     /  pCO2: 37    /  pO2: 63    / HCO3: 30    / Base Excess: 6.2   /  SaO2: 93.0                IMAGING SUBJECTIVE/OVERNIGHT EVENTS  Today is hospital day 8d. This morning patient was seen and examined at bedside, resting comfortably in bed. Afib RVR overnight, recieved metoprolol. Still fevers 38C. On propofol 20.    HOSPITAL COURSE  Day 1:   Day 2:   Day 3:     CODE STATUS:    FAMILY COMMUNICATION  Contact date:  Name of person contacted:  Relationship to patient:  Communication details:    MEDICATIONS  STANDING MEDICATIONS  cefepime   IVPB      cefepime   IVPB 2000 milliGRAM(s) IV Intermittent every 8 hours  chlorhexidine 0.12% Liquid 15 milliLiter(s) Oral Mucosa every 12 hours  chlorhexidine 2% Cloths 1 Application(s) Topical <User Schedule>  enoxaparin Injectable 40 milliGRAM(s) SubCutaneous every 24 hours  folic acid 1 milliGRAM(s) Oral daily  gabapentin 100 milliGRAM(s) Oral three times a day  hydroxychloroquine 400 milliGRAM(s) Oral daily  levETIRAcetam 1500 milliGRAM(s) Oral two times a day  losartan 50 milliGRAM(s) Oral daily  multivitamin 1 Tablet(s) Oral daily  pantoprazole   Suspension 40 milliGRAM(s) Oral two times a day  polyethylene glycol 3350 17 Gram(s) Oral two times a day  propofol Infusion. 10 MICROgram(s)/kG/Min IV Continuous <Continuous>  senna 2 Tablet(s) Oral at bedtime  silver sulfADIAZINE 1% Cream 1 Application(s) Topical every 12 hours  thiamine 100 milliGRAM(s) Oral daily  valproate sodium   IVPB 1000 milliGRAM(s) IV Intermittent every 8 hours  vancomycin  IVPB 1750 milliGRAM(s) IV Intermittent every 12 hours    PRN MEDICATIONS  acetaminophen     Tablet .. 650 milliGRAM(s) Oral every 6 hours PRN  atropine Injectable 1 milliGRAM(s) IntraMuscular once PRN  LORazepam   Injectable 2 milliGRAM(s) IV Push every 4 hours PRN    VITALS  T(F): 99.6 (11-26-24 @ 08:00), Max: 101.5 (11-25-24 @ 20:00)  HR: 70 (11-26-24 @ 09:00) (64 - 153)  BP: 127/60 (11-26-24 @ 09:00) (107/53 - 197/85)  RR: 22 (11-26-24 @ 09:00) (18 - 33)  SpO2: 100% (11-26-24 @ 09:00) (98% - 100%)    PHYSICAL EXAM  GENERAL: NAD, lying in bed comfortably  HEART: Regular rate and rhythm, no murmurs, rubs, or gallops  LUNGS: Unlabored respirations.  Clear to auscultation bilaterally, no crackles, wheezing, or rhonchi  ABDOMEN: Soft, nontender, nondistended, +BS  EXTREMITIES: LUE swollen, erythematous  NERVOUS SYSTEM:  Sedated, + pupils  SKIN: No rashes or lesions    LABS             10.5   19.50 )-----------( 263      ( 11-26-24 @ 04:38 )             33.4     139  |  103  |  28  -------------------------<  104   11-26-24 @ 04:38  4.1  |  20  |  0.7    Ca      8.4     11-26-24 @ 04:38  Phos   3.1     11-26-24 @ 04:38  Mg     2.4     11-26-24 @ 04:38    TPro  5.9  /  Alb  3.1  /  TBili  0.3  /  DBili  x   /  AST  35  /  ALT  7   /  AlkPhos  63  /  GGT  x     11-26-24 @ 04:38        Urinalysis Basic - ( 26 Nov 2024 04:38 )    Color: x / Appearance: x / SG: x / pH: x  Gluc: 104 mg/dL / Ketone: x  / Bili: x / Urobili: x   Blood: x / Protein: x / Nitrite: x   Leuk Esterase: x / RBC: x / WBC x   Sq Epi: x / Non Sq Epi: x / Bacteria: x      ABG - ( 26 Nov 2024 03:48 )  pH, Arterial: 7.51  pH, Blood: x     /  pCO2: 37    /  pO2: 63    / HCO3: 30    / Base Excess: 6.2   /  SaO2: 93.0                IMAGING

## 2024-11-26 NOTE — PROGRESS NOTE ADULT - SUBJECTIVE AND OBJECTIVE BOX
NEUROLOGY PROGRESS NOTE    Interval Events:  Patient tachycardic overnight. No new seizures. Still on Cefepime. Depakote level still low but higher than yesterday (29). Frequent to abundant diffusely expressed triphasic waves, often presented in periodic pattern. On occasions the diffuse discharges, more so from frontopolar regions, appear clearly epileptiform in nature seen on vEEG.     HPI:  68 y/o M PMH of HTN, HLD, mild SLE (Lupus) ,GIB secondary to duodenal ulcer in 1/2024,  Multiple falls , Chronic ataxia and Visual Hallucinations, hx of recently diagnosed progressive supranuclear palsy for which he follows with a neurologist at [place], presented to the ED due to cough and SOB for 2 days admitted to [facility] for mx of Aspiration PNA and NSTEMI II, found to be tachypneic and went into PEA arrest w/ ROSC  s/p 3 epinephrine ivp, amiodarone gtt, and epinephrine gtt. Neurology was consulted for approval for vEEG for twitching movements. On 11/19 in the AM, pt was having myoclonic seizures followed by a generalized myoclonic/clonic seizure that lasted for about 1 hour. 11/24 revealed GPDs, maximum bifrontal, triphasic waves, generalized, continuous slowing, generalized, severe. Started on AED's, vEEG yesterday showing abundant diffusely expressed periodic discharges with triphasic morphology.    Vitals on admission prior to code blue :   /74 , HR 64 , T 98.4 , O2 95%RA    CBC and CMP unremarkable   EKG NSR  Trops 32>>29   Lactate negative    CXR with b/l PNA   was given 1 L LR bolus and 1g cefepime in ED    pt is admitted to MICU (18 Nov 2024 03:54)     MEDICATIONS  Home Medications:  acetaminophen 325 mg oral tablet: 2 tab(s) orally every 6 hours as needed for pain or fever (29 Jan 2024 07:48)  fluticasone 50 mcg/inh nasal spray: 1 spray(s) in each nostril 2 times a day (18 Nov 2024 03:48)  folic acid 1 mg oral tablet: 1 tab(s) orally once a day (18 Jan 2024 12:17)  gabapentin 100 mg oral capsule: 1 cap(s) orally 3 times a day (every 8 hours) (29 Jan 2024 07:48)  hydroxychloroquine 200 mg oral tablet: 2 tab(s) orally once a day (16 Jan 2024 02:53)  lidocaine 4% topical film: Apply topically to affected area once a day apply to affected  area on right chest wall once daily, leave on for 12 hours, remove for 12 hours, wash hands after use ; avoid open skin - apply to intact skin only (29 Jan 2024 07:48)  losartan 50 mg oral tablet: 1 tab(s) orally once a day (18 Nov 2024 03:48)  Multiple Vitamins oral tablet: 1 tab(s) orally once a day (18 Jan 2024 12:17)  pantoprazole 40 mg oral delayed release tablet: 1 tab(s) orally 2 times a day ; take 1st tab in the morning 30 minutes before breakfast, and take the 2nd tab at bedtime (29 Jan 2024 07:48)  silver sulfADIAZINE 1% topical cream: Apply topically to affected area every 12 hours :  wash with soap and water, pat dry, apply silvadene to R chest and b/l  UE&#x27;s and b/l LE&#x27;s, cover with adaptic + DSD + kerlix (29 Jan 2024 07:55)  TAMSULOSIN HCL 0.4 MG CAPSULE: 1 cap(s) orally once a day (at bedtime) (29 Jan 2024 07:48)  thiamine 100 mg oral tablet: 1 tab(s) orally once a day (18 Jan 2024 12:17)    MEDICATIONS  (STANDING):  cefepime   IVPB      cefepime   IVPB 2000 milliGRAM(s) IV Intermittent every 8 hours  chlorhexidine 0.12% Liquid 15 milliLiter(s) Oral Mucosa every 12 hours  chlorhexidine 2% Cloths 1 Application(s) Topical <User Schedule>  enoxaparin Injectable 40 milliGRAM(s) SubCutaneous every 24 hours  folic acid 1 milliGRAM(s) Oral daily  gabapentin 100 milliGRAM(s) Oral three times a day  hydroxychloroquine 400 milliGRAM(s) Oral daily  levETIRAcetam 1500 milliGRAM(s) Oral two times a day  losartan 50 milliGRAM(s) Oral daily  multivitamin 1 Tablet(s) Oral daily  pantoprazole   Suspension 40 milliGRAM(s) Oral two times a day  polyethylene glycol 3350 17 Gram(s) Oral two times a day  propofol Infusion. 10 MICROgram(s)/kG/Min (6.55 mL/Hr) IV Continuous <Continuous>  senna 2 Tablet(s) Oral at bedtime  silver sulfADIAZINE 1% Cream 1 Application(s) Topical every 12 hours  thiamine 100 milliGRAM(s) Oral daily  valproate sodium   IVPB 1000 milliGRAM(s) IV Intermittent every 8 hours  vancomycin  IVPB 1750 milliGRAM(s) IV Intermittent every 12 hours    MEDICATIONS  (PRN):  acetaminophen     Tablet .. 650 milliGRAM(s) Oral every 6 hours PRN Temp greater or equal to 38C (100.4F)  atropine Injectable 1 milliGRAM(s) IntraMuscular once PRN HR<30  LORazepam   Injectable 2 milliGRAM(s) IV Push every 4 hours PRN seizure-like activity (i.e. twitching)      FAMILY HISTORY:  Family history of colon cancer in father      SOCIAL HISTORY: negative for tobacco, alcohol, or ilicit drug use.    Allergies    iodine (Rash (Mild to Mod))    Intolerances        GEN: NAD, pleasant, cooperative      NEURO:   - Mechanically vented, sedated on propofol. Not following commands  - Pupils reactive bilaterally. No gaze preference.   - Occasional myoclonic twitches of R lower face. Tremulousness on the RUE >L . Increased tone in RUE  - Withdraws to pain from all extremities.   - (-) gag reflex,  (+) corneal reflex    LABS:                        10.5   19.50 )-----------( 263      ( 26 Nov 2024 04:38 )             33.4     11-26    139  |  103  |  28[H]  ----------------------------<  104[H]  4.1   |  20  |  0.7    Ca    8.4      26 Nov 2024 04:38  Phos  3.1     11-26  Mg     2.4     11-26    TPro  5.9[L]  /  Alb  3.1[L]  /  TBili  0.3  /  DBili  x   /  AST  35  /  ALT  7   /  AlkPhos  63  11-26    Hemoglobin A1C:   Vitamin B12     CAPILLARY BLOOD GLUCOSE          Urinalysis Basic - ( 26 Nov 2024 04:38 )    Color: x / Appearance: x / SG: x / pH: x  Gluc: 104 mg/dL / Ketone: x  / Bili: x / Urobili: x   Blood: x / Protein: x / Nitrite: x   Leuk Esterase: x / RBC: x / WBC x   Sq Epi: x / Non Sq Epi: x / Bacteria: x        ABG - ( 26 Nov 2024 03:48 )  pH, Arterial: 7.51  pH, Blood: x     /  pCO2: 37    /  pO2: 63    / HCO3: 30    / Base Excess: 6.2   /  SaO2: 93.0                Microbiology:      RADIOLOGY, EKG AND ADDITIONAL TESTS: Reviewed.

## 2024-11-26 NOTE — PROGRESS NOTE ADULT - ASSESSMENT
IMPRESSION:    Acute hypoxemic respiratory failure remains intubated  New LLL opacity  Possible aspiration   Seizures - status  SP CPA   VTach   HO SLE   Shock now off Levophed   Post infectious HAAD treated     PLAN:    CNS: FU VEEG.  Continue Keppra and Depakote.  Neuro eval noted.  MRI.  Wean propofol , NSE    HEENT: Oral care.  ET Care     PULMONARY:  HOB @ 45 degrees.  Vent changes as follows: ARDS net MV settings.;  PEEP 8.  RR 14.  .  Monitor Airway pressures.  Wean O2 as tolerated.      CARDIOVASCULAR:   ECHO noted. LASIX HOLD    GI: GI prophylaxis.  OG Feeding.  Bowel regimen     RENAL:  Follow up lytes.  Correct as needed.  Andrea for retention     INFECTIOUS DISEASE: Follow up cultures. PER id    HEMATOLOGICAL:  DVT prophylaxis.  Monitor CBC     ENDOCRINE:  Follow up FS.  Insulin protocol if needed.    MUSCULOSKELETAL:  Off loading.  Bed rest      MICU

## 2024-11-26 NOTE — PROGRESS NOTE ADULT - ASSESSMENT
ASSESSMENT  68 y/o male with PMH of HTN, HLD, mild SLE (Lupus) ,GIB secondary to duodenal ulcer in 1/2024,  Multiple falls , Chronic ataxia and Visual Hallucinations, hx of recently diagnosed progressive supranuclear palsy for which he follows with a neurologist at [place],  presents to the ED due to cough and SOB for 2 days duration. per cousin belly the HCP who visited him eariler today at the Adult home, the pt has been complaining of cough and SOB for the past 2 days, he said that his chest feels congested then his cousin noticed that his breathing got worse and he started wheezing and having noisy breath sounds so asked the  nurse to get him to ED, pt at that time was denying any chest pain, he also had no fever, chills , GI or  symptoms, and no sick contacts. of note cousin mentions that he had visual hallucinations for the past week or so saying he sees a man in his closet ( this is not the first time the patient has visual hallucinations and is following with a neurologist at [place]).   After ED eval, patient was initially admitted to [facility] for mx of Aspiration PNA and NSTEMI II, then after that he started getting tachypneic, increased WOB, nurses suctioned him and then they noticed he is blue, then he became pulseless and CPR was initiated, he was successfully intubated , pt got ROSC , post ROSC rythm was Vtach per ED so he was started on amiodarone drip. during code blue ED also started him on Epinephrine drip.       IMPRESSION  #Fever    RUE phlebitis     CXR Right basilar opacity/discoid atelectasis, increased. Redemonstration left basilar opacity..  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      Height (cm): 177.8 (11-18-24 @ 09:08)  Weight (kg): 109.1 (11-18-24 @ 08:59)    RECOMMENDATIONS  - f/u BCX   - Send DTA  - Send Influenza/RSV/COVID19 PCR  - Vanc dosing AUC/JUNE per clinical pharmacist   - Monitor RUE   - Cefepime 2g q8h IV , Neurology note noted. Can change to zosyn 3.375 q8h IV, however patient just completed course. Meropenem also lowers seizure threshold  - s/p zosyn empiric 7 days ended 11/24  - Grave prognosis, GOC    If any questions, please send a message or call on [facility] Teams  Please continue to update ID with any pertinent new laboratory, radiographic findings, or change in clinical status

## 2024-11-26 NOTE — PROGRESS NOTE ADULT - ASSESSMENT
68 y/o M PMH of HTN, HLD, mild SLE (Lupus) ,GIB secondary to duodenal ulcer in 1/2024,  Multiple falls , recently diagnosed with PSP admitted for aspiration PNA, went into PEA arrest w/ ROSC while inpatient. Found having myoclonic seizures followed by a generalized myoclonic/clonic seizures. Patient seizure free since escalation of AED's. Suspected anoxic brain injury as seizure etiology (CTH obtain post arrest but likely too early to see changes). Patient seizure free for several days, will need vEEG scalp rest    Recommendations:  - d/c vEEG  - Switch Cefepime for alternative ABx due to its seizure threshold lowering risk  - c/w Keppra 1500mg BID  - c/w Depakote 1000mg q8h  - Will follow up MRI brain if performed today.   - Rest of management per primary  68 y/o M PMH of HTN, HLD, mild SLE (Lupus) ,GIB secondary to duodenal ulcer in 1/2024,  Multiple falls , recently diagnosed with PSP admitted for aspiration PNA, went into PEA arrest w/ ROSC while inpatient. Found having myoclonic seizures followed by a generalized myoclonic/clonic seizures. Patient seizure free since escalation of AED's. Suspected anoxic brain injury as seizure etiology (CTH obtain post arrest but likely too early to see changes). Patient seizure free for several days, will need vEEG scalp rest    Recommendations:  - d/c vEEG for scalp rest  - Switch Cefepime for alternative ABx due to its seizure threshold lowering risk  - c/w Keppra 1500mg BID  - c/w Depakote 1000mg q8h  - Will follow up MRI brain if performed today.   - Rest of management per primary   - call back once ready to wean sedation and will restart VEEG at that time

## 2024-11-26 NOTE — PROGRESS NOTE ADULT - ASSESSMENT
pt is a 68 y/o male with PMH of HTN, HLD, mild SLE (Lupus) ,GIB secondary to duodenal ulcer in 1/2024,  Multiple falls , Chronic ataxia and Visual Hallucinations, hx of recently diagnosed progressive supranuclear palsy for which he follows with a neurologist at [facility],  presents to the ED due to cough and SOB for 2 days duration, and visual hallucinations for past week.    After ED eval, patient was initially admitted to [facility] for mx of Aspiration PNA and NSTEMI II, then after that he started getting tachypneic, increased WOB, nurses suctioned him, pt then coded, ROSC acheived after 3 minutes. Post ROSC rhythm was Vtach per ED so he was started on amiodarone drip, later was discontinued it d/t bradycardia. EEG x2 days reporting showed episodes of generalized myoclonic seizures. Pt was put in Keppra + Depakote and Propofol + Versed. Latest EEG reports no seizures. Weaning off versed. Bradycardia has been improving (yesterday 30-40, now 50bpm).    #Possible RUE Cellulitis  #LLL PNA on CXR 11/26  WBC uptrending 16->19  Plan:  f/u BCx, fungitell  Vancomycin + cefepime  ID following    #Acute Hypoxic respiratory failure s/p intubation   #Possible Aspiration/Multifocal PNA   CXR unchanged - b/l hilar opacities, small RLL effusion (my read)  CT chest - b/l lower lobe consolidations  WBC downtrending 12->20->12->11  Infectious w/u neg: MRSA, RVP, Procal 0.44, UCx neg  Plan:  - c/w cefepime + doxy -> to end 11/24  - ID following  - Tylenol PRN for fevers    #Cardiac arrest, ~3min down time, s/p 1 shock for Vtach after ROSC  #NSTEMI type II  #Bradycardia - resolved  s/p amio ggt. Amio dc'd d/t bradycardia  Bedside US - not volume depleted  EP - f/u for ischemic w/u when stabilized  Cortisol 12, ->400->100->~90  Plan:  - off solumedrol  - off pressors  - off versed  - bradycardia improved off versed    #Myoclonic seizures s/p CPA  Day 1 EEG findings: majority of the generalized spikes are associated with head jerks or bilateral proximal myoclonic jerks. Around 3 am patient had an event characterized by cluster of myoclonic seizures followed by a generalized myoclonic/clonic seizure that lasted for about 1 hour. After that EEG returned to the baseline as described above.  CTH - no acute path  EEG neg for seizures x3 days, show epileptic potential  Plan:  - c/w keppra 1500 BID  - depakote levels subtherapeutic, increased to 1g q8h  - f/u AM depakote blood levels  - weaned propofol to 10  - ativan PRN  - vent settings to prevent alkalosis (rate 14)  - MRI brain  - NSE levels    #HO Lupus  #HO HTN  #HO GIB secondary to duodenal ulcer 1/2024   #Multiple falls , Chronic ataxia   #HO progressive supranuclear palsy and Visual Hallucinations  - on home meds: hydroxychloroquine, thiamine  - held BP meds d/t shock    #Lines: R IJ 11/18, briggs 11/18  #Family: Cousin Jw is HCP. Updated today at bedside 11/25  #DVT prophylaxis: Lovenox  #GI prophylaxis: PPI suspension  #Diet: tube feeds, bowel regimen  #Activity: bedrest, IAT  #Code status: DNR/Intubate  #Disposition: ICU    Pending:  - daily valproate blood levels  - propofol wean  - MRI brain - family does not know if pt has any implants  - monitor infectious markers, cellulitis improvement pt is a 70 y/o male with PMH of HTN, HLD, mild SLE (Lupus) ,GIB secondary to duodenal ulcer in 1/2024,  Multiple falls , Chronic ataxia and Visual Hallucinations, hx of recently diagnosed progressive supranuclear palsy for which he follows with a neurologist at [facility],  presents to the ED due to cough and SOB for 2 days duration, and visual hallucinations for past week.    After ED eval, patient was initially admitted to [facility] for mx of Aspiration PNA and NSTEMI II, then after that he started getting tachypneic, increased WOB, nurses suctioned him, pt then coded, ROSC acheived after 3 minutes. Post ROSC rhythm was Vtach per ED so he was started on amiodarone drip, later was discontinued it d/t bradycardia. EEG x2 days reporting showed episodes of generalized myoclonic seizures. Pt was put in Keppra + Depakote and Propofol + Versed. Latest EEG reports no seizures. Off versed.     #RUE Phlebitis - SVT on RUE duplex  #New LLL PNA on CXR 11/26  WBC uptrending 16->19  Plan:  f/u BCx, fungitell  Vancomycin + cefepime  ID following    #Acute Hypoxic respiratory failure s/p intubation   #Possible Aspiration/Multifocal PNA   CXR unchanged - b/l hilar opacities, small RLL effusion (my read)  CT chest - b/l lower lobe consolidations  WBC downtrending 12->20->12->11  Infectious w/u neg: MRSA, RVP, Procal 0.44, UCx neg  s/p zosyn empiric 7 day course    #Cardiac arrest, ~3min down time, s/p 1 shock for Vtach after ROSC  #NSTEMI type II  #Bradycardia - resolved  s/p amio ggt. Amio dc'd d/t bradycardia  Bedside US - not volume depleted  EP - f/u for ischemic w/u when stabilized  Cortisol 12, ->400->100->~90  Plan:  - off solumedrol  - off pressors  - off versed  - bradycardia improved off versed  - dc lasix    #Myoclonic seizures s/p CPA  Day 1 EEG findings: majority of the generalized spikes are associated with head jerks or bilateral proximal myoclonic jerks. Around 3 am patient had an event characterized by cluster of myoclonic seizures followed by a generalized myoclonic/clonic seizure that lasted for about 1 hour. After that EEG returned to the baseline as described above.  CTH - no acute path  Depakote levels subtherapeutic EEG neg for seizures x3 days, show epileptic potential  Plan:  - c/w keppra 1500 BID  - per neuro, c/w depakote 1g q8h  - daily AM depakote blood levels  - MRI today  - SAT after MRI  - scalp rest off EEG  - ativan PRN  - vent settings to prevent alkalosis (rate 14)  - f/u NSE levels    #HO Lupus  #HO HTN  #HO GIB secondary to duodenal ulcer 1/2024   #Multiple falls , Chronic ataxia   #HO progressive supranuclear palsy and Visual Hallucinations  - on home meds: hydroxychloroquine, thiamine  - held BP meds d/t shock    #Lines: R IJ 11/18, briggs 11/18  #Family: Cousin Jw is HCP. Updated today at bedside 11/25  #DVT prophylaxis: Lovenox  #GI prophylaxis: PPI suspension  #Diet: tube feeds, bowel regimen  #Activity: bedrest, IAT  #Code status: DNR/Intubate  #Disposition: ICU    Pending:  - daily valproate blood levels  - MRI today  - SAT  - monitor white count

## 2024-11-26 NOTE — PROGRESS NOTE ADULT - SUBJECTIVE AND OBJECTIVE BOX
Over Night Events: events noted, remains critically ill, still intubated, ventilated on propofol 20, low grade fever    PHYSICAL EXAM    ICU Vital Signs Last 24 Hrs  T(C): 38 (26 Nov 2024 00:00), Max: 38.6 (25 Nov 2024 20:00)  T(F): 100.4 (26 Nov 2024 00:00), Max: 101.5 (25 Nov 2024 20:00)  HR: 137 (26 Nov 2024 04:00) (63 - 153)  BP: 147/63 (26 Nov 2024 04:00) (107/53 - 197/85)  BP(mean): 90 (26 Nov 2024 04:00) (73 - 142)  RR: 25 (26 Nov 2024 04:00) (18 - 33)  SpO2: 100% (26 Nov 2024 04:00) (98% - 100%)    O2 Parameters below as of 25 Nov 2024 16:00  Patient On (Oxygen Delivery Method): ventilator    O2 Concentration (%): 40        General: ill looking  ETT  Lungs: Bilateral rhonchi  Cardiovascular: Regular   Abdomen: Soft, Positive BS  Extremities: No clubbing   Not following commands  RUE swelling    11-25-24 @ 07:01  -  11-26-24 @ 07:00  --------------------------------------------------------  IN:    Enteral Tube Flush: 40 mL    Free Water: 300 mL    IV PiggyBack: 700 mL    Jevity 1.2: 880 mL    Propofol (Status Epilepticus): 65.5 mL    Propofol (Status Epilepticus): 163.3 mL  Total IN: 2148.8 mL    OUT:    Indwelling Catheter - Urethral (mL): 1390 mL  Total OUT: 1390 mL    Total NET: 758.8 mL          LABS:                          10.5   19.50 )-----------( 263      ( 26 Nov 2024 04:38 )             33.4                                               11-26    139  |  103  |  28[H]  ----------------------------<  104[H]  4.1   |  20  |  0.7    Ca    8.4      26 Nov 2024 04:38  Phos  3.1     11-26  Mg     2.4     11-26    TPro  5.9[L]  /  Alb  3.1[L]  /  TBili  0.3  /  DBili  x   /  AST  35  /  ALT  7   /  AlkPhos  63  11-26                                             Urinalysis Basic - ( 26 Nov 2024 04:38 )    Color: x / Appearance: x / SG: x / pH: x  Gluc: 104 mg/dL / Ketone: x  / Bili: x / Urobili: x   Blood: x / Protein: x / Nitrite: x   Leuk Esterase: x / RBC: x / WBC x   Sq Epi: x / Non Sq Epi: x / Bacteria: x                                                  LIVER FUNCTIONS - ( 26 Nov 2024 04:38 )  Alb: 3.1 g/dL / Pro: 5.9 g/dL / ALK PHOS: 63 U/L / ALT: 7 U/L / AST: 35 U/L / GGT: x                                                                                               Mode: AC/ CMV (Assist Control/ Continuous Mandatory Ventilation)  RR (machine): 14  TV (machine): 400  FiO2: 40  PEEP: 8  ITime: 1  MAP: 11  PIP: 17                                      ABG - ( 26 Nov 2024 03:48 )  pH, Arterial: 7.51  pH, Blood: x     /  pCO2: 37    /  pO2: 63    / HCO3: 30    / Base Excess: 6.2   /  SaO2: 93.0                MEDICATIONS  (STANDING):  cefepime   IVPB      cefepime   IVPB 2000 milliGRAM(s) IV Intermittent every 8 hours  chlorhexidine 0.12% Liquid 15 milliLiter(s) Oral Mucosa every 12 hours  chlorhexidine 2% Cloths 1 Application(s) Topical <User Schedule>  enoxaparin Injectable 40 milliGRAM(s) SubCutaneous every 24 hours  folic acid 1 milliGRAM(s) Oral daily  furosemide   Injectable 40 milliGRAM(s) IV Push daily  gabapentin 100 milliGRAM(s) Oral three times a day  hydroxychloroquine 400 milliGRAM(s) Oral daily  levETIRAcetam 1500 milliGRAM(s) Oral two times a day  losartan 50 milliGRAM(s) Oral daily  multivitamin 1 Tablet(s) Oral daily  pantoprazole   Suspension 40 milliGRAM(s) Oral two times a day  polyethylene glycol 3350 17 Gram(s) Oral two times a day  propofol Infusion. 10 MICROgram(s)/kG/Min (6.55 mL/Hr) IV Continuous <Continuous>  senna 2 Tablet(s) Oral at bedtime  silver sulfADIAZINE 1% Cream 1 Application(s) Topical every 12 hours  thiamine 100 milliGRAM(s) Oral daily  valproate sodium   IVPB 1000 milliGRAM(s) IV Intermittent every 8 hours  vancomycin  IVPB 1750 milliGRAM(s) IV Intermittent every 12 hours    MEDICATIONS  (PRN):  acetaminophen     Tablet .. 650 milliGRAM(s) Oral every 6 hours PRN Temp greater or equal to 38C (100.4F)  atropine Injectable 1 milliGRAM(s) IntraMuscular once PRN HR<30  LORazepam   Injectable 2 milliGRAM(s) IV Push every 4 hours PRN seizure-like activity (i.e. twitching)      CXR noted

## 2024-11-26 NOTE — EEG REPORT - NS EEG TEXT BOX
[facility]   COMPREHENSIVE EPILEPSY CENTER   REPORT OF CONTINUOUS VIDEO EEG     [facility]: [address] , [address], [place] [address], Ph#: [phone]  [facility]: [id number], [place] [address], Ph#: [phone]  Office: [address] [place] [address], [place] [address], [place] [address] Ph#: [phone]    Patient Name: SUSAN AWAD  Age and : 69y (51)  MRN #: [id number]  Location: [address]  Referring Physician: Gregorio Bull    Study Date: 24 - 24  Duration: 24 hr  _____________________________________________________________  STUDY INFORMATION    EEG Recording Technique:  The patient underwent continuous Video-EEG monitoring, using Telemetry System hardware on the XLTek Digital System. EEG and video data were stored on a computer hard drive with important events saved in digital archive files. The material was reviewed by a physician (electroencephalographer / epileptologist) on a daily basis. Rafa and seizure detection algorithms were utilized and reviewed. An EEG Technician attended to the patient, and was available throughout daytime work hours.  The epilepsy center neurologist was available in person or on call 24-hours per day.    EEG Placement and Labeling of Electrodes:  The EEG was performed utilizing 20 channel referential EEG connections (coronal over temporal over parasagittal montage) using all standard 10-20 electrode placements with EKG, with additional electrodes placed in the inferior temporal region using the modified 10-10 montage electrode placements for elective admissions, or if deemed necessary. Recording was at a sampling rate of 256 samples per second per channel. Time synchronized digital video recording was done simultaneously with EEG recording. A low light infrared camera was used for low light recording.     _____________________________________________________________  HISTORY    Patient is a 69y old  Male who presents with a chief complaint of SOB (2024 07:58)      PERTINENT MEDICATION:  MEDICATIONS  (STANDING):  chlorhexidine 0.12% Liquid 15 milliLiter(s) Oral Mucosa every 12 hours  chlorhexidine 2% Cloths 1 Application(s) Topical <User Schedule>  doxycycline IVPB 100 milliGRAM(s) IV Intermittent every 12 hours  doxycycline IVPB      enoxaparin Injectable 40 milliGRAM(s) SubCutaneous every 24 hours  folic acid 1 milliGRAM(s) Oral daily  furosemide   Injectable 40 milliGRAM(s) IV Push daily  gabapentin 100 milliGRAM(s) Oral three times a day  hydroxychloroquine 400 milliGRAM(s) Oral daily  levETIRAcetam 1500 milliGRAM(s) Oral two times a day  multivitamin 1 Tablet(s) Oral daily  pantoprazole   Suspension 40 milliGRAM(s) Oral two times a day  piperacillin/tazobactam IVPB.. 3.375 Gram(s) IV Intermittent every 8 hours  polyethylene glycol 3350 17 Gram(s) Oral two times a day  propofol Infusion. 40 MICROgram(s)/kG/Min (26.2 mL/Hr) IV Continuous <Continuous>  senna 2 Tablet(s) Oral at bedtime  silver sulfADIAZINE 1% Cream 1 Application(s) Topical every 12 hours  thiamine 100 milliGRAM(s) Oral daily  valproate sodium   IVPB 750 milliGRAM(s) IV Intermittent every 8 hours    _____________________________________________________________  INTERPRETATION    Findings: The background was spontaneously variable, poorly reactive, no PDR was seen.    Background Slowing:  -Continuous diffuse theta and polymorphic delta slowing, with intermittent 1-2 second periods of diffuse attenuation.    Focal Slowing:   -LRDA, left frontal region, intermittent    Sleep Background:  Stage II sleep transients were not recorded.  Drowsiness and stage II sleep transients were not recorded.    Other Non-Epileptiform Findings:  None were present.    Interictal Epileptiform Activity:   -GPDs, maximum bifrontal, often with triphasic morphology, frequency up to 1 hz    Events:  Clinical events: None recorded.  Seizures: None recorded.    Artifacts:  Intermittent myogenic and movement artifacts were noted.    ECG:  The heart rate on single channel ECG was predominantly between 60-80 BPM.    _____________________________________________________________  EEG SUMMARY/CLASSIFICATION    Abnormal EEG in an encephalopathic patient.  -GPDs, maximum bifrontal, frequency up to 1 hz  -Triphasic waves, generalized, frequency up to 1 hz  -LRDA, intermittent, left frontal region  -Continuous slowing, generalized, severe    _____________________________________________________________  EEG IMPRESSION/CLINICAL CORRELATE    Abnormal EEG study.  1. There is a risk for seizures with generalized onset, and with focal onset from the left frontal region.  2. Severe diffuse cerebral dysfunction, which may have a metabolic component.  3. No seizures were recorded.    Saw Ashton MD  Neurology Attending Physician  
[facility]   COMPREHENSIVE EPILEPSY CENTER   REPORT OF CONTINUOUS VIDEO EEG     [facility]: [address] , [address], [place] [address], Ph#: [phone]  [facility]: [id number], [place] [address], Ph#: [phone]  Office: [address] [place] [address], [place] [address], [place] [address] Ph#: [phone]    Patient Name: SUSAN AWAD  Age and : 69y (51)  MRN #: [id number]  Location: [address]  Referring Physician: Gregorio Bull    Study Date: 24 - 24  Duration: 24 hr  _____________________________________________________________  STUDY INFORMATION    EEG Recording Technique:  The patient underwent continuous Video-EEG monitoring, using Telemetry System hardware on the XLTek Digital System. EEG and video data were stored on a computer hard drive with important events saved in digital archive files. The material was reviewed by a physician (electroencephalographer / epileptologist) on a daily basis. Rafa and seizure detection algorithms were utilized and reviewed. An EEG Technician attended to the patient, and was available throughout daytime work hours.  The epilepsy center neurologist was available in person or on call 24-hours per day.    EEG Placement and Labeling of Electrodes:  The EEG was performed utilizing 20 channel referential EEG connections (coronal over temporal over parasagittal montage) using all standard 10-20 electrode placements with EKG, with additional electrodes placed in the inferior temporal region using the modified 10-10 montage electrode placements for elective admissions, or if deemed necessary. Recording was at a sampling rate of 256 samples per second per channel. Time synchronized digital video recording was done simultaneously with EEG recording. A low light infrared camera was used for low light recording.     _____________________________________________________________  HISTORY    Patient is a 69y old  Male who presents with a chief complaint of SOB (2024 07:58)      PERTINENT MEDICATION:  MEDICATIONS  (STANDING):  chlorhexidine 0.12% Liquid 15 milliLiter(s) Oral Mucosa every 12 hours  chlorhexidine 2% Cloths 1 Application(s) Topical <User Schedule>  doxycycline IVPB 100 milliGRAM(s) IV Intermittent every 12 hours  doxycycline IVPB      enoxaparin Injectable 40 milliGRAM(s) SubCutaneous every 24 hours  folic acid 1 milliGRAM(s) Oral daily  furosemide   Injectable 40 milliGRAM(s) IV Push daily  gabapentin 100 milliGRAM(s) Oral three times a day  hydroxychloroquine 400 milliGRAM(s) Oral daily  levETIRAcetam 1500 milliGRAM(s) Oral two times a day  multivitamin 1 Tablet(s) Oral daily  pantoprazole   Suspension 40 milliGRAM(s) Oral two times a day  piperacillin/tazobactam IVPB.. 3.375 Gram(s) IV Intermittent every 8 hours  polyethylene glycol 3350 17 Gram(s) Oral two times a day  propofol Infusion. 40 MICROgram(s)/kG/Min (26.2 mL/Hr) IV Continuous <Continuous>  senna 2 Tablet(s) Oral at bedtime  silver sulfADIAZINE 1% Cream 1 Application(s) Topical every 12 hours  thiamine 100 milliGRAM(s) Oral daily  valproate sodium   IVPB 750 milliGRAM(s) IV Intermittent every 8 hours    _____________________________________________________________  INTERPRETATION    Findings: The background was spontaneously variable, poorly reactive, no PDR was seen.    Background Slowing:  -Continuous diffuse theta and polymorphic delta slowing, with intermittent 1-2 second periods of diffuse attenuation.    Focal Slowing:   None    Sleep Background:  Stage II sleep transients were not recorded.  Drowsiness and stage II sleep transients were not recorded.    Other Non-Epileptiform Findings:  None were present.    Interictal Epileptiform Activity:   -GPDs, maximum bifrontal, often with triphasic morphology, frequency up to 1 hz    Events:  Clinical events: None recorded.  Seizures: None recorded.    Artifacts:  Intermittent myogenic and movement artifacts were noted. Excessive electrode artifacts in second half of recording.    ECG:  The heart rate on single channel ECG was predominantly between 60-80 BPM.    _____________________________________________________________  EEG SUMMARY/CLASSIFICATION    Abnormal EEG in an encephalopathic patient.  -GPDs, maximum bifrontal, frequency up to 1 hz  -Triphasic waves, generalized, frequency up to 1 hz  -Continuous slowing, generalized, severe    _____________________________________________________________  EEG IMPRESSION/CLINICAL CORRELATE    Abnormal EEG study.  1. There is a risk for seizures with generalized onset.  2. Severe diffuse cerebral dysfunction, which may have a metabolic component.  3. No seizures were recorded.    NOTE: second half of recording was limited by excessive electrode artifact    Saw Ashton MD  Neurology Attending Physician      
Epilepsy Attending Note:     RITESHSUSAN    69y Male  MRN [id number]    Vital Signs Last 24 Hrs  T(C): 37.3 (22 Nov 2024 08:00), Max: 37.7 (22 Nov 2024 04:00)  T(F): 99.1 (22 Nov 2024 08:00), Max: 99.9 (22 Nov 2024 04:00)  HR: 52 (22 Nov 2024 10:15) (37 - 56)  BP: 157/67 (22 Nov 2024 10:15) (92/47 - 192/77)  BP(mean): 96 (22 Nov 2024 10:15) (67 - 120)  RR: 21 (22 Nov 2024 10:15) (15 - 26)  SpO2: 99% (22 Nov 2024 10:15) (95% - 99%)    Parameters below as of 22 Nov 2024 10:00  Patient On (Oxygen Delivery Method): ventilator    O2 Concentration (%): 40                          9.5    11.34 )-----------( 210      ( 22 Nov 2024 05:20 )             29.9       11-22    143  |  107  |  18  ----------------------------<  97  3.8   |  27  |  0.8    Ca    8.2[L]      22 Nov 2024 05:20  Mg     2.2     11-22    TPro  5.7[L]  /  Alb  3.1[L]  /  TBili  0.2  /  DBili  x   /  AST  25  /  ALT  10  /  AlkPhos  53  11-22      MEDICATIONS  (STANDING):  chlorhexidine 0.12% Liquid 15 milliLiter(s) Oral Mucosa every 12 hours  chlorhexidine 2% Cloths 1 Application(s) Topical <User Schedule>  doxycycline IVPB 100 milliGRAM(s) IV Intermittent every 12 hours  doxycycline IVPB      enoxaparin Injectable 40 milliGRAM(s) SubCutaneous every 24 hours  folic acid 1 milliGRAM(s) Oral daily  furosemide   Injectable 40 milliGRAM(s) IV Push daily  gabapentin 100 milliGRAM(s) Oral three times a day  hydroxychloroquine 400 milliGRAM(s) Oral daily  levETIRAcetam 1500 milliGRAM(s) Oral two times a day  midazolam Infusion. 0.02 mG/kG/Hr (2.18 mL/Hr) IV Continuous <Continuous>  multivitamin 1 Tablet(s) Oral daily  pantoprazole   Suspension 40 milliGRAM(s) Oral two times a day  piperacillin/tazobactam IVPB.. 3.375 Gram(s) IV Intermittent every 8 hours  polyethylene glycol 3350 17 Gram(s) Oral two times a day  propofol Infusion. 40 MICROgram(s)/kG/Min (26.2 mL/Hr) IV Continuous <Continuous>  senna 2 Tablet(s) Oral at bedtime  silver sulfADIAZINE 1% Cream 1 Application(s) Topical every 12 hours  thiamine 100 milliGRAM(s) Oral daily  valproate sodium   IVPB 750 milliGRAM(s) IV Intermittent every 8 hours    MEDICATIONS  (PRN):  acetaminophen     Tablet .. 650 milliGRAM(s) Oral every 6 hours PRN Temp greater or equal to 38C (100.4F)  atropine Injectable 1 milliGRAM(s) IntraMuscular once PRN HR<30      Valproic Acid Level, Serum: 17.0 ug/mL [50.0 - 100.0] (11-22-24 @ 05:20)  Valproic Acid Level, Serum: 16.0 ug/mL [50.0 - 100.0] (11-21-24 @ 16:49)        VEEG in the last 24 hours:    Background----------nearly continues , symmetrical, less than optimally organized reaching frequencies in the range of 4-5 hz showing mild reactivity to the state and  level of sedation. It is of note that the background also                                  shows superimposed EKG artifact    Focal and generalized slowing--------moderate to severe generalized slowing     Interictal activity--------  1- frequent to abundant diffusely expressed sharp waves often expressed better from the right side. 2- brief runs of diffusely expressed rhythmic sharply contoured delta or notched delta                                      3- diffusely expressed triphasic waves often in quasi periodic pattern    Events-------none    Seizures-----none    Impression: abnormal as above    Plan - as/neurology    
Epilepsy Attending Note:     RITESHSUSAN    69y Male  MRN [id number]    Vital Signs Last 24 Hrs  T(C): 37.4 (25 Nov 2024 08:00), Max: 38.3 (24 Nov 2024 16:00)  T(F): 99.4 (25 Nov 2024 08:00), Max: 101 (24 Nov 2024 16:00)  HR: 69 (25 Nov 2024 10:21) (51 - 124)  BP: 157/69 (25 Nov 2024 09:00) (133/62 - 190/78)  BP(mean): 106 (25 Nov 2024 09:00) (88 - 137)  RR: 20 (25 Nov 2024 09:00) (16 - 35)  SpO2: 99% (25 Nov 2024 10:21) (95% - 100%)    Parameters below as of 25 Nov 2024 08:00  Patient On (Oxygen Delivery Method): ventilator    O2 Concentration (%): 40                          9.9    15.92 )-----------( 259      ( 25 Nov 2024 04:27 )             31.5       11-25    141  |  103  |  26[H]  ----------------------------<  92  3.7   |  30  |  0.8    Ca    8.2[L]      25 Nov 2024 04:27  Phos  3.2     11-25  Mg     2.3     11-25    TPro  6.0  /  Alb  3.3[L]  /  TBili  0.3  /  DBili  x   /  AST  24  /  ALT  8   /  AlkPhos  61  11-25      MEDICATIONS  (STANDING):  cefepime   IVPB      cefepime   IVPB 2000 milliGRAM(s) IV Intermittent once  cefepime   IVPB 2000 milliGRAM(s) IV Intermittent every 8 hours  chlorhexidine 0.12% Liquid 15 milliLiter(s) Oral Mucosa every 12 hours  chlorhexidine 2% Cloths 1 Application(s) Topical <User Schedule>  enoxaparin Injectable 40 milliGRAM(s) SubCutaneous every 24 hours  folic acid 1 milliGRAM(s) Oral daily  furosemide   Injectable 40 milliGRAM(s) IV Push daily  gabapentin 100 milliGRAM(s) Oral three times a day  hydroxychloroquine 400 milliGRAM(s) Oral daily  levETIRAcetam 1500 milliGRAM(s) Oral two times a day  losartan 50 milliGRAM(s) Oral daily  multivitamin 1 Tablet(s) Oral daily  pantoprazole   Suspension 40 milliGRAM(s) Oral two times a day  polyethylene glycol 3350 17 Gram(s) Oral two times a day  propofol Infusion. 20 MICROgram(s)/kG/Min (13.1 mL/Hr) IV Continuous <Continuous>  senna 2 Tablet(s) Oral at bedtime  silver sulfADIAZINE 1% Cream 1 Application(s) Topical every 12 hours  thiamine 100 milliGRAM(s) Oral daily  valproate sodium   IVPB 1000 milliGRAM(s) IV Intermittent every 8 hours  vancomycin  IVPB 1750 milliGRAM(s) IV Intermittent every 12 hours    MEDICATIONS  (PRN):  acetaminophen     Tablet .. 650 milliGRAM(s) Oral every 6 hours PRN Temp greater or equal to 38C (100.4F)  atropine Injectable 1 milliGRAM(s) IntraMuscular once PRN HR<30  LORazepam   Injectable 2 milliGRAM(s) IV Push every 4 hours PRN seizure-like activity (i.e. twitching)      Valproic Acid Level, Serum: 24.0 ug/mL [50.0 - 100.0] (11-25-24 @ 04:27)  Valproic Acid Level, Serum: 27.0 ug/mL [50.0 - 100.0] (11-24-24 @ 11:21)  Valproic Acid Level, Serum: 22.0 ug/mL [50.0 - 100.0] (11-23-24 @ 05:50)        VEEG in the last 24 hours:    Background-----------  continues, low amplitude,  less than optimally organized low amplitude reaching 4-5 hz superimposed by lower range theta and abundant diffusely expressed periodic discharges with triphasic morphology                                   Often this discharges more specifically over the FP regions appear epileptiform in nature    Focal and generalized slowing----------moderate to severe generalized slowing    Interictal activity---------------  as above    Events------------------none     Seizures------------ none    Impression: abnormal as above. It is of note that periods of the recording mainly between late am to about 7 pm  was contaminated with significant artifact    Plan - as/ neurology    
Epilepsy Attending Note:     SHAMIKASUSAN MULLEN    69y Male  MRN [id number]    Vital Signs Last 24 Hrs  T(C): 36.6 (2024 08:00), Max: 36.8 (2024 04:00)  T(F): 97.8 (2024 08:00), Max: 98.2 (2024 04:00)  HR: 39 (2024 10:30) (32 - 48)  BP: 113/58 (2024 10:30) (88/47 - 174/79)  BP(mean): 82 (2024 10:30) (65 - 113)  RR: 20 (2024 10:30) (16 - 24)  SpO2: 99% (2024 10:30) (97% - 100%)    Parameters below as of 2024 10:00  Patient On (Oxygen Delivery Method): ventilator    O2 Concentration (%): 40                          8.5    11.15 )-----------( 209      ( 2024 04:25 )             26.5       11-21    137  |  104  |  22[H]  ----------------------------<  122[H]  4.1   |  25  |  0.9    Ca    7.8[L]      2024 04:25  Mg     2.3     11-    TPro  5.3[L]  /  Alb  3.1[L]  /  TBili  0.2  /  DBili  x   /  AST  26  /  ALT  11  /  AlkPhos  50  11-21      MEDICATIONS  (STANDING):  chlorhexidine 0.12% Liquid 15 milliLiter(s) Oral Mucosa every 12 hours  chlorhexidine 2% Cloths 1 Application(s) Topical <User Schedule>  DOPamine Infusion 2.5 MICROgram(s)/kG/Min (10.2 mL/Hr) IV Continuous <Continuous>  doxycycline IVPB 100 milliGRAM(s) IV Intermittent every 12 hours  doxycycline IVPB      enoxaparin Injectable 40 milliGRAM(s) SubCutaneous every 24 hours  folic acid 1 milliGRAM(s) Oral daily  furosemide   Injectable 40 milliGRAM(s) IV Push daily  gabapentin 100 milliGRAM(s) Oral three times a day  hydroxychloroquine 400 milliGRAM(s) Oral daily  levETIRAcetam 1500 milliGRAM(s) Oral two times a day  midazolam Infusion. 0.05 mG/kG/Hr (5.46 mL/Hr) IV Continuous <Continuous>  multivitamin 1 Tablet(s) Oral daily  norepinephrine Infusion 0.05 MICROgram(s)/kG/Min (5.11 mL/Hr) IV Continuous <Continuous>  pantoprazole   Suspension 40 milliGRAM(s) Oral two times a day  piperacillin/tazobactam IVPB.. 3.375 Gram(s) IV Intermittent every 8 hours  polyethylene glycol 3350 17 Gram(s) Oral two times a day  propofol Infusion. 40 MICROgram(s)/kG/Min (26.2 mL/Hr) IV Continuous <Continuous>  senna 2 Tablet(s) Oral at bedtime  silver sulfADIAZINE 1% Cream 1 Application(s) Topical every 12 hours  thiamine 100 milliGRAM(s) Oral daily  valproate sodium   IVPB 750 milliGRAM(s) IV Intermittent every 12 hours    MEDICATIONS  (PRN):  acetaminophen     Tablet .. 650 milliGRAM(s) Oral every 6 hours PRN Temp greater or equal to 38C (100.4F)  atropine Injectable 1 milliGRAM(s) IntraMuscular once PRN HR<30            VEEG in the last 24 hours:    Background - very low amplitude, continuous, relatively better expression of PDR, reaching frequencies in the range of 4-5 Hz, showing mild reactivity. The background is superimposed by brief periods of 1-3 sec attenuations expressed at variable intervals throughout the recording.     Focal and generalized slowin. moderate to severe generalized slowing  2. no focal slowing    Interictal activity:  1. frequent to abundant low amplitude spikes appearing mostly in quasi-periodic pattern.   2. rare brief 3-4 sec runs of notched delta over FC region    Events - nonr    Seizures - none    Impression: Abnormal VEEG as above    Plan - per neurology team    
Epilepsy Attending Note:     RITESHSUSAN    69y Male  MRN [id number]    Vital Signs Last 24 Hrs  T(C): 35.9 (2024 08:00), Max: 38 (2024 12:00)  T(F): 96.7 (2024 08:00), Max: 100.4 (2024 12:00)  HR: 40 (2024 08:00) (40 - 58)  BP: 100/54 (:00) (94/48 - 164/74)  BP(mean): 75 (2024 08:00) (69 - 106)  RR: 16 (:00) (16 - 21)  SpO2: 98% (:) (98% - 100%)    Parameters below as of 2024 08:00  Patient On (Oxygen Delivery Method): ventilator                              8.5    11.29 )-----------( 185      ( 2024 04:40 )             26.5       11-20    138  |  105  |  23[H]  ----------------------------<  123[H]  3.3[L]   |  25  |  1.0    Ca    8.0[L]      2024 04:40  Phos  2.9     11-  Mg     2.2     20    TPro  5.1[L]  /  Alb  3.1[L]  /  TBili  0.3  /  DBili  x   /  AST  30  /  ALT  12  /  AlkPhos  52  11-20      MEDICATIONS  (STANDING):  chlorhexidine 0.12% Liquid 15 milliLiter(s) Oral Mucosa every 12 hours  chlorhexidine 2% Cloths 1 Application(s) Topical <User Schedule>  doxycycline IVPB 100 milliGRAM(s) IV Intermittent every 12 hours  doxycycline IVPB      enoxaparin Injectable 40 milliGRAM(s) SubCutaneous every 24 hours  folic acid 1 milliGRAM(s) Oral daily  gabapentin 100 milliGRAM(s) Oral three times a day  hydroxychloroquine 400 milliGRAM(s) Oral daily  levETIRAcetam 1500 milliGRAM(s) Oral two times a day  midazolam Infusion. 0.05 mG/kG/Hr (5.46 mL/Hr) IV Continuous <Continuous>  multivitamin 1 Tablet(s) Oral daily  pantoprazole   Suspension 40 milliGRAM(s) Oral two times a day  piperacillin/tazobactam IVPB.. 3.375 Gram(s) IV Intermittent every 8 hours  polyethylene glycol 3350 17 Gram(s) Oral two times a day  potassium chloride   Powder 40 milliEquivalent(s) Oral once  propofol Infusion. 40 MICROgram(s)/kG/Min (26.2 mL/Hr) IV Continuous <Continuous>  propofol Injectable 50 milliGRAM(s) IV Push once  senna 2 Tablet(s) Oral at bedtime  silver sulfADIAZINE 1% Cream 1 Application(s) Topical every 12 hours  thiamine 100 milliGRAM(s) Oral daily    MEDICATIONS  (PRN):  acetaminophen     Tablet .. 650 milliGRAM(s) Oral every 6 hours PRN Temp greater or equal to 38C (100.4F)            VEEG in the last 24 hours:    Background - very low amplitude/suppressed background, shows slight improvement in regard to the amplitude and expression of PDR towards the latter part of the recording, reaching frequencies in the range of 4-5 Hz. The background continues to be superimposed by periodic and quasiperiodic low or high amplitude generalized spikes.    Focal and generalized slowin. severe generalized slowing  2. no focal slowing    Interictal activity - abundant generalized spikes as described above.    Events - see below    Seizures - occasionally generalized spikes are associated with head jerks. However, there were 2 discreet myoclonic generalized seizures yesterday at 8:30 am and 6:30 pm characterized by clusters of myoclonic activity.    Impression: Abnormal VEEG as above    Plan - per neurology team    
Epilepsy Attending Note:     RITESHSUSAN    69y Male  MRN [id number]    Vital Signs Last 24 Hrs  T(C): 38.6 (19 Nov 2024 04:00), Max: 38.6 (19 Nov 2024 04:00)  T(F): 101.5 (19 Nov 2024 04:00), Max: 101.5 (19 Nov 2024 04:00)  HR: 61 (19 Nov 2024 08:48) (48 - 115)  BP: 140/64 (19 Nov 2024 08:00) (82/43 - 173/71)  BP(mean): 92 (19 Nov 2024 08:00) (59 - 115)  RR: 22 (19 Nov 2024 08:00) (22 - 51)  SpO2: 98% (19 Nov 2024 08:48) (92% - 100%)    Parameters below as of 19 Nov 2024 08:00  Patient On (Oxygen Delivery Method): ventilator                              9.8    12.96 )-----------( 215      ( 19 Nov 2024 06:10 )             30.3       11-19    141  |  106  |  17  ----------------------------<  95  3.5   |  23  |  1.1    Ca    8.3[L]      19 Nov 2024 06:10  Phos  2.9     11-19  Mg     2.1     11-19    TPro  5.4[L]  /  Alb  3.3[L]  /  TBili  0.5  /  DBili  x   /  AST  37  /  ALT  17  /  AlkPhos  59  11-19      MEDICATIONS  (STANDING):  chlorhexidine 0.12% Liquid 15 milliLiter(s) Oral Mucosa every 12 hours  chlorhexidine 2% Cloths 1 Application(s) Topical <User Schedule>  doxycycline IVPB 100 milliGRAM(s) IV Intermittent every 12 hours  doxycycline IVPB      enoxaparin Injectable 40 milliGRAM(s) SubCutaneous every 24 hours  folic acid 1 milliGRAM(s) Oral daily  gabapentin 100 milliGRAM(s) Oral three times a day  hydroxychloroquine 400 milliGRAM(s) Oral daily  lactated ringers. 1000 milliLiter(s) (50 mL/Hr) IV Continuous <Continuous>  levETIRAcetam 1500 milliGRAM(s) Oral two times a day  midazolam Infusion. 0.05 mG/kG/Hr (5.46 mL/Hr) IV Continuous <Continuous>  multivitamin 1 Tablet(s) Oral daily  pantoprazole   Suspension 40 milliGRAM(s) Oral two times a day  piperacillin/tazobactam IVPB.. 3.375 Gram(s) IV Intermittent every 8 hours  polyethylene glycol 3350 17 Gram(s) Oral two times a day  propofol Infusion. 40 MICROgram(s)/kG/Min (26.2 mL/Hr) IV Continuous <Continuous>  propofol Injectable 50 milliGRAM(s) IV Push once  senna 2 Tablet(s) Oral at bedtime  silver sulfADIAZINE 1% Cream 1 Application(s) Topical every 12 hours  thiamine 100 milliGRAM(s) Oral daily    MEDICATIONS  (PRN):  acetaminophen     Tablet .. 650 milliGRAM(s) Oral every 6 hours PRN Temp greater or equal to 38C (100.4F)            VEEG in the last 16 hours: Intubated, sedated    Background - shows burst suppression pattern. Bursts consisting of low amplitude generalized spikes at the frequency of 1 per 1-2 seconds seen throughout the recording. Shows minimal reactivity, at the sensitivity of 2 mcV shows interspike PDR of 2-3 Hz.    Focal and generalized slowing - severe generalized slowing    Interictal activity - as above    Events - see below    Seizures:  1. majority of the generalized spikes are associated with head jerks or bilateral proximal myoclonic jerks  2. Around 3 am patient had an event characterized by cluster of myoclonic seizures followed by a generalized myoclonic/clonic seizure that lasted for about 1 hour. After that EEG returned to the baseline as described above.    Impression: Abnormal VEEG as above    Plan - per neurology team    
Epilepsy Attending Note:     RITESHSUSAN    69y Male  MRN [id number]    Vital Signs Last 24 Hrs  T(C): 37.6 (2024 08:00), Max: 38.6 (2024 20:00)  T(F): 99.6 (2024 08:00), Max: 101.5 (2024 20:00)  HR: 70 (:00) (63 - 153)  BP: 127/60 (:00) (107/53 - 197/85)  BP(mean): 86 (:) (73 - 142)  RR: 22 (:00) (18 - 33)  SpO2: 100% () (98% - 100%)    Parameters below as of 2024 08:00  Patient On (Oxygen Delivery Method): ventilator                              10.5   19.50 )-----------( 263      ( 2024 04:38 )             33.4           139  |  103  |  28[H]  ----------------------------<  104[H]  4.1   |  20  |  0.7    Ca    8.4      2024 04:38  Phos  3.1       Mg     2.4         TPro  5.9[L]  /  Alb  3.1[L]  /  TBili  0.3  /  DBili  x   /  AST  35  /  ALT  7   /  AlkPhos  63        MEDICATIONS  (STANDING):  cefepime   IVPB      cefepime   IVPB 2000 milliGRAM(s) IV Intermittent every 8 hours  chlorhexidine 0.12% Liquid 15 milliLiter(s) Oral Mucosa every 12 hours  chlorhexidine 2% Cloths 1 Application(s) Topical <User Schedule>  enoxaparin Injectable 40 milliGRAM(s) SubCutaneous every 24 hours  folic acid 1 milliGRAM(s) Oral daily  gabapentin 100 milliGRAM(s) Oral three times a day  hydroxychloroquine 400 milliGRAM(s) Oral daily  levETIRAcetam 1500 milliGRAM(s) Oral two times a day  losartan 50 milliGRAM(s) Oral daily  multivitamin 1 Tablet(s) Oral daily  pantoprazole   Suspension 40 milliGRAM(s) Oral two times a day  polyethylene glycol 3350 17 Gram(s) Oral two times a day  propofol Infusion. 10 MICROgram(s)/kG/Min (6.55 mL/Hr) IV Continuous <Continuous>  senna 2 Tablet(s) Oral at bedtime  silver sulfADIAZINE 1% Cream 1 Application(s) Topical every 12 hours  thiamine 100 milliGRAM(s) Oral daily  valproate sodium   IVPB 1000 milliGRAM(s) IV Intermittent every 8 hours  vancomycin  IVPB 1750 milliGRAM(s) IV Intermittent every 12 hours    MEDICATIONS  (PRN):  acetaminophen     Tablet .. 650 milliGRAM(s) Oral every 6 hours PRN Temp greater or equal to 38C (100.4F)  atropine Injectable 1 milliGRAM(s) IntraMuscular once PRN HR<30  LORazepam   Injectable 2 milliGRAM(s) IV Push every 4 hours PRN seizure-like activity (i.e. twitching)      Valproic Acid Level, Serum: 29.0 ug/mL [50.0 - 100.0] (24 @ 04:38)  Valproic Acid Level, Serum: 24.0 ug/mL [50.0 - 100.0] (24 @ 04:27)  Valproic Acid Level, Serum: 27.0 ug/mL [50.0 - 100.0] (24 @ 11:21)        VEEG in the last 24 hours:    Background - continuous, symmetrical, reaching frequencies in the range of 3-4 Hz superimposed by frequent to abundant diffusely expressed triphasic waves, often presented in periodic pattern. On occasions the diffuse discharges, more so from frontopolar regions, appear clearly epileptiform in nature.      Focal and generalized slowin. moderate to severe generalized slowing  2. moderate bifrontal slowing    Interictal activity:  1. as above  2. runs of frontal/frontopolar dominant periodic rhythmic 2Hz discharges    Events - none    Seizures - none    Impression: Abnormal VEEG as above    Plan - per neurology team

## 2024-11-27 NOTE — PROGRESS NOTE ADULT - ASSESSMENT
70 y/o male with PMH of HTN, HLD, mild SLE (Lupus) ,GIB secondary to duodenal ulcer in 1/2024,  Multiple falls , Chronic ataxia and Visual Hallucinations, hx of recently diagnosed progressive supranuclear palsy for which he follows with a neurologist at [place],  presents to the ED due to cough and SOB for 2 days duration. Patient with cardiac arrest after presentation with ROSC, now intubated.  Palliative care consulted for [place] [facility].    Patient's MRI shows anoxic brain injury.    Spoke with HCP Vitaly Escalera, and Db outside patient's room in MICU. Discussed result of MRI that shows anoxic brain injury. Discussed that this would leave the patient with a poor overall neurological condition, poor overall prognosis, a need for trach/PEG, and lifelong NH placement.  They noted the patient would never want to be in nursing home, and would definitely not want to be bedbound with poor quality of life and poor neurological function for the rest of life. Discussed CMO and comfort measures only with palliative extubation. They wish to pursue palliative extubation.    Discussed case with [facility]. No plan to see donation at this time.     Recommendations  -DNR/DNI  -Comfort measures only with palliative extubation  -MOLST filled out and placed in chart  -No additional IVF, artificial nutrition, blood draws, vital signs, pressors, antibiotics, escalation of oxygen, escalation of care  -Comfort feeds OK  -Anticipate patient will die in hospital    Patient to be palliatively extubated, using the compassionate extubation protocol as a guideline. Please note that providers may choose to vary from the protocol based on the clinical needs of the patient and other factors. General overview of the protocol to be followed:    (1) Will give dilaudid 1mg IV x 1 prior to CPAP trial  (2) Wait 10-15 minutes  (3) Place on CPAP 5/5/21% and monitor for 1-2 minutes  (4) If patient is comfortable, place back on vent until planned extubation time  (5) If patient is not comfortable, place back previous vent settings, and provider will increase IV opioid dose to dilaudid 2mg IV  (6) 10-15 minutes after IV opioid, place back on CPAP as above, and monitor for 1-2 minutes  (7) Above steps to be repeated until ideal dose for patient’s comfort achieved, but for no longer than 60 minutes total duration    -Recommend dilaudid 1mg IV (if dose noted to be appropriate on trial) lorazepam 2mg IV once 10-15min prior to extubation  -recommend dilaudid 1mg IV q15min PRN for pain/dyspnea following extubation  -recommend lorazepam 2mg IV q15min PRN for agitation/anxiety or seizures  -recommend glycopyrrolate 0.2mg IV q6h PRN For secretions following extubation    Education about palliative care provided to patient/family.  See Recs below.    Please call [phone] with questions or concerns 24/7.   We will continue to follow.

## 2024-11-27 NOTE — PROGRESS NOTE ADULT - ASSESSMENT
68 y/o M PMH of HTN, HLD, mild SLE (Lupus) ,GIB secondary to duodenal ulcer in 1/2024,  Multiple falls , recently diagnosed with PSP admitted for aspiration PNA, went into PEA arrest w/ ROSC while inpatient. Found having myoclonic seizures followed by a generalized myoclonic/clonic seizures. Patient seizure free since escalation of AED's. Suspected anoxic brain injury now confirmed via MRI. Patient seizure free for several days on current regimen, and, off EEG for scalp rest. Given his pre-admission medical status, which include lupus, neurodegenerative diagnosis (PSP), and the compounding effect of his MI and subsequent seizures and anoxic brain injury, his neurological prognosis is guarded. He continues to demonstrate several brain stem reflexes, however the exam is confounded by his active infection and ongoing sedation.     Recommendations:  - c/w Keppra 1500mg BID  - c/w Depakote 1000mg q8h  - Recommend recalling palliative care to discuss GOC with family    - Rest of management per primary       Recommendations not final until attested by attending neurologist Dr. Boudreaux 70 y/o M PMH of HTN, HLD, mild SLE (Lupus) ,GIB secondary to duodenal ulcer in 1/2024,  Multiple falls , recently diagnosed with PSP admitted for aspiration PNA, went into PEA arrest w/ ROSC while inpatient. Found having myoclonic seizures followed by a generalized myoclonic/clonic seizures. Patient seizure free since escalation of AED's. Suspected anoxic brain injury now confirmed via MRI. Patient seizure free for several days on current regimen, and, off EEG for scalp rest. Given his pre-admission medical status, which include lupus, neurodegenerative diagnosis (PSP), and the compounding effect of his MI and subsequent seizures and anoxic brain injury, his neurological prognosis is guarded. He continues to demonstrate several brain stem reflexes, however the exam is confounded by his active infection and ongoing sedation.     Recommendations:  - c/w Keppra 1500mg BID  - c/w Depakote 1000mg q8h  - rEEG today, if worsened compared to past reads will considering increasing Depakote to 1500mg q8h  - Recommend recalling palliative care to discuss GOC with family    - Rest of management per primary       Discussed with attending neurologist Dr. Boudreaux

## 2024-11-27 NOTE — PROGRESS NOTE ADULT - SUBJECTIVE AND OBJECTIVE BOX
HPI:   pt is a 70 y/o male with PMH of HTN, HLD, mild SLE (Lupus) ,GIB secondary to duodenal ulcer in 1/2024,  Multiple falls , Chronic ataxia and Visual Hallucinations, hx of recently diagnosed progressive supranuclear palsy for which he follows with a neurologist at [place],  presents to the ED due to cough and SOB for 2 days duration. per cousin belly the HCP who visited him eariler today at the Adult home, the pt has been complaining of cough and SOB for the past 2 days, he said that his chest feels congested then his cousin noticed that his breathing got worse and he started wheezing and having noisy breath sounds so asked the  nurse to get him to ED, pt at that time was denying any chest pain, he also had no fever, chills , GI or  symptoms, and no sick contacts. of note cousin mentions that he had visual hallucinations for the past week or so saying he sees a man in his closet ( this is not the first time the patient has visual hallucinations and is following with a neurologist at [place]).     Interval history  -Patient seen at bedside with cousins  -He remains intubated/sedated and unable to participate in exam  -MRI shows anoxic brain injury     ADVANCE DIRECTIVES:     [ ] Full Code [x ] DNR  MOLST  [ ]  Living Will  [ ]   DECISION MAKER(s):  [x ] Health Care Proxy(s)  [ ] Surrogate(s)  [ ] Guardian           Name(s): Phone Number(s):  Jw Obriensalinasarmando      BASELINE (I)ADL(s) (prior to admission):    [place]: [ ]Total  [ ] Moderate [ ]Dependent  Palliative Performance Status Version 2:         %    http://npcrc.org/files/news/palliative_performance_scale_ppsv2.pdf    Allergies    iodine (Rash (Mild to Mod))    Intolerances    MEDICATIONS  (STANDING):  chlorhexidine 0.12% Liquid 15 milliLiter(s) Oral Mucosa every 12 hours  chlorhexidine 2% Cloths 1 Application(s) Topical <User Schedule>  enoxaparin Injectable 40 milliGRAM(s) SubCutaneous every 24 hours  folic acid 1 milliGRAM(s) Oral daily  gabapentin 100 milliGRAM(s) Oral three times a day  hydroxychloroquine 400 milliGRAM(s) Oral daily  levETIRAcetam 1500 milliGRAM(s) Oral two times a day  losartan 50 milliGRAM(s) Oral daily  metoprolol tartrate 25 milliGRAM(s) Oral two times a day  multivitamin 1 Tablet(s) Oral daily  pantoprazole   Suspension 40 milliGRAM(s) Oral two times a day  piperacillin/tazobactam IVPB.- 3.375 Gram(s) IV Intermittent once  piperacillin/tazobactam IVPB.. 3.375 Gram(s) IV Intermittent every 8 hours  polyethylene glycol 3350 17 Gram(s) Oral two times a day  senna 2 Tablet(s) Oral at bedtime  silver sulfADIAZINE 1% Cream 1 Application(s) Topical every 12 hours  thiamine 100 milliGRAM(s) Oral daily  valproate sodium   IVPB 1000 milliGRAM(s) IV Intermittent every 8 hours    MEDICATIONS  (PRN):  acetaminophen     Tablet .. 650 milliGRAM(s) Oral every 6 hours PRN Temp greater or equal to 38C (100.4F)  atropine Injectable 1 milliGRAM(s) IntraMuscular once PRN HR<30  fentaNYL    Injectable 25 MICROGram(s) IV Push every 4 hours PRN Moderate Pain (4 - 6)  LORazepam   Injectable 2 milliGRAM(s) IV Push every 4 hours PRN seizure-like activity (i.e. twitching)      PRESENT SYMPTOMS: [x ]Unable to obtain due to poor mentation   Source if other than patient:  [ ]Family   [ ]Team     Pain: [ ]yes [ ]no  ALL PAIN ASSESSMENT COMPONENTS WERE ASKED ABOUT UNLESS OTHERWISE NOTED  QOL impact -   Location -                    Aggravating factors -  Quality -  Radiation -  Timing-  Severity (0-10 scale):  Minimal acceptable level (0-10 scale):     CPOT:  1  https://www.sccm.org/getattachment/wwq74b98-1s8l-5i9b-3p9d-3909u9937o0x/Critical-Care-Pain-Observation-Tool-(CPOT)    PAIN AD Score:   http://geriatrictoolkit.[email or web address]/painad.pdf (press ctrl +  left click to view)    Dyspnea:                           [ ]None[ ]Mild [ ]Moderate [ ]Severe     Respiratory Distress Observation Scale (RDOS): 2  A score of 0 to 2 signifies little or no respiratory distress, 3 signifies mild distress, scores 4 to 6 indicate moderate distress, and scores greater than 7 signify severe distress  https://www.[facility].ca/sites/default/files/PDFS/453012-wyqqbiqhiep-julmpoot-holwyeikoxs-ehwpv.pdf    Anxiety:                             [ ]None[ ]Mild [ ]Moderate [ ]Severe   Fatigue:                             [ ]None[ ]Mild [ ]Moderate [ ]Severe   Nausea:                             [ ]None[ ]Mild [ ]Moderate [ ]Severe   Loss of appetite:              [ ]None[ ]Mild [ ]Moderate [ ]Severe   Constipation:                    [ ]None[ ]Mild [ ]Moderate [ ]Severe    Other Symptoms:  [ ]All other review of systems negative     Palliative Performance Status Version 2:         10%    http://[email or web address]/files/news/palliative_performance_scale_[email or web address].pdf    PHYSICAL EXAM:  Vital Signs Last 24 Hrs  T(C): 37.3 (27 Nov 2024 12:00), Max: 38.1 (26 Nov 2024 23:00)  T(F): 99.2 (27 Nov 2024 12:00), Max: 100.6 (26 Nov 2024 23:00)  HR: 82 (27 Nov 2024 12:00) (62 - 158)  BP: 151/70 (27 Nov 2024 12:00) (100/52 - 201/88)  BP(mean): 101 (27 Nov 2024 12:00) (70 - 127)  RR: 21 (27 Nov 2024 12:00) (17 - 34)  SpO2: 99% (27 Nov 2024 12:00) (88% - 100%)    Parameters below as of 27 Nov 2024 12:00  Patient On (Oxygen Delivery Method): ventilator    O2 Concentration (%): 40    GENERAL:  [ ] No acute distress [ ]Lethargic  [x ]Unarousable  [ ]Verbal  [ ]Non-Verbal [ ]Cachexia    BEHAVIORAL/PSYCH:  [ ]Alert and Oriented x  [ ] Anxiety [ ] Delirium [ ] Agitation [x ] Calm   EYES: [ ] No scleral icterus [ ] Scleral icterus [ ] Closed  ENMT:  [ ]Dry mouth  [ x]No external oral lesions [ ] No external ear or nose lesions  CARDIOVASCULAR:  [ ]Regular [ ]Irregular [ ]Tachy [ ]Not Tachy  [ ]Eddy [ ] Edema [ ] No edema  PULMONARY:  [ x]Tachypnea  [ ]Audible excessive secretions [x ] No labored breathing [ ] labored breathing  GASTROINTESTINAL: [ ]Soft  [ ]Distended  [ ]Not distended [ ]Non tender [ ]Tender  MUSCULOSKELETAL: [ ]No clubbing [ ] clubbing  [ ] No cyanosis [ ] cyanosis  NEUROLOGIC: [ ]No focal deficits  [ ]Follows commands  [x ]Does not follow commands  [ ]Cognitive impairment  [ ]Dysphagia  [ ]Dysarthria  [ ]Paresis   SKIN: [ ] Jaundiced [ ] Non-jaundiced [ ]Rash [ ]No Rash [ ] Warm [ ] Dry  MISC/LINES: [ x] ET tube [ ] Trach [ ]NGT/OGT [ ]PEG [ ]Andrea    LABS: reviewed by me                                   10.5   18.36 )-----------( 287      ( 27 Nov 2024 04:33 )             33.2       11-27    138  |  100  |  30[H]  ----------------------------<  110[H]  3.6   |  27  |  0.6[L]    Ca    8.4      27 Nov 2024 04:33  Phos  2.9     11-27  Mg     2.4     11-27    TPro  5.9[L]  /  Alb  3.3[L]  /  TBili  0.3  /  DBili  x   /  AST  28  /  ALT  8   /  AlkPhos  66  11-27          ABG - ( 27 Nov 2024 03:41 )  pH, Arterial: 7.50  pH, Blood: x     /  pCO2: 38    /  pO2: 140   / HCO3: 30    / Base Excess: 6.1   /  SaO2: 98.2                Urinalysis Basic - ( 27 Nov 2024 04:33 )    Color: x / Appearance: x / SG: x / pH: x  Gluc: 110 mg/dL / Ketone: x  / Bili: x / Urobili: x   Blood: x / Protein: x / Nitrite: x   Leuk Esterase: x / RBC: x / WBC x   Sq Epi: x / Non Sq Epi: x / Bacteria: x        PTT - ( 27 Nov 2024 00:47 )  PTT:29.3 sec          CAPILLARY BLOOD GLUCOSE                  RADIOLOGY & ADDITIONAL STUDIES: reviewed by me    < from: Xray Chest 1 View- PORTABLE-Routine (11.27.24 @ 04:39) >    IMPRESSION:    Stable bilateral lung opacities    < end of copied text >      EKG: reviewed by me    < from: 12 Lead ECG (11.21.24 @ 16:33) >  Ventricular Rate 37 BPM    Atrial Rate 37 BPM    P-R Interval 158 ms    QRS Duration 96 ms    Q-T Interval 444 ms    QTC Calculation(Bazett) 348 ms    P Axis 47 degrees    R Axis 30 degrees    T Axis 53 degrees    Diagnosis Line Marked sinus bradycardia  Low voltage QRS  Abnormal ECG    < end of copied text >          PROTEIN CALORIE MALNUTRITION PRESENT: [ ]mild [ ]moderate [ ]severe [ ]underweight [ ]morbid obesity  https://www.andeal.org/vault/2440/web/files/ONC/Table_Clinical%20Characteristics%20to%20Document%20Malnutrition-White%20JV%20et%20al%956877.pdf    Height (cm): 177.8 (11-18-24 @ 09:08), 180.3 (01-19-24 @ 08:18)  Weight (kg): 109.1 (11-18-24 @ 08:59), 100.7 (01-19-24 @ 08:18), 99.8 (01-16-24 @ 15:38)  BMI (kg/m2): 34.5 (11-18-24 @ 09:08), 33.6 (11-18-24 @ 08:59), 31 (01-19-24 @ 08:18)  [ ]PPSV2 < or = to 30% [ ]significant weight loss  [ ]poor nutritional intake  [ ]anasarca      [ ]Artificial Nutrition      Palliative Care Spiritual/Emotional Screening Tool Question  Severity (0-4):                    OR                    [ x] Unable to determine. Will assess at later time if appropriate.  Score of 2 or greater indicates recommendation of Chaplaincy and/or SW referral  Chaplaincy Referral: [ ] Yes [ ] Refused [ ] Following     Caregiver [place]:  [ ] Yes [ ] No    OR    [x ] Unable to determine. Will assess at later time if appropriate.  Social Work Referral [ ]  Patient and Family Centered Care Referral [ ]    Anticipatory Grief Present: [ ] Yes [ ] No    OR     [ x] Unable to determine. Will assess at later time if appropriate.  Social Work Referral [ ]  Patient and Family Centered Care Referral [ ]    Patient discussed with primary medical team MD  Palliative care education provided to patient and/or family

## 2024-11-27 NOTE — PROGRESS NOTE ADULT - SUBJECTIVE AND OBJECTIVE BOX
SUBJECTIVE/OVERNIGHT EVENTS  Today is hospital day 9d. This morning patient was seen and examined at bedside, resting comfortably in bed. No acute or major events overnight.    HOSPITAL COURSE  Day 1:   Day 2:   Day 3:     CODE STATUS:    FAMILY COMMUNICATION  Contact date:  Name of person contacted:  Relationship to patient:  Communication details:    MEDICATIONS  STANDING MEDICATIONS  chlorhexidine 0.12% Liquid 15 milliLiter(s) Oral Mucosa every 12 hours  chlorhexidine 2% Cloths 1 Application(s) Topical <User Schedule>  enoxaparin Injectable 40 milliGRAM(s) SubCutaneous every 24 hours  folic acid 1 milliGRAM(s) Oral daily  gabapentin 100 milliGRAM(s) Oral three times a day  hydroxychloroquine 400 milliGRAM(s) Oral daily  levETIRAcetam 1500 milliGRAM(s) Oral two times a day  losartan 50 milliGRAM(s) Oral daily  metoprolol tartrate 25 milliGRAM(s) Oral two times a day  multivitamin 1 Tablet(s) Oral daily  pantoprazole   Suspension 40 milliGRAM(s) Oral two times a day  piperacillin/tazobactam IVPB.- 3.375 Gram(s) IV Intermittent once  piperacillin/tazobactam IVPB.. 3.375 Gram(s) IV Intermittent every 8 hours  polyethylene glycol 3350 17 Gram(s) Oral two times a day  senna 2 Tablet(s) Oral at bedtime  silver sulfADIAZINE 1% Cream 1 Application(s) Topical every 12 hours  thiamine 100 milliGRAM(s) Oral daily  valproate sodium   IVPB 1000 milliGRAM(s) IV Intermittent every 8 hours    PRN MEDICATIONS  acetaminophen     Tablet .. 650 milliGRAM(s) Oral every 6 hours PRN  atropine Injectable 1 milliGRAM(s) IntraMuscular once PRN  fentaNYL    Injectable 25 MICROGram(s) IV Push every 4 hours PRN  LORazepam   Injectable 2 milliGRAM(s) IV Push every 4 hours PRN    VITALS  T(F): 100 (11-27-24 @ 08:00), Max: 100.6 (11-26-24 @ 23:00)  HR: 68 (11-27-24 @ 11:00) (62 - 158)  BP: 123/62 (11-27-24 @ 11:00) (100/52 - 201/88)  RR: 17 (11-27-24 @ 11:00) (17 - 34)  SpO2: 97% (11-27-24 @ 11:00) (88% - 100%)    PHYSICAL EXAM  GENERAL: NAD, lying in bed comfortably  HEART: Regular rate and rhythm, no murmurs, rubs, or gallops  LUNGS: Unlabored respirations.  Clear to auscultation bilaterally, no crackles, wheezing, or rhonchi  ABDOMEN: Soft, nontender, nondistended  EXTREMITIES: LUE erythema edema  NERVOUS SYSTEM:  Not responsive to commands. Positive corneal  SKIN: No rashes or lesions    LABS             10.5   18.36 )-----------( 287      ( 11-27-24 @ 04:33 )             33.2     138  |  100  |  30  -------------------------<  110   11-27-24 @ 04:33  3.6  |  27  |  0.6    Ca      8.4     11-27-24 @ 04:33  Phos   2.9     11-27-24 @ 04:33  Mg     2.4     11-27-24 @ 04:33    TPro  5.9  /  Alb  3.3  /  TBili  0.3  /  DBili  x   /  AST  28  /  ALT  8   /  AlkPhos  66  /  GGT  x     11-27-24 @ 04:33    PTT - ( 11-27-24 @ 00:47 )  PTT:29.3 sec      Urinalysis Basic - ( 27 Nov 2024 04:33 )    Color: x / Appearance: x / SG: x / pH: x  Gluc: 110 mg/dL / Ketone: x  / Bili: x / Urobili: x   Blood: x / Protein: x / Nitrite: x   Leuk Esterase: x / RBC: x / WBC x   Sq Epi: x / Non Sq Epi: x / Bacteria: x      ABG - ( 27 Nov 2024 03:41 )  pH, Arterial: 7.50  pH, Blood: x     /  pCO2: 38    /  pO2: 140   / HCO3: 30    / Base Excess: 6.1   /  SaO2: 98.2                IMAGING

## 2024-11-27 NOTE — PROGRESS NOTE ADULT - TIME BILLING
I have personally seen and examined this patient.  I have reviewed all pertinent clinical information and reviewed all relevant imaging and diagnostic studies personally.   I discussed my recommendations with the primary team
I have personally seen and examined this patient.  I have reviewed all pertinent clinical information and reviewed all relevant imaging and diagnostic studies personally.   I discussed my recommendations with the primary team
above
I have personally seen and examined this patient.  I have reviewed all pertinent clinical information and reviewed all relevant imaging and diagnostic studies personally.   I discussed my recommendations with the primary team
above
above

## 2024-11-27 NOTE — PROGRESS NOTE ADULT - SUBJECTIVE AND OBJECTIVE BOX
NEUROLOGY CONSULT    NEUROLOGY PROGRESS NOTE    Interval Events:  Patient tachycardic and febrile overnight. No new seizures.  Cefepime course completed, off vEEG for scalp rest. MRI brain confirming suspected brain injury, restricted diffusion inthe splenium of the corpus callosum and symmetric basal ganglia signal changes, in addition to ischemia/infarction type signal within the right body of the caudate      HPI:  68 y/o M PMH of HTN, HLD, mild SLE (Lupus) ,GIB secondary to duodenal ulcer in 1/2024,  Multiple falls , Chronic ataxia and Visual Hallucinations, hx of recently diagnosed progressive supranuclear palsy for which he follows with a neurologist at [place], presented to the ED due to cough and SOB for 2 days admitted to [facility]LE for mx of Aspiration PNA and NSTEMI II, found to be tachypneic and went into PEA arrest w/ ROSC  s/p 3 epinephrine ivp, amiodarone gtt, and epinephrine gtt. Neurology was consulted for approval for vEEG for twitching movements. On 11/19 in the AM, pt was having myoclonic seizures followed by a generalized myoclonic/clonic seizure that lasted for about 1 hour. 11/24 revealed GPDs, maximum bifrontal, triphasic waves, generalized, continuous slowing, generalized, severe. Started on AED's, vEEG yesterday showing abundant diffusely expressed periodic discharges with triphasic morphology.    CBC and CMP unremarkable   EKG NSR  Trops 32>>29   Lactate negative    CXR with b/l PNA   was given 1 L LR bolus and 1g cefepime in ED    pt is admitted to MICU (18 Nov 2024 03:54)     MEDICATIONS  Home Medications:  acetaminophen 325 mg oral tablet: 2 tab(s) orally every 6 hours as needed for pain or fever (29 Jan 2024 07:48)  fluticasone 50 mcg/inh nasal spray: 1 spray(s) in each nostril 2 times a day (18 Nov 2024 03:48)  folic acid 1 mg oral tablet: 1 tab(s) orally once a day (18 Jan 2024 12:17)  gabapentin 100 mg oral capsule: 1 cap(s) orally 3 times a day (every 8 hours) (29 Jan 2024 07:48)  hydroxychloroquine 200 mg oral tablet: 2 tab(s) orally once a day (16 Jan 2024 02:53)  lidocaine 4% topical film: Apply topically to affected area once a day apply to affected  area on right chest wall once daily, leave on for 12 hours, remove for 12 hours, wash hands after use ; avoid open skin - apply to intact skin only (29 Jan 2024 07:48)  losartan 50 mg oral tablet: 1 tab(s) orally once a day (18 Nov 2024 03:48)  Multiple Vitamins oral tablet: 1 tab(s) orally once a day (18 Jan 2024 12:17)  pantoprazole 40 mg oral delayed release tablet: 1 tab(s) orally 2 times a day ; take 1st tab in the morning 30 minutes before breakfast, and take the 2nd tab at bedtime (29 Jan 2024 07:48)  silver sulfADIAZINE 1% topical cream: Apply topically to affected area every 12 hours :  wash with soap and water, pat dry, apply silvadene to R chest and b/l  UE&#x27;s and b/l LE&#x27;s, cover with adaptic + DSD + kerlix (29 Jan 2024 07:55)  TAMSULOSIN HCL 0.4 MG CAPSULE: 1 cap(s) orally once a day (at bedtime) (29 Jan 2024 07:48)  thiamine 100 mg oral tablet: 1 tab(s) orally once a day (18 Jan 2024 12:17)    MEDICATIONS  (STANDING):  chlorhexidine 0.12% Liquid 15 milliLiter(s) Oral Mucosa every 12 hours  chlorhexidine 2% Cloths 1 Application(s) Topical <User Schedule>  enoxaparin Injectable 40 milliGRAM(s) SubCutaneous every 24 hours  fentaNYL    Injectable 50 MICROGram(s) IV Push every 4 hours  fentaNYL    Injectable 25 MICROGram(s) IV Push every 4 hours  folic acid 1 milliGRAM(s) Oral daily  gabapentin 100 milliGRAM(s) Oral three times a day  hydroxychloroquine 400 milliGRAM(s) Oral daily  levETIRAcetam 1500 milliGRAM(s) Oral two times a day  losartan 50 milliGRAM(s) Oral daily  metoprolol tartrate 25 milliGRAM(s) Oral two times a day  multivitamin 1 Tablet(s) Oral daily  pantoprazole   Suspension 40 milliGRAM(s) Oral two times a day  piperacillin/tazobactam IVPB.- 3.375 Gram(s) IV Intermittent once  piperacillin/tazobactam IVPB.. 3.375 Gram(s) IV Intermittent every 8 hours  polyethylene glycol 3350 17 Gram(s) Oral two times a day  senna 2 Tablet(s) Oral at bedtime  silver sulfADIAZINE 1% Cream 1 Application(s) Topical every 12 hours  thiamine 100 milliGRAM(s) Oral daily  valproate sodium   IVPB 1000 milliGRAM(s) IV Intermittent every 8 hours    MEDICATIONS  (PRN):  acetaminophen     Tablet .. 650 milliGRAM(s) Oral every 6 hours PRN Temp greater or equal to 38C (100.4F)  atropine Injectable 1 milliGRAM(s) IntraMuscular once PRN HR<30  LORazepam   Injectable 2 milliGRAM(s) IV Push every 4 hours PRN seizure-like activity (i.e. twitching)      FAMILY HISTORY:  Family history of colon cancer in father      SOCIAL HISTORY: negative for tobacco, alcohol, or ilicit drug use.    Allergies    iodine (Rash (Mild to Mod))    Intolerances        GEN: NAD, pleasant, cooperative    NEURO:   - Mechanically vented, sedated on propofol. Not following commands  - Pupils reactive bilaterally. No gaze preference.   - Occasional myoclonic twitches of R lower face. Tremulousness on the RUE >L . Increased tone in RUE  - Withdraws to pain from all extremities.   - (-) gag reflex,  (+) corneal reflex    LABS:                        10.5   18.36 )-----------( 287      ( 27 Nov 2024 04:33 )             33.2     11-27    138  |  100  |  30[H]  ----------------------------<  110[H]  3.6   |  27  |  0.6[L]    Ca    8.4      27 Nov 2024 04:33  Phos  2.9     11-27  Mg     2.4     11-27    TPro  5.9[L]  /  Alb  3.3[L]  /  TBili  0.3  /  DBili  x   /  AST  28  /  ALT  8   /  AlkPhos  66  11-27    Hemoglobin A1C:   Vitamin B12   PTT - ( 27 Nov 2024 00:47 )  PTT:29.3 sec  CAPILLARY BLOOD GLUCOSE          Urinalysis Basic - ( 27 Nov 2024 04:33 )    Color: x / Appearance: x / SG: x / pH: x  Gluc: 110 mg/dL / Ketone: x  / Bili: x / Urobili: x   Blood: x / Protein: x / Nitrite: x   Leuk Esterase: x / RBC: x / WBC x   Sq Epi: x / Non Sq Epi: x / Bacteria: x        ABG - ( 27 Nov 2024 03:41 )  pH, Arterial: 7.50  pH, Blood: x     /  pCO2: 38    /  pO2: 140   / HCO3: 30    / Base Excess: 6.1   /  SaO2: 98.2                Microbiology:      RADIOLOGY, EKG AND ADDITIONAL TESTS: Reviewed.

## 2024-11-27 NOTE — PROGRESS NOTE ADULT - CONVERSATION DETAILS
Spoke with HCP Vitaly Escalera, and Db outside patient's room in MICU. Discussed result of MRI that shows anoxic brain injury. Discussed that this would leave the patient with a poor overall neurological condition, poor overall prognosis, a need for trach/PEG, and lifelong NH placement.  They noted the patient would never want to be in nursing home, and would definitely not want to be bedbound with poor quality of life and poor neurological function for the rest of life. Discussed CMO and comfort measures only with palliative extubation. They wish to pursue palliative extubation.

## 2024-11-27 NOTE — PROGRESS NOTE ADULT - SUBJECTIVE AND OBJECTIVE BOX
Over Night Events: events noted, remains critically ill, still intubated, ventilated, ID/ Neuro noted, MRI done    PHYSICAL EXAM    ICU Vital Signs Last 24 Hrs  T(C): 37.7 (27 Nov 2024 04:00), Max: 38.1 (26 Nov 2024 23:00)  T(F): 99.9 (27 Nov 2024 04:00), Max: 100.6 (26 Nov 2024 23:00)  HR: 121 (27 Nov 2024 03:00) (62 - 146)  BP: 144/67 (27 Nov 2024 03:00) (118/58 - 201/88)  BP(mean): 97 (27 Nov 2024 03:00) (83 - 127)  RR: 23 (27 Nov 2024 03:00) (17 - 34)  SpO2: 100% (27 Nov 2024 03:00) (88% - 100%)    O2 Parameters below as of 27 Nov 2024 04:00  Patient On (Oxygen Delivery Method): ventilator    O2 Concentration (%): 50        General: ill looking  ETT  Lungs: bl rhonchi  Cardiovascular: Regular   Abdomen: Soft, Positive BS  not following commands      11-25-24 @ 07:01  -  11-26-24 @ 07:00  --------------------------------------------------------  IN:    Enteral Tube Flush: 40 mL    Free Water: 300 mL    IV PiggyBack: 700 mL    Jevity 1.2: 880 mL    Propofol (Status Epilepticus): 65.5 mL    Propofol (Status Epilepticus): 163.3 mL  Total IN: 2148.8 mL    OUT:    Indwelling Catheter - Urethral (mL): 1390 mL  Total OUT: 1390 mL    Total NET: 758.8 mL      11-26-24 @ 07:01  -  11-27-24 @ 06:16  --------------------------------------------------------  IN:    Enteral Tube Flush: 200 mL    IV PiggyBack: 600 mL    Peptamen A.F.: 360 mL    Propofol (Status Epilepticus): 104 mL    Vital High Protein: 630 mL  Total IN: 1894 mL    OUT:    Indwelling Catheter - Urethral (mL): 1190 mL  Total OUT: 1190 mL    Total NET: 704 mL          LABS:                          10.5   18.36 )-----------( 287      ( 27 Nov 2024 04:33 )             33.2                                               11-27    138  |  100  |  30[H]  ----------------------------<  110[H]  3.6   |  27  |  0.6[L]    Ca    8.4      27 Nov 2024 04:33  Phos  2.9     11-27  Mg     2.4     11-27    TPro  5.9[L]  /  Alb  3.3[L]  /  TBili  0.3  /  DBili  x   /  AST  28  /  ALT  8   /  AlkPhos  66  11-27      PTT - ( 27 Nov 2024 00:47 )  PTT:29.3 sec                                       Urinalysis Basic - ( 27 Nov 2024 04:33 )    Color: x / Appearance: x / SG: x / pH: x  Gluc: 110 mg/dL / Ketone: x  / Bili: x / Urobili: x   Blood: x / Protein: x / Nitrite: x   Leuk Esterase: x / RBC: x / WBC x   Sq Epi: x / Non Sq Epi: x / Bacteria: x                                                  LIVER FUNCTIONS - ( 27 Nov 2024 04:33 )  Alb: 3.3 g/dL / Pro: 5.9 g/dL / ALK PHOS: 66 U/L / ALT: 8 U/L / AST: 28 U/L / GGT: x                                                                                               Mode: AC/ CMV (Assist Control/ Continuous Mandatory Ventilation)  RR (machine): 14  TV (machine): 400  FiO2: 50  PEEP: 8  ITime: 1  MAP: 8  PC: 8  PIP: 17                                      ABG - ( 27 Nov 2024 03:41 )  pH, Arterial: 7.50  pH, Blood: x     /  pCO2: 38    /  pO2: 140   / HCO3: 30    / Base Excess: 6.1   /  SaO2: 98.2                MEDICATIONS  (STANDING):  cefepime   IVPB      cefepime   IVPB 2000 milliGRAM(s) IV Intermittent every 8 hours  chlorhexidine 0.12% Liquid 15 milliLiter(s) Oral Mucosa every 12 hours  chlorhexidine 2% Cloths 1 Application(s) Topical <User Schedule>  enoxaparin Injectable 40 milliGRAM(s) SubCutaneous every 24 hours  folic acid 1 milliGRAM(s) Oral daily  gabapentin 100 milliGRAM(s) Oral three times a day  hydroxychloroquine 400 milliGRAM(s) Oral daily  levETIRAcetam 1500 milliGRAM(s) Oral two times a day  losartan 50 milliGRAM(s) Oral daily  multivitamin 1 Tablet(s) Oral daily  pantoprazole   Suspension 40 milliGRAM(s) Oral two times a day  polyethylene glycol 3350 17 Gram(s) Oral two times a day  propofol Infusion. 10 MICROgram(s)/kG/Min (6.55 mL/Hr) IV Continuous <Continuous>  senna 2 Tablet(s) Oral at bedtime  silver sulfADIAZINE 1% Cream 1 Application(s) Topical every 12 hours  thiamine 100 milliGRAM(s) Oral daily  valproate sodium   IVPB 1000 milliGRAM(s) IV Intermittent every 8 hours  vancomycin  IVPB 1750 milliGRAM(s) IV Intermittent every 12 hours    MEDICATIONS  (PRN):  acetaminophen     Tablet .. 650 milliGRAM(s) Oral every 6 hours PRN Temp greater or equal to 38C (100.4F)  atropine Injectable 1 milliGRAM(s) IntraMuscular once PRN HR<30  LORazepam   Injectable 2 milliGRAM(s) IV Push every 4 hours PRN seizure-like activity (i.e. twitching)    cxr noted     Over Night Events: events noted, remains critically ill, still intubated, ventilated, ID/ Neuro noted, MRI noted, PROPOFOL    PHYSICAL EXAM    ICU Vital Signs Last 24 Hrs  T(C): 37.7 (27 Nov 2024 04:00), Max: 38.1 (26 Nov 2024 23:00)  T(F): 99.9 (27 Nov 2024 04:00), Max: 100.6 (26 Nov 2024 23:00)  HR: 121 (27 Nov 2024 03:00) (62 - 146)  BP: 144/67 (27 Nov 2024 03:00) (118/58 - 201/88)  BP(mean): 97 (27 Nov 2024 03:00) (83 - 127)  RR: 23 (27 Nov 2024 03:00) (17 - 34)  SpO2: 100% (27 Nov 2024 03:00) (88% - 100%)    O2 Parameters below as of 27 Nov 2024 04:00  Patient On (Oxygen Delivery Method): ventilator    O2 Concentration (%): 50        General: ill looking  ETT  Lungs: bl rhonchi  Cardiovascular: Regular   Abdomen: Soft, Positive BS  not following commands      11-25-24 @ 07:01  -  11-26-24 @ 07:00  --------------------------------------------------------  IN:    Enteral Tube Flush: 40 mL    Free Water: 300 mL    IV PiggyBack: 700 mL    Jevity 1.2: 880 mL    Propofol (Status Epilepticus): 65.5 mL    Propofol (Status Epilepticus): 163.3 mL  Total IN: 2148.8 mL    OUT:    Indwelling Catheter - Urethral (mL): 1390 mL  Total OUT: 1390 mL    Total NET: 758.8 mL      11-26-24 @ 07:01  -  11-27-24 @ 06:16  --------------------------------------------------------  IN:    Enteral Tube Flush: 200 mL    IV PiggyBack: 600 mL    Peptamen A.F.: 360 mL    Propofol (Status Epilepticus): 104 mL    Vital High Protein: 630 mL  Total IN: 1894 mL    OUT:    Indwelling Catheter - Urethral (mL): 1190 mL  Total OUT: 1190 mL    Total NET: 704 mL          LABS:                          10.5   18.36 )-----------( 287      ( 27 Nov 2024 04:33 )             33.2                                               11-27    138  |  100  |  30[H]  ----------------------------<  110[H]  3.6   |  27  |  0.6[L]    Ca    8.4      27 Nov 2024 04:33  Phos  2.9     11-27  Mg     2.4     11-27    TPro  5.9[L]  /  Alb  3.3[L]  /  TBili  0.3  /  DBili  x   /  AST  28  /  ALT  8   /  AlkPhos  66  11-27      PTT - ( 27 Nov 2024 00:47 )  PTT:29.3 sec                                       Urinalysis Basic - ( 27 Nov 2024 04:33 )    Color: x / Appearance: x / SG: x / pH: x  Gluc: 110 mg/dL / Ketone: x  / Bili: x / Urobili: x   Blood: x / Protein: x / Nitrite: x   Leuk Esterase: x / RBC: x / WBC x   Sq Epi: x / Non Sq Epi: x / Bacteria: x                                                  LIVER FUNCTIONS - ( 27 Nov 2024 04:33 )  Alb: 3.3 g/dL / Pro: 5.9 g/dL / ALK PHOS: 66 U/L / ALT: 8 U/L / AST: 28 U/L / GGT: x                                                                                               Mode: AC/ CMV (Assist Control/ Continuous Mandatory Ventilation)  RR (machine): 14  TV (machine): 400  FiO2: 40  PEEP: 8  ITime: 1  MAP: 8  PC: 8  PIP: 17                                      ABG - ( 27 Nov 2024 03:41 )  pH, Arterial: 7.50  pH, Blood: x     /  pCO2: 38    /  pO2: 140   / HCO3: 30    / Base Excess: 6.1   /  SaO2: 98.2                MEDICATIONS  (STANDING):  cefepime   IVPB      cefepime   IVPB 2000 milliGRAM(s) IV Intermittent every 8 hours  chlorhexidine 0.12% Liquid 15 milliLiter(s) Oral Mucosa every 12 hours  chlorhexidine 2% Cloths 1 Application(s) Topical <User Schedule>  enoxaparin Injectable 40 milliGRAM(s) SubCutaneous every 24 hours  folic acid 1 milliGRAM(s) Oral daily  gabapentin 100 milliGRAM(s) Oral three times a day  hydroxychloroquine 400 milliGRAM(s) Oral daily  levETIRAcetam 1500 milliGRAM(s) Oral two times a day  losartan 50 milliGRAM(s) Oral daily  multivitamin 1 Tablet(s) Oral daily  pantoprazole   Suspension 40 milliGRAM(s) Oral two times a day  polyethylene glycol 3350 17 Gram(s) Oral two times a day  propofol Infusion. 10 MICROgram(s)/kG/Min (6.55 mL/Hr) IV Continuous <Continuous>  senna 2 Tablet(s) Oral at bedtime  silver sulfADIAZINE 1% Cream 1 Application(s) Topical every 12 hours  thiamine 100 milliGRAM(s) Oral daily  valproate sodium   IVPB 1000 milliGRAM(s) IV Intermittent every 8 hours  vancomycin  IVPB 1750 milliGRAM(s) IV Intermittent every 12 hours    MEDICATIONS  (PRN):  acetaminophen     Tablet .. 650 milliGRAM(s) Oral every 6 hours PRN Temp greater or equal to 38C (100.4F)  atropine Injectable 1 milliGRAM(s) IntraMuscular once PRN HR<30  LORazepam   Injectable 2 milliGRAM(s) IV Push every 4 hours PRN seizure-like activity (i.e. twitching)    cxr noted

## 2024-11-27 NOTE — PROGRESS NOTE ADULT - ASSESSMENT
IMPRESSION:    Acute hypoxemic respiratory failure remains intubated  PNA  RUE cellulitis  Possible aspiration   Seizures - status  SP CPA   VTach   HO SLE   Shock now off Levophed       PLAN:    CNS: Continue Keppra and Depakote.  Neuro eval noted.  MRI.  Wean propofol , NSE. fup MRI    HEENT: Oral care.  ET Care     PULMONARY:  HOB @ 45 degrees.  Vent changes as follows: ARDS net MV settings.;  PEEP 8.  RR 14.  .  Monitor Airway pressures.  Wean O2 as tolerated. DECFIO2 40%     CARDIOVASCULAR:   ECHO noted, avoid overload    GI: GI prophylaxis.  OG Feeding.  Bowel regimen     RENAL:  Follow up lytes.  Correct as needed.  Andrea for retention     INFECTIOUS DISEASE: Follow up cultures. zosyn    HEMATOLOGICAL:  DVT prophylaxis.  Monitor CBC     ENDOCRINE:  Follow up FS.  Insulin protocol if needed.    MUSCULOSKELETAL:  Off loading.  Bed rest      MICU  very poor prognosis IMPRESSION:    Acute hypoxemic respiratory failure remains intubated  PNA  RUE cellulitis  Possible aspiration   Anoxic brain injury  Seizures - status  SP CPA   VTach   HO SLE   Shock now off Levophed       PLAN:    CNS: Continue Keppra and Depakote.  Neuro eval noted.  MRI.  Wean propofol , NSE. fup MRI noted    HEENT: Oral care.  ET Care     PULMONARY:  HOB @ 45 degrees.  Vent changes as follows: ARDS net MV settings.;  PEEP 8.  RR 14.  .  Monitor Airway pressures.  Wean O2 as tolerated. DEC FIO2 40%     CARDIOVASCULAR:   ECHO noted, avoid overload    GI: GI prophylaxis.  OG Feeding.  Bowel regimen     RENAL:  Follow up lytes.  Correct as needed.  Andrea for retention     INFECTIOUS DISEASE: Follow up cultures. zosyn    HEMATOLOGICAL:  DVT prophylaxis.  Monitor CBC     ENDOCRINE:  Follow up FS.  Insulin protocol if needed.    MUSCULOSKELETAL:  Off loading.  Bed rest      MICU  very poor prognosis;  Palliative care fup

## 2024-11-29 LAB — FUNGITELL: 38 PG/ML — SIGNIFICANT CHANGE UP

## 2024-12-01 LAB
CULTURE RESULTS: SIGNIFICANT CHANGE UP
SPECIMEN SOURCE: SIGNIFICANT CHANGE UP

## 2024-12-04 LAB — NSE FLD-MCNC: 11.3 NG/ML — SIGNIFICANT CHANGE UP (ref 0–17.6)

## 2024-12-09 NOTE — CDI QUERY NOTE - [id number]_GEN_ALL_CORE_HH
Clinical documentation and/or evidence of the patient’s presentation, evaluation, and medical management, as evidenced below, may support a diagnosis that is not documented in the medical record.  In order to ensure accurate coding and accuracy of the clinical record, the documentation in this patient’s medical record requires additional clarification.      If you think the supporting documentation and/or clinical evidence supports a more specific diagnosis, please include more specific documentation of a diagnosis associated with these findings in your progress note and/or discharge summary.    	  Please clarify if shock can be further specified as:    • Sepsis with septic shock could not be excluded at the time of discharge	  • Sepsis with septic shock ruled out  • Other (specify)    Supporting documentation and/or clinical evidence:     11/18 ED Provider Note: went into respiratory then cardiac arrest, intubated, 3 epinephrine given, vfib on monitor, 200J defibrillation, will admit to ICU. respiratory arrest most likely 2/2 sepsis/aspiration  Principal Discharge Dx Pneumonia, aspiration.  SEPSIS – was this patient treated for sepsis? No.     H&P Adult: Acute Hypoxic respiratory failure s/p intubation iso Aspiration/Multifocal PNA   Cardiac arrest     11/18 Consult Note Adult-Infectious Disease Attending: Shock , rule out septic   RECOMMENDATIONS- Unclear if true infectious source  - Given critical illness , continue piperacillin/tazobactam IVPB.. 3.375 Gram(s) IV Intermittent every 8 hours    11/18 Consult Note Adult-Critical Care Attending: Acute hypoxemic respiratory failure   Possible aspiration  Shock on levophed   Post infectious HAAD ?    11/27 Progress Note Adult-Critical Care Attending : Acute hypoxemic respiratory failure remains intubated  PNA  RUE cellulitis  Possible aspiration   Anoxic brain injury  Shock now off Levophed     11/26 Progress Note Adult-Infectious Disease Attending: Fever    CXR Right basilar opacity/discoid atelectasis, increased. Redemonstration left basilar opacity..  Intubated on mechanical ventilation   Shock , rule out septic   Afebrile   Admission WBC 12   11/18 BCX NGTD , UCX NG, sputum NG    Procalcitonin: 0.44 (11-18-24 @ 10:20)   Admission CXR no PNA; Repeat CXR bilateral opacities   < from: CT Chest No Cont (11.18.24 @ 15:39) >  Bilateral lower lobe consolidations with volume loss left lower lobe       Admit to Inpatient Level of Care: Diagnosis; Pneumonia, aspiration    Antimicrobial/Anti- Infectives  IV Zosyn Indication: Pneumonia (11/18-11/24) x 22 doses  IV Doxycycline Indication: empiric pneumonia (11/18-11/24) x 13 doses  IV Vancomycin Iddication: Empiric Dosing (11/25-11/27) x 4 doses  IV Cefepime Indication: sepsis (11/25-11/27) x 6 doses    General Medication: norepinephrine (11/18-11/21)    WBC: 11/18- 12.67, 21.08  Procalcitonin: 11/18- 0.44    Vital Signs- @ 23:25- Temp 98.4 HR- 64 RR- 20 Room Air 95%  @ 23:45- Temp 99.6 Rectal Temp- 37.6 RR- 22    Thank you,  Sis Higginbotham RN, BSN, CCDS  [phone]/ TEAMS (preferred)

## 2024-12-10 NOTE — PROGRESS NOTE ADULT - CRITICAL CARE ATTENDING COMMENT
The patient's injury acutely impairs one or more vital organ systems, and there is a high probability of imminent or life threatening deterioration in the patient’s condition. The care of this patient requires complex medical decision making in the field of Infectious Diseases and extensive interpretation of laboratory, microbiological, and radiographic data No